# Patient Record
Sex: MALE | Race: WHITE | Employment: OTHER | URBAN - METROPOLITAN AREA
[De-identification: names, ages, dates, MRNs, and addresses within clinical notes are randomized per-mention and may not be internally consistent; named-entity substitution may affect disease eponyms.]

---

## 2017-01-02 ENCOUNTER — TELEPHONE (OUTPATIENT)
Dept: FAMILY MEDICINE | Facility: CLINIC | Age: 82
End: 2017-01-02

## 2017-01-02 ENCOUNTER — ANTICOAGULATION THERAPY VISIT (OUTPATIENT)
Dept: FAMILY MEDICINE | Facility: CLINIC | Age: 82
End: 2017-01-02
Payer: COMMERCIAL

## 2017-01-02 DIAGNOSIS — Z79.01 LONG-TERM (CURRENT) USE OF ANTICOAGULANTS: ICD-10-CM

## 2017-01-02 DIAGNOSIS — I48.91 ATRIAL FIBRILLATION (H): Primary | ICD-10-CM

## 2017-01-02 LAB — INR PPP: 3.5

## 2017-01-02 PROCEDURE — 99207 ZZC NO CHARGE NURSE ONLY: CPT | Performed by: FAMILY MEDICINE

## 2017-01-02 NOTE — TELEPHONE ENCOUNTER
ED / Discharge Outreach Protocol    Patient Contact    Attempt # 1    Was call answered?  No.  Left message on voicemail with information to speak with triage note.  Discharge note sounds like the patient went to a nursing home. Please confirm.      Follow-up Appointments      Follow Up and recommended labs and tests        Follow up with Nursing home physician.  No follow up labs or test are needed.  Will need to ensure he is having improvement in his symptoms of knee pain and ability to ambulate.  Will need to ensure follow up with Orthopedics.  Follow up with Ortho - Dr. Zuniga in 1-2 weeks.                Brandy Miller RN

## 2017-01-02 NOTE — PROGRESS NOTES
ANTICOAGULATION FOLLOW-UP CLINIC VISIT    Patient Name:  Earl Sanchez  Date:  1/2/2017  Contact Type:  Telephone/ [facility], [place], [phone]    SUBJECTIVE:     Patient Findings     Positives No Problem Findings    Comments Patient recently discharged from a nursing home to [facility].            OBJECTIVE    INR   Date Value Ref Range Status   01/02/2017 3.5  Final       ASSESSMENT / PLAN  INR assessment SUPRA    Recheck INR In: 1 WEEK    INR Location Homecare INR      Anticoagulation Summary as of 1/2/2017     INR goal 2.0-3.0   Selected INR 3.5! (1/2/2017)   Maintenance plan 4 mg (2 mg x 2) on Mon, Wed, Fri; 2 mg (2 mg x 1) all other days   Full instructions 1/4: 2 mg; Otherwise 4 mg on Mon, Wed, Fri; 2 mg all other days   Weekly total 20 mg   Plan last modified Brandy Miller RN (3/11/2016)   Next INR check 1/9/2017   Priority INR   Target end date Indefinite    Indications   Long-term (current) use of anticoagulants [Z79.01] [Z79.01]  Atrial fibrillation (H) [I48.91]         Anticoagulation Episode Summary     INR check location     Preferred lab     Send INR reminders to [facility]    Comments       Anticoagulation Care Providers     Provider Role Specialty Phone number    Alvarado Florian MD [facility] [phone]            See the Encounter Report to view Anticoagulation Flowsheet and Dosing Calendar (Go to Encounters tab in chart review, and find the Anticoagulation Therapy Visit)    Dosage adjustment made based on physician directed care plan.    Brandy Miller, RN

## 2017-01-02 NOTE — TELEPHONE ENCOUNTER
Patient returned call to clinic but hung up before call connected.     Tried to call patient back but got voice mail.     KENDRICK Delgado

## 2017-01-02 NOTE — TELEPHONE ENCOUNTER
Pt was discharged on 12/30/2016 after being treated for a tear of the meniscusof the L knee. Please call the pt. Thank you.  Naya Esposito,     .

## 2017-01-02 NOTE — TELEPHONE ENCOUNTER
Home care called asking for orders    opened to home care on 12/31/16    Needs INR today. Different nurse will see today and call with result.     Verbal ok on skilled nursing 1 X week for 1 week, 2 X week for 2 weeks and 1 X week for 2 weeks and 2 prn visits     Asked verbal order for INR draw for home care today    PT/OT and home health aid 2 X week for 2 weeks    Approval given to fax orders for yanelis Delgado RN

## 2017-01-03 NOTE — TELEPHONE ENCOUNTER
Patient has hospital f/u scheduled 1/5/17.   Minal Daley RN   [facility] - Triage

## 2017-01-05 ENCOUNTER — OFFICE VISIT (OUTPATIENT)
Dept: FAMILY MEDICINE | Facility: CLINIC | Age: 82
End: 2017-01-05
Payer: COMMERCIAL

## 2017-01-05 VITALS
TEMPERATURE: 97.8 F | HEART RATE: 80 BPM | DIASTOLIC BLOOD PRESSURE: 70 MMHG | WEIGHT: 155 LBS | BODY MASS INDEX: 23.49 KG/M2 | HEIGHT: 68 IN | SYSTOLIC BLOOD PRESSURE: 110 MMHG

## 2017-01-05 DIAGNOSIS — S83.209D CURRENT TEAR OF MENISCUS OF KNEE, UNSPECIFIED LATERALITY, UNSPECIFIED MENISCUS, UNSPECIFIED TEAR TYPE, SUBSEQUENT ENCOUNTER: ICD-10-CM

## 2017-01-05 DIAGNOSIS — R53.81 PHYSICAL DECONDITIONING: Primary | ICD-10-CM

## 2017-01-05 PROCEDURE — 99214 OFFICE O/P EST MOD 30 MIN: CPT | Performed by: FAMILY MEDICINE

## 2017-01-05 NOTE — PROGRESS NOTES
SUBJECTIVE:                                                    Earl Sanchez is a 89 year old male who presents to clinic today for the following health issues:          Hospital Follow-up Visit:    Hospital/Nursing Home/ Rehab Facility: [facility]  Date of Admission: 12/17/16  Date of Discharge: 12/29/16  Reason(s) for Admission: Torn meniscus            Problems taking medications regularly:  None       Medication changes since discharge: None       Problems adhering to non-medication therapy:  None    Summary of hospitalization:  [facility] discharge summary reviewed  Diagnostic Tests/Treatments reviewed.  Follow up needed: none  Other Healthcare Providers Involved in Patient s Care:         None  Update since discharge: improved.     Post Discharge Medication Reconciliation: discharge medications reconciled, continue medications without change.  Plan of care communicated with patient     Coding guidelines for this visit:  Type of Medical   Decision Making Face-to-Face Visit       within 7 Days of discharge Face-to-Face Visit        within 14 days of discharge   Moderate Complexity [id number]   High Complexity [id number]                Problem list and histories reviewed & adjusted, as indicated.  Additional history: as documented    Patient Active Problem List   Diagnosis     Hypertension goal BP (blood pressure) < 140/90     CAD (coronary artery disease)     Osteoarthritis     Gait apraxia     Atrial fibrillation (H)     BPH (benign prostatic hyperplasia)     Heart failure (H)     Hyperlipidemia with target LDL less than 100     CKD (chronic kidney disease) stage 3, GFR 30-59 ml/min     Microalbuminuria     Anemia     Hypothyroidism     Elevated alkaline phosphatase level     Advance care planning     Health Care Home     Chronic anticoagulation     Elevated fasting glucose     Toe inflammation     Elevated uric acid in blood     Gastritis     Abdominal aortic aneurysm (H)      Advanced directives, counseling/discussion     Long-term (current) use of anticoagulants [Z79.01]     Hereditary multiple exostosis     General weakness     Tear of meniscus of left knee     Past Surgical History   Procedure Laterality Date     Hernia repair, inguinal rt/lt       right     Ankle surgery       ORIF ankle fracture     Phacoemulsification clear cornea with toric intraocular lens implant  4/10/2014     Procedure: PHACOEMULSIFICATION CLEAR CORNEA WITH TORIC INTRAOCULAR LENS IMPLANT;  RIGHT PHACOEMULSIFICATION CLEAR CORNEA WITH TORIC INTRAOCULAR LENS IMPLANT ;  Surgeon: Carlo Tee MD;  Location: [facility]     Phacoemulsification clear cornea with toric intraocular lens implant  4/22/2014     Procedure: PHACOEMULSIFICATION CLEAR CORNEA WITH TORIC INTRAOCULAR LENS IMPLANT;  Surgeon: Carlo Tee MD;  Location: [facility]     Coronary artery bypass  2007     CAB X 5 CABG with LIMA to LAD and saphenous vein grafts to, OM2,SVG to diag 1, and sequential PDA     Heart cath left heart cath  12/10/07       Social History   Substance Use Topics     Smoking status: Never Smoker      Smokeless tobacco: Never Used     Alcohol Use: Yes      Comment: occ - one glass of wine last night     Family History   Problem Relation Age of Onset     Hypertension Mother      Hypertension Maternal Grandmother          Current Outpatient Prescriptions   Medication Sig Dispense Refill     Warfarin Therapy Reminder 1 each continuous prn       senna-docusate (SENOKOT-S;PERICOLACE) 8.6-50 MG per tablet Take 1 tablet by mouth 2 times daily       acetaminophen (TYLENOL) 325 MG tablet Take 650 mg by mouth 3 times daily       acetaminophen (TYLENOL) 500 MG tablet Take 1,000 mg by mouth nightly as needed for mild pain       furosemide (LASIX) 40 MG tablet TAKE 1 TABLET IN THE MORNING 180 tablet 1     levothyroxine (SYNTHROID, LEVOTHROID) 50 MCG tablet Take 1 tablet (50 mcg) by mouth daily 90 tablet 2     doxazosin (CARDURA) 4  MG tablet Take 1 tablet (4 mg) by mouth At Bedtime 90 tablet 1     lisinopril (PRINIVIL,ZESTRIL) 10 MG tablet Take 0.5 tablets (5 mg) by mouth daily 45 tablet 1     pravastatin (PRAVACHOL) 40 MG tablet Take 1 tablet (40 mg) by mouth daily 90 tablet 3     nitroglycerin (NITROSTAT) 0.4 MG SL tablet Place 1 tablet (0.4 mg) under the tongue every 5 minutes as needed for chest pain 25 tablet 0     aspirin (ASPIRIN LOW DOSE) 81 MG tablet Take 81 mg by mouth daily        No Known Allergies  Recent Labs   Lab Test 12/20/16  12/15/16   1330  05/11/16   1441  03/22/16   1406  06/11/15   1039  05/14/15   1059  04/16/15   1039  02/05/15   1344   04/08/14   1052  02/03/14   0917   03/12/13   1018   08/28/12   0911   A1C   --    --    --    --    --    --    --   5.9   --   6.2*   --    --   5.6   --    --    LDL   --    --    --   43   --    --   47   --    --    --   61   --    --    --   65   HDL   --    --    --   51   --    --   55   --    --    --   54   --    --    --   35*   TRIG   --    --    --   135   --    --   89   --    --    --   71   --    --    --   88   ALT   --    --    --    --   17   --    --    --    --    --   18   --    --    --   21   CR  1.23*  1.23   --    --   1.62*   --    --   1.60*   < >   --    --    < >  1.68*   < >  1.37*   GFRESTIMATED  51*  55*   --    --   40*   --    --   41*   < >   --    --    < >  39*   < >  49*   GFRESTBLACK  >60  67   --    --   49*   --    --   50*   < >   --    --    < >  47*   < >  60*   POTASSIUM  4.1  3.9   --    --   3.9   --    --   4.3   < >  4.1   --    < >  4.2   < >  4.2   TSH   --    --   1.85   --    --   2.79   --    --    < >  4.74   --    --   2.71   < >  9.78*    < > = values in this interval not displayed.      BP Readings from Last 3 Encounters:   01/05/17 110/70   12/28/16 140/76   12/18/16 117/66    Wt Readings from Last 3 Encounters:   01/05/17 155 lb (70.308 kg)   12/28/16 158 lb 6.4 oz (71.85 kg)   12/18/16 160 lb 11.2 oz (72.893 kg)           "          ROS:  Constitutional, HEENT, cardiovascular, pulmonary, gi and gu systems are negative, except as otherwise noted.    OBJECTIVE:                                                    /70 mmHg  Pulse 80  Temp(Src) 97.8  F (36.6  C) (Tympanic)  Ht 5' 8\" (1.727 m)  Wt 155 lb (70.308 kg)  BMI 23.57 kg/m2  Body mass index is 23.57 kg/(m^2).  GENERAL: healthy, alert and no distress  NECK: no adenopathy, no asymmetry, masses, or scars and thyroid normal to palpation  RESP: lungs clear to auscultation - no rales, rhonchi or wheezes  CV: regular rate and rhythm, normal S1 S2, no S3 or S4, no murmur, click or rub, no peripheral edema and peripheral pulses strong  ABDOMEN: soft, nontender, no hepatosplenomegaly, no masses and bowel sounds normal  MS: no gross musculoskeletal defects noted, no edema         ASSESSMENT/PLAN:                                                    Earl was seen today for recheck.    Diagnoses and all orders for this visit:    Physical deconditioning    Current tear of meniscus of knee, unspecified laterality, unspecified meniscus, unspecified tear type, subsequent encounter      Improving knee pain, finishing PT, I&O is stable,   Will keep him on current dose of meds, and keep monitoring     FUTURE APPOINTMENTS:       - Follow-up visit in 6 months     Alvarado Florian MD  [facility] [place]    "

## 2017-01-09 ENCOUNTER — ANTICOAGULATION THERAPY VISIT (OUTPATIENT)
Dept: NURSING | Facility: CLINIC | Age: 82
End: 2017-01-09

## 2017-01-09 DIAGNOSIS — I48.91 ATRIAL FIBRILLATION (H): Primary | ICD-10-CM

## 2017-01-09 DIAGNOSIS — Z79.01 LONG-TERM (CURRENT) USE OF ANTICOAGULANTS: ICD-10-CM

## 2017-01-09 LAB — INR PPP: 3.1

## 2017-01-09 NOTE — PROGRESS NOTES
ANTICOAGULATION FOLLOW-UP CLINIC VISIT    Patient Name:  Earl Sanchez  Date:  1/9/2017  Contact Type:  Telephone/ [facility], [place], [phone]    SUBJECTIVE:     Patient Findings     Positives No Problem Findings           OBJECTIVE    INR   Date Value Ref Range Status   01/09/2017 3.1  Final       ASSESSMENT / PLAN  INR assessment THER    Recheck INR In: 1 WEEK    INR Location [facility]R      Anticoagulation Summary as of 1/9/2017     INR goal 2.0-3.0   Selected INR 3.1! (1/9/2017)   Maintenance plan 4 mg (2 mg x 2) on Mon, Fri; 2 mg (2 mg x 1) all other days   Full instructions 4 mg on Mon, Fri; 2 mg all other days   Weekly total 18 mg   Plan last modified Brandy Miller RN (1/9/2017)   Next INR check 1/16/2017   Priority INR   Target end date Indefinite    Indications   Long-term (current) use of anticoagulants [Z79.01] [Z79.01]  Atrial fibrillation (H) [I48.91]         Anticoagulation Episode Summary     INR check location     Preferred lab     Send INR reminders to [facility]    Comments       Anticoagulation Care Providers     Provider Role Specialty Phone number    Alvarado Florian MD [facility] [phone]            See the Encounter Report to view Anticoagulation Flowsheet and Dosing Calendar (Go to Encounters tab in chart review, and find the Anticoagulation Therapy Visit)    Dosage adjustment made based on physician directed care plan.    Brandy Miller, KENDRICK

## 2017-01-16 ENCOUNTER — ANTICOAGULATION THERAPY VISIT (OUTPATIENT)
Dept: FAMILY MEDICINE | Facility: CLINIC | Age: 82
End: 2017-01-16
Payer: COMMERCIAL

## 2017-01-16 DIAGNOSIS — Z79.01 LONG-TERM (CURRENT) USE OF ANTICOAGULANTS: ICD-10-CM

## 2017-01-16 DIAGNOSIS — I48.91 ATRIAL FIBRILLATION (H): Primary | ICD-10-CM

## 2017-01-16 LAB — INR PPP: 2.5

## 2017-01-16 PROCEDURE — 99207 ZZC NO CHARGE NURSE ONLY: CPT | Performed by: FAMILY MEDICINE

## 2017-01-16 NOTE — PROGRESS NOTES
ANTICOAGULATION FOLLOW-UP CLINIC VISIT    Patient Name:  Earl Sanchez  Date:  1/16/2017  Contact Type:  Telephone/ YAMILETH Eng [phone]    SUBJECTIVE:     Patient Findings     Positives No Problem Findings           OBJECTIVE    INR   Date Value Ref Range Status   01/09/2017 3.1  Final       ASSESSMENT / PLAN  INR assessment THER    Recheck INR In: 2 WEEKS    INR Location Clinic      Anticoagulation Summary as of 1/16/2017     INR goal 2.0-3.0   Selected INR    Maintenance plan 4 mg (2 mg x 2) on Mon, Fri; 2 mg (2 mg x 1) all other days   Full instructions 4 mg on Mon, Fri; 2 mg all other days   Weekly total 18 mg   No change documented Ellen Negro RN   Plan last modified Brandy Miller RN (1/9/2017)   Next INR check 1/30/2017   Priority INR   Target end date Indefinite    Indications   Long-term (current) use of anticoagulants [Z79.01] [Z79.01]  Atrial fibrillation (H) [I48.91]         Anticoagulation Episode Summary     INR check location     Preferred lab     Send INR reminders to  [facility]    Comments       Anticoagulation Care Providers     Provider Role Specialty Phone number    Alvarado Florian MD [facility] [phone]            See the Encounter Report to view Anticoagulation Flowsheet and Dosing Calendar (Go to Encounters tab in chart review, and find the Anticoagulation Therapy Visit)    YAMILETH Eng  [phone] report INR of 2.5 today.    Advised to continue with 4 mg on Mon, Fri,  2 mg all other days.  Recheck in 2 weeks in clinic.  Patient will call clinic to schedule.    Dosage adjustment made based on physician directed care plan.    Ellen Negro, RN

## 2017-01-24 ENCOUNTER — OFFICE VISIT (OUTPATIENT)
Dept: FAMILY MEDICINE | Facility: CLINIC | Age: 82
End: 2017-01-24
Payer: COMMERCIAL

## 2017-01-24 VITALS
WEIGHT: 155 LBS | SYSTOLIC BLOOD PRESSURE: 104 MMHG | DIASTOLIC BLOOD PRESSURE: 60 MMHG | BODY MASS INDEX: 23.49 KG/M2 | HEART RATE: 74 BPM | HEIGHT: 68 IN | TEMPERATURE: 97.6 F

## 2017-01-24 DIAGNOSIS — E79.0 HIGH LEVEL OF URIC ACID IN BLOOD: Primary | ICD-10-CM

## 2017-01-24 DIAGNOSIS — M25.473 ANKLE SWELLING, UNSPECIFIED LATERALITY: ICD-10-CM

## 2017-01-24 DIAGNOSIS — E79.0 ELEVATED URIC ACID IN BLOOD: ICD-10-CM

## 2017-01-24 LAB — URATE SERPL-MCNC: 7.7 MG/DL (ref 3.5–7.2)

## 2017-01-24 PROCEDURE — 36415 COLL VENOUS BLD VENIPUNCTURE: CPT | Performed by: FAMILY MEDICINE

## 2017-01-24 PROCEDURE — 84550 ASSAY OF BLOOD/URIC ACID: CPT | Performed by: FAMILY MEDICINE

## 2017-01-24 PROCEDURE — 99213 OFFICE O/P EST LOW 20 MIN: CPT | Performed by: FAMILY MEDICINE

## 2017-01-24 NOTE — NURSING NOTE
"Chief Complaint   Patient presents with     Arthritis       Initial /60 mmHg  Pulse 74  Temp(Src) 97.6  F (36.4  C) (Tympanic)  Ht 5' 8\" (1.727 m)  Wt 155 lb (70.308 kg)  BMI 23.57 kg/m2 Estimated body mass index is 23.57 kg/(m^2) as calculated from the following:    Height as of this encounter: 5' 8\" (1.727 m).    Weight as of this encounter: 155 lb (70.308 kg).  BP completed using cuff size: armando Hooks CMA    "

## 2017-01-24 NOTE — PROGRESS NOTES
SUBJECTIVE:                                                    Earl Sanchez is a 89 year old male who presents to clinic today for the following health issues:      Concern - arthritis     Onset: has been different since last friday     Description:   Both feet were achy, Currently right ankle is still painful and swollen    Intensity: mild    Progression of Symptoms:  same    Accompanying Signs & Symptoms:  See above       Previous history of similar problem:       Precipitating factors:   Worsened by:     Alleviating factors:  Improved by:        Therapies Tried and outcome: tylenol          Problem list and histories reviewed & adjusted, as indicated.  Additional history: as documented    Patient Active Problem List   Diagnosis     Hypertension goal BP (blood pressure) < 140/90     CAD (coronary artery disease)     Osteoarthritis     Gait apraxia     Atrial fibrillation (H)     BPH (benign prostatic hyperplasia)     Heart failure (H)     Hyperlipidemia with target LDL less than 100     CKD (chronic kidney disease) stage 3, GFR 30-59 ml/min     Microalbuminuria     Anemia     Hypothyroidism     Elevated alkaline phosphatase level     Advance care planning     Health Care Home     Chronic anticoagulation     Elevated fasting glucose     Toe inflammation     Elevated uric acid in blood     Gastritis     Abdominal aortic aneurysm (H)     Advanced directives, counseling/discussion     Long-term (current) use of anticoagulants [Z79.01]     Hereditary multiple exostosis     General weakness     Tear of meniscus of left knee     Past Surgical History   Procedure Laterality Date     Hernia repair, inguinal rt/lt       right     Ankle surgery       ORIF ankle fracture     Phacoemulsification clear cornea with toric intraocular lens implant  4/10/2014     Procedure: PHACOEMULSIFICATION CLEAR CORNEA WITH TORIC INTRAOCULAR LENS IMPLANT;  RIGHT PHACOEMULSIFICATION CLEAR CORNEA WITH TORIC INTRAOCULAR LENS IMPLANT ;   Surgeon: Carlo Tee MD;  Location: [facility]     Phacoemulsification clear cornea with toric intraocular lens implant  4/22/2014     Procedure: PHACOEMULSIFICATION CLEAR CORNEA WITH TORIC INTRAOCULAR LENS IMPLANT;  Surgeon: Carlo Tee MD;  Location: [facility]     Coronary artery bypass  2007     CAB X 5 CABG with LIMA to LAD and saphenous vein grafts to, OM2,SVG to diag 1, and sequential PDA     Heart cath left heart cath  12/10/07       Social History   Substance Use Topics     Smoking status: Never Smoker      Smokeless tobacco: Never Used     Alcohol Use: Yes      Comment: occ - one glass of wine last night     Family History   Problem Relation Age of Onset     Hypertension Mother      Hypertension Maternal Grandmother          Current Outpatient Prescriptions   Medication Sig Dispense Refill     Warfarin Therapy Reminder 1 each continuous prn       acetaminophen (TYLENOL) 325 MG tablet Take 650 mg by mouth 3 times daily       acetaminophen (TYLENOL) 500 MG tablet Take 1,000 mg by mouth nightly as needed for mild pain       furosemide (LASIX) 40 MG tablet TAKE 1 TABLET IN THE MORNING 180 tablet 1     levothyroxine (SYNTHROID, LEVOTHROID) 50 MCG tablet Take 1 tablet (50 mcg) by mouth daily 90 tablet 2     doxazosin (CARDURA) 4 MG tablet Take 1 tablet (4 mg) by mouth At Bedtime 90 tablet 1     lisinopril (PRINIVIL,ZESTRIL) 10 MG tablet Take 0.5 tablets (5 mg) by mouth daily 45 tablet 1     pravastatin (PRAVACHOL) 40 MG tablet Take 1 tablet (40 mg) by mouth daily 90 tablet 3     nitroglycerin (NITROSTAT) 0.4 MG SL tablet Place 1 tablet (0.4 mg) under the tongue every 5 minutes as needed for chest pain 25 tablet 0     aspirin (ASPIRIN LOW DOSE) 81 MG tablet Take 81 mg by mouth daily        No Known Allergies  Recent Labs   Lab Test 12/20/16  12/15/16   1330  05/11/16   1441  03/22/16   1406  06/11/15   1039  05/14/15   1059  04/16/15   1039  02/05/15   1344   04/08/14   1052  02/03/14   0917    "03/12/13   1018   08/28/12   0911   A1C   --    --    --    --    --    --    --   5.9   --   6.2*   --    --   5.6   --    --    LDL   --    --    --   43   --    --   47   --    --    --   61   --    --    --   65   HDL   --    --    --   51   --    --   55   --    --    --   54   --    --    --   35*   TRIG   --    --    --   135   --    --   89   --    --    --   71   --    --    --   88   ALT   --    --    --    --   17   --    --    --    --    --   18   --    --    --   21   CR  1.23*  1.23   --    --   1.62*   --    --   1.60*   < >   --    --    < >  1.68*   < >  1.37*   GFRESTIMATED  51*  55*   --    --   40*   --    --   41*   < >   --    --    < >  39*   < >  49*   GFRESTBLACK  >60  67   --    --   49*   --    --   50*   < >   --    --    < >  47*   < >  60*   POTASSIUM  4.1  3.9   --    --   3.9   --    --   4.3   < >  4.1   --    < >  4.2   < >  4.2   TSH   --    --   1.85   --    --   2.79   --    --    < >  4.74   --    --   2.71   < >  9.78*    < > = values in this interval not displayed.      BP Readings from Last 3 Encounters:   01/24/17 104/60   01/05/17 110/70   12/28/16 140/76    Wt Readings from Last 3 Encounters:   01/24/17 155 lb (70.308 kg)   01/05/17 155 lb (70.308 kg)   12/28/16 158 lb 6.4 oz (71.85 kg)                    ROS:  Constitutional, HEENT, cardiovascular, pulmonary, gi and gu systems are negative, except as otherwise noted.    OBJECTIVE:                                                    /60 mmHg  Pulse 74  Temp(Src) 97.6  F (36.4  C) (Tympanic)  Ht 5' 8\" (1.727 m)  Wt 155 lb (70.308 kg)  BMI 23.57 kg/m2  Body mass index is 23.57 kg/(m^2).  GENERAL: healthy, alert and no distress  NECK: no adenopathy, no asymmetry, masses, or scars and thyroid normal to palpation  RESP: lungs clear to auscultation - no rales, rhonchi or wheezes  CV: regular rate and rhythm, normal S1 S2, no S3 or S4, no murmur, click or rub, no peripheral edema and peripheral pulses strong  ABDOMEN: " soft, nontender, no hepatosplenomegaly, no masses and bowel sounds normal  MS: no gross musculoskeletal defects noted, no edema         ASSESSMENT/PLAN:                                                    Earl was seen today for arthritis.    Diagnoses and all orders for this visit:    High level of uric acid in blood  -     Uric acid    Ankle swelling, unspecified laterality  -     Uric acid      Has sudden swelling on bilateral ankle with tenderness, has no erythema, limited ROM with pain and swelling  Has no h/o trauma,  Has been having high uric acid, has been taking lasix, will recheck lab and consider resume allopurinol         Alvarado Florian MD  [facility] [place]

## 2017-01-25 RX ORDER — ALLOPURINOL 100 MG/1
100 TABLET ORAL 2 TIMES DAILY
Qty: 90 TABLET | Refills: 1 | Status: SHIPPED | OUTPATIENT
Start: 2017-01-25 | End: 2017-11-29

## 2017-01-26 ENCOUNTER — CARE COORDINATION (OUTPATIENT)
Dept: CASE MANAGEMENT | Facility: CLINIC | Age: 82
End: 2017-01-26

## 2017-01-26 DIAGNOSIS — N18.30 CKD (CHRONIC KIDNEY DISEASE) STAGE 3, GFR 30-59 ML/MIN (H): ICD-10-CM

## 2017-01-26 DIAGNOSIS — E03.9 HYPOTHYROIDISM, UNSPECIFIED TYPE: Primary | ICD-10-CM

## 2017-01-26 DIAGNOSIS — E78.5 HYPERLIPIDEMIA WITH TARGET LDL LESS THAN 100: ICD-10-CM

## 2017-01-26 DIAGNOSIS — Z79.01 CHRONIC ANTICOAGULATION: Primary | ICD-10-CM

## 2017-01-26 DIAGNOSIS — I48.20 CHRONIC ATRIAL FIBRILLATION (H): ICD-10-CM

## 2017-01-26 DIAGNOSIS — N40.1 BENIGN NON-NODULAR PROSTATIC HYPERPLASIA WITH LOWER URINARY TRACT SYMPTOMS: ICD-10-CM

## 2017-01-26 DIAGNOSIS — R60.0 EDEMA, PERIPHERAL: ICD-10-CM

## 2017-01-26 DIAGNOSIS — N40.0 BENIGN NON-NODULAR PROSTATIC HYPERPLASIA WITHOUT LOWER URINARY TRACT SYMPTOMS: ICD-10-CM

## 2017-01-26 RX ORDER — PRAVASTATIN SODIUM 40 MG
40 TABLET ORAL DAILY
Qty: 90 TABLET | Refills: 0 | Status: SHIPPED | OUTPATIENT
Start: 2017-01-26 | End: 2017-08-28

## 2017-01-26 RX ORDER — WARFARIN SODIUM 2 MG/1
TABLET ORAL
Qty: 20 TABLET | Refills: 0 | Status: SHIPPED | OUTPATIENT
Start: 2017-01-26 | End: 2017-02-01

## 2017-01-26 RX ORDER — FUROSEMIDE 40 MG
TABLET ORAL
Qty: 180 TABLET | Refills: 1 | Status: SHIPPED | OUTPATIENT
Start: 2017-01-26 | End: 2017-06-16

## 2017-01-26 RX ORDER — LISINOPRIL 10 MG/1
5 TABLET ORAL DAILY
Qty: 45 TABLET | Refills: 1 | Status: SHIPPED | OUTPATIENT
Start: 2017-01-26 | End: 2017-08-28

## 2017-01-26 RX ORDER — LEVOTHYROXINE SODIUM 50 UG/1
50 TABLET ORAL DAILY
Qty: 90 TABLET | Refills: 1 | Status: SHIPPED | OUTPATIENT
Start: 2017-01-26 | End: 2017-08-28

## 2017-01-26 RX ORDER — DOXAZOSIN 4 MG/1
4 TABLET ORAL AT BEDTIME
Qty: 90 TABLET | Refills: 3 | Status: SHIPPED | OUTPATIENT
Start: 2017-01-26 | End: 2019-09-15

## 2017-01-26 NOTE — TELEPHONE ENCOUNTER
levothyroxine (SYNTHROID, LEVOTHROID) 50 MCG tablet     Last Written Prescription Date: 8/2/16  Last Quantity: 90, # refills: 2  Last Office Visit with [facility], [facility] or [facility] prescribing provider: 1/25/17        TSH   Date Value Ref Range Status   05/11/2016 1.85 0.40 - 4.00 mU/L Final       pravastatin (PRAVACHOL) 40 MG tablet     Last Written Prescription Date: 4/12/16  Last Fill Quantity: 90, # refills: 3  Last Office Visit with [facility], [facility] or [facility] prescribing provider: 1/25/17       CHOL      121   3/22/2016  HDL       51   3/22/2016  LDL       43   3/22/2016  TRIG      135   3/22/2016  CHOLHDLRATIO      2.2   4/16/2015      furosemide (LASIX) 40 MG tablet      Last Written Prescription Date: 8/23/16  Last Fill Quantity: 180, # refills: 1  Last Office Visit with [facility], [facility] or [facility] prescribing provider: 1/27/17        lisinopril (PRINIVIL,ZESTRIL) 10 MG tablet      Last Written Prescription Date: 7/26/16  Last Fill Quantity: 45, # refills: 1  Last Office Visit with [facility], [facility] or [facility] prescribing provider: 1/27/17       POTASSIUM   Date Value Ref Range Status   12/20/2016 4.1 3.5 - 5.2 mmol/L Final     CREATININE   Date Value Ref Range Status   12/20/2016 1.23* 0.73 - 1.18 mg/dL Final     BP Readings from Last 3 Encounters:   01/24/17 104/60   01/05/17 110/70   12/28/16 140/76          POTASSIUM   Date Value Ref Range Status   12/20/2016 4.1 3.5 - 5.2 mmol/L Final     CREATININE   Date Value Ref Range Status   12/20/2016 1.23* 0.73 - 1.18 mg/dL Final     BP Readings from Last 3 Encounters:   01/24/17 104/60   01/05/17 110/70   12/28/16 140/76     Warfarin    Last Written Prescription Date: ?  Last Fill Qty: ?, # refills: ?  Last Office Visit with [facility], [facility] or [facility] prescribing provider: 1/27/17       Date and Result of Last PT/INR:   INR      2.5   1/16/2017  INR      3.1   1/9/2017  INR      1.8   11/17/2016  INR      2.1   10/6/2016       .  doxazosin (CARDURA) 4 MG tablet      Last Written Prescription  Date: 7/26/16  Last Fill Quantity: 90, # refills: 1    Last Office Visit with [facility] or [facility] prescribing provider:  1/27/17   Future Office Visit:        BP Readings from Last 3 Encounters:   01/24/17 104/60   01/05/17 110/70   12/28/16 140/76       Yun OLIVER

## 2017-01-26 NOTE — TELEPHONE ENCOUNTER
Routing refill request to provider for review/approval because:  Labs out of range:  Cr      Amina SamuelsRN  [facility]  [phone]

## 2017-01-26 NOTE — PROGRESS NOTES
Clinic Care Coordination Contact  Care Team Conversations    Per [facility] and Epic patient is still receiving services from [facility].    Plan:  Secure e-mail sent to home care , Albertina Rodríguez RN, asking for progress update.  Will wait update from Albertina.  RN CC will check on home care status in 2-3 weeks.    TACO Mitchell, RN, PHN  [facility] Care Coordinator  [phone]  January 26, 2017

## 2017-01-30 NOTE — PROGRESS NOTES
Clinic Care Coordination Contact  Care Team Conversations    Update received from Albertina that patient was discharged from [facility] on 1/26/2017.    Plan: RN CC will follow up with patient this week.    TACO Mitchell, RN, PHN  [facility] Care Coordinator  [phone]  January 30, 2017

## 2017-02-01 ENCOUNTER — TELEPHONE (OUTPATIENT)
Dept: FAMILY MEDICINE | Facility: CLINIC | Age: 82
End: 2017-02-01

## 2017-02-01 DIAGNOSIS — I48.20 CHRONIC ATRIAL FIBRILLATION (H): ICD-10-CM

## 2017-02-01 DIAGNOSIS — Z79.01 CHRONIC ANTICOAGULATION: Primary | ICD-10-CM

## 2017-02-01 RX ORDER — WARFARIN SODIUM 2 MG/1
TABLET ORAL
Qty: 135 TABLET | Refills: 0 | Status: SHIPPED | OUTPATIENT
Start: 2017-02-01 | End: 2017-06-14

## 2017-02-01 NOTE — TELEPHONE ENCOUNTER
Patient almost out-would like this sent to [facility] (pended)      warfarin (COUMADIN) 2 MG tablet    Last Written Prescription Date: 1/26/17  Last Fill Qty: 20, # refills: 0  Last Office Visit with [facility], P or [facility] prescribing provider: 1/25/17       Date and Result of Last PT/INR:   INR      2.5   1/16/2017  INR      3.1   1/9/2017  INR      1.8   11/17/2016  INR      2.1   10/6/2016     Yun OLIVER

## 2017-02-02 ENCOUNTER — DOCUMENTATION ONLY (OUTPATIENT)
Dept: CARE COORDINATION | Facility: CLINIC | Age: 82
End: 2017-02-02

## 2017-02-02 NOTE — PROGRESS NOTES
OT Reassess/DC Note    Patient is home bound secondary to weakness, pain and need for assist to leave home.     SUBJECTIVE    Upcoming Appointments/Final Instructions and Education//  pt must sit on end of bed for doffing clothing for his shower, as well as sitting there for dressing, which is his already good habit.     OBJECTIVE Changes and progress since initial eval noted in following areas    BP    sitting  102/65   standing  119/76       HR        73  O2 Sats  96    Mobility Tool Score//  0    Functional mobility//  demos mod I with all transfers using equip appropriately. Uses grab bar for shower and placing mat down/up, using grab bar for toilet. Mod I for supine, sit/stand from bed.     Skin Integrity/Edema//  no issues    Continence//  wears incont products day/night for accidents    ADLs// Pt able to demo compliance with changing his routine to sit at end of bed for removing clothing for shower vs standing in closet and trying to get pants off when he is standing up/placing him at high risk for falls. Today he shows how he is now sitting, admits that his dtr discussed this with him after OT call last week and that she got him a new mat and he really likes it. Demos safety with setting mat on/off shower seat/holding onto grab bar. Pt demos safe with mod I for showering routine, dressing post shower and for completing toileting, grooming. No concerns noted on admit for toileting, grooming.     IADLs//  pt demos understanding of his meds/med set up and has supervision/cues to take meds, but has not needed cues. Pt has assist for all other IADLs.     DME//  walker,  handicap accessible bathroom    Cognition//  Pt A/O x4, consistently. Pt showed need for repetition with education for walker safety during ADLs/changing showering/dressing routine, but with repetition he shows learning and carryover as he is motivated to remain safe. Pt has been quite clear about meds/per OT and SN assessment/ and his safety  needs.     HHA Plan of Care//  discharged    Skilled services provided today//  OT completes reassessment of ADL/IADL safety and educates pt on shower routine modifications for increased falls prevention.     ASSESSMENT/SUMMARY/Justification for DC. OT had recommended pt obtain a new, small rubber bottom mat so it is safer for him to manage up/down and for him to sit for all U/LB dressing and OT had called pts dtr to educate her on recommendation to help reinforce changes, as well as getting him the mat/she has done all to support her father. He showed OT today how he has changed his routine. Pt has participated with 4/4 OT visits for POC to address goals below, he has met goals and is appropriate to discharge following todays visit.     FOR AGENCY ID, SUMMARY OF DISCIPLINES INVOLVED IN THIS EPISODE OF CARE  Pt has also participated with 1 PT visit and 7 SN visits with goals met for each.   PT goals met during initial visit/  1/ Pt ind with mobility in building with 4ww.  MET  2/ Pt ind with HEP.  MET  3/ Pt verbalizes ind with falls prevention.   MET  SN goals met/  1. Assess for pain, and Tylenol use.   MET  2. Assess for med compliance, is setting up medi boxes and able to teach back, using one boc for whole day.   MET    OT Initial Goals readdressed//  To be met 3 weeks/  1. Pt to demo I carryover of 3 walker/transfer safety strategies for falls risk reduction.  MET  2. Pt/cgs to be educated on ADL/IADL safety recommendations based upon ADL performance and cognitive screening/observation.  MET  3. Pt to be linked with necessary AE as/if needed for optimal safety with ADLs.   MET    FOR WINNIE ID, Episode Timeframe SOC Date   12/31/2016           DC Date   01/26/2017

## 2017-02-07 ENCOUNTER — ANTICOAGULATION THERAPY VISIT (OUTPATIENT)
Dept: NURSING | Facility: CLINIC | Age: 82
End: 2017-02-07
Payer: COMMERCIAL

## 2017-02-07 DIAGNOSIS — Z79.01 LONG-TERM (CURRENT) USE OF ANTICOAGULANTS: Primary | ICD-10-CM

## 2017-02-07 LAB — INR POINT OF CARE: 3 (ref 0.86–1.14)

## 2017-02-07 PROCEDURE — 36416 COLLJ CAPILLARY BLOOD SPEC: CPT

## 2017-02-07 PROCEDURE — 85610 PROTHROMBIN TIME: CPT | Mod: QW

## 2017-02-07 NOTE — MR AVS SNAPSHOT
Earl Tanner Laura   2/7/2017 3:00 PM   Anticoagulation Therapy Visit    Description:  89 year old male   Provider:  [facility]   Department:  Ec Nurse           INR as of 2/7/2017     Selected INR 3.0 (2/7/2017)      Anticoagulation Summary as of 2/7/2017     INR goal 2.0-3.0   Selected INR 3.0 (2/7/2017)   Full instructions 4 mg on Mon, Fri; 2 mg all other days   Next INR check 3/7/2017    Indications   Long-term (current) use of anticoagulants [Z79.01] [Z79.01]  Atrial fibrillation (H) [I48.91]         Your next Anticoagulation Clinic appointment(s)     Mar 07, 2017 11:15 AM   Anticoagulation Visit with  ANTICOAGULATION CLINIC   [facility]    [address] [place] [address]   [phone]              Contact Numbers     Clinic Number:         February 2017 Details    Sun Mon Tue Wed Thu Fri Sat        1               2               3               4                 5               6               7      2 mg   See details      8      2 mg         9      2 mg         10      4 mg         11      2 mg           12      2 mg         13      4 mg         14      2 mg         15      2 mg         16      2 mg         17      4 mg         18      2 mg           19      2 mg         20      4 mg         21      2 mg         22      2 mg         23      2 mg         24      4 mg         25      2 mg           26      2 mg         27      4 mg         28      2 mg              Date Details   02/07 This INR check               How to take your warfarin dose     To take:  2 mg Take 1 of the 2 mg tablets.    To take:  4 mg Take 2 of the 2 mg tablets.           March 2017 Details    Sun Mon Tue Wed Thu Fri Sat        1      2 mg         2      2 mg         3      4 mg         4      2 mg           5      2 mg         6      4 mg         7            8               9               10               11                 12                13               14               15               16               17               18                 19               20               21               22               23               24               25                 26               27               28               29               30               31                 Date Details   No additional details    Date of next INR:  3/7/2017         How to take your warfarin dose     To take:  2 mg Take 1 of the 2 mg tablets.    To take:  4 mg Take 2 of the 2 mg tablets.

## 2017-02-07 NOTE — PROGRESS NOTES
ANTICOAGULATION FOLLOW-UP CLINIC VISIT    Patient Name:  Earl Sanchez  Date:  2/7/2017  Contact Type:  Face to Face    SUBJECTIVE:     Patient Findings     Positives Medication Changes    Comments Started allopurinol 1/25/17           OBJECTIVE    INR PROTIME   Date Value Ref Range Status   02/07/2017 3.0* 0.86 - 1.14 Final       ASSESSMENT / PLAN  INR assessment THER    Recheck INR In: 4 WEEKS    INR Location Clinic      Anticoagulation Summary as of 2/7/2017     INR goal 2.0-3.0   Selected INR 3.0 (2/7/2017)   Maintenance plan 4 mg (2 mg x 2) on Mon, Fri; 2 mg (2 mg x 1) all other days   Full instructions 4 mg on Mon, Fri; 2 mg all other days   Weekly total 18 mg   No change documented Latha Lynn RN   Plan last modified Brandy Miller RN (1/9/2017)   Next INR check 3/7/2017   Priority INR   Target end date Indefinite    Indications   Long-term (current) use of anticoagulants [Z79.01] [Z79.01]  Atrial fibrillation (H) [I48.91]         Anticoagulation Episode Summary     INR check location     Preferred lab     Send INR reminders to [facility]    Comments       Anticoagulation Care Providers     Provider Role Specialty Phone number    Alvarado Florian MD [facility] [phone]            See the Encounter Report to view Anticoagulation Flowsheet and Dosing Calendar (Go to Encounters tab in chart review, and find the Anticoagulation Therapy Visit)    Dosage adjustment made based on physician directed care plan.    Latha Lynn, RN

## 2017-02-08 ENCOUNTER — CARE COORDINATION (OUTPATIENT)
Dept: CASE MANAGEMENT | Facility: CLINIC | Age: 82
End: 2017-02-08

## 2017-02-08 NOTE — LETTER
[facility] - CARE MANAGEMENT DEPT  [address] [facility] [address]  [place] [address]  Phone: [phone]    02/20/17    Earl Sanchez  [address] [place] [address] [address]  [facility] [facility] [place] [address]        Dear Pieter,  I am the Clinic Care Coordinator that works with your primary care provider's clinic. I wanted to thank you for spending the time to talk with me.  Below is a description of what Clinic Care Coordination is and how I can further assist you.     The Clinic Care Coordinator role is a Registered Nurse and/or  who understands the health care system. The goal of Clinic Care Coordination is to help you manage your health and improve access to the [facility] system in the most efficient manner.  The Registered Nurse can assist you in meeting your health care goals by providing education, coordinating services, and strengthening the communication among your providers. The  can assist you with financial, behavioral, psychosocial, and chemical dependency and counseling/psychiatric resources.    Please feel free to keep this letter and contact information to contact me at [phone] with any further questions or concerns that may arise. We at [facility] are focused on providing you with the highest-quality healthcare experience possible and that all starts with you.       Sincerely,     TACO Mitchell, RN, PHN  A Care Coordinator    Enclosed: I have enclosed a copy of a 24 Hour Access Plan. This has helpful phone numbers for you to call when needed. Please keep this in an easy to access place to use as needed.               Using Your Patient Care Plan: [facility]  When do I use my care plan?    Emergency room visits: The care plan gives the emergency room staff an overview of your health. And it gives instructions from your doctor about your care.    Hospital: If you bring your care plan, it will take less time to give your health  history when you are admitted.    Seeing a specialist:  Specialists can track changes to your medicines or enter a new diagnosis. You can have them fax the changes to your doctor to update your plan.    Regular or chronic care visits: Review your care plan for any errors before regular visits. Add information you feel would be helpful. (For example, all blood draws should be finger pokes if possible or note that your child cannot sit in a room for very long.)    Caregivers: Give the care plan to all caregivers. This might include your in-home health care team and family members.  How do I make changes to my care plan?  You may write on the care plan. Make changes by crossing out or adding information. Bring the revised care plan when you see your doctor, and share it with your Health Care Home team.  To send plan updates to your Health Care Home team, call, fax, mail or drop off the changes. We will update your care plan and send you a new copy. Please tell us if you need more than one copy. It s a good idea to keep a copy in your home, car, wheel-chair bag or wherever you might need one.

## 2017-02-08 NOTE — LETTER
Health Care Home - Access Care Plan    About Me  Patient Name:  Earl Sanchez    YOB: 1926  Age:                            89 year old   Maribel MRN:         [id number] Telephone Information:     Home Phone [phone]   Mobile none       Address:    [address] [place] [address] [address]  [place] [address] Email address:  [email or web address]      Emergency Contact(s)  Name Relationship Lgl Grd Work Phone Home Phone Mobile Phone   ALTHEA NEVAREZ  Daughter  none [phone] none             Health Maintenance: Routine Health maintenance Reviewed: Due/Overdue: Please discuss with Dr. Florain at your next office visit.    Health Maintenance Due   Topic Date Due     PNEUMOCOCCAL (1 of 2 - PCV13) 09/17/1991     HF ACTION PLAN Q3 YR (NO INBASKET MSG)  08/29/2015     A1C Q1 YR (NO INBASKET)  02/05/2016     ALT Q1 YR (NO INBASKET)  06/11/2016     MICROALBUMIN Q1 YEAR( NO INBASKET)  06/11/2016         My Access Plan  Medical Emergency 911   Questions or concerns during clinic hours Primary Clinic Line, I will call the clinic directly: [facility] [phone]   24 Hour Appointment Line [phone] or  [phone] [place] ([phone])  (toll free)   24 Hour Nurse Line [phone] (toll free)   Questions or concerns outside clinic hours 24 Hour Appointment Line, I will call the after-hours on-call line:   [facility] ([facility]) [phone] or [phone] ([phone]) (toll-free)   Preferred Urgent Care Preferred Urgent Care: [facility], [phone]   Preferred Hospital Preferred Hospital: [facility]  [phone]   Preferred Pharmacy [facility] [address] [facility], [place] - [address] [facility] [address]     Behavioral Health Crisis Line [facility], [phone] or 911     My Care Team Members  Patient Care Team       Relationship Specialty Notifications Start End    Alvarado Florian MD  PCP - General Family Practice  10/21/14     Phone: [phone] Fax: [phone]         [address] [address]    Abigail Trotter, RN Case Manager   6/27/16     Phone: [phone] Fax: [phone]            My Medical and Care Information  Problem List   Patient Active Problem List   Diagnosis     Hypertension goal BP (blood pressure) < 140/90     CAD (coronary artery disease)     Osteoarthritis     Gait apraxia     Atrial fibrillation (H)     BPH (benign prostatic hyperplasia)     Heart failure (H)     Hyperlipidemia with target LDL less than 100     CKD (chronic kidney disease) stage 3, GFR 30-59 ml/min     Microalbuminuria     Anemia     Hypothyroidism     Elevated alkaline phosphatase level     Advance care planning     Health Care Home     Chronic anticoagulation     Elevated fasting glucose     Toe inflammation     Elevated uric acid in blood     Gastritis     Abdominal aortic aneurysm (H)     Advanced directives, counseling/discussion     Long-term (current) use of anticoagulants [Z79.01]     Hereditary multiple exostosis     General weakness     Tear of meniscus of left knee      Current Medications and Allergies:  See printed Medication Report

## 2017-02-08 NOTE — PROGRESS NOTES
Clinic Care Coordination Contact  [facility]/Voicemail    Referral Source: PCP    Clinical Data: Patient was discharged from [facility] on 1/26/2017. D/C summary is in epic.    Outreach attempted x 1.  Left message on voicemail with call back information and requested return call.    Plan: Care Coordinator will try to reach patient again in 3-5 business days.    TACO Mitchell, RN, PHN  \[facility]\"" Care Coordinator  [phone]  February 8, 2017

## 2017-02-20 NOTE — PROGRESS NOTES
Clinic Care Coordination Contact  OUTREACH    Referral Information:  Referral Source: PCP  Reason for Contact: Home care discharge  Care Conference: No     Universal Utilization:   ED Visits in last year: 1  Hospital visits in last year: 1  Last PCP appointment: 01/24/17  Missed Appointments: 0  Concerns: None at this time  Multiple Providers or Specialists: Yes  Upcoming appointment: 03/07/17 (for INR recheck)    Clinical Concerns:  Current Medical Concerns: atrial fibrillation, long term anticoagulation, knee sprain    Current Behavioral Concerns: Denies concerns at this time for anxiety or depression    Education Provided to patient: Patient advised to contact RN CC if he has questions or needs further assistance   Clinical Pathway Name: None  Clinical Pathway: None    Medication Management:  Method of Taking:  Self set up in med box ( he sets up 4 weeks at at time)  Patient has understanding of regimen and is adherent:  Yes  Medications Reviewed: Yes  Side Effects Reported: None       Functional Status:  Mobility Status: Independent w/Device  Equipment Currently Used at Home: walker, rolling (4-wheeled walker), grab bars, medical alert necklace  Transportation: Children or bus is provided through [place] but only goes to specific places  He states he is independent with dressing, bathing and laundry.  He gets 3 meals provided through [place].  He said he also has cleaning provided once per week through [place] as well.  He lives in independent living.  Alert and oriented.  Denies any recent falls.           Psychosocial:  Current living arrangement:: I live alone, I live in assisted living  Financial/Insurance: [facility]  Children live close by and are supportive     Resources and Interventions:  Current Resources:     Assisted Living: [facility] in Independent Living        Advanced Care Plans/Directives on file: Yes  Referrals Placed: None at this time     Goals: None at this time          Barriers: Lives  alone, does not drive  Strengths: Lives at [place] in independent living, family is supportive    Patient/Caregiver understanding: Pieter states he is doing well since his discharge from home care.  He said it was nice to get his INR's checked at home and wish that could continue.  We discussed that he could have it continue if he is willing to privately pay for it.  He verbalized understanding.  CC also advised him that there are home lab services that can draw labs and sent result to PCP.  He said he does not have a problem getting a ride to the clinic every 6 weeks for his INR at this point.  He said the clinic is very close to his house.  He said that [place] does have a lot of activities.  He said he does not play bridge but goes to trivia and musical activities.      Frequency of Care Coordination: As needed         Plan:     1)  Pieter will follow up as scheduled with INR clinic on 3/7/2017.    2) RN CC mailed Access Care Plan to patient.  No ongoing care coordination needs identified at this time.  Patient has good support from his children.  I encouraged Pieter to call me if he has questions or needs further assistance.      Abigail Trotter, TACO, RN, PHN  A Care Coordinator  [phone]  February 20, 2017

## 2017-02-24 ENCOUNTER — TELEPHONE (OUTPATIENT)
Dept: FAMILY MEDICINE | Facility: CLINIC | Age: 82
End: 2017-02-24

## 2017-02-24 DIAGNOSIS — N39.0 URINARY TRACT INFECTION WITHOUT HEMATURIA, SITE UNSPECIFIED: Primary | ICD-10-CM

## 2017-02-24 RX ORDER — CEPHALEXIN 500 MG/1
500 CAPSULE ORAL 3 TIMES DAILY
Qty: 15 CAPSULE | Refills: 0 | Status: SHIPPED | OUTPATIENT
Start: 2017-02-24 | End: 2017-02-10

## 2017-02-24 NOTE — TELEPHONE ENCOUNTER
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

## 2017-02-24 NOTE — TELEPHONE ENCOUNTER
Patient notified of the prescription and if symptoms do not resolve to make a follow up appointment with Dr. Florian.  Patient agreeable.  Amina Samuels RN  [facility]  [phone]

## 2017-03-06 ENCOUNTER — DOCUMENTATION ONLY (OUTPATIENT)
Dept: OTHER | Facility: CLINIC | Age: 82
End: 2017-03-06

## 2017-03-06 DIAGNOSIS — Z71.89 ADVANCE CARE PLANNING: Chronic | ICD-10-CM

## 2017-03-07 ENCOUNTER — ANTICOAGULATION THERAPY VISIT (OUTPATIENT)
Dept: NURSING | Facility: CLINIC | Age: 82
End: 2017-03-07
Payer: COMMERCIAL

## 2017-03-07 DIAGNOSIS — Z79.01 LONG-TERM (CURRENT) USE OF ANTICOAGULANTS: ICD-10-CM

## 2017-03-07 DIAGNOSIS — I48.91 ATRIAL FIBRILLATION (H): ICD-10-CM

## 2017-03-07 LAB — INR POINT OF CARE: 3 (ref 0.86–1.14)

## 2017-03-07 PROCEDURE — 85610 PROTHROMBIN TIME: CPT | Mod: QW

## 2017-03-07 PROCEDURE — 36416 COLLJ CAPILLARY BLOOD SPEC: CPT

## 2017-03-07 NOTE — MR AVS SNAPSHOT
Earl Sanchez   3/7/2017 11:15 AM   Anticoagulation Therapy Visit    Description:  89 year old male   Provider:  [facility]   Department:  Ec Nurse           INR as of 3/7/2017     Today's INR 3.0      Anticoagulation Summary as of 3/7/2017     INR goal 2.0-3.0   Today's INR 3.0   Full instructions 4 mg on Mon, Fri; 2 mg all other days   Next INR check 4/4/2017    Indications   Long-term (current) use of anticoagulants [Z79.01] [Z79.01]  Atrial fibrillation (H) [I48.91]         Description     3.0 today.  Continue with 4 mg on Mon, Fri;  2 mg all other days.  Recheck in 4 weeks.         Your next Anticoagulation Clinic appointment(s)     Apr 04, 2017 11:15 AM CDT   Anticoagulation Visit with  ANTICOAGULATION CLINIC   [facility]    [address] [place] [id number] [phone]              Contact Numbers     Clinic Number:         March 2017 Details    Sun Mon Tue Wed Thu Fri Sat        1               2               3               4                 5               6               7      2 mg   See details      8      2 mg         9      2 mg         10      4 mg         11      2 mg           12      2 mg         13      4 mg         14      2 mg         15      2 mg         16      2 mg         17      4 mg         18      2 mg           19      2 mg         20      4 mg         21      2 mg         22      2 mg         23      2 mg         24      4 mg         25      2 mg           26      2 mg         27      4 mg         28      2 mg         29      2 mg         30      2 mg         31      4 mg           Date Details   03/07 This INR check               How to take your warfarin dose     To take:  2 mg Take 1 of the 2 mg tablets.    To take:  4 mg Take 2 of the 2 mg tablets.           April 2017 Details    Sun Mon Tue Wed Thu Fri Sat           1      2 mg           2      2 mg         3      4 mg          4            5               6               7               8                 9               10               11               12               13               14               15                 16               17               18               19               20               21               22                 23               24               25               26               27               28               29                 30                      Date Details   No additional details    Date of next INR:  4/4/2017         How to take your warfarin dose     To take:  2 mg Take 1 of the 2 mg tablets.    To take:  4 mg Take 2 of the 2 mg tablets.

## 2017-03-07 NOTE — PROGRESS NOTES
ANTICOAGULATION FOLLOW-UP CLINIC VISIT    Patient Name:  Earl Sanchez  Date:  3/7/2017  Contact Type:  Face to Face    SUBJECTIVE:     Patient Findings     Positives No Problem Findings           OBJECTIVE    INR Protime   Date Value Ref Range Status   03/07/2017 3.0 (A) 0.86 - 1.14 Final       ASSESSMENT / PLAN  INR assessment THER    Recheck INR In: 4 WEEKS    INR Location Clinic      Anticoagulation Summary as of 3/7/2017     INR goal 2.0-3.0   Today's INR 3.0   Maintenance plan 4 mg (2 mg x 2) on Mon, Fri; 2 mg (2 mg x 1) all other days   Full instructions 4 mg on Mon, Fri; 2 mg all other days   Weekly total 18 mg   No change documented Ellen Negro RN   Plan last modified Brandy Miller RN (1/9/2017)   Next INR check 4/4/2017   Priority INR   Target end date Indefinite    Indications   Long-term (current) use of anticoagulants [Z79.01] [Z79.01]  Atrial fibrillation (H) [I48.91]         Anticoagulation Episode Summary     INR check location     Preferred lab     Send INR reminders to  [facility]    Comments       Anticoagulation Care Providers     Provider Role Specialty Phone number    Alvarado Florian MD [facility] [phone]            See the Encounter Report to view Anticoagulation Flowsheet and Dosing Calendar (Go to Encounters tab in chart review, and find the Anticoagulation Therapy Visit)    Dosage adjustment made based on physician directed care plan.    3.0 today.  Continue with 4 mg on Mon, Fri;  2 mg all other days.  Recheck in 4 weeks.     Ellen Negro RN

## 2017-03-20 DIAGNOSIS — M1A.4790 OTHER SECONDARY CHRONIC GOUT OF FOOT WITHOUT TOPHUS, UNSPECIFIED LATERALITY: Primary | ICD-10-CM

## 2017-03-20 NOTE — TELEPHONE ENCOUNTER
On Allopurinol for gout, working pretty good but pain not completely gone    Wondering about increasing the dosage    1/25/17 rx written for #90 (45 day) rx not 90 day supply.    Had been taking twice daily (200 mg daily) and wants to know if he could take 2 pills twice daily (400 mg daily)    Started on his own taking the 2 tablets twice daily (400 mg daily) about 5-6 days ago and wants to know if this is ok with you that he increased the dosing    Will need new rx to reflect the change.    Increased dosing may also affect INR.    Triage:  OK to leave a detailed message on home christine Delgado RN

## 2017-03-21 RX ORDER — ALLOPURINOL 100 MG/1
200 TABLET ORAL 2 TIMES DAILY
Qty: 360 TABLET | Refills: 1 | Status: SHIPPED | OUTPATIENT
Start: 2017-03-21 | End: 2017-04-04

## 2017-03-21 NOTE — TELEPHONE ENCOUNTER
Spoke with patient, pharmacy pended and rx sent.   Minal Daley RN   [facility] - Triage

## 2017-04-04 ENCOUNTER — OFFICE VISIT (OUTPATIENT)
Dept: FAMILY MEDICINE | Facility: CLINIC | Age: 82
End: 2017-04-04
Payer: COMMERCIAL

## 2017-04-04 ENCOUNTER — ANTICOAGULATION THERAPY VISIT (OUTPATIENT)
Dept: NURSING | Facility: CLINIC | Age: 82
End: 2017-04-04
Payer: COMMERCIAL

## 2017-04-04 VITALS
DIASTOLIC BLOOD PRESSURE: 56 MMHG | WEIGHT: 157 LBS | SYSTOLIC BLOOD PRESSURE: 112 MMHG | BODY MASS INDEX: 23.79 KG/M2 | OXYGEN SATURATION: 97 % | HEIGHT: 68 IN | HEART RATE: 80 BPM | TEMPERATURE: 97.9 F

## 2017-04-04 DIAGNOSIS — M1A.4790 OTHER SECONDARY CHRONIC GOUT OF FOOT WITHOUT TOPHUS, UNSPECIFIED LATERALITY: ICD-10-CM

## 2017-04-04 DIAGNOSIS — Z79.01 LONG-TERM (CURRENT) USE OF ANTICOAGULANTS: ICD-10-CM

## 2017-04-04 DIAGNOSIS — I48.91 ATRIAL FIBRILLATION (H): ICD-10-CM

## 2017-04-04 LAB — INR POINT OF CARE: 2.1 (ref 0.86–1.14)

## 2017-04-04 PROCEDURE — 99213 OFFICE O/P EST LOW 20 MIN: CPT | Performed by: FAMILY MEDICINE

## 2017-04-04 PROCEDURE — 99207 ZZC NO CHARGE NURSE ONLY: CPT

## 2017-04-04 PROCEDURE — 36416 COLLJ CAPILLARY BLOOD SPEC: CPT

## 2017-04-04 PROCEDURE — 85610 PROTHROMBIN TIME: CPT | Mod: QW

## 2017-04-04 RX ORDER — ALLOPURINOL 100 MG/1
200 TABLET ORAL 3 TIMES DAILY PRN
Qty: 360 TABLET | Refills: 1
Start: 2017-04-04 | End: 2017-11-29

## 2017-04-04 NOTE — NURSING NOTE
"Chief Complaint   Patient presents with     Arthritis       Initial /56  Pulse 80  Temp 97.9  F (36.6  C) (Tympanic)  Ht 5' 8\" (1.727 m)  Wt 157 lb (71.2 kg)  SpO2 97%  BMI 23.87 kg/m2 Estimated body mass index is 23.87 kg/(m^2) as calculated from the following:    Height as of this encounter: 5' 8\" (1.727 m).    Weight as of this encounter: 157 lb (71.2 kg).  Medication Reconciliation: complete     Angela Hooks CMA      "

## 2017-04-04 NOTE — MR AVS SNAPSHOT
After Visit Summary   4/4/2017    Earl Sanchez    MRN: [id number]           Patient Information     Date Of Birth          9/17/1926        Visit Information        Provider Department      4/4/2017 10:40 AM Alvarado Florian MD 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)    [address] [place] [address]   [phone]              Who to contact     If you have questions or need follow up information about today's clinic visit or your schedule please contact [facility] directly at [phone].  Normal or non-critical lab and imaging results will be communicated to you by MyChart, letter or phone within 4 business days after the clinic has received the results. If you do not hear from us within 7 days, please contact the clinic through MyChart or phone. If you have a critical or abnormal lab result, we will notify you by phone as soon as possible.  Submit refill requests through [employer] or call your pharmacy and they will forward the refill request to us. Please allow 3 business days for your refill to be completed.          Additional Information About Your Visit        MyChart Information     [employer] gives you secure access to your electronic health record. If you see a primary care provider, you can also send messages to your care team and make appointments. If you have questions, please call your primary care clinic.  If you do not have a primary care provider, please call [phone] and they will assist you.        Care EveryWhere ID     This is your Care EveryWhere ID. This could be used by other organizations to access  "your [facility] medical records  [id number]        Your Vitals Were     Pulse Temperature Height Pulse Oximetry BMI (Body Mass Index)       80 97.9  F (36.6  C) (Tympanic) 5' 8\" (1.727 m) 97% 23.87 kg/m2        Blood Pressure from Last 3 Encounters:   04/04/17 112/56   01/24/17 104/60   01/05/17 110/70    Weight from Last 3 Encounters:   04/04/17 157 lb (71.2 kg)   01/24/17 155 lb (70.3 kg)   01/05/17 155 lb (70.3 kg)              Today, you had the following     No orders found for display         Today's Medication Changes          These changes are accurate as of: 4/4/17 11:02 AM.  If you have any questions, ask your nurse or doctor.               These medicines have changed or have updated prescriptions.        Dose/Directions    * allopurinol 100 MG tablet   Commonly known as:  ZYLOPRIM   This may have changed:  Another medication with the same name was changed. Make sure you understand how and when to take each.   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 Read the directions carefully, and ask your doctor or other care provider to review them with you.         Where to get your medicines      Some of these will need a paper prescription and others can be bought over the counter.  Ask your nurse if you have questions.     You don't need a prescription for these medications     allopurinol 100 MG tablet    "             Primary Care Provider Office Phone # Fax #    Alvarado Florian [phone]       [address] [address]        Thank you!     Thank you for choosing [facility]  for your care. Our goal is always to provide you with excellent care. Hearing back from our patients is one way we can continue to improve our services. Please take a few minutes to complete the written survey that you may receive in the mail after your visit with us. Thank you!             Your Updated Medication List - Protect others around you: Learn how to safely use, store and throw away your medicines at www.disposemymeds.org.          This list is accurate as of: 4/4/17 11:02 AM.  Always use your most recent med list.                   Brand Name Dispense Instructions for use    * acetaminophen 325 MG tablet    TYLENOL     Take 650 mg by mouth 3 times daily       * acetaminophen 500 MG tablet    TYLENOL     Take 1,000 mg by mouth nightly as needed for mild pain       * allopurinol 100 MG tablet    ZYLOPRIM    90 tablet    Take 1 tablet (100 mg) by mouth 2 times daily       * allopurinol 100 MG tablet    ZYLOPRIM    360 tablet    Take 2 tablets (200 mg) by mouth 3 times daily as needed       ASPIRIN LOW DOSE 81 MG tablet   Generic drug:  aspirin      Take 81 mg by mouth daily       cephALEXin 500 MG capsule    KEFLEX    15 capsule    Take 1 capsule (500 mg) by mouth 3 times daily       doxazosin 4 MG tablet    CARDURA    90 tablet    Take 1 tablet (4 mg) by mouth At Bedtime       furosemide 40 MG tablet    LASIX    180 tablet    TAKE 1 TABLET IN THE MORNING       levothyroxine 50 MCG tablet    SYNTHROID/LEVOTHROID    90 tablet    Take 1 tablet (50 mcg) by mouth daily       lisinopril 10 MG tablet    PRINIVIL/ZESTRIL    45 tablet    Take 0.5 tablets (5 mg) by mouth daily       nitroglycerin 0.4 MG sublingual tablet    NITROSTAT    25 tablet    Place 1 tablet (0.4  mg) under the tongue every 5 minutes as needed for chest pain       pravastatin 40 MG tablet    PRAVACHOL    90 tablet    Take 1 tablet (40 mg) by mouth daily       * Warfarin Therapy Reminder      1 each continuous prn       * warfarin 2 MG tablet    COUMADIN    135 tablet    Takes 4 mg on Mon, Fri;  2 mg all other days or as directed by [facility] nurses       * Notice:  This list has 6 medication(s) that are the same as other medications prescribed for you. Read the directions carefully, and ask your doctor or other care provider to review them with you.

## 2017-04-04 NOTE — PROGRESS NOTES
SUBJECTIVE:                                                    Earl Sanchez is a 89 year old male who presents to clinic today for the following health issues:      Concern - recheck gout     Onset: ongoing since last visit    Description:   Recheck gout is feeling better    Intensity: mild    Progression of Symptoms:  same    Accompanying Signs & Symptoms:         Previous history of similar problem:       Precipitating factors:   Worsened by:     Alleviating factors:  Improved by:        Therapies Tried and outcome: Allopurinol      Problem list and histories reviewed & adjusted, as indicated.  Additional history: as documented    Patient Active Problem List   Diagnosis     Hypertension goal BP (blood pressure) < 140/90     CAD (coronary artery disease)     Osteoarthritis     Gait apraxia     Atrial fibrillation (H)     BPH (benign prostatic hyperplasia)     Heart failure (H)     Hyperlipidemia with target LDL less than 100     CKD (chronic kidney disease) stage 3, GFR 30-59 ml/min     Microalbuminuria     Anemia     Hypothyroidism     Elevated alkaline phosphatase level     Advance care planning     Health Care Home     Chronic anticoagulation     Elevated fasting glucose     Toe inflammation     Elevated uric acid in blood     Gastritis     Abdominal aortic aneurysm (H)     Advanced directives, counseling/discussion     Long-term (current) use of anticoagulants [Z79.01]     Hereditary multiple exostosis     General weakness     Tear of meniscus of left knee     Past Surgical History:   Procedure Laterality Date     ANKLE SURGERY      ORIF ankle fracture     CORONARY ARTERY BYPASS  2007    CAB X 5 CABG with LIMA to LAD and saphenous vein grafts to, OM2,SVG to diag 1, and sequential PDA     HEART CATH LEFT HEART CATH  12/10/07     HERNIA REPAIR, INGUINAL RT/LT      right     PHACOEMULSIFICATION CLEAR CORNEA WITH TORIC INTRAOCULAR LENS IMPLANT  4/10/2014    Procedure: PHACOEMULSIFICATION CLEAR CORNEA  WITH TORIC INTRAOCULAR LENS IMPLANT;  RIGHT PHACOEMULSIFICATION CLEAR CORNEA WITH TORIC INTRAOCULAR LENS IMPLANT ;  Surgeon: Carol Tee MD;  Location: [facility]     PHACOEMULSIFICATION CLEAR CORNEA WITH TORIC INTRAOCULAR LENS IMPLANT  4/22/2014    Procedure: PHACOEMULSIFICATION CLEAR CORNEA WITH TORIC INTRAOCULAR LENS IMPLANT;  Surgeon: Carlo Tee MD;  Location: [facility]       Social History   Substance Use Topics     Smoking status: Never Smoker     Smokeless tobacco: Never Used     Alcohol use Yes      Comment: occ - one glass of wine last night     Family History   Problem Relation Age of Onset     Hypertension Mother      Hypertension Maternal Grandmother          Current Outpatient Prescriptions   Medication Sig Dispense Refill     allopurinol (ZYLOPRIM) 100 MG tablet Take 2 tablets (200 mg) by mouth 3 times daily as needed 360 tablet 1     cephALEXin (KEFLEX) 500 MG capsule Take 1 capsule (500 mg) by mouth 3 times daily 15 capsule 0     warfarin (COUMADIN) 2 MG tablet Takes 4 mg on Mon, Fri;  2 mg all other days or as directed by [facility] nurses 135 tablet 0     levothyroxine (SYNTHROID/LEVOTHROID) 50 MCG tablet Take 1 tablet (50 mcg) by mouth daily 90 tablet 1     pravastatin (PRAVACHOL) 40 MG tablet Take 1 tablet (40 mg) by mouth daily 90 tablet 0     furosemide (LASIX) 40 MG tablet TAKE 1 TABLET IN THE MORNING 180 tablet 1     lisinopril (PRINIVIL/ZESTRIL) 10 MG tablet Take 0.5 tablets (5 mg) by mouth daily 45 tablet 1     doxazosin (CARDURA) 4 MG tablet Take 1 tablet (4 mg) by mouth At Bedtime 90 tablet 3     allopurinol (ZYLOPRIM) 100 MG tablet Take 1 tablet (100 mg) by mouth 2 times daily 90 tablet 1     Warfarin Therapy Reminder 1 each continuous prn       acetaminophen (TYLENOL) 325 MG tablet Take 650 mg by mouth 3 times daily       acetaminophen (TYLENOL) 500 MG tablet Take 1,000 mg by mouth nightly as needed for mild pain       nitroglycerin (NITROSTAT) 0.4 MG SL tablet Place 1 tablet  (0.4 mg) under the tongue every 5 minutes as needed for chest pain 25 tablet 0     aspirin (ASPIRIN LOW DOSE) 81 MG tablet Take 81 mg by mouth daily        No Known Allergies  Recent Labs   Lab Test 12/20/16  12/15/16   1330  05/11/16   1441  03/22/16   1406  06/11/15   1039  05/14/15   1059  04/16/15   1039  02/05/15   1344   04/08/14   1052  02/03/14   0917   03/12/13   1018   08/28/12   0911   A1C   --    --    --    --    --    --    --   5.9   --   6.2*   --    --   5.6   --    --    LDL   --    --    --   43   --    --   47   --    --    --   61   --    --    --   65   HDL   --    --    --   51   --    --   55   --    --    --   54   --    --    --   35*   TRIG   --    --    --   135   --    --   89   --    --    --   71   --    --    --   88   ALT   --    --    --    --   17   --    --    --    --    --   18   --    --    --   21   CR  1.23*  1.23   --    --   1.62*   --    --   1.60*   < >   --    --    < >  1.68*   < >  1.37*   GFRESTIMATED  51*  55*   --    --   40*   --    --   41*   < >   --    --    < >  39*   < >  49*   GFRESTBLACK  >60  67   --    --   49*   --    --   50*   < >   --    --    < >  47*   < >  60*   POTASSIUM  4.1  3.9   --    --   3.9   --    --   4.3   < >  4.1   --    < >  4.2   < >  4.2   TSH   --    --   1.85   --    --   2.79   --    --    < >  4.74   --    --   2.71   < >  9.78*    < > = values in this interval not displayed.      BP Readings from Last 3 Encounters:   04/04/17 112/56   01/24/17 104/60   01/05/17 110/70    Wt Readings from Last 3 Encounters:   04/04/17 157 lb (71.2 kg)   01/24/17 155 lb (70.3 kg)   01/05/17 155 lb (70.3 kg)                    Reviewed and updated as needed this visit by clinical staff       Reviewed and updated as needed this visit by Provider         ROS:  Constitutional, HEENT, cardiovascular, pulmonary, gi and gu systems are negative, except as otherwise noted.    OBJECTIVE:                                                    /56  Pulse  "80  Temp 97.9  F (36.6  C) (Tympanic)  Ht 5' 8\" (1.727 m)  Wt 157 lb (71.2 kg)  SpO2 97%  BMI 23.87 kg/m2  Body mass index is 23.87 kg/(m^2).  GENERAL: healthy, alert and no distress  NECK: no adenopathy, no asymmetry, masses, or scars and thyroid normal to palpation  RESP: lungs clear to auscultation - no rales, rhonchi or wheezes  CV: regular rate and rhythm, normal S1 S2, no S3 or S4, no murmur, click or rub, no peripheral edema and peripheral pulses strong  ABDOMEN: soft, nontender, no hepatosplenomegaly, no masses and bowel sounds normal  MS: improving right MP podagra        ASSESSMENT/PLAN:                                                    Earl was seen today for arthritis.    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

## 2017-04-04 NOTE — MR AVS SNAPSHOT
Earl Cotterclaricemeri   4/4/2017 11:15 AM   Anticoagulation Therapy Visit    Description:  89 year old male   Provider:  [facility]   Department:  [facility]           INR as of 4/4/2017     Today's INR 2.1      Anticoagulation Summary as of 4/4/2017     INR goal 2.0-3.0   Today's INR 2.1   Full instructions 4 mg on Mon, Fri; 2 mg all other days   Next INR check 5/3/2017    Indications   Long-term (current) use of anticoagulants [Z79.01] [Z79.01]  Atrial fibrillation (H) [I48.91]         Description     2.1 today.  Continue with 4 mg on Mon, Fri;  2 mg all other days.  Recheck in 4 weeks.         Your next Anticoagulation Clinic appointment(s)     Apr 04, 2017 11:15 AM CDT   Anticoagulation Visit with [facility]   [facility] ([address] [address]   [phone]            May 03, 2017 10:30 AM CDT   Anticoagulation Visit with [facility]   [facility] ([address] [address]   [phone]              Contact Numbers     Clinic Number:         April 2017 Details    Sun Mon Tue Wed Thu Fri Sat           1                 2               3               4      2 mg   See details      5      2 mg         6      2 mg         7      4 mg         8      2 mg           9      2 mg         10      4 mg         11      2 mg         12      2 mg         13      2 mg         14      4 mg         15      2 mg           16      2 mg         17      4 mg         18      2 mg         19      2 mg         20      2 mg         21      4 mg         22      2 mg           23      2 mg         24      4 mg         25      2 mg         26      2 mg         27      2 mg         28      4 mg         29      2 mg           30      2 mg                Date Details   04/04 This INR check               How to take your warfarin  dose     To take:  2 mg Take 1 of the 2 mg tablets.    To take:  4 mg Take 2 of the 2 mg tablets.           May 2017 Details    Sun Mon Tue Wed Thu Fri Sat      1      4 mg         2      2 mg         3            4               5               6                 7               8               9               10               11               12               13                 14               15               16               17               18               19               20                 21               22               23               24               25               26               27                 28               29               30               31                   Date Details   No additional details    Date of next INR:  5/3/2017         How to take your warfarin dose     To take:  2 mg Take 1 of the 2 mg tablets.    To take:  4 mg Take 2 of the 2 mg tablets.

## 2017-04-04 NOTE — PROGRESS NOTES
ANTICOAGULATION FOLLOW-UP CLINIC VISIT    Patient Name:  Earl Sanchez  Date:  4/4/2017  Contact Type:  Face to Face    SUBJECTIVE:     Patient Findings     Positives No Problem Findings           OBJECTIVE    INR Protime   Date Value Ref Range Status   04/04/2017 2.1 (A) 0.86 - 1.14 Final       ASSESSMENT / PLAN  INR assessment THER    Recheck INR In: 4 WEEKS    INR Location Clinic      Anticoagulation Summary as of 4/4/2017     INR goal 2.0-3.0   Today's INR 2.1   Maintenance plan 4 mg (2 mg x 2) on Mon, Fri; 2 mg (2 mg x 1) all other days   Full instructions 4 mg on Mon, Fri; 2 mg all other days   Weekly total 18 mg   No change documented Ellen Negro RN   Plan last modified Brandy Miller RN (1/9/2017)   Next INR check 5/3/2017   Priority INR   Target end date Indefinite    Indications   Long-term (current) use of anticoagulants [Z79.01] [Z79.01]  Atrial fibrillation (H) [I48.91]         Anticoagulation Episode Summary     INR check location     Preferred lab     Send INR reminders to  [facility]    Comments       Anticoagulation Care Providers     Provider Role Specialty Phone number    Alvarado Florian MD [facility] [phone]            See the Encounter Report to view Anticoagulation Flowsheet and Dosing Calendar (Go to Encounters tab in chart review, and find the Anticoagulation Therapy Visit)    Dosage adjustment made based on physician directed care plan.    2.1 today.  Continue with 4 mg on Mon, Fri;  2 mg all other days.  Recheck in 4 weeks.     Ellen Negro RN

## 2017-05-03 ENCOUNTER — ANTICOAGULATION THERAPY VISIT (OUTPATIENT)
Dept: NURSING | Facility: CLINIC | Age: 82
End: 2017-05-03
Payer: COMMERCIAL

## 2017-05-03 DIAGNOSIS — I48.91 ATRIAL FIBRILLATION (H): ICD-10-CM

## 2017-05-03 DIAGNOSIS — Z79.01 LONG-TERM (CURRENT) USE OF ANTICOAGULANTS: ICD-10-CM

## 2017-05-03 LAB — INR POINT OF CARE: 1.7 (ref 0.86–1.14)

## 2017-05-03 PROCEDURE — 85610 PROTHROMBIN TIME: CPT | Mod: QW

## 2017-05-03 PROCEDURE — 36416 COLLJ CAPILLARY BLOOD SPEC: CPT

## 2017-05-03 NOTE — PROGRESS NOTES
ANTICOAGULATION FOLLOW-UP CLINIC VISIT    Patient Name:  Earl Sanchez  Date:  5/3/2017  Contact Type:  Face to Face    SUBJECTIVE:     Patient Findings     Positives Missed doses           OBJECTIVE    INR Protime   Date Value Ref Range Status   05/03/2017 1.7 (A) 0.86 - 1.14 Final       ASSESSMENT / PLAN  INR assessment SUB    Recheck INR In: 4 WEEKS    INR Location Clinic      Anticoagulation Summary as of 5/3/2017     INR goal 2.0-3.0   Today's INR 1.7!   Maintenance plan 4 mg (2 mg x 2) on Mon, Fri; 2 mg (2 mg x 1) all other days   Full instructions 4 mg on Mon, Fri; 2 mg all other days   Weekly total 18 mg   Plan last modified Brandy Miller RN (1/9/2017)   Next INR check 5/31/2017   Priority INR   Target end date Indefinite    Indications   Long-term (current) use of anticoagulants [Z79.01] [Z79.01]  Atrial fibrillation (H) [I48.91]         Anticoagulation Episode Summary     INR check location     Preferred lab     Send INR reminders to  [facility]    Comments       Anticoagulation Care Providers     Provider Role Specialty Phone number    Alvarado Florian MD [facility] [phone]            See the Encounter Report to view Anticoagulation Flowsheet and Dosing Calendar (Go to Encounters tab in chart review, and find the Anticoagulation Therapy Visit)    Dosage adjustment made based on physician directed care plan.    1.7 today with missed dose.  Continue with 4 mg on Mon, Fri;  2 mg all other days.  Recheck in 4 weeks as requested.     Ellen Negro RN

## 2017-05-30 ENCOUNTER — ANTICOAGULATION THERAPY VISIT (OUTPATIENT)
Dept: NURSING | Facility: CLINIC | Age: 82
End: 2017-05-30
Payer: COMMERCIAL

## 2017-05-30 DIAGNOSIS — Z79.01 LONG-TERM (CURRENT) USE OF ANTICOAGULANTS: ICD-10-CM

## 2017-05-30 LAB — INR POINT OF CARE: 1.3 (ref 0.86–1.14)

## 2017-05-30 PROCEDURE — 85610 PROTHROMBIN TIME: CPT | Mod: QW

## 2017-05-30 PROCEDURE — 36416 COLLJ CAPILLARY BLOOD SPEC: CPT

## 2017-05-30 NOTE — MR AVS SNAPSHOT
Earl Sanchez   5/30/2017 1:15 PM   Anticoagulation Therapy Visit    Description:  89 year old male   Provider:  [facility]   Department:  [facility]           INR as of 5/30/2017     Today's INR 1.3!      Anticoagulation Summary as of 5/30/2017     INR goal 2.0-3.0   Today's INR 1.3!   Full instructions 5/30: 4 mg; Otherwise 4 mg on Mon, Fri; 2 mg all other days   Next INR check 6/5/2017    Indications   Long-term (current) use of anticoagulants [Z79.01] [Z79.01]  Atrial fibrillation (H) [I48.91]         Your next Anticoagulation Clinic appointment(s)     Jun 05, 2017  9:30 AM CDT   Anticoagulation Visit with  ANTICOAGULATION CLINIC   [facility] ([address] [address]   [phone]              Contact Numbers     Clinic Number:         May 2017 Details    Sun Mon Tue Wed Thu Fri Sat      1               2               3               4               5               6                 7               8               9               10               11               12               13                 14               15               16               17               18               19               20                 21               22               23               24               25               26               27                 28               29               30      4 mg   See details      31      2 mg             Date Details   05/30 This INR check               How to take your warfarin dose     To take:  2 mg Take 1 of the 2 mg tablets.    To take:  4 mg Take 2 of the 2 mg tablets.           June 2017 Details    Sun Mon Tue Wed Thu Fri Sat         1      2 mg         2      4 mg         3      2 mg           4      2 mg         5            6               7               8               9               10                 11               12               13               14                15               16               17                 18               19               20               21               22               23               24                 25               26               27               28               29               30                 Date Details   No additional details    Date of next INR:  6/5/2017         How to take your warfarin dose     To take:  2 mg Take 1 of the 2 mg tablets.    To take:  4 mg Take 2 of the 2 mg tablets.

## 2017-05-30 NOTE — PROGRESS NOTES
ANTICOAGULATION FOLLOW-UP CLINIC VISIT    Patient Name:  Earl Sanchez  Date:  5/30/2017  Contact Type:  Face to Face    SUBJECTIVE:     Patient Findings     Positives No Problem Findings, may have missed a dose in last 7 days           OBJECTIVE    INR Protime   Date Value Ref Range Status   05/30/2017 1.3 (A) 0.86 - 1.14 Final       ASSESSMENT / PLAN  INR assessment SUB    Recheck INR In: 1 WEEK    INR Location Clinic      Anticoagulation Summary as of 5/30/2017     INR goal 2.0-3.0   Today's INR 1.3!   Maintenance plan 4 mg (2 mg x 2) on Mon, Fri; 2 mg (2 mg x 1) all other days   Full instructions 5/30: 4 mg; Otherwise 4 mg on Mon, Fri; 2 mg all other days   Weekly total 18 mg   Plan last modified Brandy Miller RN (1/9/2017)   Next INR check 6/5/2017   Priority INR   Target end date Indefinite    Indications   Long-term (current) use of anticoagulants [Z79.01] [Z79.01]  Atrial fibrillation (H) [I48.91]         Anticoagulation Episode Summary     INR check location     Preferred lab     Send INR reminders to [facility]    Comments TAKES WARFARIN IN THE MORNING        Anticoagulation Care Providers     Provider Role Specialty Phone number    Alvarado Florian MD [facility] [phone]            See the Encounter Report to view Anticoagulation Flowsheet and Dosing Calendar (Go to Encounters tab in chart review, and find the Anticoagulation Therapy Visit)    Take extra 2 mg 5/30, take 4 mg F, 2 mg all other days, recheck 1 week.      Dosage adjustment made based on physician directed care plan.    Latha Lynn RN

## 2017-06-05 ENCOUNTER — TELEPHONE (OUTPATIENT)
Dept: FAMILY MEDICINE | Facility: CLINIC | Age: 82
End: 2017-06-05

## 2017-06-05 ENCOUNTER — ANTICOAGULATION THERAPY VISIT (OUTPATIENT)
Dept: NURSING | Facility: CLINIC | Age: 82
End: 2017-06-05
Payer: COMMERCIAL

## 2017-06-05 DIAGNOSIS — I48.91 ATRIAL FIBRILLATION (H): ICD-10-CM

## 2017-06-05 DIAGNOSIS — I48.20 CHRONIC ATRIAL FIBRILLATION (H): Primary | ICD-10-CM

## 2017-06-05 DIAGNOSIS — Z79.01 LONG-TERM (CURRENT) USE OF ANTICOAGULANTS: ICD-10-CM

## 2017-06-05 LAB — INR POINT OF CARE: 2 (ref 0.86–1.14)

## 2017-06-05 PROCEDURE — 36416 COLLJ CAPILLARY BLOOD SPEC: CPT

## 2017-06-05 PROCEDURE — 85610 PROTHROMBIN TIME: CPT | Mod: QW

## 2017-06-05 NOTE — TELEPHONE ENCOUNTER
Patient is due for his annual anticoagulation referral.  Last office visit:  4/4/17  OBJECTIVE          INR Protime   Date Value Ref Range Status   06/05/2017 2.0 (A) 0.86 - 1.14 Final         ASSESSMENT / PLAN  INR assessment THER     Recheck INR In: 2 WEEKS     INR Location Clinic              Anticoagulation Summary as of 6/5/2017            INR goal 2.0-3.0   Today's INR 2.0   Maintenance plan 4 mg (2 mg x 2) on Mon, Wed, Fri; 2 mg (2 mg x 1) all other days   Full instructions 4 mg on Mon, Wed, Fri; 2 mg all other days   Weekly total 20 mg   Plan last modified Brandy Miller RN (6/5/2017)   Next INR check 6/19/2017   Priority INR   Target end date Indefinite         Order pended.  Brandy Miller RN

## 2017-06-05 NOTE — MR AVS SNAPSHOT
Earl Sanchez   6/5/2017 9:30 AM   Anticoagulation Therapy Visit    Description:  89 year old male   Provider[facility]  [facility]   Department:  [facility] Nurse           INR as of 6/5/2017     Today's INR       Anticoagulation Summary as of 6/5/2017     INR goal 2.0-3.0   Today's INR    Full instructions 4 mg on Mon, Wed, Fri; 2 mg all other days   Next INR check 6/19/2017    Indications   Long-term (current) use of anticoagulants [Z79.01] [Z79.01]  Atrial fibrillation (H) [I48.91]         Your next Anticoagulation Clinic appointment(s)     Jun 19, 2017 10:30 AM CDT   [facility]ticoagulation Visit with [facility]   [facility] [place]    [address] [place] [address]   [phone]              Contact Numbers     Clinic Number:         June 2017 Details    Sun Mon Tue Wed Thu Fri Sat         1               2               3                 4               5      4 mg   See details      6      2 mg         7      4 mg         8      2 mg         9      4 mg         10      2 mg           11      2 mg         12      4 mg         13      2 mg         14      4 mg         15      2 mg         16      4 mg         17      2 mg           18      2 mg         19            20               21               22               23               24                 25               26               27               28               29               30                 Date Details   06/05 This INR check       Date of next INR:  6/19/2017         How to take your warfarin dose     To take:  2 mg Take 1 of the 2 mg tablets.    To take:  4 mg Take 2 of the 2 mg tablets.

## 2017-06-05 NOTE — PROGRESS NOTES
ANTICOAGULATION FOLLOW-UP CLINIC VISIT    Patient Name:  Earl Sanchez  Date:  6/5/2017  Contact Type:  Face to Face    SUBJECTIVE:     Patient Findings     Positives No Problem Findings    Comments States that he eats quite a bit of greens at his senior center.           OBJECTIVE    INR Protime   Date Value Ref Range Status   06/05/2017 2.0 (A) 0.86 - 1.14 Final       ASSESSMENT / PLAN  INR assessment THER    Recheck INR In: 2 WEEKS    INR Location Clinic      Anticoagulation Summary as of 6/5/2017     INR goal 2.0-3.0   Today's INR 2.0   Maintenance plan 4 mg (2 mg x 2) on Mon, Wed, Fri; 2 mg (2 mg x 1) all other days   Full instructions 4 mg on Mon, Wed, Fri; 2 mg all other days   Weekly total 20 mg   Plan last modified Brandy Miller RN (6/5/2017)   Next INR check 6/19/2017   Priority INR   Target end date Indefinite    Indications   Long-term (current) use of anticoagulants [Z79.01] [Z79.01]  Atrial fibrillation (H) [I48.91]         Anticoagulation Episode Summary     INR check location     Preferred lab     Send INR reminders to [facility]    Comments TAKES WARFARIN IN THE MORNING        Anticoagulation Care Providers     Provider Role Specialty Phone number    Alvarado Florian MD [facility] [phone]            See the Encounter Report to view Anticoagulation Flowsheet and Dosing Calendar (Go to Encounters tab in chart review, and find the Anticoagulation Therapy Visit)    Dosage adjustment made based on physician directed care plan.    Brandy Miller, RN

## 2017-06-10 ENCOUNTER — APPOINTMENT (OUTPATIENT)
Dept: MRI IMAGING | Facility: CLINIC | Age: 82
End: 2017-06-10
Attending: EMERGENCY MEDICINE
Payer: COMMERCIAL

## 2017-06-10 ENCOUNTER — HOSPITAL ENCOUNTER (EMERGENCY)
Facility: CLINIC | Age: 82
Discharge: HOME | End: 2017-06-10
Attending: EMERGENCY MEDICINE | Admitting: EMERGENCY MEDICINE
Payer: COMMERCIAL

## 2017-06-10 VITALS
OXYGEN SATURATION: 97 % | TEMPERATURE: 98.3 F | SYSTOLIC BLOOD PRESSURE: 142 MMHG | DIASTOLIC BLOOD PRESSURE: 75 MMHG | HEIGHT: 68 IN

## 2017-06-10 DIAGNOSIS — E86.0 DEHYDRATION: ICD-10-CM

## 2017-06-10 DIAGNOSIS — R29.898 LEG WEAKNESS, BILATERAL: ICD-10-CM

## 2017-06-10 DIAGNOSIS — Z79.01 LONG TERM CURRENT USE OF ANTICOAGULANT THERAPY: ICD-10-CM

## 2017-06-10 LAB
ALBUMIN UR-MCNC: 30 MG/DL
ANION GAP SERPL CALCULATED.3IONS-SCNC: 7 MMOL/L (ref 3–14)
APPEARANCE UR: CLEAR
BASOPHILS # BLD AUTO: 0.1 10E9/L (ref 0–0.2)
BASOPHILS NFR BLD AUTO: 0.8 %
BILIRUB UR QL STRIP: NEGATIVE
BUN SERPL-MCNC: 27 MG/DL (ref 7–30)
CALCIUM SERPL-MCNC: 8.8 MG/DL (ref 8.5–10.1)
CHLORIDE SERPL-SCNC: 109 MMOL/L (ref 94–109)
CO2 SERPL-SCNC: 25 MMOL/L (ref 20–32)
COLOR UR AUTO: YELLOW
CREAT SERPL-MCNC: 1.11 MG/DL (ref 0.66–1.25)
DIFFERENTIAL METHOD BLD: ABNORMAL
EOSINOPHIL # BLD AUTO: 0.1 10E9/L (ref 0–0.7)
EOSINOPHIL NFR BLD AUTO: 2.3 %
ERYTHROCYTE [DISTWIDTH] IN BLOOD BY AUTOMATED COUNT: 15 % (ref 10–15)
GFR SERPL CREATININE-BSD FRML MDRD: 62 ML/MIN/1.7M2
GLUCOSE SERPL-MCNC: 102 MG/DL (ref 70–99)
GLUCOSE UR STRIP-MCNC: NEGATIVE MG/DL
HCT VFR BLD AUTO: 33.9 % (ref 40–53)
HGB BLD-MCNC: 11.1 G/DL (ref 13.3–17.7)
HGB UR QL STRIP: NEGATIVE
HYALINE CASTS #/AREA URNS LPF: 1 /LPF (ref 0–2)
IMM GRANULOCYTES # BLD: 0 10E9/L (ref 0–0.4)
IMM GRANULOCYTES NFR BLD: 0.2 %
INR PPP: 1.8 (ref 0.86–1.14)
KETONES UR STRIP-MCNC: NEGATIVE MG/DL
LEUKOCYTE ESTERASE UR QL STRIP: ABNORMAL
LYMPHOCYTES # BLD AUTO: 0.8 10E9/L (ref 0.8–5.3)
LYMPHOCYTES NFR BLD AUTO: 12.5 %
MCH RBC QN AUTO: 31.6 PG (ref 26.5–33)
MCHC RBC AUTO-ENTMCNC: 32.7 G/DL (ref 31.5–36.5)
MCV RBC AUTO: 97 FL (ref 78–100)
MONOCYTES # BLD AUTO: 0.5 10E9/L (ref 0–1.3)
MONOCYTES NFR BLD AUTO: 8.7 %
MUCOUS THREADS #/AREA URNS LPF: PRESENT /LPF
NEUTROPHILS # BLD AUTO: 4.6 10E9/L (ref 1.6–8.3)
NEUTROPHILS NFR BLD AUTO: 75.5 %
NITRATE UR QL: NEGATIVE
NRBC # BLD AUTO: 0 10*3/UL
NRBC BLD AUTO-RTO: 0 /100
PH UR STRIP: 6.5 PH (ref 5–7)
PLATELET # BLD AUTO: 184 10E9/L (ref 150–450)
POTASSIUM SERPL-SCNC: 4.2 MMOL/L (ref 3.4–5.3)
RBC # BLD AUTO: 3.51 10E12/L (ref 4.4–5.9)
RBC #/AREA URNS AUTO: 1 /HPF (ref 0–2)
SODIUM SERPL-SCNC: 141 MMOL/L (ref 133–144)
SP GR UR STRIP: 1.02 (ref 1–1.03)
SQUAMOUS #/AREA URNS AUTO: <1 /HPF (ref 0–1)
TROPONIN I SERPL-MCNC: 0.02 UG/L (ref 0–0.04)
URN SPEC COLLECT METH UR: ABNORMAL
UROBILINOGEN UR STRIP-MCNC: NORMAL MG/DL (ref 0–2)
WBC # BLD AUTO: 6.1 10E9/L (ref 4–11)
WBC #/AREA URNS AUTO: 3 /HPF (ref 0–2)

## 2017-06-10 PROCEDURE — 70553 MRI BRAIN STEM W/O & W/DYE: CPT

## 2017-06-10 PROCEDURE — 93005 ELECTROCARDIOGRAM TRACING: CPT

## 2017-06-10 PROCEDURE — 99285 EMERGENCY DEPT VISIT HI MDM: CPT | Mod: 25

## 2017-06-10 PROCEDURE — 84484 ASSAY OF TROPONIN QUANT: CPT | Performed by: EMERGENCY MEDICINE

## 2017-06-10 PROCEDURE — 81001 URINALYSIS AUTO W/SCOPE: CPT | Performed by: EMERGENCY MEDICINE

## 2017-06-10 PROCEDURE — 85025 COMPLETE CBC W/AUTO DIFF WBC: CPT | Performed by: EMERGENCY MEDICINE

## 2017-06-10 PROCEDURE — A9585 GADOBUTROL INJECTION: HCPCS | Performed by: EMERGENCY MEDICINE

## 2017-06-10 PROCEDURE — 25000128 H RX IP 250 OP 636: Performed by: EMERGENCY MEDICINE

## 2017-06-10 PROCEDURE — 85610 PROTHROMBIN TIME: CPT | Performed by: EMERGENCY MEDICINE

## 2017-06-10 PROCEDURE — 80048 BASIC METABOLIC PNL TOTAL CA: CPT | Performed by: EMERGENCY MEDICINE

## 2017-06-10 RX ORDER — GADOBUTROL 604.72 MG/ML
8 INJECTION INTRAVENOUS ONCE
Status: COMPLETED | OUTPATIENT
Start: 2017-06-10 | End: 2017-06-10

## 2017-06-10 RX ADMIN — GADOBUTROL 8 ML: 604.72 INJECTION INTRAVENOUS at 13:33

## 2017-06-10 ASSESSMENT — ENCOUNTER SYMPTOMS
APPETITE CHANGE: 0
WEAKNESS: 1
VOMITING: 0
DIARRHEA: 0
COUGH: 0

## 2017-06-10 NOTE — ED AVS SNAPSHOT
Emergency Department    [address] [facility] [address]    Phone:  [phone]    Fax:  [phone]                                       Earl Sanchez   MRN: [id number]    Department:   Emergency Department   Date of Visit:  6/10/2017           After Visit Summary Signature Page     I have received my discharge instructions, and my questions have been answered. I have discussed any challenges I see with this plan with the nurse or doctor.    ..........................................................................................................................................  Patient/Patient Representative Signature      ..........................................................................................................................................  Patient Representative Print Name and Relationship to Patient    ..................................................               ................................................  Date                                            Time    ..........................................................................................................................................  Reviewed by Signature/Title    ...................................................              ..............................................  Date                                                            Time

## 2017-06-10 NOTE — ED AVS SNAPSHOT
Emergency Department    [address] [facility] [address]    Phone:  [phone]    Fax:  [phone]                                       Earl Sanchez   MRN: [id number]    Department:   Emergency Department   Date of Visit:  6/10/2017           Patient Information     Date Of Birth          9/17/1926        Your diagnoses for this visit were:     Leg weakness, bilateral left greater than right    Dehydration     Long term current use of anticoagulant therapy        You were seen by Prudence Terrell MD.      Follow-up Information     Follow up with Alvarado Florian MD.    Specialty:  Family Practice    Contact information:    [facility]  [address] [facility] [address]  [phone]          Schedule an appointment as soon as possible for a visit with Spenser Diallo MD.    Specialty:  Neurology    Contact information:    [facility]  [address]  [facility] [address]  [phone]          Discharge Instructions       Increase your fluids to an extra 3-4 glasses of water or juice a day, use your walker.  Set up an appointment in the clinic with your doctor this week for a recheck of your leg weakness.        Dehydration (Adult)  Dehydration occurs when your body loses too much fluid. This may be the result of prolonged vomiting or diarrhea, excessive sweating, or a high fever. It may also happen if you don t drink enough fluid when you re sick or out in the heat. Misuse of diuretics (water pills) can also be a cause.  Symptoms include thirst and decreased urine output. You may also feel dizzy, weak, fatigued, or very drowsy. The diet described below is usually enough to treat dehydration. In some cases, you may need medicine.  Home care    Drink at least 12 8-ounce glasses of fluid every day to resolve the dehydration. Fluid may include water; orange juice; lemonade; apple, grape, or cranberry juice; clear fruit drinks; electrolyte  replacement and sports drinks; and teas and coffee without caffeine. If you have been diagnosed with a kidney disease, ask your doctor how much and what types of fluids you should drink to prevent dehydration. If you have kidney disease, fluid can build up in the body. This can be dangerous to your health.     If you have a fever, muscle aches, or a headache as a result of a cold or flu, you may take acetaminophen or ibuprofen, unless another medicine was prescribed. If you have chronic liver or kidney disease, or have ever had a stomach ulcer or gastrointestinal bleeding, talk with your health care provider before using these medicines. Don't take aspirin if you are younger than 18 and have a fever. Aspirin raises the chance for severe liver injury.  Follow-up care  Follow up with your health care provider, or as advised.  When to seek medical advice  Call your health care provider right away if any of these occur:    Continued vomiting    Frequent diarrhea (more than 5 times a day); blood (red or black color) or mucus in diarrhea    Blood in vomit or stool    Swollen abdomen or increasing abdominal pain    Weakness, dizziness, or fainting    Unusual drowsiness or confusion    Reduced urine output or extreme thirst    Fever of 100.4 F (34 C) or higher     0335-0166 [employer]. [address] [place] [address]. All rights reserved. This information is not intended as a substitute for professional medical care. Always follow your healthcare professional's instructions.          Future Appointments        Provider Department Dept Phone Center    6/19/2017 10:30 AM [facility] [phone]       24 Hour Appointment Hotline       To make an appointment at any [facility], call [phone] ([phone]). If you don't have a family doctor or clinic, we will help you find one. [facility] are conveniently located to serve the needs of you  and your family.             Review of your medicines      Our records show that you are taking the medicines listed below. If these are incorrect, please call your family doctor or clinic.        Dose / Directions Last dose taken    * acetaminophen 325 MG tablet   Commonly known as:  TYLENOL   Dose:  650 mg        Take 650 mg by mouth 3 times daily   Refills:  0        * acetaminophen 500 MG tablet   Commonly known as:  TYLENOL   Dose:  1000 mg        Take 1,000 mg by mouth nightly as needed for mild pain   Refills:  0        * allopurinol 100 MG tablet   Commonly known as:  ZYLOPRIM   Dose:  100 mg   Quantity:  90 tablet        Take 1 tablet (100 mg) by mouth 2 times daily   Refills:  1        * allopurinol 100 MG tablet   Commonly known as:  ZYLOPRIM   Dose:  200 mg   Quantity:  360 tablet        Take 2 tablets (200 mg) by mouth 3 times daily as needed   Refills:  1        ASPIRIN LOW DOSE 81 MG tablet   Dose:  81 mg   Generic drug:  aspirin        Take 81 mg by mouth daily   Refills:  0        doxazosin 4 MG tablet   Commonly known as:  CARDURA   Dose:  4 mg   Quantity:  90 tablet        Take 1 tablet (4 mg) by mouth At Bedtime   Refills:  3        furosemide 40 MG tablet   Commonly known as:  LASIX   Quantity:  180 tablet        TAKE 1 TABLET IN THE MORNING   Refills:  1        levothyroxine 50 MCG tablet   Commonly known as:  SYNTHROID/LEVOTHROID   Dose:  50 mcg   Quantity:  90 tablet        Take 1 tablet (50 mcg) by mouth daily   Refills:  1        lisinopril 10 MG tablet   Commonly known as:  PRINIVIL/ZESTRIL   Dose:  5 mg   Quantity:  45 tablet        Take 0.5 tablets (5 mg) by mouth daily   Refills:  1        nitroglycerin 0.4 MG sublingual tablet   Commonly known as:  NITROSTAT   Dose:  0.4 mg   Quantity:  25 tablet        Place 1 tablet (0.4 mg) under the tongue every 5 minutes as needed for chest pain   Refills:  0        pravastatin 40 MG tablet   Commonly known as:  PRAVACHOL   Dose:  40 mg    Quantity:  90 tablet        Take 1 tablet (40 mg) by mouth daily   Refills:  0        * Warfarin Therapy Reminder   Dose:  1 each        1 each continuous prn   Refills:  0        * warfarin 2 MG tablet   Commonly known as:  COUMADIN   Quantity:  135 tablet        Takes 4 mg on Mon, Fri;  2 mg all other days or as directed by [facility] nurses   Refills:  0        * Notice:  This list has 6 medication(s) that are the same as other medications prescribed for you. Read the directions carefully, and ask your doctor or other care provider to review them with you.            Procedures and tests performed during your visit     Basic metabolic panel    CBC with platelets differential    EKG 12-lead, tracing only    INR    MR Brain w/o & w Contrast    Troponin I    UA reflex to Microscopic      Orders Needing Specimen Collection     None      Pending Results     Date and Time Order Name Status Description    6/10/2017 1114 EKG 12-lead, tracing only Preliminary     6/10/2017 1114 MR Brain w/o & w Contrast Preliminary             Pending Culture Results     No orders found from 6/8/2017 to 6/11/2017.            Pending Results Instructions     If you had any lab results that were not finalized at the time of your Discharge, you can call the ED Lab Result RN at [phone]. You will be contacted by this team for any positive Lab results or changes in treatment. The nurses are available 7 days a week from 10A to 6:30P.  You can leave a message 24 hours per day and they will return your call.        Test Results From Your Hospital Stay        6/10/2017 12:14 PM      Component Results     Component Value Ref Range & Units Status    WBC 6.1 4.0 - 11.0 10e9/L Final    RBC Count 3.51 (L) 4.4 - 5.9 10e12/L Final    Hemoglobin 11.1 (L) 13.3 - 17.7 g/dL Final    Hematocrit 33.9 (L) 40.0 - 53.0 % Final    MCV 97 78 - 100 fl Final    MCH 31.6 26.5 - 33.0 pg Final    MCHC 32.7 31.5 - 36.5 g/dL Final    RDW 15.0 10.0 - 15.0 % Final     Platelet Count 184 150 - 450 10e9/L Final    Diff Method Automated Method  Final    % Neutrophils 75.5 % Final    % Lymphocytes 12.5 % Final    % Monocytes 8.7 % Final    % Eosinophils 2.3 % Final    % Basophils 0.8 % Final    % Immature Granulocytes 0.2 % Final    Nucleated RBCs 0 0 /100 Final    Absolute Neutrophil 4.6 1.6 - 8.3 10e9/L Final    Absolute Lymphocytes 0.8 0.8 - 5.3 10e9/L Final    Absolute Monocytes 0.5 0.0 - 1.3 10e9/L Final    Absolute Eosinophils 0.1 0.0 - 0.7 10e9/L Final    Absolute Basophils 0.1 0.0 - 0.2 10e9/L Final    Abs Immature Granulocytes 0.0 0 - 0.4 10e9/L Final    Absolute Nucleated RBC 0.0  Final         6/10/2017 12:30 PM      Component Results     Component Value Ref Range & Units Status    Sodium 141 133 - 144 mmol/L Final    Potassium 4.2 3.4 - 5.3 mmol/L Final    Chloride 109 94 - 109 mmol/L Final    Carbon Dioxide 25 20 - 32 mmol/L Final    Anion Gap 7 3 - 14 mmol/L Final    Glucose 102 (H) 70 - 99 mg/dL Final    Urea Nitrogen 27 7 - 30 mg/dL Final    Creatinine 1.11 0.66 - 1.25 mg/dL Final    GFR Estimate 62 >60 mL/min/1.7m2 Final    Non  GFR Calc    GFR Estimate If Black 75 >60 mL/min/1.7m2 Final    African American GFR Calc    Calcium 8.8 8.5 - 10.1 mg/dL Final         6/10/2017 12:24 PM      Component Results     Component Value Ref Range & Units Status    INR 1.80 (H) 0.86 - 1.14 Final         6/10/2017 12:31 PM      Component Results     Component Value Ref Range & Units Status    Troponin I ES 0.017 0.000 - 0.045 ug/L Final    The 99th percentile for upper reference range is 0.045 ug/L.  Troponin values   in   the range of 0.045 - 0.120 ug/L may be associated with risks of adverse   clinical events.           6/10/2017  2:12 PM      Narrative     MRI BRAIN WITHOUT AND WITH CONTRAST  6/10/2017 1:46 PM    HISTORY: Left leg weakness for 2 days.    TECHNIQUE:  Multiplanar, multisequence MRI of the brain without and  with 7 mL Gadavist.    COMPARISON:  10/21/2014.    FINDINGS:  There is generalized atrophy of the brain.  White matter  changes are present in the cerebral hemispheres that are consistent  with small vessel ischemic disease in this age patient. Flattening of  the sulci over the convexities suggests oral pressure hydrocephalus,  but this is unchanged. There is no evidence of hemorrhage, mass, acute  infarct, or anomaly.  There are no gadolinium enhancing lesions.    There are bilateral intraocular lens implants. The arteries at the  base of the brain and the dural venous sinuses appear patent.         Impression     IMPRESSION:  1. No acute abnormality.  2. Brain atrophy and white matter changes consistent with sequelae of  small vessel ischemic disease.  3. Flattening of the sulci over the convexities is again suspicious  for normal pressure hydrocephalus but unchanged.         6/10/2017 11:43 AM      Component Results     Component Value Ref Range & Units Status    Color Urine Yellow  Final    Appearance Urine Clear  Final    Glucose Urine Negative NEG mg/dL Final    Bilirubin Urine Negative NEG Final    Ketones Urine Negative NEG mg/dL Final    Specific Gravity Urine 1.018 1.003 - 1.035 Final    Blood Urine Negative NEG Final    pH Urine 6.5 5.0 - 7.0 pH Final    Protein Albumin Urine 30 (A) NEG mg/dL Final    Urobilinogen mg/dL Normal 0.0 - 2.0 mg/dL Final    Nitrite Urine Negative NEG Final    Leukocyte Esterase Urine Trace (A) NEG Final    Source Midstream Urine  Final    RBC Urine 1 0 - 2 /HPF Final    WBC Urine 3 (H) 0 - 2 /HPF Final    Squamous Epithelial /HPF Urine <1 0 - 1 /HPF Final    Mucous Urine Present (A) NEG /LPF Final    Hyaline Casts 1 0 - 2 /LPF Final                Clinical Quality Measure: Blood Pressure Screening     Your blood pressure was checked while you were in the emergency department today. The last reading we obtained was  BP: 142/75 . Please read the guidelines below about what these numbers mean and what you should do  about them.  If your systolic blood pressure (the top number) is less than 120 and your diastolic blood pressure (the bottom number) is less than 80, then your blood pressure is normal. There is nothing more that you need to do about it.  If your systolic blood pressure (the top number) is 120-139 or your diastolic blood pressure (the bottom number) is 80-89, your blood pressure may be higher than it should be. You should have your blood pressure rechecked within a year by a primary care provider.  If your systolic blood pressure (the top number) is 140 or greater or your diastolic blood pressure (the bottom number) is 90 or greater, you may have high blood pressure. High blood pressure is treatable, but if left untreated over time it can put you at risk for heart attack, stroke, or kidney failure. You should have your blood pressure rechecked by a primary care provider within the next 4 weeks.  If your provider in the emergency department today gave you specific instructions to follow-up with your doctor or provider even sooner than that, you should follow that instruction and not wait for up to 4 weeks for your follow-up visit.        Thank you for choosing [facility]       Thank you for choosing [facility] for your care. Our goal is always to provide you with excellent care. Hearing back from our patients is one way we can continue to improve our services. Please take a few minutes to complete the written survey that you may receive in the mail after you visit with us. Thank you!        [employer] Information     [facility] gives you secure access to your electronic health record. If you see a primary care provider, you can also send messages to your care team and make appointments. If you have questions, please call your primary care clinic.  If you do not have a primary care provider, please call [phone] and they will assist you.        Care EveryWhere ID     This is your Care EveryWhere ID. This could be used by  other organizations to access your [facility] medical records  [id number]        After Visit Summary       This is your record. Keep this with you and show to your community pharmacist(s) and doctor(s) at your next visit.

## 2017-06-10 NOTE — ED NOTES
Bed: ED02  Expected date:   Expected time:   Means of arrival:   Comments:  439  89 M diff ambulating  1055

## 2017-06-10 NOTE — ED NOTES
Pt road tested with walker.  Gait was even and shuffling, pt stated that the weakness that brought him to the ED was gone.

## 2017-06-10 NOTE — ED PROVIDER NOTES
History     Chief Complaint:  Generalized Weakness    HPI   Earl Sanchez is a 89 year old male who presents to the ED via EMS for evaluation of weakness. The patient reports that he has had increased leg weakness yesterday and today. Normally, he uses a walker to get around, but has not been able to ambulate per baseline recently. The patient is concerned about sustaining a fall secondary to his instability. He denies any upper body weakness, coughing, diarrhea, vomiting, or change in appetite. He denies any history of stroke.     Allergies:  NKDA     Medications:    Zyloprim  Coumadin  Levothyroxine  Pravastatin  Lasix  Lisinopril  Cardura  Tylenol  Nitroglycerin   Aspirin      Past Medical History:    Abdominal aortic aneurysm   Acute MI - 1981, 2007  Atrial fibrillation  Benign prostatic hyperplasia  Coronary artery disease   Chronic kidney disease   Duodenal ulcer with hemorrhage   Gait apraxia   Gastritis   Gastro-oesophageal reflux disease   Heart failure  Hyperlipidemia   Hypertension   Osteoarthritis   Renal disease   Thyroid disease      Past Surgical History:    ORIF ankle fracture   Coronary artery bypass - 2007  Left heart cath - 12/10/2007  Right inguinal hernia repair  Phacoemulsification clear cornea with toric intraocular lens implant - 4/10/2014  Phacoemulsification clear cornea with toric intraocular lens implant - 4/22/2014    Family History:    Mother - hypertension     Social History:  The patient was brought to the ED by EMS.  Smoking Status: Never smoker  Smokeless Tobacco: Never used   Alcohol Use: Occasional. One glass of wine last night.   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     Review of Systems   Constitutional: Negative for appetite change.   Respiratory: Negative for cough.    Gastrointestinal: Negative for diarrhea and vomiting.   Neurological: Positive for weakness (legs).   All other systems reviewed and are  "negative.    Physical Exam   First Vitals: /75  Temp 98.3  F (36.8  C) (Oral)  Ht 1.727 m (5' 8\")  SpO2 97%       Physical Exam  Nursing note and vitals reviewed.  Constitutional:  Appears well-developed and well-nourished, comfortable.   HENT:   Head:   No evidence of facial or scalp trauma.  Nose:    Nose normal.   Mouth/Throat:  Mucosa is moist.  Eyes:    Conjunctivae are normal.      Pupils are equal, round, and reactive to light.      Right eye exhibits no discharge. Left eye exhibits no discharge.      No scleral icterus.   Cardiovascular:  Normal rate, irregular rhythm.      Normal heart sounds and intact distal pulses.       No murmur heard.  Pulmonary/Chest:  Effort normal and breath sounds normal. No respiratory distress.     No wheezes. No rales. No chest wall tenderness. No stridor.   Abdominal:   Soft. No distension and no mass. No tenderness.      No rebound and no guarding. No flank pain.  Musculoskeletal:  Normal range of motion.      No edema and no tenderness.                                       Neck supple, no midline cervical tenderness.   Neurological:   GCS 15. NIH stroke scale score 0. O X 3.  No sensory deficit. Normal strength    and sensation. Normal coordination. Normal finger to nose. Normal    heel-knee-shin. No hand drift. No leg drift. He does have some mild left leg    weakness. He has mild weakness of the plantarflexors of the ankles. Left leg    heel-knee-shin was wobbly. No facial droop. No slurred speech. Mental status    and memory normal. Comprehension and expression of speech is normal.   Skin:    Skin is warm and dry. No rash noted. No diaphoresis.      No erythema. No pallor.   Psychiatric:   Behavior is normal. Judgment and thought content normal.     Emergency Department Course   ECG done at 1126. ECG read at 1130:  Rate 68 bpm. MN interval *. QRS duration 100. QT/QTc 430/457. P-R-T axes * -60 62.  Atrial fibrillation with a competing junctional pacemaker. Left " anterior fascicular block. Cannot rule out anterior infarct, age undetermined. Abnormal ECG. No significant change compared to EKG dated 12/15/2016.     Imaging:  Radiographic findings were communicated with the patient who voiced understanding of the findings.  MR Brain w/o & w Contrast:  1. No acute abnormality.  2. Brain atrophy and white matter changes consistent with sequelae of small vessel ischemic disease.  3. Flattening of the sulci over the convexities is again suspicious for normal pressure hydrocephalus but unchanged.  Per radiology.     Laboratory:  CBC: HGB 11.1 low, o/w WNL (WBC 6.1, )   BMP: Glucose 102 high, o/w WNL (Creatinine 1.11)   INR: 1.80 high   Troponin I: 0.017  UA: Leukocyte esterase urine trace, protein albumin urine 30, WBC/HPF 3 high, mucous urine present, o/w negative    Interventions:  (1333) Gadavist 8 mL IV    Emergency Department Course:  Nursing notes and vitals reviewed.  I performed an exam of the patient as documented above.     EKG obtained in the ED, see results above.   The patient had several labs conducted while in the ED, findings are noted above.   The patient was sent for imaging while here in the emergency department, findings above.    Findings and plan explained to the Patient. Patient discharged home with instructions regarding supportive care, medications, and reasons to return. The importance of close follow-up was reviewed.     Impression & Plan      Medical Decision Making:  Earl Sanchez is a 89 year old male who presents for evaluation of leg weakness. His left leg was definitely weaker than the right. He could lift it up off the bed, but his heel-knee-shin was unsteady, and when he plantar flexed his foot he had weakness compared to the right. He has noticed this for some time. He looked dry when he came in; his mouth was dry. He drank fluids while he was here. His labs came back with urine that was clear, normal troponin, INR was  subtherapeutic at 1.80, and BMP was normal. His CBC is also normal, other than slightly low hemoglobin at 11.1. After some oral fluids and brain MRI which was unremarkable, he got up and ambulated. He used the walker and did quite well. He got around without a problem. He believes that he is not drinking enough fluid. I think with his chronic leg weakness, it just makes that worse. He agrees to drink 3-4 more glasses of water a day. His daughter is here and agrees with this. He has a walker at home. I am comfortable sending him home, but because he feels his leg weakness is worsening, he should follow up in the clinic. He has not had a recent MRI of his lumbar spine, which may be helpful if he continues to get progressively worse weakening in his legs.     Diagnosis:    ICD-10-CM    1. Leg weakness, bilateral M62.81     left greater than right   2. Dehydration E86.0    3. Long term current use of anticoagulant therapy Z79.01        Disposition:  Discharged to home. Increase your fluids to an extra 3-4 glasses of water or juice a day, use your walker.  Set up an appointment in the clinic with your doctor this week for a recheck of your leg weakness.    I, Sherwin Bridges, am serving as a scribe at 10:59 AM on 6/10/2017 to document services personally performed by Prudence Terrell MD based on my observations and the provider's statements to me.    EMERGENCY DEPARTMENT       Prudence Terrell MD  06/11/17 0059

## 2017-06-10 NOTE — DISCHARGE INSTRUCTIONS
Increase your fluids to an extra 3-4 glasses of water or juice a day, use your walker.  Set up an appointment in the clinic with your doctor this week for a recheck of your leg weakness.        Dehydration (Adult)  Dehydration occurs when your body loses too much fluid. This may be the result of prolonged vomiting or diarrhea, excessive sweating, or a high fever. It may also happen if you don t drink enough fluid when you re sick or out in the heat. Misuse of diuretics (water pills) can also be a cause.  Symptoms include thirst and decreased urine output. You may also feel dizzy, weak, fatigued, or very drowsy. The diet described below is usually enough to treat dehydration. In some cases, you may need medicine.  Home care    Drink at least 12 8-ounce glasses of fluid every day to resolve the dehydration. Fluid may include water; orange juice; lemonade; apple, grape, or cranberry juice; clear fruit drinks; electrolyte replacement and sports drinks; and teas and coffee without caffeine. If you have been diagnosed with a kidney disease, ask your doctor how much and what types of fluids you should drink to prevent dehydration. If you have kidney disease, fluid can build up in the body. This can be dangerous to your health.     If you have a fever, muscle aches, or a headache as a result of a cold or flu, you may take acetaminophen or ibuprofen, unless another medicine was prescribed. If you have chronic liver or kidney disease, or have ever had a stomach ulcer or gastrointestinal bleeding, talk with your health care provider before using these medicines. Don't take aspirin if you are younger than 18 and have a fever. Aspirin raises the chance for severe liver injury.  Follow-up care  Follow up with your health care provider, or as advised.  When to seek medical advice  Call your health care provider right away if any of these occur:    Continued vomiting    Frequent diarrhea (more than 5 times a day); blood (red or  black color) or mucus in diarrhea    Blood in vomit or stool    Swollen abdomen or increasing abdominal pain    Weakness, dizziness, or fainting    Unusual drowsiness or confusion    Reduced urine output or extreme thirst    Fever of 100.4 F (34 C) or higher     5407-1657 [employer]. [address] [place] [address]. All rights reserved. This information is not intended as a substitute for professional medical care. Always follow your healthcare professional's instructions.

## 2017-06-11 LAB — INTERPRETATION ECG - MUSE: NORMAL

## 2017-06-13 ENCOUNTER — TELEPHONE (OUTPATIENT)
Dept: FAMILY MEDICINE | Facility: CLINIC | Age: 82
End: 2017-06-13

## 2017-06-13 NOTE — TELEPHONE ENCOUNTER
OPT[employer] PAF Project printed and given to PCP for completion at patient's appointment  TC to fax to [phone] to process once it has been signed  Yun OLIVER

## 2017-06-14 ENCOUNTER — OFFICE VISIT (OUTPATIENT)
Dept: FAMILY MEDICINE | Facility: CLINIC | Age: 82
End: 2017-06-14
Payer: COMMERCIAL

## 2017-06-14 VITALS — DIASTOLIC BLOOD PRESSURE: 86 MMHG | OXYGEN SATURATION: 98 % | HEART RATE: 78 BPM | SYSTOLIC BLOOD PRESSURE: 136 MMHG

## 2017-06-14 DIAGNOSIS — I25.10 CORONARY ARTERY DISEASE INVOLVING NATIVE CORONARY ARTERY OF NATIVE HEART WITHOUT ANGINA PECTORIS: ICD-10-CM

## 2017-06-14 DIAGNOSIS — I10 HYPERTENSION GOAL BP (BLOOD PRESSURE) < 140/90: ICD-10-CM

## 2017-06-14 DIAGNOSIS — Z79.01 CHRONIC ANTICOAGULATION: ICD-10-CM

## 2017-06-14 DIAGNOSIS — I48.20 CHRONIC ATRIAL FIBRILLATION (H): ICD-10-CM

## 2017-06-14 DIAGNOSIS — R53.81 PHYSICAL DECONDITIONING: ICD-10-CM

## 2017-06-14 DIAGNOSIS — E78.5 HYPERLIPIDEMIA WITH TARGET LDL LESS THAN 100: ICD-10-CM

## 2017-06-14 DIAGNOSIS — R63.0 POOR APPETITE: Primary | ICD-10-CM

## 2017-06-14 PROCEDURE — 99214 OFFICE O/P EST MOD 30 MIN: CPT | Performed by: FAMILY MEDICINE

## 2017-06-14 RX ORDER — WARFARIN SODIUM 2 MG/1
TABLET ORAL
Qty: 135 TABLET | Refills: 0 | Status: SHIPPED | OUTPATIENT
Start: 2017-06-14 | End: 2017-07-14

## 2017-06-14 RX ORDER — MIRTAZAPINE 30 MG/1
30 TABLET, FILM COATED ORAL AT BEDTIME
Qty: 90 TABLET | Refills: 1 | Status: SHIPPED | OUTPATIENT
Start: 2017-06-14 | End: 2017-11-29

## 2017-06-14 NOTE — MR AVS SNAPSHOT
After Visit Summary   6/14/2017    Earl Sanchez    MRN: [id number]           Patient Information     Date Of Birth          9/17/1926        Visit Information        Provider Department      6/14/2017 10:20 AM Alvarado Florian MD [facility]        Today's Diagnoses     Poor appetite    -  1    Chronic anticoagulation        Chronic atrial fibrillation (H)        Physical deconditioning        Coronary artery disease involving native coronary artery of native heart without angina pectoris        Hyperlipidemia with target LDL less than 100        Hypertension goal BP (blood pressure) < 140/90           Follow-ups after your visit        Your next 10 appointments already scheduled     Jun 19, 2017 10:30 AM CDT   Anticoagulation Visit with [facility]   [facility]    [address] [place] [id number] [phone]              Who to contact     If you have questions or need follow up information about today's clinic visit or your schedule please contact [facility] directly at [phone].  Normal or non-critical lab and imaging results will be communicated to you by Kiddyhart, letter or phone within 4 business days after the clinic has received the results. If you do not hear from us within 7 days, please contact the clinic through Kiddyhart or phone. If you have a critical or abnormal lab result, we will notify you by phone as soon as possible.  Submit refill requests through BlueYield or call your pharmacy and they will forward the refill request to us. Please allow 3 business days for your refill to be completed.          Additional Information About Your Visit        MyChart Information     [employer] gives you secure access to your electronic health record. If you see a primary care provider, you can also send messages to your care team and make appointments. If you have  questions, please call your primary care clinic.  If you do not have a primary care provider, please call [phone] and they will assist you.        Care EveryWhere ID     This is your Care EveryWhere ID. This could be used by other organizations to access your [facility] medical records  [id number]        Your Vitals Were     Pulse Pulse Oximetry                78 98%           Blood Pressure from Last 3 Encounters:   06/14/17 136/86   06/10/17 142/75   04/04/17 112/56    Weight from Last 3 Encounters:   04/04/17 157 lb (71.2 kg)   01/24/17 155 lb (70.3 kg)   01/05/17 155 lb (70.3 kg)              Today, you had the following     No orders found for display         Today's Medication Changes          These changes are accurate as of: 6/14/17 11:13 AM.  If you have any questions, ask your nurse or doctor.               Start taking these medicines.        Dose/Directions    mirtazapine 30 MG tablet   Commonly known as:  REMERON   Used for:  Poor appetite   Started by:  Alvarado Florian MD        Dose:  30 mg   Take 1 tablet (30 mg) by mouth At Bedtime   Quantity:  90 tablet   Refills:  1            Where to get your medicines      These medications were sent to [facility], [address] [address]     Phone:  [phone]     mirtazapine 30 MG tablet    warfarin 2 MG tablet                Primary Care Provider Office Phone # Fax #    Alvarado Florian [phone]       [address] [address]        Thank you!     Thank you for choosing [facility]  for your care. Our goal is always to provide you with excellent care. Hearing back from our patients is one way we can continue to improve our services. Please take a few minutes to complete the written survey that you may receive in the mail after your visit with us. Thank you!             Your Updated  Medication List - Protect others around you: Learn how to safely use, store and throw away your medicines at www.disposemymeds.org.          This list is accurate as of: 6/14/17 11:13 AM.  Always use your most recent med list.                   Brand Name Dispense Instructions for use    * acetaminophen 325 MG tablet    TYLENOL     Take 650 mg by mouth 3 times daily       * acetaminophen 500 MG tablet    TYLENOL     Take 1,000 mg by mouth nightly as needed for mild pain       * allopurinol 100 MG tablet    ZYLOPRIM    90 tablet    Take 1 tablet (100 mg) by mouth 2 times daily       * allopurinol 100 MG tablet    ZYLOPRIM    360 tablet    Take 2 tablets (200 mg) by mouth 3 times daily as needed       ASPIRIN LOW DOSE 81 MG tablet   Generic drug:  aspirin      Take 81 mg by mouth daily       doxazosin 4 MG tablet    CARDURA    90 tablet    Take 1 tablet (4 mg) by mouth At Bedtime       furosemide 40 MG tablet    LASIX    180 tablet    TAKE 1 TABLET IN THE MORNING       levothyroxine 50 MCG tablet    SYNTHROID/LEVOTHROID    90 tablet    Take 1 tablet (50 mcg) by mouth daily       lisinopril 10 MG tablet    PRINIVIL/ZESTRIL    45 tablet    Take 0.5 tablets (5 mg) by mouth daily       mirtazapine 30 MG tablet    REMERON    90 tablet    Take 1 tablet (30 mg) by mouth At Bedtime       nitroglycerin 0.4 MG sublingual tablet    NITROSTAT    25 tablet    Place 1 tablet (0.4 mg) under the tongue every 5 minutes as needed for chest pain       pravastatin 40 MG tablet    PRAVACHOL    90 tablet    Take 1 tablet (40 mg) by mouth daily       * Warfarin Therapy Reminder      1 each continuous prn       * warfarin 2 MG tablet    COUMADIN    135 tablet    Takes 4 mg on Mon, Fri;  2 mg all other days or as directed by [facility] nurses       * Notice:  This list has 6 medication(s) that are the same as other medications prescribed for you. Read the directions carefully, and ask your doctor or other care provider to review them with you.

## 2017-06-14 NOTE — PROGRESS NOTES
SUBJECTIVE:                                                    Earl Sanchez is a 89 year old male who presents to clinic today for the following health issues:      ED/UC Followup:    Facility:  [facility]  Date of visit: 6/10/17  Reason for visit: Weakness dehydrated  Current Status: very weak still     Problem list and histories reviewed & adjusted, as indicated.  Additional history: as documented    Patient Active Problem List   Diagnosis     Hypertension goal BP (blood pressure) < 140/90     CAD (coronary artery disease)     Osteoarthritis     Gait apraxia     Atrial fibrillation (H)     BPH (benign prostatic hyperplasia)     Heart failure (H)     Hyperlipidemia with target LDL less than 100     CKD (chronic kidney disease) stage 3, GFR 30-59 ml/min     Microalbuminuria     Anemia     Hypothyroidism     Elevated alkaline phosphatase level     Advance care planning     Health Care Home     Chronic anticoagulation     Elevated fasting glucose     Toe inflammation     Elevated uric acid in blood     Gastritis     Abdominal aortic aneurysm (H)     Advanced directives, counseling/discussion     Long-term (current) use of anticoagulants [Z79.01]     Hereditary multiple exostosis     General weakness     Tear of meniscus of left knee     Past Surgical History:   Procedure Laterality Date     ANKLE SURGERY      ORIF ankle fracture     CORONARY ARTERY BYPASS  2007    CAB X 5 CABG with LIMA to LAD and saphenous vein grafts to, OM2,SVG to diag 1, and sequential PDA     HEART CATH LEFT HEART CATH  12/10/07     HERNIA REPAIR, INGUINAL RT/LT      right     PHACOEMULSIFICATION CLEAR CORNEA WITH TORIC INTRAOCULAR LENS IMPLANT  4/10/2014    Procedure: PHACOEMULSIFICATION CLEAR CORNEA WITH TORIC INTRAOCULAR LENS IMPLANT;  RIGHT PHACOEMULSIFICATION CLEAR CORNEA WITH TORIC INTRAOCULAR LENS IMPLANT ;  Surgeon: Carlo Tee MD;  Location: [facility]     PHACOEMULSIFICATION CLEAR CORNEA WITH TORIC INTRAOCULAR LENS IMPLANT   4/22/2014    Procedure: PHACOEMULSIFICATION CLEAR CORNEA WITH TORIC INTRAOCULAR LENS IMPLANT;  Surgeon: Carlo Tee MD;  Location: [facility]       Social History   Substance Use Topics     Smoking status: Never Smoker     Smokeless tobacco: Never Used     Alcohol use Yes      Comment: occ - one glass of wine last night     Family History   Problem Relation Age of Onset     Hypertension Mother      Hypertension Maternal Grandmother          Current Outpatient Prescriptions   Medication Sig Dispense Refill     mirtazapine (REMERON) 30 MG tablet Take 1 tablet (30 mg) by mouth At Bedtime 90 tablet 1     warfarin (COUMADIN) 2 MG tablet Takes 4 mg on Mon, Fri;  2 mg all other days or as directed by [facility] nurses 135 tablet 0     allopurinol (ZYLOPRIM) 100 MG tablet Take 2 tablets (200 mg) by mouth 3 times daily as needed 360 tablet 1     levothyroxine (SYNTHROID/LEVOTHROID) 50 MCG tablet Take 1 tablet (50 mcg) by mouth daily 90 tablet 1     pravastatin (PRAVACHOL) 40 MG tablet Take 1 tablet (40 mg) by mouth daily 90 tablet 0     furosemide (LASIX) 40 MG tablet TAKE 1 TABLET IN THE MORNING 180 tablet 1     lisinopril (PRINIVIL/ZESTRIL) 10 MG tablet Take 0.5 tablets (5 mg) by mouth daily 45 tablet 1     doxazosin (CARDURA) 4 MG tablet Take 1 tablet (4 mg) by mouth At Bedtime 90 tablet 3     allopurinol (ZYLOPRIM) 100 MG tablet Take 1 tablet (100 mg) by mouth 2 times daily 90 tablet 1     Warfarin Therapy Reminder 1 each continuous prn       acetaminophen (TYLENOL) 325 MG tablet Take 650 mg by mouth 3 times daily       acetaminophen (TYLENOL) 500 MG tablet Take 1,000 mg by mouth nightly as needed for mild pain       nitroglycerin (NITROSTAT) 0.4 MG SL tablet Place 1 tablet (0.4 mg) under the tongue every 5 minutes as needed for chest pain 25 tablet 0     aspirin (ASPIRIN LOW DOSE) 81 MG tablet Take 81 mg by mouth daily        [DISCONTINUED] warfarin (COUMADIN) 2 MG tablet Takes 4 mg on Mon, Fri;  2 mg all other days or  as directed by [facility] nurses 135 tablet 0     No Known Allergies  Recent Labs   Lab Test  06/10/17   1201 12/20/16 05/11/16   1441  03/22/16   1406  06/11/15   1039  05/14/15   1059  04/16/15   1039  02/05/15   1344   04/08/14   1052  02/03/14   0917   03/12/13   1018   08/28/12   0911   A1C   --    --    --    --    --    --    --    --   5.9   --   6.2*   --    --   5.6   --    --    LDL   --    --    --    --   43   --    --   47   --    --    --   61   --    --    --   65   HDL   --    --    --    --   51   --    --   55   --    --    --   54   --    --    --   35*   TRIG   --    --    --    --   135   --    --   89   --    --    --   71   --    --    --   88   ALT   --    --    --    --    --   17   --    --    --    --    --   18   --    --    --   21   CR  1.11  1.23*   < >   --    --   1.62*   --    --   1.60*   < >   --    --    < >  1.68*   < >  1.37*   GFRESTIMATED  62  51*   < >   --    --   40*   --    --   41*   < >   --    --    < >  39*   < >  49*   GFRESTBLACK  75  >60   < >   --    --   49*   --    --   50*   < >   --    --    < >  47*   < >  60*   POTASSIUM  4.2  4.1   < >   --    --   3.9   --    --   4.3   < >  4.1   --    < >  4.2   < >  4.2   TSH   --    --    --   1.85   --    --   2.79   --    --    < >  4.74   --    --   2.71   < >  9.78*    < > = values in this interval not displayed.      BP Readings from Last 3 Encounters:   06/14/17 136/86   06/10/17 142/75   04/04/17 112/56    Wt Readings from Last 3 Encounters:   04/04/17 157 lb (71.2 kg)   01/24/17 155 lb (70.3 kg)   01/05/17 155 lb (70.3 kg)                    Reviewed and updated as needed this visit by clinical staff       Reviewed and updated as needed this visit by Provider         ROS:  Constitutional, HEENT, cardiovascular, pulmonary, gi and gu systems are negative, except as otherwise noted.    OBJECTIVE:                                                    /86 (BP Location: Right arm, Patient Position: Chair, Cuff Size:  Adult Regular)  Pulse 78  SpO2 98%  There is no height or weight on file to calculate BMI.  GENERAL: healthy, alert and no distress  NECK: no adenopathy, no asymmetry, masses, or scars and thyroid normal to palpation  RESP: lungs clear to auscultation - no rales, rhonchi or wheezes  CV: regular rate and rhythm, normal S1 S2, no S3 or S4, no murmur, click or rub, no peripheral edema and peripheral pulses strong  ABDOMEN: soft, nontender, no hepatosplenomegaly, no masses and bowel sounds normal  MS: no gross musculoskeletal defects noted, no edema         ASSESSMENT/PLAN:                                                    ASSESSMENT / PLAN:  (R63.0) Poor appetite  (primary encounter diagnosis)  Comment: water and food intake has been decreased, feeling very weak, was seen at ER, and found having dehydration  Plan: mirtazapine (REMERON) 30 MG tablet        encouraged him to try Remeron, and will also keep counting liquid intake(at least three 17 Oz water daily ), with adding ensure     (Z79.01) Chronic anticoagulation  Comment: stable with current treatment, has no sign of acute active bleeding, no blood in stool nor urine   Plan: warfarin (COUMADIN) 2 MG tablet            (I48.2) Chronic atrial fibrillation (H)  Comment: mentioned above   Plan: warfarin (COUMADIN) 2 MG tablet            (R53.81) Physical deconditioning  Comment: mentioned above above, need to increase the intake  Plan: mentioned above     (I25.10) Coronary artery disease involving native coronary artery of native heart without angina pectoris  Comment: stable with worsening clinical finding, has stable a fib status, will keep watching sx   Plan: keep monitoring     (E78.5) Hyperlipidemia with target LDL less than 100  Comment: mentioned above,   Plan: monitoring     (I10) Hypertension goal BP (blood pressure) < 140/90  Comment: stable  Plan: monitoring       FUTURE APPOINTMENTS:       - Follow-up visit in 1 month     Alvarado Florian MD  [place]  [facility]

## 2017-06-14 NOTE — NURSING NOTE
"Chief Complaint   Patient presents with     ER F/U       Initial /86 (BP Location: Right arm, Patient Position: Chair, Cuff Size: Adult Regular)  Pulse 78  SpO2 98% Estimated body mass index is 23.87 kg/(m^2) as calculated from the following:    Height as of 4/4/17: 5' 8\" (1.727 m).    Weight as of 4/4/17: 157 lb (71.2 kg).  Medication Reconciliation: complete   Angela Hooks CMA      "

## 2017-06-15 ENCOUNTER — TELEPHONE (OUTPATIENT)
Dept: FAMILY MEDICINE | Facility: CLINIC | Age: 82
End: 2017-06-15

## 2017-06-15 NOTE — TELEPHONE ENCOUNTER
Calling to report a change in his condition -  Patient woke up this AM with mild swollen ankles  patient is taking in 30-40 oz a day-  patient have a very low salt diet    Advised to put feet up- patient will do and continue to monitor swelling- if worsening he will call back- just want Dr. Florian to know this is a new symptom      Amina Samuels,RN  [facility]  [phone]

## 2017-06-15 NOTE — TELEPHONE ENCOUNTER
If gets worse with SOB/PND, CHF study needed. Wt/BP/BNP/BMP should be rechecked   And, he has been off of lasix for a while, will consider resume it as well in the case in near future

## 2017-06-16 ENCOUNTER — TELEPHONE (OUTPATIENT)
Dept: FAMILY MEDICINE | Facility: CLINIC | Age: 82
End: 2017-06-16

## 2017-06-16 ENCOUNTER — APPOINTMENT (OUTPATIENT)
Dept: CT IMAGING | Facility: CLINIC | Age: 82
End: 2017-06-16
Attending: EMERGENCY MEDICINE
Payer: COMMERCIAL

## 2017-06-16 ENCOUNTER — HOSPITAL ENCOUNTER (OUTPATIENT)
Facility: CLINIC | Age: 82
Setting detail: OBSERVATION
Discharge: NURSING, REHAB OR LONG-TERM CARE | End: 2017-06-16
Attending: EMERGENCY MEDICINE | Admitting: INTERNAL MEDICINE
Payer: COMMERCIAL

## 2017-06-16 VITALS
BODY MASS INDEX: 24.25 KG/M2 | TEMPERATURE: 95.7 F | RESPIRATION RATE: 16 BRPM | WEIGHT: 160 LBS | HEIGHT: 68 IN | DIASTOLIC BLOOD PRESSURE: 77 MMHG | SYSTOLIC BLOOD PRESSURE: 136 MMHG | OXYGEN SATURATION: 99 %

## 2017-06-16 DIAGNOSIS — R29.6 FALLS FREQUENTLY: ICD-10-CM

## 2017-06-16 DIAGNOSIS — D64.9 ANEMIA, UNSPECIFIED TYPE: ICD-10-CM

## 2017-06-16 DIAGNOSIS — R62.7 ADULT FAILURE TO THRIVE: ICD-10-CM

## 2017-06-16 DIAGNOSIS — Z79.01 CHRONIC ANTICOAGULATION: Primary | ICD-10-CM

## 2017-06-16 PROBLEM — R53.1 WEAKNESS GENERALIZED: Status: ACTIVE | Noted: 2017-06-16

## 2017-06-16 LAB
ALBUMIN UR-MCNC: NEGATIVE MG/DL
ANION GAP SERPL CALCULATED.3IONS-SCNC: 8 MMOL/L (ref 3–14)
APPEARANCE UR: CLEAR
BASOPHILS # BLD AUTO: 0.1 10E9/L (ref 0–0.2)
BASOPHILS NFR BLD AUTO: 1.1 %
BILIRUB UR QL STRIP: NEGATIVE
BUN SERPL-MCNC: 18 MG/DL (ref 7–30)
CALCIUM SERPL-MCNC: 8.8 MG/DL (ref 8.5–10.1)
CHLORIDE SERPL-SCNC: 108 MMOL/L (ref 94–109)
CO2 SERPL-SCNC: 26 MMOL/L (ref 20–32)
COLOR UR AUTO: ABNORMAL
CREAT SERPL-MCNC: 1.15 MG/DL (ref 0.66–1.25)
DIFFERENTIAL METHOD BLD: ABNORMAL
EOSINOPHIL # BLD AUTO: 0.1 10E9/L (ref 0–0.7)
EOSINOPHIL NFR BLD AUTO: 2.2 %
ERYTHROCYTE [DISTWIDTH] IN BLOOD BY AUTOMATED COUNT: 14.7 % (ref 10–15)
GFR SERPL CREATININE-BSD FRML MDRD: 60 ML/MIN/1.7M2
GLUCOSE SERPL-MCNC: 92 MG/DL (ref 70–99)
GLUCOSE UR STRIP-MCNC: NEGATIVE MG/DL
HCT VFR BLD AUTO: 35.1 % (ref 40–53)
HGB BLD-MCNC: 11.5 G/DL (ref 13.3–17.7)
HGB UR QL STRIP: NEGATIVE
IMM GRANULOCYTES # BLD: 0 10E9/L (ref 0–0.4)
IMM GRANULOCYTES NFR BLD: 0.2 %
INR PPP: 2.91 (ref 0.86–1.14)
INTERPRETATION ECG - MUSE: NORMAL
KETONES UR STRIP-MCNC: NEGATIVE MG/DL
LEUKOCYTE ESTERASE UR QL STRIP: NEGATIVE
LYMPHOCYTES # BLD AUTO: 1.1 10E9/L (ref 0.8–5.3)
LYMPHOCYTES NFR BLD AUTO: 16.7 %
MCH RBC QN AUTO: 31.5 PG (ref 26.5–33)
MCHC RBC AUTO-ENTMCNC: 32.8 G/DL (ref 31.5–36.5)
MCV RBC AUTO: 96 FL (ref 78–100)
MONOCYTES # BLD AUTO: 0.6 10E9/L (ref 0–1.3)
MONOCYTES NFR BLD AUTO: 10 %
MUCOUS THREADS #/AREA URNS LPF: PRESENT /LPF
NEUTROPHILS # BLD AUTO: 4.4 10E9/L (ref 1.6–8.3)
NEUTROPHILS NFR BLD AUTO: 69.8 %
NITRATE UR QL: NEGATIVE
NRBC # BLD AUTO: 0 10*3/UL
NRBC BLD AUTO-RTO: 0 /100
PH UR STRIP: 6.5 PH (ref 5–7)
PLATELET # BLD AUTO: 186 10E9/L (ref 150–450)
POTASSIUM SERPL-SCNC: 3.7 MMOL/L (ref 3.4–5.3)
RBC # BLD AUTO: 3.65 10E12/L (ref 4.4–5.9)
RBC #/AREA URNS AUTO: 1 /HPF (ref 0–2)
SODIUM SERPL-SCNC: 142 MMOL/L (ref 133–144)
SP GR UR STRIP: 1 (ref 1–1.03)
TSH SERPL DL<=0.005 MIU/L-ACNC: 2.79 MU/L (ref 0.4–4)
URN SPEC COLLECT METH UR: ABNORMAL
UROBILINOGEN UR STRIP-MCNC: NORMAL MG/DL (ref 0–2)
WBC # BLD AUTO: 6.3 10E9/L (ref 4–11)
WBC #/AREA URNS AUTO: 1 /HPF (ref 0–2)

## 2017-06-16 PROCEDURE — 81001 URINALYSIS AUTO W/SCOPE: CPT | Performed by: INTERNAL MEDICINE

## 2017-06-16 PROCEDURE — 80048 BASIC METABOLIC PNL TOTAL CA: CPT | Performed by: EMERGENCY MEDICINE

## 2017-06-16 PROCEDURE — 25000128 H RX IP 250 OP 636: Performed by: INTERNAL MEDICINE

## 2017-06-16 PROCEDURE — 85025 COMPLETE CBC W/AUTO DIFF WBC: CPT | Performed by: EMERGENCY MEDICINE

## 2017-06-16 PROCEDURE — 93005 ELECTROCARDIOGRAM TRACING: CPT

## 2017-06-16 PROCEDURE — 96361 HYDRATE IV INFUSION ADD-ON: CPT

## 2017-06-16 PROCEDURE — 99220 ZZC INITIAL OBSERVATION CARE,LEVL III: CPT | Performed by: INTERNAL MEDICINE

## 2017-06-16 PROCEDURE — 96360 HYDRATION IV INFUSION INIT: CPT

## 2017-06-16 PROCEDURE — 25000128 H RX IP 250 OP 636: Performed by: EMERGENCY MEDICINE

## 2017-06-16 PROCEDURE — 84443 ASSAY THYROID STIM HORMONE: CPT | Performed by: EMERGENCY MEDICINE

## 2017-06-16 PROCEDURE — 40000893 ZZH STATISTIC PT IP EVAL DEFER

## 2017-06-16 PROCEDURE — 85610 PROTHROMBIN TIME: CPT | Performed by: EMERGENCY MEDICINE

## 2017-06-16 PROCEDURE — G0378 HOSPITAL OBSERVATION PER HR: HCPCS

## 2017-06-16 PROCEDURE — 99285 EMERGENCY DEPT VISIT HI MDM: CPT | Mod: 25

## 2017-06-16 PROCEDURE — 70450 CT HEAD/BRAIN W/O DYE: CPT

## 2017-06-16 RX ORDER — ACETAMINOPHEN 325 MG/1
650 TABLET ORAL EVERY 4 HOURS PRN
Status: DISCONTINUED | OUTPATIENT
Start: 2017-06-16 | End: 2017-06-16 | Stop reason: HOSPADM

## 2017-06-16 RX ORDER — ONDANSETRON 2 MG/ML
4 INJECTION INTRAMUSCULAR; INTRAVENOUS EVERY 6 HOURS PRN
Status: DISCONTINUED | OUTPATIENT
Start: 2017-06-16 | End: 2017-06-16 | Stop reason: HOSPADM

## 2017-06-16 RX ORDER — NALOXONE HYDROCHLORIDE 0.4 MG/ML
.1-.4 INJECTION, SOLUTION INTRAMUSCULAR; INTRAVENOUS; SUBCUTANEOUS
Status: DISCONTINUED | OUTPATIENT
Start: 2017-06-16 | End: 2017-06-16 | Stop reason: HOSPADM

## 2017-06-16 RX ORDER — WARFARIN SODIUM 2 MG/1
2 TABLET ORAL ONCE
Qty: 30 TABLET | DISCHARGE
Start: 2017-06-16 | End: 2017-06-16

## 2017-06-16 RX ORDER — WARFARIN SODIUM 2 MG/1
2 TABLET ORAL
Status: DISCONTINUED | OUTPATIENT
Start: 2017-06-16 | End: 2017-06-16 | Stop reason: HOSPADM

## 2017-06-16 RX ORDER — POLYETHYLENE GLYCOL 3350 17 G/17G
17 POWDER, FOR SOLUTION ORAL DAILY PRN
Status: DISCONTINUED | OUTPATIENT
Start: 2017-06-16 | End: 2017-06-16 | Stop reason: HOSPADM

## 2017-06-16 RX ORDER — AMOXICILLIN 250 MG
1-2 CAPSULE ORAL 2 TIMES DAILY PRN
Status: DISCONTINUED | OUTPATIENT
Start: 2017-06-16 | End: 2017-06-16 | Stop reason: HOSPADM

## 2017-06-16 RX ORDER — SODIUM CHLORIDE 9 MG/ML
1000 INJECTION, SOLUTION INTRAVENOUS CONTINUOUS
Status: DISCONTINUED | OUTPATIENT
Start: 2017-06-16 | End: 2017-06-16 | Stop reason: HOSPADM

## 2017-06-16 RX ORDER — ONDANSETRON 4 MG/1
4 TABLET, ORALLY DISINTEGRATING ORAL EVERY 6 HOURS PRN
Status: DISCONTINUED | OUTPATIENT
Start: 2017-06-16 | End: 2017-06-16 | Stop reason: HOSPADM

## 2017-06-16 RX ORDER — SODIUM CHLORIDE 9 MG/ML
INJECTION, SOLUTION INTRAVENOUS CONTINUOUS
Status: DISCONTINUED | OUTPATIENT
Start: 2017-06-16 | End: 2017-06-16 | Stop reason: HOSPADM

## 2017-06-16 RX ADMIN — SODIUM CHLORIDE, PRESERVATIVE FREE: 5 INJECTION INTRAVENOUS at 10:36

## 2017-06-16 RX ADMIN — SODIUM CHLORIDE 500 ML: 9 INJECTION, SOLUTION INTRAVENOUS at 04:52

## 2017-06-16 ASSESSMENT — ENCOUNTER SYMPTOMS
BACK PAIN: 0
DIZZINESS: 0
WOUND: 0
SHORTNESS OF BREATH: 0
HEADACHES: 0
NAUSEA: 0
NUMBNESS: 0
NECK PAIN: 0
CHILLS: 0
VOMITING: 0
WEAKNESS: 1
FEVER: 0

## 2017-06-16 ASSESSMENT — ACTIVITIES OF DAILY LIVING (ADL)
NUMBER_OF_TIMES_PATIENT_HAS_FALLEN_WITHIN_LAST_SIX_MONTHS: 4
SWALLOWING: 0-->SWALLOWS FOODS/LIQUIDS WITHOUT DIFFICULTY
DRESS: 0-->INDEPENDENT
RETIRED_EATING: 0-->INDEPENDENT
FALL_HISTORY_WITHIN_LAST_SIX_MONTHS: YES
TOILETING: 3-->ASSISTIVE EQUIPMENT AND PERSON
TRANSFERRING: 3-->ASSISTIVE EQUIPMENT AND PERSON
RETIRED_COMMUNICATION: 0-->UNDERSTANDS/COMMUNICATES WITHOUT DIFFICULTY
COGNITION: 0 - NO COGNITION ISSUES REPORTED
BATHING: 3-->ASSISTIVE EQUIPMENT AND PERSON
AMBULATION: 3-->ASSISTIVE EQUIPMENT AND PERSON
WHICH_OF_THE_ABOVE_FUNCTIONAL_RISKS_HAD_A_RECENT_ONSET_OR_CHANGE?: AMBULATION;TRANSFERRING;TOILETING

## 2017-06-16 NOTE — IP AVS SNAPSHOT
"[facility] OBSERVATION UNIT: [phone]                                              INTERAGENCY TRANSFER FORM - PHYSICIAN ORDERS   2017                    Hospital Admission Date: 2017  KATHRYN GARNER   : 1926  Sex: Male        Attending Provider: Mal Weiss MD     Allergies:  No Known Allergies    Infection:  None   Service:  HOSPITALIST    Ht:  1.727 m (5' 8\")   Wt:  72.6 kg (160 lb)   Admission Wt:  72.6 kg (160 lb)    BMI:  24.33 kg/m 2   BSA:  1.87 m 2            Patient PCP Information     Provider PCP Type    Alvarado Florian MD General      ED Clinical Impression     Diagnosis Description Comment Added By Time Added    Falls frequently [R29.6] Falls frequently [R29.6]  TriggerErnesto MD 2017  6:47 AM    Adult failure to thrive [R62.7] Adult failure to thrive [R62.7]  TriggerErnesto MD 2017  6:48 AM    Anemia, unspecified type [D64.9] Anemia, unspecified type [D64.9]  TriggerErnesto MD 2017  6:48 AM      Hospital Problems as of 2017              Priority Class Noted POA    Weakness generalized Medium  2017 Yes      Non-Hospital Problems as of 2017              Priority Class Noted    Hypertension goal BP (blood pressure) < 140/90 High  Unknown    CAD (coronary artery disease) High  Unknown    Atrial fibrillation (H) High  Unknown    Heart failure (H) High  Unknown    Hyperlipidemia with target LDL less than 100 Medium  Unknown    Osteoarthritis   Unknown    Gait apraxia   Unknown    BPH (benign prostatic hyperplasia)   Unknown    CKD (chronic kidney disease) stage 3, GFR 30-59 ml/min High  2012    Anemia High  2012    Microalbuminuria Medium  2012    Hypothyroidism Medium  2012    Elevated alkaline phosphatase level Medium  2012    Advance care planning Low  2012    Health Care Home Low  2012    Chronic anticoagulation High  2012    Elevated fasting glucose High  " 3/15/2013    Toe inflammation   1/30/2014    Elevated uric acid in blood   1/30/2014    Gastritis   Unknown    Abdominal aortic aneurysm (H)   Unknown    Advanced directives, counseling/discussion   12/10/2015    Long-term (current) use of anticoagulants [Z79.01] Medium  3/11/2016    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    General weakness Medium  12/15/2016    Tear of meniscus of left knee Medium  12/19/2016      Code Status History     Date Active Date Inactive Code Status Order ID Comments User Context    12/17/2016  9:20 AM 6/16/2017  8:06 AM DNR/DNI [id number]  Omari Sims MD Outpatient    12/16/2016  2:41 AM 12/17/2016  9:20 AM DNR/DNI [id number]  Vy Walsh RN Inpatient    12/15/2016  5:21 PM 12/16/2016  2:41 AM Full Code [id number]  Mal Weiss MD Inpatient         Medication Review      CONTINUE these medications which may have CHANGED, or have new prescriptions. If we are uncertain of the size of tablets/capsules you have at home, strength may be listed as something that might have changed.        Dose / Directions Comments    pravastatin 40 MG tablet   Commonly known as:  PRAVACHOL   This may have changed:  when to take this   Used for:  Hyperlipidemia with target LDL less than 100        Dose:  40 mg   Take 1 tablet (40 mg) by mouth daily   Quantity:  90 tablet   Refills:  0        * warfarin 2 MG tablet   Commonly known as:  COUMADIN   This may have changed:  Another medication with the same name was added. Make sure you understand how and when to take each.   Used for:  Chronic anticoagulation, Chronic atrial fibrillation (H)        Takes 4 mg on Mon, Fri;  2 mg all other days or as directed by [facility] nurses   Quantity:  135 tablet   Refills:  0        * warfarin 2 MG tablet   Commonly known as:  COUMADIN   This may have changed:  You were already taking a medication with the same name, and this prescription was added. Make sure you understand how and when to  take each.   Used for:  Chronic anticoagulation        Dose:  2 mg   Take 1 tablet (2 mg) by mouth once for 1 dose   Quantity:  30 tablet   Refills:  0        * Notice:  This list has 2 medication(s) that are the same as other medications prescribed for you. Read the directions carefully, and ask your doctor or other care provider to review them with you.      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, unspecified laterality        Dose:  200 mg   Take 2 tablets (200 mg) by mouth 3 times daily as needed   Quantity:  360 tablet   Refills:  1        ASPIRIN LOW DOSE 81 MG tablet   Generic drug:  aspirin        Dose:  81 mg   Take 81 mg by mouth daily   Refills:  0        doxazosin 4 MG tablet   Commonly known as:  CARDURA   Used for:  Benign non-nodular prostatic hyperplasia without lower urinary tract symptoms        Dose:  4 mg   Take 1 tablet (4 mg) by mouth At Bedtime   Quantity:  90 tablet   Refills:  3        levothyroxine 50 MCG tablet   Commonly known as:  SYNTHROID/LEVOTHROID   Used for:  Hypothyroidism, unspecified type        Dose:  50 mcg   Take 1 tablet (50 mcg) by mouth daily   Quantity:  90 tablet   Refills:  1        lisinopril 10 MG tablet   Commonly known as:  PRINIVIL/ZESTRIL   Used for:  CKD (chronic kidney disease) stage 3, GFR 30-59 ml/min        Dose:  5 mg   Take 0.5 tablets (5 mg) by mouth daily   Quantity:  45 tablet   Refills:  1        mirtazapine 30 MG  tablet   Commonly known as:  REMERON   Used for:  Poor appetite        Dose:  30 mg   Take 1 tablet (30 mg) by mouth At Bedtime   Quantity:  90 tablet   Refills:  1        nitroglycerin 0.4 MG sublingual tablet   Commonly known as:  NITROSTAT   Used for:  CAD (coronary artery disease)        Dose:  0.4 mg   Place 1 tablet (0.4 mg) under the tongue every 5 minutes as needed for chest pain   Quantity:  25 tablet   Refills:  0        * Notice:  This list has 2 medication(s) that are the same as other medications prescribed for you. Read the directions carefully, and ask your doctor or other care provider to review them with you.            Summary of Visit     Reason for your hospital stay       Gen weakness             After Care     Activity - Up with nursing assistance           Advance Diet as Tolerated       Follow this diet upon discharge: Orders Placed This Encounter      Regular Diet Adult         Fall precautions           General info for SNF       Length of Stay Estimate: Short Term Care: Estimated # of Days <30  Condition at Discharge: Improving  Level of care:skilled   Rehabilitation Potential: Good  Admission H&P remains valid and up-to-date: Yes  Recent Chemotherapy: N/A  Use Nursing Home Standing Orders: N/A       Mantoux instructions       Give two-step Mantoux (PPD) Per Facility Policy Yes             Referrals     Occupational Therapy Adult Consult       Evaluate and treat as clinically indicated.    Reason:       Physical Therapy Adult Consult       Evaluate and treat as clinically indicated.    Reason:             Your next 10 appointments already scheduled     Jun 19, 2017 10:30 AM CDT   Anticoagulation Visit with  ANTICOAGULATION CLINIC   [facility])    [address] [place] [id number] [phone]              Follow-Up Appointment Instructions     Future Labs/Procedures    Follow Up and recommended labs and tests      Comments:    Follow up with skilled nursing physician.  The following labs/tests are recommended:      Follow-Up Appointment Instructions     Follow Up and recommended labs and tests       Follow up with skilled nursing physician.  The following labs/tests are recommended:             Statement of Approval     Ordered          06/16/17 1258  I have reviewed and agree with all the recommendations and orders detailed in this document.  EFFECTIVE NOW     Approved and electronically signed by:  Mal Weiss MD

## 2017-06-16 NOTE — PLAN OF CARE
"Problem: Goal Outcome Summary  Goal: Goal Outcome Summary  PT: Orders received and chart reviewed. Patient currently admitted under observation status. Patient discussed in interdisciplinary rounds and PT screen completed. Per NA, patient needed a \"strong\" assist x 2 to walk between the cart and the bed this morning to prevent a fall. Patient will need TCU on discharge to address strength concerns to maximize independence with functional mobility. Will complete order at this time.       "

## 2017-06-16 NOTE — H&P
PRIMARY CARE PROVIDER:  Alvarado Florian MD      DATE OF ADMISSION:  06/16/2017      CHIEF COMPLAINT:  Generalized weakness.      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  The patient has been progressively getting weaker lately and has been slower getting around.  He does live at an assisted living and has been independent until now.  Lately he tends to be a little unstable with his gait and has had minor falls a few times without any serious injuries.  This morning he woke up to go to the bathroom around 3:00 a.m.  He was so weak that he could barely stand up and gently slid to the bathroom floor.  He clearly mentioned that he did not fall.  There was no injury, and he did not hit his head.  He was eventually brought to the emergency room.  The patient was just seen in the emergency room 6 days ago for a similar presentation.  At that time he had a fairly extensive workup including MRI of the brain with and without contrast which did not show any acute problems.  At that time it was also felt that he was not taking enough fluids and it was felt that he was dehydrated.  He received IV fluid and discharged back to the assisted living.  The patient also had a similar presentation in 12/2016, and there was no clearcut cause identified for his generalized weakness.  It does appear like this has been an ongoing and progressive problem.  As far as pertinent review of system is concerned, he denies any back pain.  No fever or chills.  No cough.  No dyspnea.  No diarrhea or vomiting.  No dysuria, urinary urgency or frequency.  No fever or chills.  He denies any lightheaded or dizzy spells.  On questioning more about his weakness, he does mention that it is a  generalized weakness and involves pretty much all 4 extremities.  The daughter also mentioned that he has been weak in his upper extremities and has had difficulty getting up in the bed because of upper extremity weakness too.      In the emergency room, the patient was evaluated by Dr. Robin.  Basic lab work was nonrevealing in terms of cause for his weakness.  CT head was negative.  INR was 2.91.  The patient is being admitted for IV hydration and transitional care placement.      PAST MEDICAL HISTORY:   1.  Atrial fibrillation, on chronic Coumadin.   2.  Hypertension.   3.  Chronic kidney disease, stage 3.   4.  BPH.   5.  Hyperlipidemia.   6.  Osteoarthritis.   7.  Coronary artery disease.   8.  Hypothyroidism.   9.  Abdominal aortic aneurysm.      ALLERGIES:  None.      SOCIAL AND PERSONAL HISTORY:  He is a retired physician.  He lives at [facility] in [place].  No tobacco, alcohol or illicit drug use.      FAMILY HISTORY:  Reviewed and is noncontributory.      HOME MEDICATIONS:     Prior to Admission Medications   Prescriptions Last Dose Informant Patient Reported? Taking?   acetaminophen (TYLENOL) 500 MG tablet Past Month at prn Self Yes Yes   Sig: Take 1,000 mg by mouth every 6 hours as needed for mild pain    allopurinol (ZYLOPRIM) 100 MG tablet 6/15/2017 at pm Self No Yes   Sig: Take 1 tablet (100 mg) by mouth 2 times daily   allopurinol (ZYLOPRIM) 100 MG tablet Has never used this Rx at - Self No No   Sig: Take 2 tablets (200 mg) by mouth 3 times daily as needed   aspirin (ASPIRIN LOW DOSE) 81 MG tablet 6/15/2017 at am Self Yes Yes   Sig: Take 81 mg by mouth daily    doxazosin (CARDURA) 4 MG tablet 6/15/2017 at hs Self No Yes   Sig: Take 1 tablet (4 mg) by mouth At Bedtime   levothyroxine (SYNTHROID/LEVOTHROID) 50 MCG tablet 6/15/2017 at am Self No Yes   Sig: Take 1 tablet (50 mcg) by mouth daily   lisinopril (PRINIVIL/ZESTRIL) 10 MG tablet 6/15/2017 at am Self No Yes   Sig: Take 0.5 tablets (5 mg)  by mouth daily   mirtazapine (REMERON) 30 MG tablet 6/15/2017 at hs Self No Yes   Sig: Take 1 tablet (30 mg) by mouth At Bedtime   nitroglycerin (NITROSTAT) 0.4 MG SL tablet prn at prn Self No No   Sig: Place 1 tablet (0.4 mg) under the tongue every 5 minutes as needed for chest pain   pravastatin (PRAVACHOL) 40 MG tablet 6/15/2017 at hs Self No Yes   Sig: Take 1 tablet (40 mg) by mouth daily   Patient taking differently: Take 40 mg by mouth At Bedtime    warfarin (COUMADIN) 2 MG tablet 6/15/2017 at pm Self No Yes   Sig: Takes 4 mg on Mon, Fri;  2 mg all other days or as directed by [facility] nurses      Facility-Administered Medications: None        REVIEW OF SYSTEMS:  A complete review of system was done and was negative for anything else other than that mentioned in the HPI.      PHYSICAL EXAMINATION:   GENERAL:  Mr. Sanchez is a 89-year-old male who is not in any obvious distress.   VITAL SIGNS:  Temperature is 97.7, blood pressure is 154/93, heart rate is 70, respiration rate is 14, O2 sat 97% on room air.   HEENT:  Atraumatic, normocephalic, no pallor or icterus.   NECK:  Supple, with good range of motion.   RESPIRATORY:  Good air entry bilaterally.  Normal effort of breathing.   CARDIOVASCULAR:  Regular, rate controlled.   ABDOMEN:  Soft, nontender.   EXTREMITIES:  No edema, cyanosis or clubbing.   SKIN:  Warm and dry.   NEUROLOGIC:  Awake, alert, oriented x3, answering all of my questions appropriately, moving all 4 extremities.      LABORATORY AND DIAGNOSTIC DATA:  Creatinine is 1.15 with a GFR of 60, normal electrolytes, hemoglobin is 11.5.  INR is 2.91.  A 12-lead EKG with controlled atrial fibrillation.      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   1.  Generalized weakness.  The etiology  of this is unclear.  He has had similar presentation for this now 3 times within the last 6 months or so and a cause has not been identified.  Six days ago when he presented to the emergency room, it was thought to be due to dehydration.  Apparently he is doing better with fluid intake but still has not gotten better.  He does have a history of hypothyroidism.  I am going to recheck his TSH and T4 today.  I doubt we will find out a clearcut cause at this time.  The daughter does mention that more than likely he is going to need TCU this time.  Will consult  and care coordinators for placement.  Will also recheck his urine just to make sure that a UTI is not contributing to his symptoms.   2.  Atrial fibrillation, on chronic Coumadin.  His INR is therapeutic.  He has had minor falls but no recent injury to the head and neck area.  At this time will continue his Coumadin and ask pharmacy to dose it for us.   3.  Hypertension.  Continue prior to admission lisinopril 5 mg daily and Cardura 4 mg daily.   4.  Hypothyroidism:  Continue prior to admission levothyroxine 50 mcg daily.  Recheck TSH and T4 as mentioned above.   5.  History of coronary artery disease.  He is on aspirin 81 mg daily and pravastatin.  Denies any chest pain or any anginal equivalent symptoms.  No further workup necessary at this time.      Anticipated length of stay less than 2 midnights.      CODE STATUS:  DNR/DNI.         JOCELYN HOWE MD             D: 2017 08:00   T: 2017 08:32   MT: felisa      Name:     KATHRYN GARNER   MRN:      [id number]        Account:      [id number]   :      1926           Admitted:     [id number]      Document: [id number]       cc: Alvarado Florian MD

## 2017-06-16 NOTE — IP AVS SNAPSHOT
"` `     [facility]: [phone]                                              INTERAGENCY TRANSFER FORM - NURSING   2017                    Hospital Admission Date: 2017  KATHRYN GARNER   : 1926  Sex: Male        Attending Provider: Mal Weiss MD     Allergies:  No Known Allergies    Infection:  None   Service:  HOSPITALIST    Ht:  1.727 m (5' 8\")   Wt:  72.6 kg (160 lb)   Admission Wt:  72.6 kg (160 lb)    BMI:  24.33 kg/m 2   BSA:  1.87 m 2            Patient PCP Information     Provider PCP Type    Alvarado Florian MD General      Current Code Status     Date Active Code Status Order ID Comments User Context       2017  8:06 AM DNR/DNI [id number]  Mal Weiss MD Inpatient       Code Status History     Date Active Date Inactive Code Status Order ID Comments User Context    2016  9:20 AM 2017  8:06 AM DNR/DNI [id number]  Omari Sims MD Outpatient    2016  2:41 AM 2016  9:20 AM DNR/DNI [id number]  Vy Walsh RN Inpatient    12/15/2016  5:21 PM 2016  2:41 AM Full Code [id number]  aMl Weiss MD Inpatient      Advance Directives        Does patient have a scanned Advance Directive/ACP document in EPIC?           No        Hospital Problems as of 2017              Priority Class Noted POA    Weakness generalized Medium  2017 Yes      Non-Hospital Problems as of 2017              Priority Class Noted    Hypertension goal BP (blood pressure) < 140/90 High  Unknown    CAD (coronary artery disease) High  Unknown    Atrial fibrillation (H) High  Unknown    Heart failure (H) High  Unknown    Hyperlipidemia with target LDL less than 100 Medium  Unknown    Osteoarthritis   Unknown    Gait apraxia   Unknown    BPH (benign prostatic hyperplasia)   Unknown    CKD (chronic kidney disease) stage 3, GFR 30-59 ml/min High  2012    Anemia High  2012    Microalbuminuria Medium  2012    " Hypothyroidism Medium  8/29/2012    Elevated alkaline phosphatase level Medium  8/29/2012    Advance care planning Low  9/6/2012    Health Care Home Low  9/12/2012    Chronic anticoagulation High  11/29/2012    Elevated fasting glucose High  3/15/2013    Toe inflammation   1/30/2014    Elevated uric acid in blood   1/30/2014    Gastritis   Unknown    Abdominal aortic aneurysm (H)   Unknown    Advanced directives, counseling/discussion   12/10/2015    Long-term (current) use of anticoagulants [Z79.01] Medium  3/11/2016    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    General weakness Medium  12/15/2016    Tear of meniscus of left knee Medium  12/19/2016      Immunizations     Name Date      Influenza (High Dose) 3 valent vaccine 10/06/16     Influenza (High Dose) 3 valent vaccine 10/08/15     Influenza (High Dose) 3 valent vaccine 10/27/14     Influenza (High Dose) 3 valent vaccine 11/07/13     Influenza (High Dose) 3 valent vaccine 10/02/12     TDAP Vaccine (Adacel) 08/21/12          END      ASSESSMENT     Discharge Profile Flowsheet     EXPECTED DISCHARGE     Last Bowel Movement  12/16/16 12/16/16 1106    Expected Discharge Date  06/16/17 06/16/17 1034   COMMUNICATION ASSESSMENT      DISCHARGE NEEDS ASSESSMENT     Patient's communication style  spoken language (English or Bilingual) 06/16/17 0821    Concerns To Be Addressed  discharge planning concerns;home safety concerns 06/16/17 1034   FINAL RESOURCES      Concerns Comments  -- 02/20/17 1302   Resources List  Transitional Care 06/16/17 1034    Patient/family verbalizes understanding of discharge plan recommendations?  Yes 06/16/17 1034   Other Resources  Other (see comment) 02/20/17 1302    Medical Team notified of plan?  yes 06/16/17 1034   Transitional Care  -- ([facility]) 06/16/17 1503    Anticipated Changes Related to Illness  none 06/16/17 0821   PAS Number  [id number] 06/16/17 1503    Equipment Currently Used at Home  grab bar;walker, rolling;other  "(see comments) 06/16/17 0821   Referrals Placed  Other *** (None at this time) 02/20/17 1302    Transportation Available  agency transportation;van, wheelchair accessible 06/16/17 1034   SAFETY      # of Referrals Placed by CTS  Post Acute Facilities 06/16/17 1034   Safety WDL  WDL 06/16/17 1158    GASTROINTESTINAL (ADULT,PEDIATRIC,OB)                        Assessment WDL (Within Defined Limits) Definitions           Safety WDL     Effective: 09/28/15    Row Information: <b>WDL Definition:</b> Bed in low position, wheels locked; call light in reach; upper side rails up x 2; ID band on<br> <font color=\"gray\"><i>Item=AS safety wdl>>List=AS safety wdl>>Version=F14</i></font>      Vitals     Vital Signs Flowsheet     VITAL SIGNS     BMI (Calculated)  24.38 06/16/17 0409    Temp  95.7  F (35.4  C) 06/16/17 0804   LYING ORTHOSTATIC BP      Temp src  Oral 06/16/17 0409   Lying Orthostatic BP  135/83 06/16/17 0431    Resp  16 06/16/17 0804   Lying Orthostatic Pulse  62 bpm 06/16/17 0431    Heart Rate  69 06/16/17 0804   SITTING ORTHOSTATIC BP      Pulse/Heart Rate Source  Monitor 06/16/17 0409   Sitting Orthostatic BP  135/86 06/16/17 0431    BP  136/77 06/16/17 0804   Sitting Orthostatic Pulse  86 bpm 06/16/17 0431    OXYGEN THERAPY     STANDING ORTHOSTATIC BP      SpO2  99 % 06/16/17 0804   Standing Orthostatic BP  154/93 06/16/17 0431    O2 Device  None (Room air) 06/16/17 1158   Standing Orthostatic Pulse  79 bpm 06/16/17 0431    PAIN/COMFORT     CYNTHIA COMA SCALE      Patient's Stated Pain Goal  No pain 06/16/17 0409   Best Eye Response  4-->(E4) spontaneous 06/16/17 1158    0-10 Pain Scale  0 06/16/17 0757   Best Motor Response  6-->(M6) obeys commands 06/16/17 1158    HEIGHT AND WEIGHT     Best Verbal Response  5-->(V5) oriented 06/16/17 1158    Height  1.727 m (5' 8\") 06/16/17 0409   Chapin Coma Scale Score  15 06/16/17 1158    Height Method  Stated 06/16/17 0409   DAILY CARE      Weight  72.6 kg (160 lb) " (Patient states) 06/16/17 0409   Activity Type  sitting, edge of bed 06/16/17 1345    BSA (Calculated - sq m)  1.87 06/16/17 0409   Activity Level of Assistance  assistance, 2 people 06/16/17 1345            Patient Lines/Drains/Airways Status    Active LINES/DRAINS/AIRWAYS     Name: Placement date: Placement time: Site: Days: Last dressing change:    Peripheral IV 12/15/16 Right 12/15/16   1331      183     Wound 10/21/14 Nose Abrasion(s) 10/21/14   2123   Nose   968     Wound 10/21/14 Left Knee Abrasion(s) 10/21/14   2124   Knee   968     Wound 10/21/14 Right;Upper Lip Laceration 10/21/14   2124   Lip   968     Wound 12/15/16 Coccyx Other (comment) Redness 12/15/16   2259   Coccyx   182     Incision/Surgical Site 04/22/14 Left Eye 04/22/14   1345    1151             Patient Lines/Drains/Airways Status    Active PICC/CVC     None            Intake/Output Detail Report     Date Intake     Output Net    Shift P.O. I.V. IV Piggyback Total Urine Total       Day 06/15/17 0700 - 06/15/17 1459 -- -- -- -- -- -- 0    Sandra 06/15/17 1500 - 06/15/17 2259 -- -- -- -- -- -- 0    Noc 06/15/17 2300 - 06/16/17 0659 -- 10 -- 10 -- -- 10    Day 06/16/17 0700 - 06/16/17 1459 360 -- -- 360 100 100 260    Sandra 06/16/17 1500 - 06/16/17 2259 -- -- -- -- -- -- 0      Last Void/BM       Most Recent Value    Urine Occurrence 1 at 06/16/2017 1300    Stool Occurrence       Case Management/Discharge Planning     Case Management/Discharge Planning Flowsheet     REFERRAL INFORMATION     EXPECTED DISCHARGE      Did the Initial Social Work Assessment result in a Social Work Case?  Yes 06/16/17 1034   Expected Discharge Date  06/16/17 06/16/17 1034    Admission Type  observation 06/16/17 1034   ASSESSMENT/CONCERNS TO BE ADDRESSED      Arrived From  home or self-care 06/16/17 1034   Concerns To Be Addressed  discharge planning concerns;home safety concerns 06/16/17 1034    Referral Source  physician 06/16/17 1034   Concerns Comments  -- 02/20/17 [id number]     # of Referrals Placed by CTS  Post Acute Facilities 06/16/17 1034   DISCHARGE PLANNING      Reason For Consult  discharge planning 06/16/17 1034   Patient/family verbalizes understanding of discharge plan recommendations?  Yes 06/16/17 1034    Record Reviewed  medical record 06/16/17 1034   Medical Team notified of plan?  yes 06/16/17 1034     Assigned to Case  Jami Elias 06/16/17 1034   Anticipated Changes Related to Illness  none 06/16/17 0821    LIVING ENVIRONMENT     Transportation Available  agency transportation;van, wheelchair accessible 06/16/17 1034    Lives With  alone;facility resident 06/16/17 1034   FINAL RESOURCES      Living Arrangements  assisted living 06/16/17 1034   Equipment Currently Used at Home  grab bar;walker, rolling;other (see comments) 06/16/17 0821    Quality Of Family Relationships  supportive;helpful;involved 06/16/17 1034   Resources List  Transitional Care 06/16/17 1034    Able to Return to Prior Living Arrangements  no 06/16/17 1034   Other Resources  Other (see comment) 02/20/17 1302    HOME SAFETY     Transitional Care  -- ([facility]) 06/16/17 1503    Patient Feels Safe Living in Home?  yes 06/16/17 1034   PAS Number  [id number] 06/16/17 1503    ASSESSMENT OF FAMILY/SOCIAL SUPPORT     Referrals Placed  Other *** (None at this time) 02/20/17 1302    Marital Status   06/16/17 1034   ABUSE RISK SCREEN      Who is your support system?  Children 06/16/17 1034   QUESTION TO PATIENT:  Has a member of your family or a partner(now or in the past) intimidated, hurt, manipulated, or controlled you in any way?  no 06/16/17 0824    Description of Support System  Supportive;Involved 06/16/17 1034   QUESTION TO PATIENT: Do you feel safe going back to the place where you are living?  yes 06/16/17 0824    Support Assessment  Adequate family and caregiver support 06/16/17 1034   OBSERVATION: Is there reason to believe there has been maltreatment of a vulnerable adult (ie.  Physical/Sexual/Emotional abuse, self neglect, lack of adequate food, shelter, medical care, or financial exploitation)?  no 06/16/17 0824    Quality of Family Relationships  supportive;involved 06/16/17 1034   (R) MENTAL HEALTH SUICIDE RISK      COPING/STRESS     Are you depressed or being treated for depression?  No 06/16/17 0824    Major Change/Loss/Stressor  none 06/16/17 0824

## 2017-06-16 NOTE — TELEPHONE ENCOUNTER
Dr. Joanna Zapien, [facility], the house doctor at [facility] will follow the patient this weekend.    Please call Gricelda on Monday 6/19 if Dr. Florian wishes to follow by phone and fax going forward. Call Gricelda directly at [phone].    Gricelda apologized for the short notice. [facility] also had short notice.  Brandy Miller RN

## 2017-06-16 NOTE — PROGRESS NOTES
"Care Transition Initial Assessment - YAYO  Reason For Consult: discharge planning  Met with: Patient and Family    Active Problems:    Weakness generalized         DATA  Lives With: alone, facility resident  Living Arrangements: assisted living  Description of Support System: Supportive, Involved  Who is your support system?: Children  Support Assessment: Adequate family and caregiver support.   Identified issues/concerns regarding health management:    SW received request for consult to assist with discharge planning. Patient admitted Observation Status on 6/16/17 for Generalized Weakness. Tentative discharge date 6/16/17. Patient currently resides at [facility]. Patient is mostly independent at baseline, however due to weakness is requiring and assist of 2 currently. SW met with patient to discuss discharge planning and recommendation of TCU at discharge. Patient stating he is in agreement with TCU. Discussed that as long as [facility] provides authorization, patient would have coverage. Informed patient that UCARE typically covers days 1-20, and days  there is a $140 co-pay. Patient in agreement with this. Patient requesting to stay in the [facility]. Discussed [facility]. Patient stating he is in agreement with a referral being sent to [facility] for TCU. SW sent referral per protocol.     Addendum:   Patient accepted at [facility] TCU for today. Per [facility] admissions staff, they will work on receiving UCARE authorization and stated they \"feel patient will most definitely meet qualifications for coverage\". Per [facility] they do not accept TCU admissions over the weekend, so patient would need to discharge today. YAYO paged MD to verify if patient will be ready for discharge today.     Resources List: Transitional Care     Quality Of Family Relationships: supportive, helpful, involved  Transportation Available: agency transportation, van, wheelchair " accessible      ASSESSMENT  Cognitive Status:  awake, alert and oriented  Concerns to be addressed: discharge planning.       PLAN  Financial costs for the patient includes: NA-pending authorization from [facility].  Patient given options and choices for discharge: discussed TCU, patient in agreement.  Patient/family is agreeable to the plan?  Yes  Patient Goals and Preferences: TCU.  [facility]  Discharge Planner   Discharge Plans in progress: Referral sent to [facility].   Barriers to discharge plan: [facility] authorization.  Follow up plan: Patient to contact daughter and MD josefina paged to discuss discharge plan.        Entered by: Jami Elias 06/16/2017 10:34 AM       DILEEP Xie, NICHOLESW

## 2017-06-16 NOTE — ED PROVIDER NOTES
History     Chief Complaint:  Fall    HPI   Earl Sanchez is a 89 year old male who presents via EMS for a fall. The patient has a history of a recent fall due to global weakness in his legs and was evaluated recently without any significant findings for this including an MRI on 6/10. He was given IV fluids at that time and felt improved. Earlier yesterday, he notes that around dinner time he had crumpled up getting out of his chair at dinner and needed assistance getting to his room. This morning, he states that he was getting off the toilet at his nursing care facility when he was unable to get up, sliding off the edge of the seat onto the floor. He denies hitting his head or losing consciousness. EMS was subsequently called and on arrival found the patient to have an unsteady gait, requiring assistance, normal blood sugar, and normal oxygen saturations. The patient notes that the symptoms tonight were similar to the previous ones he had when he seen here 6 days ago. However, he notes that when he was talking with EMS he occasionally had garbled speech with his questions at times, but no confusion. He denies any focal weakness or numbness. He denies any hip pain, back pain, neck pain, headache, fevers, chills, shortness of breath, chest pain.     MRI BRAIN WITHOUT AND WITH CONTRAST 6/10/2017 1:46 PM  1. No acute abnormality.  2. Brain atrophy and white matter changes consistent with sequelae of  small vessel ischemic disease.  3. Flattening of the sulci over the convexities is again suspicious  for normal pressure hydrocephalus but unchanged.  BRAEDEN MORALES MD    Allergies:  No known drug allergies     Medications:    Remeron  Coumadin  Zyloprim  Levothyroxine  Lasix  Cardura  Zyloprim  Nitroglycerin  Aspirin     Past Medical History:    Atrial fibrillation  Heart failure   CKD stage III  Hypertension  Anemia  Heart failure  Abdominal aortic aneurysm  Gastritis  Osteoarthritis  BPH  Gait  "apraxia  Hyperlipidemia  CAD  Duodenal ulcer    Past Surgical History:    ORIF Ankle  CABG x5  Hernia repair  Left heart cath  Cataract surgery x2    Family History:    Hypertension     Social History:  Smoking status: Never smoker  Alcohol use: Yes, occasionally    Marital Status:       Review of Systems   Constitutional: Negative for chills and fever.   Respiratory: Negative for shortness of breath.    Cardiovascular: Negative for chest pain.   Gastrointestinal: Negative for nausea and vomiting.   Musculoskeletal: Negative for back pain and neck pain.   Skin: Negative for wound.   Neurological: Positive for weakness. Negative for dizziness, syncope, numbness and headaches.   All other systems reviewed and are negative.      Physical Exam   Patient Vitals for the past 24 hrs:   BP Temp Temp src Heart Rate Resp SpO2 Height Weight   06/16/17 0530 - - - 70 14 97 % - -   06/16/17 0419 (!) 154/93 - - 85 14 - - -   06/16/17 0414 135/86 - - 68 9 97 % - -   06/16/17 0407 138/90 97.7  F (36.5  C) Oral 71 14 98 % 1.727 m (5' 8\") 72.6 kg (160 lb)        Physical Exam  General: Alert, interactive in mild distress  Head:  Scalp is atraumatic  Eyes:  The pupils are equal, round, and reactive to light    EOM's intact    No scleral icterus  ENT:      Nose:  The external nose is normal  Ears:  External ears are normal  Mouth/Throat: The oropharynx is normal    Mucus membranes are dry      Neck:  Normal range of motion.      There is no rigidity.    Trachea is in the midline         CV:  Irregularly irregular. 2/6 systolic murmur.    No murmur   Resp:  Breath sounds are clear bilaterally    Non-labored, no retractions or accessory muscle use      GI:  Abdomen is soft, no distension, no tenderness.       MS:  Normal strength in all 4 extremities, No midline cervical, thoracic, or lumbar tenderness  Skin:  Warm and dry, No rash or lesions noted.  Neuro: Global malaise but no focal neurologic deficits.    Strength 5/5 x4.  " Sensation intact  In all 4 extremities.      Cranial nerves 2-12 grossly intact.  Psych:  Awake. Alert.  Normal affect.      Appropriate interactions.    Emergency Department Course     ECG (4:10:32):  Rate 69 bpm. NC interval &. QRS duration 100. QT/QTc 426/456. P-R-T axes * -58 75. Atrial fibrillation. Left anterior fascicular block. Minimal voltage criteria for LVH, may be normal variant. Possible anterior infarct, age undetermined. Abnormal ECG. Interpreted at 0428 by Trigger, Ernesto Benavides MD.     Imaging:  Radiographic findings were communicated with the patient who voiced understanding of the findings.    CT-scan Head w/o contrast:  No acute abnormality.  Result per radiology.      Laboratory:  CBC: HGB 11.5 (L), o/w WNL (WBC 6.3, )    BMP: GFR 60 (L) o/w WNL (Creatinine 1.15)   INR: 2.91 (H)    Interventions:  NS 1L IV Bolus     Emergency Department Course:  The patient arrived in the emergency department via EMS.   Past medical records, nursing notes, and vitals reviewed.  0426: I performed an exam of the patient and obtained history, as documented above.   IV inserted and blood drawn. The patient was placed on continuous cardiac monitoring and pulse oximetry.   The patient was sent for a CT-scan while in the emergency department, findings above.      0637: Findings and plan explained to the Patient and family who consents to admission.     0713: Discussed the patient with Dr. Weiss, who will admit the patient to a medical bed for further monitoring, evaluation, and treatment.      Impression & Plan      Medical Decision Making:  Mr. Sanchez was seen and evaluated. This was his second visit in approximately one weeks time with frequent falls. He slid off of his toilet today and denies striking his head or losing consciousness however he was unable to get up. The above work up was undertaken here returning largely unremarkable. His INR is therapeutic and CT scan demonstrates no signs of any  acute intracranial hemorrhage. He's got no deformity of the extremities, no spinal discomfort. He was unable to provide a urine sample here, however did have a urinalysis at his last visit that was unremarkable. He is demonstrating no signs of acute infection. Given his failure to thrive and weakness as well as frequent falls, I believe he would benefit from hospitalization. I spoke with Dr. Weiss from the hospitalist service who was in agreement. I believe the patient would benefit from physical therapy evaluation as well as  consultation to discuss if his current living accommodations are appropriate. The patient's daughters were in agreement with this plan.     Diagnosis:    ICD-10-CM    1. Falls frequently R29.6    2. Adult failure to thrive R62.7    3. Anemia, unspecified type D64.9      Jose Turner  6/16/2017    EMERGENCY DEPARTMENT  Jose ANDUJAR, am serving as a scribe at 4:26 AM on 6/16/2017 to document services personally performed by Ernesto Robin MD based on my observations and the provider's statements to me.       Ernesto Robin MD  06/16/17 0903

## 2017-06-16 NOTE — IP AVS SNAPSHOT
MRN:[id number]                      After Visit Summary   6/16/2017    Earl Sanchez    MRN: [id number]           Thank you!     Thank you for choosing [facility] for your care. Our goal is always to provide you with excellent care. Hearing back from our patients is one way we can continue to improve our services. Please take a few minutes to complete the written survey that you may receive in the mail after you visit with us. Thank you!        Patient Information     Date Of Birth          9/17/1926        Designated Caregiver       Most Recent Value    Caregiver    Will someone help with your care after discharge? yes    Name of designated caregiver Darcy Avalos    Phone number of caregiver see demographics    Caregiver address see demographics      About your hospital stay     You were admitted on:  June 16, 2017 [facility]    You were discharged on:  June 16, 2017        Reason for your hospital stay       Gen weakness                  Who to Call     For medical emergencies, please call 911.  For non-urgent questions about your medical care, please call your primary care provider or clinic, [phone]          Attending Provider     Provider Specialty    Trigger, Ernesto Benavides MD Emergency Medicine    IselaMal martinez MD Internal Medicine       Primary Care Provider Office Phone # Fax #    Alvarado Florian [phone]      After Care Instructions     Activity - Up with nursing assistance           Advance Diet as Tolerated       Follow this diet upon discharge: Orders Placed This Encounter      Regular Diet Adult              Fall precautions           General info for SNF       Length of Stay Estimate: Short Term Care: Estimated # of Days <30  Condition at Discharge: Improving  Level of care:skilled   Rehabilitation Potential: Good  Admission H&P remains valid and up-to-date: Yes  Recent Chemotherapy: N/A  Use Nursing  Home Standing Orders: N/A            Mantoux instructions       Give two-step Mantoux (PPD) Per Facility Policy Yes                  Follow-up Appointments     Follow Up and recommended labs and tests       Follow up with long-term physician.  The following labs/tests are recommended:                  Your next 10 appointments already scheduled     Jun 19, 2017 10:30 AM CDT   Anticoagulation Visit with  ANTICOAGULATION CLINIC   [facility] [place] [place]    [address] [id number] [phone]              Additional Services     Occupational Therapy Adult Consult       Evaluate and treat as clinically indicated.    Reason:            Physical Therapy Adult Consult       Evaluate and treat as clinically indicated.    Reason:                  Warfarin Instruction     You have started taking a medicine called warfarin. This is a blood-thinning medicine (anticoagulant). It helps prevent and treat blood clots.      Before leaving the hospital, make sure you know how much to take and how long to take it.      You will need regular blood tests to make sure your blood is clotting safely. It is very important to see your doctor for regular blood tests.    Talk to your doctor before taking any new medicine (this includes over-the-counter drugs and herbal products). Many medicines can interact with warfarin. This may cause more bleeding or too much clotting.     Eating a lot of vitamin K--found in green, leafy vegetables--can change the way warfarin works in your body. Do NOT avoid these foods. Instead, try to eat the same amount each day.     Bleeding is the most common side-effect of warfarin. You may notice bleeding gums, a bloody nose, bruises and bleeding longer when you cut yourself. See a doctor at once if:   o You cough up blood  o You find blood in your stool (poop)  o You have a deep cut, or a cut that bleeds longer than 10 minutes   o You have a  "bad cut, hard fall, accident or hit your head (go to urgent care or the emergency room).    For women who can get pregnant: This medicine can harm an unborn baby. Be very careful not to get pregnant while taking this medicine. If you think you might be pregnant, call your doctor right away.    For more information, read \"Guide to Warfarin Therapy,  the booklet you received in the hospital.        Pending Results     No orders found from 6/14/2017 to 6/17/2017.            Statement of Approval     Ordered          06/16/17 1258  I have reviewed and agree with all the recommendations and orders detailed in this document.  EFFECTIVE NOW     Approved and electronically signed by:  Mal Weiss MD             Admission Information     Date & Time Provider Department Dept. Phone    6/16/2017 Mal Weiss MD [facility] [phone]      Your Vitals Were     Blood Pressure Temperature Respirations Height Weight Pulse Oximetry    136/77 95.7  F (35.4  C) 16 1.727 m (5' 8\") 72.6 kg (160 lb) 99%    BMI (Body Mass Index)                   24.33 kg/m2           [facility] Information     [employer] gives you secure access to your electronic health record. If you see a primary care provider, you can also send messages to your care team and make appointments. If you have questions, please call your primary care clinic.  If you do not have a primary care provider, please call [phone] and they will assist you.        Care EveryWhere ID     This is your Care EveryWhere ID. This could be used by other organizations to access your [facility] medical records  [id number]           Review of your medicines      CONTINUE these medicines which may have CHANGED, or have new prescriptions. If we are uncertain of the size of tablets/capsules you have at home, strength may be listed as something that might have changed.        Dose / Directions    pravastatin 40 MG tablet   Commonly known as:  PRAVACHOL   This may " have changed:  when to take this   Used for:  Hyperlipidemia with target LDL less than 100        Dose:  40 mg   Take 1 tablet (40 mg) by mouth daily   Quantity:  90 tablet   Refills:  0       * warfarin 2 MG tablet   Commonly known as:  COUMADIN   This may have changed:  Another medication with the same name was added. Make sure you understand how and when to take each.   Used for:  Chronic anticoagulation, Chronic atrial fibrillation (H)        Takes 4 mg on Mon, Fri;  2 mg all other days or as directed by [facility] nurses   Quantity:  135 tablet   Refills:  0       * warfarin 2 MG tablet   Commonly known as:  COUMADIN   This may have changed:  You were already taking a medication with the same name, and this prescription was added. Make sure you understand how and when to take each.   Used for:  Chronic anticoagulation        Dose:  2 mg   Take 1 tablet (2 mg) by mouth once for 1 dose   Quantity:  30 tablet   Refills:  0       * Notice:  This list has 2 medication(s) that are the same as other medications prescribed for you. Read the directions carefully, and ask your doctor or other care provider to review them with you.      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, unspecified laterality        Dose:  200 mg   Take 2 tablets (200 mg) by mouth 3 times daily as needed   Quantity:  360 tablet   Refills:  1       ASPIRIN LOW DOSE 81 MG tablet   Generic drug:  aspirin        Dose:  81 mg   Take 81  mg by mouth daily   Refills:  0       doxazosin 4 MG tablet   Commonly known as:  CARDURA   Used for:  Benign non-nodular prostatic hyperplasia without lower urinary tract symptoms        Dose:  4 mg   Take 1 tablet (4 mg) by mouth At Bedtime   Quantity:  90 tablet   Refills:  3       levothyroxine 50 MCG tablet   Commonly known as:  SYNTHROID/LEVOTHROID   Used for:  Hypothyroidism, unspecified type        Dose:  50 mcg   Take 1 tablet (50 mcg) by mouth daily   Quantity:  90 tablet   Refills:  1       lisinopril 10 MG tablet   Commonly known as:  PRINIVIL/ZESTRIL   Used for:  CKD (chronic kidney disease) stage 3, GFR 30-59 ml/min        Dose:  5 mg   Take 0.5 tablets (5 mg) by mouth daily   Quantity:  45 tablet   Refills:  1       mirtazapine 30 MG tablet   Commonly known as:  REMERON   Used for:  Poor appetite        Dose:  30 mg   Take 1 tablet (30 mg) by mouth At Bedtime   Quantity:  90 tablet   Refills:  1       nitroglycerin 0.4 MG sublingual tablet   Commonly known as:  NITROSTAT   Used for:  CAD (coronary artery disease)        Dose:  0.4 mg   Place 1 tablet (0.4 mg) under the tongue every 5 minutes as needed for chest pain   Quantity:  25 tablet   Refills:  0       * Notice:  This list has 2 medication(s) that are the same as other medications prescribed for you. Read the directions carefully, and ask your doctor or other care provider to review them with you.         Where to get your medicines      Some of these will need a paper prescription and others can be bought over the counter. Ask your nurse if you have questions.     You don't need a prescription for these medications     warfarin 2 MG tablet                Protect others around you: Learn how to safely use, store and throw away your medicines at www.disposemymeds.org.             Medication List: This is a list of all your medications and when to take them. Check marks below indicate your daily home schedule. Keep this list as a reference.       Medications           Morning Afternoon Evening Bedtime As Needed    acetaminophen 500 MG tablet   Commonly known as:  TYLENOL   Take 1,000 mg by mouth every 6 hours as needed for mild pain                                * allopurinol 100 MG tablet   Commonly known as:  ZYLOPRIM   Take 1 tablet (100 mg) by mouth 2 times daily                                * allopurinol 100 MG tablet   Commonly known as:  ZYLOPRIM   Take 2 tablets (200 mg) by mouth 3 times daily as needed                                ASPIRIN LOW DOSE 81 MG tablet   Take 81 mg by mouth daily   Generic drug:  aspirin                                doxazosin 4 MG tablet   Commonly known as:  CARDURA   Take 1 tablet (4 mg) by mouth At Bedtime                                levothyroxine 50 MCG tablet   Commonly known as:  SYNTHROID/LEVOTHROID   Take 1 tablet (50 mcg) by mouth daily                                lisinopril 10 MG tablet   Commonly known as:  PRINIVIL/ZESTRIL   Take 0.5 tablets (5 mg) by mouth daily                                mirtazapine 30 MG tablet   Commonly known as:  REMERON   Take 1 tablet (30 mg) by mouth At Bedtime                                nitroglycerin 0.4 MG sublingual tablet   Commonly known as:  NITROSTAT   Place 1 tablet (0.4 mg) under the tongue every 5 minutes as needed for chest pain                                pravastatin 40 MG tablet   Commonly known as:  PRAVACHOL   Take 1 tablet (40 mg) by mouth daily                                * warfarin 2 MG tablet   Commonly known as:  COUMADIN   Takes 4 mg on Mon, Fri;  2 mg all other days or as directed by [facility] nurses                                * warfarin 2 MG tablet   Commonly known as:  COUMADIN   Take 1 tablet (2 mg) by mouth once for 1 dose                                * Notice:  This list has 4 medication(s) that are the same as other medications prescribed for you. Read the directions carefully, and ask your doctor or other care provider to review  them with you.

## 2017-06-16 NOTE — IP AVS SNAPSHOT
[facility]    [address] [place] [id number]    Phone:  [phone]                                       After Visit Summary   6/16/2017    Earl Sanchez    MRN: [id number]           After Visit Summary Signature Page     I have received my discharge instructions, and my questions have been answered. I have discussed any challenges I see with this plan with the nurse or doctor.    ..........................................................................................................................................  Patient/Patient Representative Signature      ..........................................................................................................................................  Patient Representative Print Name and Relationship to Patient    ..................................................               ................................................  Date                                            Time    ..........................................................................................................................................  Reviewed by Signature/Title    ...................................................              ..............................................  Date                                                            Time

## 2017-06-16 NOTE — PLAN OF CARE
Problem: Goal Outcome Summary  Goal: Goal Outcome Summary  Outcome: Adequate for Discharge Date Met:  06/16/17  Care Plan Summary Note: Pt adequate for discharge.  D/C teaching done,  Questioned answered, verbalized understanding. Patient belonging given to patient. D/C paperwork given to daughter. Transported to TCU by two daughters. Continue to monitor.

## 2017-06-16 NOTE — IP AVS SNAPSHOT
` `           [facility] OBSERVATION UNIT: [phone]                 INTERAGENCY TRANSFER FORM - NOTES (H&P, Discharge Summary, Consults, Procedures, Therapies)   2017                    Hospital Admission Date: 2017  KATHRYN GARNER   : 1926  Sex: Male        Patient PCP Information     Provider PCP Type    Alvarado Florian MD General      History & Physicals     No notes of this type exist for this encounter.      Discharge Summaries     No notes of this type exist for this encounter.      Consult Notes     No notes of this type exist for this encounter.         Progress Notes - Physician (Notes from 17 through 17)      Progress Notes by Jami Elias LSW at 2017 10:34 AM     Author:  Jami Elias LSW Service:  (none) Author Type:      Filed:  2017 12:04 PM Date of Service:  2017 10:34 AM Creation Time:  2017 10:34 AM    Status:  Signed :  Jami Elias LSW ()         Care Transition Initial Assessment - SW  Reason For Consult: discharge planning  Met with: Patient and Family    Active Problems:    Weakness generalized         DATA  Lives With: alone, facility resident  Living Arrangements: assisted living  Description of Support System: Supportive, Involved  Who is your support system?: Children  Support Assessment: Adequate family and caregiver support.   Identified issues/concerns regarding health management:    SW received request for consult to assist with discharge planning. Patient admitted Observation Status on[LS1.1] 17 for Generalized Weakness. Tentative discharge date 17. [facility] resides [facility]. Patient is mostly independent at baseline, however due to weakness is requiring and assist of 2 currently[LS1.2]. SW met with patient to discuss discharge planning and recommendation of TCU at discharge. Patient stating he is in agreement with TCU. Discussed that as long  "as [facility] provides authorization, patient would have coverage. Informed patient that [facility] typically covers days 1-20, and days  there is a $140 co-pay. Patient in agreement with this. Patient requesting to stay in the [facility]. Discussed [facility]. Patient stating he is in agreement with a referral being sent to [facility] for TCU. SW sent referral per protocol.     Addendum:   Patient accepted at [facility] TCU for today. Per [facility] admissions staff, they will work on receiving [facility] authorization and stated they \"feel patient will most definitely meet qualifications for coverage\". Per [facility] they do not accept TCU admissions over the weekend, so patient would need to discharge today. YAYO paged MD to verify if patient will be ready for discharge today.     Res[LS1.3]o[LS1.2]urces List: Transitional Care     Quality Of Family Relationships: supportive, helpful, involved  Transportation Available: agency transportation, van, wheelchair accessible      ASSESSMENT  Cognitive Status:[LS1.1]  awake, alert and oriented[LS1.3]  Concerns to be addressed:[LS1.1] discharge planning[LS1.3].       PLAN  Financial costs for the patient includes[LS1.1]: NA-pending authorization from [facility][LS1.3].  Patient given options and choices for discharge[LS1.1]: discussed TCU, patient in agreement[LS1.3].  Patient/family is agreeable to the plan?[LS1.1]  Yes[LS1.3]  Patient Goals and Preferences:[LS1.1] TCU[LS1.3].  Patient anticipates discharging to:[LS1.1] [facility] TCU[LS1.3].  Discharge Planner   Discharge Plans in progress:[LS1.1] Referral sent to [facility].[LS1.3]   Barriers to discharge plan:[LS1.1] [employer] authorization.[LS1.3]  Follow up plan:[LS1.1] Patient to contact daughter and update, MD paged to discuss discharge plan.[LS1.3]        Entered by: Jami Elias 06/16/2017 10:34 AM[LS1.1]       Jami Elias, MSW, LGSW[LS1.3]       Revision History        User Key Date/Time User Provider Type " Action    > LS1.3 6/16/2017 12:04 PM Jami Elias LSW  Sign     LS1.2 6/16/2017 11:48 AM Jami Elias LSW       LS1.1 6/16/2017 10:34 AM Jami Elias LSW              ED Provider Notes by Ernesto Robin MD at 6/16/2017  4:03 AM     Author:  Ernesto Robin MD Service:  Emergency Medicine Author Type:  Physician    Filed:  6/16/2017  9:48 AM Date of Service:  6/16/2017  4:03 AM Creation Time:  6/16/2017  4:26 AM    Status:  Signed :  Ernesto Robin MD (Physician)           History     Chief Complaint:  Fall    HPI   Earl Sanchez is a 89 year old male who presents via EMS for[ZJ1.1] a[BT1.1] fall. The patient has a history of a recent fall due to global weakness in his legs and was evaluated recently without any significant findings for this including an MRI on 6/10. He was given IV fluids at that time and felt improved. Earlier yesterday, he notes that around dinner time he had crumpled up getting out of his chair at dinner and needed assistance getting to his room. This morning, he states that he was getting off the toilet at his nursing care facility when he was unable to get up, sliding off the edge of the seat onto the floor. He denies hitting his head or losing consciousness. EMS was subsequently called and on arrival found the patient to have an unsteady gait, requiring assistance, normal blood sugar, and normal oxygen saturations. The patient notes that the symptoms tonight were similar to the previous ones he had when he seen here 6 days ago. However, he notes that when he was talking with EMS he occasionally had garbled speech with his questions at times, but no confusion. He denies any focal weakness or numbness. He denies any hip pain, back pain, neck pain, headache, fevers, chills, shortness of breath, chest pain.     MRI BRAIN WITHOUT AND WITH CONTRAST 6/10/2017 1:46 PM  1. No acute abnormality.  2. Brain atrophy  "and white matter changes consistent with sequelae of  small vessel ischemic disease.  3. Flattening of the sulci over the convexities is again suspicious  for normal pressure hydrocephalus but unchanged.  BRAEDEN MORALES MD    Allergies:  No known drug allergies     Medications:    Remeron  Coumadin  Zyloprim  Levothyroxine  Lasix  Cardura  Zyloprim  Nitroglycerin  Aspirin     Past Medical History:    Atrial fibrillation  Heart failure   CKD stage III  Hypertension  Anemia  Heart failure  Abdominal aortic aneurysm  Gastritis  Osteoarthritis  BPH  Gait apraxia  Hyperlipidemia  CAD  Duodenal ulcer    Past Surgical History:    ORIF Ankle  CABG x5  Hernia repair  Left heart cath  Cataract surgery x2    Family History:    Hypertension     Social History:  Smoking status: Never smoker  Alcohol use: Yes, occasionally    Marital Status:       Review of Systems   Constitutional: Negative for chills and fever.   Respiratory: Negative for shortness of breath.    Cardiovascular: Negative for chest pain.   Gastrointestinal: Negative for nausea and vomiting.   Musculoskeletal: Negative for back pain and neck pain.   Skin: Negative for wound.   Neurological: Positive for weakness. Negative for dizziness, syncope, numbness and headaches.   All other systems reviewed and are negative.      Physical Exam[ZJ1.1]   Patient Vitals for the past 24 hrs:   BP Temp Temp src Heart Rate Resp SpO2 Height Weight   06/16/17 0530 - - - 70 14 97 % - -   06/16/17 0419 (!) 154/93 - - 85 14 - - -   06/16/17 0414 135/86 - - 68 9 97 % - -   06/16/17 0407 138/90 97.7  F (36.5  C) Oral 71 14 98 % 1.727 m (5' 8\") 72.6 kg (160 lb)[ZJ1.2]        Physical Exam[ZJ1.1]  General: Alert, interactive in[ZJ1.3] mild[BT1.1] distress  Head:  Scalp is atraumatic  Eyes:  The pupils are equal, round, and reactive to light    EOM's intact    No scleral icterus  ENT:      Nose:  The external nose is normal  Ears:  External ears are normal  Mouth/Throat: The " oropharynx is normal    Mucus membranes are[ZJ1.3] dry[BT1.1]      Neck:  Normal range of motion.      There is no rigidity.    Trachea is in the midline         CV:  Irregularly irregular. 2/6 systolic murmur.    No murmur   Resp:  Breath sounds are clear bilaterally    Non-labored, no retractions or accessory muscle use      GI:  Abdomen is soft, no distension, no tenderness.       MS:  Normal strength in all 4 extremities, No midline cervical, thoracic, or lumbar tenderness  Skin:  Warm and dry, No rash or lesions noted.  Neuro: Global malaise but no focal neurologic deficits.    Strength 5/5 x4.  Sensation intact  In all 4 extremities.      Cranial nerves 2-12[ZJ1.3] grossly[BT1.1] intact.  Psych:  Awake. Alert.  Normal affect.      Appropriate interactions.[ZJ1.3]    Emergency Department Course     ECG (4:10:32):  Rate 69 bpm. ID interval &. QRS duration 100. QT/QTc 426/456. P-R-T axes * -58 75. Atrial fibrillation. Left anterior fascicular block. Minimal voltage criteria for LVH, may be normal variant. Possible anterior infarct, age undetermined. Abnormal ECG. Interpreted at 0428 by TriggerErnesto MD.     Imaging:[ZJ1.1]  Radiographic findings were communicated with the patient who voiced understanding of the findings.    CT-scan Head w/o contrast:  No acute abnormality.  Result per radiology.[ZJ1.3]      Laboratory:[ZJ1.1]  CBC: HGB 11.5 (L), o/w WNL (WBC 6.3, )    BMP: GFR 60 (L) o/w WNL (Creatinine 1.15)   INR: 2.91 (H)[ZJ1.3]    Interventions:[ZJ1.1]  NS 1L IV Bolus[ZJ1.3]     Emergency Department Course:  The patient arrived in the emergency department via EMS.   Past medical records, nursing notes, and vitals reviewed.[ZJ1.1]  0426[ZJ1.3]: I performed an exam of the patient and obtained history, as documented above.[ZJ1.1]   IV inserted and blood drawn. The patient was placed on continuous cardiac monitoring and pulse oximetry.   The patient was sent for a CT-scan while in the emergency  department, findings above.[ZJ1.3]      0637:[ZJ1.4] Findings and plan explained to the Patient and family who consents to admission.     0713: Discussed the patient with [ZJ1.3] Aretha[SM1.1]lori[SS1.1]michelle[SM1.1], who will admit the patient to a medical bed for further monitoring, evaluation, and treatment.[ZJ1.3]      Impression & Plan      Medical Decision Making:[ZJ1.1]  Mr. Sanchez was seen and evaluated. This was his second visit in approximately one weeks time with frequent falls. He slid off of his toilet today and denies striking his head or losing consciousness however he was unable to get up. The above work up was undertaken here returning largely unremarkable. His INR is therapeutic and CT scan demonstrates no signs of any acute intracranial hemorrhage. He's got no deformity of the extremities, no spinal discomfort. He was unable to provide a urine sample here, however did have a urinalysis at his last visit that was unremarkable. He is demonstrating no signs of acute infection. Given his failure to thrive and weakness as well as frequent falls, I believe he would benefit from hospitalization. I spoke with [SM1.1] Isela[SS1.1] from the hospitalist service who was in agreement. I believe the patient would benefit from physical therapy evaluation as well as  consultation to discuss if his current living accommodations are appropriate. The patient's daughters were in agreement with this plan.[SM1.1]     Diagnosis:[ZJ1.1]    ICD-10-CM    1. Falls frequently R29.6    2. Adult failure to thrive R62.7    3. Anemia, unspecified type D64.9[ZJ1.2]      Jose Turner  6/16/2017    EMERGENCY DEPARTMENT  IJose, am serving as a scribe at 4:26 AM on 6/16/2017 to document services personally performed by Ernesto Robin[ZJ1.1] MD[ZJ1.3] based on my observations and the provider's statements to me.[ZJ1.1]       Ernesto Robin MD  06/16/17 0948  [BT1.2]      "Revision History        User Key Date/Time User Provider Type Action    > BT1.2 6/16/2017  9:48 AM Trigger, Ernesto Benavides MD Physician Sign     BT1.1 6/16/2017  9:46 AM Trigger, Ernesto Benavides MD Physician      SS1.1 6/16/2017  7:38 AM Marilynn Toribio Scribe Share     SM1.1 6/16/2017  7:26 AM RashardOdin Scribe Share     ZJ1.4 6/16/2017  7:16 AM Jose Turner Scribe Share     ZJ1.2 6/16/2017  7:14 AM JohnnyMarielosry Scribe Share     ZJ1.3 6/16/2017  7:11 AM Marielos Turnerry Scribe      ZJ1.1 6/16/2017  4:40 AM Jhonny Jose Cadeibe Share            ED Notes by Kathrin Mcfarland RN at 6/16/2017  7:00 AM     Author:  Kathrin Mcfarland RN Service:  (none) Author Type:  (none)    Filed:  6/16/2017  7:03 AM Date of Service:  6/16/2017  7:00 AM Creation Time:  6/16/2017  7:03 AM    Status:  Signed :  Kathrin Mcfarland RN         [facility]  ED Nurse Handoff Report    ED Chief complaint: Fall (Slid off toilet when trying to get off.  Feeling weak & having frequent falls lately.  Evaluated recently for weakness & fall & no reason found.  Reports here on Saturday for same, had labs, MRI.  Was dehydrated & received IV fluids. )      ED Diagnosis:   Final diagnoses:   Falls frequently   Adult failure to thrive   Anemia, unspecified type       Code Status: DNR, hospitalist to address.  Refer to Code documentation    Allergies: No Known Allergies    Activity level - Baseline/Home:  Stand with Assist    Activity Level - Current:   Stand with Assist of 2     Needed?: No    Isolation: No  Infection: Not Applicable    Bariatric?: No    Vital Signs:   Vitals:    06/16/17 0407 06/16/17 0414 06/16/17 0419 06/16/17 0530   BP: 138/90 135/86 (!) 154/93    Resp: 14 9 14 14   Temp: 97.7  F (36.5  C)      TempSrc: Oral      SpO2: 98% 97%  97%   Weight: 72.6 kg (160 lb)      Height: 1.727 m (5' 8\")          Cardiac Rhythm: ,  Atrial fibrillation, rate 78      Pain level: 0-10 Pain Scale: " 0    Is this patient confused?: No    Patient Report: Initial Complaint: Initial Complaint: Patient complaints of feeling weak, fell in dining room earlier today.  Tonight, slid off of toilet.  Unsure why he is so weak, seeking answers.  Denies injury.  Seem for similar complaint of Saturday in ED.  Evaluated & discharged; reports had MRI, labs, IV fluids.  Discharged back to assisted living, was not admitted.  Focused Assessment:  Patient complaints of generalized weakness.  Reports frequent falls over last week.  Weakness seems to be worsening.  Denies pain & has no other complaints.  Patient became incontinent of urine when assisted to standing for orthostatic vital signs.  2nd episode of urine incontinence later, unable to obtain UA & pang catheter insertion not indicated at this time per MD.    Tests Performed:  Labs, CT scan  Abnormal Results: Refer to results.   Treatments provided: IV fluid bolus, 500 ml  Tests Performed: Labs, CT scan, unable to collect UA.  Pang catheter insertion not indicated at this time per MD & patient.   Abnormal Results: Refer to results.   Treatments provided:  IV fluids, 500 ml.     Family Comments: Son & daughter with patient.     OBS brochure/video discussed/provided to patient: N/A    ED Medications:   Medications   sodium chloride (PF) 0.9% PF flush 3 mL (not administered)   sodium chloride (PF) 0.9% PF flush 3 mL (3 mLs Intracatheter Given 6/16/17 0453)   0.9% sodium chloride BOLUS (500 mLs Intravenous New Bag 6/16/17 0452)     Followed by   0.9% sodium chloride infusion (not administered)       Drips infusing?:  No      ED NURSE PHONE NUMBER: [phone][JL1.1]            Revision History        User Key Date/Time User Provider Type Action    > JL1.1 6/16/2017  7:03 AM Kathrin Mcfarland RN (none) Sign            ED Notes by Kathrin Mcfarland RN at 6/16/2017  6:27 AM     Author:  Kathrin Mcfarland RN Service:  (none) Author Type:  (none)    Filed:  6/16/2017  6:29 AM Date  of Service:  6/16/2017  6:27 AM Creation Time:  6/16/2017  6:29 AM    Status:  Signed :  Kathrin Mcfarland RN         Patient unable to provide urine sample.  Has been incontinent of urine twice during ED stay & seems to be unable to control his urine.  Assist x2 to standing to attempt to use urinal.  Incontinence brief moderately saturated with urine.  Patient changed to clean brief.[JL1.1]       Revision History        User Key Date/Time User Provider Type Action    > JL1.1 6/16/2017  6:29 AM Kathrin Mcfarland RN (none) Sign            ED Notes by Ashleigh Larkin RN at 6/16/2017  4:05 AM     Author:  Ashleigh Larkin RN Service:  (none) Author Type:  Registered Nurse    Filed:  6/16/2017  4:05 AM Date of Service:  6/16/2017  4:05 AM Creation Time:  6/16/2017  4:05 AM    Status:  Signed :  Ashleigh Larkin RN (Registered Nurse)         Bed: ED19  Expected date:   Expected time:   Means of arrival:   Comments:  Marivel-89M Fall     Revision History        User Key Date/Time User Provider Type Action    > AH1.1 6/16/2017  4:05 AM Ashleigh Larkin RN Registered Nurse Sign                  Procedure Notes     No notes of this type exist for this encounter.      Progress Notes - Therapies (Notes from 06/13/17 through 06/16/17)     No notes of this type exist for this encounter.

## 2017-06-16 NOTE — TELEPHONE ENCOUNTER
Left message on answering machine for patient to call back.  Amina SamuelsRN  [facility]  [phone]

## 2017-06-16 NOTE — DISCHARGE SUMMARY
DATE OF ADMISSION: 06/16/2017.      DATE OF DISCHARGE:  06/16/2017.      DISCHARGE DIAGNOSES:   1.  Generalized weakness.   2.  Atrial fibrillation, on chronic Coumadin.   3.  Hypertension.   4.  Hypothyroidism.   5.  History of coronary artery disease.      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  For details of presentation and initial plan, please refer to the H&P dictated by the same provider.  The patient was essentially admitted for a placement.  He is now going to be discharging to a TCU.  As a part of his generalized weakness, I did a TSH, which was normal.  He already had a pretty good workup about a week ago with an MRI of the brain.  He did not have any focal symptoms, and we decided not to pursue any further workup at this time.  Moreover, he was going to be seeing a neurologist as an outpatient.  I would defer the workup there.  At this time, he will be discharged to a TCU.      PHYSICAL EXAMINATION ON THE DAY OF DISCHARGE:  Please refer to my H&P.      DISCHARGE MEDICATIONS:   1.  Pravastatin 40 mg daily.   2.  Warfarin 4 mg on Monday and 2 mg on all other days per previous schedule.  However, given the slightly high INR, we have recommended 2 mg of Coumadin tonight and then go back to the previous schedule.   3.  Tylenol 1000 mg every 6 hours as needed.   4.  Allopurinol 100 mg 2 times daily.   5.  Aspirin 81 mg daily.   6.  Cardura 4 mg at bedtime.   7.  Levothyroxine 50 mcg daily.   8.  Lisinopril 5 mg daily.   9.  Remeron 30 mg at bedtime.   10.  Nitroglycerin 0.4 mg sublingual as needed.      FOLLOWUP INSTRUCTIONS AND PLAN:     1.  He will follow up with provider at the transitional care facility.   2.  Outpatient Urology as previously scheduled.         JOCELYN HOWE MD             D: 06/16/2017 13:25   T: 06/16/2017 13:41   MT: [id number]      Name:     EARL SANCHEZ   MRN:      [id number]         Account:        [id number]   :      1926           Admit Date:     [id number]                                  Discharge Date:       Document: [id number]

## 2017-06-16 NOTE — PROGRESS NOTES
YAYO  I: Patient's daughter requested SW also contact [facility] as that is closer for family, and patient in agreement. Per [facility] admissions, they do not have a bed available at this time. YAYO updated patient and patient's family who are in agreement with patient discharging to [facility]. Patient's daughter wishes to transport. Discussed with patient's daughter that patient is currently an assist of 2 people. Patient's daughter stating she was able to assist the patient in and out of the car the other day and feels she can do it again. Patient's daughter stating they have a transport chair she can use. Patient is also in agreement with this and does not want a wheelchair ride arranged. Patient's daughter will transport at 16:00 today. YAYO updated [facility]. YAYO faxed discharge orders, PAS. RN notified.   P: No further needs at this time.     DILEEP Xie, LGSW    PAS-RR    D: Per DHS regulation, YAYO completed and submitted PAS-RR to [place] [employer] on [employer]ging Direct Connect via the Senior LinkAge Line.  PAS-RR confirmation # is : [id number]    I: SW spoke with patient and they are aware a PAS-RR has been submitted.  YAYO reviewed with patient that they may be contacted for a follow up appointment within 10 days of hospital discharge if their SNF stay is < 30 days.  Contact information for [facility] LinkAge Line was also provided.    A: Patient verbalized understanding.    P: Further questions may be directed to [facility] Line at #[phone], option #4 for PAS-RR staff.

## 2017-06-16 NOTE — PHARMACY-ADMISSION MEDICATION HISTORY
Admission medication history interview status for the 6/16/2017  admission is complete. See EPIC admission navigator for prior to admission medications     Medication history source reliability:Good    Actions taken by pharmacist (provider contacted, etc):None     Additional medication history information not noted on PTA med list :    Patient is very familiar with his home meds (including the doses).     I can see a prescription for PRN allopurinol on top of the scheduled allopurinol, but he states that he was unaware of the PRN prescription and has not used it. He is only using the scheduled allopurinol.     He states that he is no longer taking Lasix 40 mg qAM.    The Remeron is newly prescribed.     He is currently taking warfarin 4 mg on Mon/Fri, and 2 mg ROW. His last dose was 6/15 PM.    Medication reconciliation/reorder completed by provider prior to medication history? No    Time spent in this activity: 15 mins    Prior to Admission medications    Medication Sig Last Dose Taking? Auth Provider   mirtazapine (REMERON) 30 MG tablet Take 1 tablet (30 mg) by mouth At Bedtime 6/15/2017 at hs Yes Alvarado Florian MD   warfarin (COUMADIN) 2 MG tablet Takes 4 mg on Mon, Fri;  2 mg all other days or as directed by [facility] nurses 6/15/2017 at pm Yes Alvarado Florian MD   levothyroxine (SYNTHROID/LEVOTHROID) 50 MCG tablet Take 1 tablet (50 mcg) by mouth daily 6/15/2017 at am Yes Alvarado Florian MD   pravastatin (PRAVACHOL) 40 MG tablet Take 1 tablet (40 mg) by mouth daily  Patient taking differently: Take 40 mg by mouth At Bedtime  6/15/2017 at hs Yes Alvarado Florian MD   lisinopril (PRINIVIL/ZESTRIL) 10 MG tablet Take 0.5 tablets (5 mg) by mouth daily 6/15/2017 at am Yes Alvarado Florian MD   doxazosin (CARDURA) 4 MG tablet Take 1 tablet (4 mg) by mouth At Bedtime 6/15/2017 at hs Yes Alvarado Florian MD   allopurinol (ZYLOPRIM) 100 MG tablet Take 1 tablet (100 mg) by mouth 2 times daily 6/15/2017 at pm Yes Alvarado Florian MD   acetaminophen  (TYLENOL) 500 MG tablet Take 1,000 mg by mouth every 6 hours as needed for mild pain  Past Month at prn Yes Reported, Patient   aspirin (ASPIRIN LOW DOSE) 81 MG tablet Take 81 mg by mouth daily  6/15/2017 at am Yes Reported, Patient   allopurinol (ZYLOPRIM) 100 MG tablet Take 2 tablets (200 mg) by mouth 3 times daily as needed Has never used this Rx at -  Alvarado Florian MD   nitroglycerin (NITROSTAT) 0.4 MG SL tablet Place 1 tablet (0.4 mg) under the tongue every 5 minutes as needed for chest pain prn at prn  Alvarado Florian MD [facility]

## 2017-06-16 NOTE — ED NOTES
Patient unable to provide urine sample.  Has been incontinent of urine twice during ED stay & seems to be unable to control his urine.  Assist x2 to standing to attempt to use urinal.  Incontinence brief moderately saturated with urine.  Patient changed to clean brief.

## 2017-06-16 NOTE — TELEPHONE ENCOUNTER
LOV 6/14/17    Gricelda from [facility] calling to ask if Dr. Florian is willing to follow the patient by phone and fax. Patient will be at [facility] following a fall.    Left voice mail for Dr. Florian to ask if he is willing to follow.    Patient will be arriving at [facility] this afternoon. If unable to reach Dr. Florian they will assign another physician.  Brandy Miller RN

## 2017-06-16 NOTE — IP AVS SNAPSHOT
` `     [facility] OBSERVATION UNIT: [phone]            Medication Administration Report for Earl Sanchez as of 06/16/17 1527   Legend:    Given Hold Not Given Due Canceled Entry Other Actions    Time Time (Time) Time  Time-Action       Inactive    Active    Linked        Medications 06/10/17 06/11/17 06/12/17 06/13/17 06/14/17 06/15/17 06/16/17    0.9% sodium chloride infusion  Rate: 75 mL/hr Freq: CONTINUOUS Route: IV  Start: 06/16/17 0807          1036 ( )-New Bag           0.9% sodium chloride infusion  Rate: 125 mL/hr Freq: CONTINUOUS Route: IV  Start: 06/16/17 0442   Admin Instructions: Administer after the bolus.                      acetaminophen (TYLENOL) tablet 650 mg  Dose: 650 mg Freq: EVERY 4 HOURS PRN Route: PO  PRN Reason: mild pain  Start: 06/16/17 0806   Admin Instructions: Alternate ibuprofen (if ordered) with acetaminophen.  Maximum acetaminophen dose from all sources = 75 mg/kg/day not to exceed 4 grams/day.               naloxone (NARCAN) injection 0.1-0.4 mg  Dose: 0.1-0.4 mg Freq: EVERY 2 MIN PRN Route: IV  PRN Reason: opioid reversal  Start: 06/16/17 0806   Admin Instructions: For respiratory rate LESS than or EQUAL to 8.  Partial reversal dose:  0.1 mg titrated q 2 minutes for Analgesia Side Effects Monitoring Sedation Level of 3 (frequently drowsy, arousable, drifts to sleep during conversation).Full reversal dose:  0.4 mg bolus for Analgesia Side Effects Monitoring Sedation Level of 4 (somnolent, minimal or no response to stimulation).               ondansetron (ZOFRAN-ODT) ODT tab 4 mg  Dose: 4 mg Freq: EVERY 6 HOURS PRN Route: PO  PRN Reason: nausea  Start: 06/16/17 0806   Admin Instructions: This is Step 1 of nausea and vomiting management.  If nausea not resolved in 15 minutes, go to Step 2 prochlorperazine (COMPAZINE). Do not push through foil backing. Peel back foil and gently remove. Place on tongue immediately. Administration with liquid unnecessary               Or  ondansetron (ZOFRAN) injection 4 mg  Dose: 4 mg Freq: EVERY 6 HOURS PRN Route: IV  PRN Reasons: nausea,vomiting  Start: 06/16/17 0806   Admin Instructions: This is Step 1 of nausea and vomiting management.  If nausea not resolved in 15 minutes, go to Step 2 prochlorperazine (COMPAZINE).  Irritant.               polyethylene glycol (MIRALAX/GLYCOLAX) Packet 17 g  Dose: 17 g Freq: DAILY PRN Route: PO  PRN Reason: constipation  Start: 06/16/17 0806   Admin Instructions: Give in 8oz of  water, juice, or soda. Hold for loose stools.  This is the second step of a three step constipation treatment protocol.  1 Packet = 17 grams. Mixed prescribed dose in 8 ounces of water. Follow with 8 oz. of water.               senna-docusate (SENOKOT-S;PERICOLACE) 8.6-50 MG per tablet 1-2 tablet  Dose: 1-2 tablet Freq: 2 TIMES DAILY PRN Route: PO  PRN Comment: constipation   Start: 06/16/17 0806   Admin Instructions: If no bowel movement in 24 hours, increase to 2 tablets PO BID.  Hold for loose stools.   This is the first step of a three step constipation treatment protocol.               sodium chloride (PF) 0.9% PF flush 3 mL  Dose: 3 mL Freq: EVERY 8 HOURS Route: IK  Start: 06/16/17 0442   Admin Instructions: And Q1H PRN, to lock peripheral IV dormant line.           0453 (3 mL)-Given       (1508)-Not Given       [ ] 2042           sodium chloride (PF) 0.9% PF flush 3 mL  Dose: 3 mL Freq: EVERY 1 HOUR PRN Route: IK  PRN Reason: line flush  PRN Comment: for peripheral IV flush post IV meds  Start: 06/16/17 0440              Warfarin Therapy Reminder (Check START DATE - warfarin may be starting in the FUTURE)  Freq: CONTINUOUS PRN Route: XX  Start: 06/16/17 0806   Admin Instructions: *Note to reorder warfarin daily*  Pharmacy Warfarin Dosing Service  Patient is on Warfarin Therapy - check for daily order              Future Medications  Medications 06/10/17 06/11/17 06/12/17 06/13/17 06/14/17 06/15/17 06/16/17       warfarin  (COUMADIN) tablet 2 mg  Dose: 2 mg Freq: ONCE AT 6PM Route: PO  Start: 06/16/17 1800          [ ] 1800          Completed Medications  Medications 06/10/17 06/11/17 06/12/17 06/13/17 06/14/17 06/15/17 06/16/17         Dose: 500 mL Freq: ONCE Route: IV  Last Dose: Stopped (06/16/17 0552)  Start: 06/16/17 0442   End: 06/16/17 0552          0452 (500 mL)-New Bag       0552-Stopped

## 2017-06-16 NOTE — PHARMACY-ANTICOAGULATION SERVICE
Clinical Pharmacy - Warfarin Dosing Consult     Pharmacy has been consulted to manage this patient s warfarin therapy.  Indication: Atrial Fibrillation  Therapy Goal: INR 2-3  Provider/Team: Mal Weiss MD  Warfarin Prior to Admission: Yes  Warfarin PTA Regimen: 4mg Mon, 2mg rest of week  Significant drug interactions: allopurinol, aspirin, levothyroxine  Recent documented change in oral intake/nutrition: No    INR   Date Value Ref Range Status   06/16/2017 2.91 (H) 0.86 - 1.14 Final   06/10/2017 1.80 (H) 0.86 - 1.14 Final       Recommend warfarin 2 mg today.  Pharmacy will monitor Earl Sanchez daily and order warfarin doses to achieve specified goal.      Please contact pharmacy as soon as possible if the warfarin needs to be held for a procedure or if the warfarin goals change.

## 2017-06-16 NOTE — IP AVS SNAPSHOT
` ` Patient Information     Patient Name Sex     Earl Sanchez ([id number]) Male 1926       Room Bed    OU02 OU02-01      Patient Demographics     Address Phone E-mail Address    [address] [place] [address] [address]  [place] [address] [phone] (Home)  none (Work)  none (Mobile) [email or web address]      Patient Ethnicity & Race     Ethnic Group Patient Race    American White      Emergency Contact(s)     Name Relation Home Work Mobile    Shasta Avalos  Daughter [phone] none [phone]    Pako Sanchez Son [phone]      Reynaldo Sanchez Son   [phone]      Documents on File        Status Date Received Description       Documents for the Patient    RW Privacy Notice  () 06     Insurance Card  () 06     Consent Form  06     Waiver - Payment  06     Privacy Notice - [place] Received 07     Consent Form  07     Insurance Card Received () 08     Consent Form  08     Waiver - Payment  08     Consent for Services - CIM Received () 11     Privacy Notice - CIM Received 11     Patient ID Received () 09/10/12     Consent for Services - Hospital/Clinic Received () 12     External Medication Information Consent Accepted () 12     HIM CASSIE Authorization - File Only   12 CASSIE Magana    Privacy Notice - [place] Received 12     Consent to Communicate Received () 12 Email Consent 12    Insurance Card Received () 09/10/12 [facility]    Consent for EHR Access  13 Copied from existing Consent for services - C/HOD collected on 2012    [facility] Specified Other       Consent for Services - Hospital/Clinic Received () 13     External Medication Information Consent Patient Refused 13     Consent to Communicate Received 13 PHI    Patient ID Received () 13     Insurance Card Received ()  13     HIM CASSIE Authorization - File Only   Vaishnavi Auth Release 14    Consent for Services - Hospital/Clinic Received () 14     Insurance Card Received () 02/12/15 [employer]    Consent for Services - Hospital/Clinic Received () 09/03/15     Business/Insurance/Care Coordination/Health Form - Patient  09/15/15 APPLICATION FOR DISABILITY PARKING CERTIFICATE FORM, 8/24/15    Consent for Services/Privacy Notice - Hospital/Clinic Received () 16     Insurance Card Received () 16 [employer]are    [facility] CASSIE Authorization  16     Advance Directives and Living Will Received 16 Resuscitation Guidelines 2012    Advance Directives and Living Will Not Received  Validation of AD 10/17/2012    Advance Directives and Living Will Received 16 Health Care Directive 10/17/2012    Insurance Card Received 17 [employer]are    Consent for Services/Privacy Notice - Hospital/Clinic Received 17     Patient ID Received () 17     Business/Insurance/Care Coordination/Health Form - Patient  17 [employer], DOSING CONSIDERATION, 17    Advance Directives and Living Will Received (Deleted) 06     External Medication Information Consent  (Deleted)      Consent for Services/Privacy Notice - Hospital/Clinic  (Deleted)         Documents for the Encounter    CMS IM for Patient Signature         Admission Information     Attending Provider Admitting Provider Admission Type Admission Date/Time    Mal Weiss MD Bhattarai, Nimesh, MD Emergency 17  0405    Discharge Date Hospital Service Auth/Cert Status Service Area     Hospitalist [facility]    Unit Room/Bed Admission Status        OBSERVATION OU02/OU02-01 Admission (Confirmed)       Admission     Complaint    Weakness generalized      Hospital Account     Name Acct ID Class Status Primary Coverage    Earl Sanchez Tanner [id number] Observation Open  DAVID - [employer]            Guarantor Account (for Hospital Account #[id number])     Name Relation to Pt Service Area Active? Acct Type    Earl Sanchez  FCS Yes Personal/Family    Address Phone          [address]  [place] [address] [phone](H)  none(O)              Coverage Information (for Hospital Account #[id number])     F/O Payor/Plan Precert #    [employer]     Subscriber Subscriber #    Earl Sanchez [id number]    Address Phone    [address]  [place] [address] [phone]

## 2017-06-16 NOTE — PLAN OF CARE
Problem: Discharge Planning  Goal: Discharge Planning (Adult, OB, Behavioral, Peds)  Outcome: Improving  - SW consult- met  -vital signs normal or at patient baseline- met    Nurse to notify provider when observation goals have been met and patient is ready for discharge.     A&Ox3. Able to stand and take a few steps with assist x1. Incontinent. IV infusing. Tolerating regular diet. Denies pain. [facility] Daughter will transport pt at 1600.

## 2017-06-16 NOTE — ED NOTES
"[facility]  ED Nurse Handoff Report    ED Chief complaint: Fall (Slid off toilet when trying to get off.  Feeling weak & having frequent falls lately.  Evaluated recently for weakness & fall & no reason found.  Reports here on Saturday for same, had labs, MRI.  Was dehydrated & received IV fluids. )      ED Diagnosis:   Final diagnoses:   Falls frequently   Adult failure to thrive   Anemia, unspecified type       Code Status: DNR, hospitalist to address.  Refer to Code documentation    Allergies: No Known Allergies    Activity level - Baseline/Home:  Stand with Assist    Activity Level - Current:   Stand with Assist of 2     Needed?: No    Isolation: No  Infection: Not Applicable    Bariatric?: No    Vital Signs:   Vitals:    06/16/17 0407 06/16/17 0414 06/16/17 0419 06/16/17 0530   BP: 138/90 135/86 (!) 154/93    Resp: 14 9 14 14   Temp: 97.7  F (36.5  C)      TempSrc: Oral      SpO2: 98% 97%  97%   Weight: 72.6 kg (160 lb)      Height: 1.727 m (5' 8\")          Cardiac Rhythm: ,  Atrial fibrillation, rate 78      Pain level: 0-10 Pain Scale: 0    Is this patient confused?: No    Patient Report: Initial Complaint: Initial Complaint: Patient complaints of feeling weak, fell in dining room earlier today.  Tonight, slid off of toilet.  Unsure why he is so weak, seeking answers.  Denies injury.  Seem for similar complaint of Saturday in ED.  Evaluated & discharged; reports had MRI, labs, IV fluids.  Discharged back to assisted living, was not admitted.  Focused Assessment:  Patient complaints of generalized weakness.  Reports frequent falls over last week.  Weakness seems to be worsening.  Denies pain & has no other complaints.  Patient became incontinent of urine when assisted to standing for orthostatic vital signs.  2nd episode of urine incontinence later, unable to obtain UA & pang catheter insertion not indicated at this time per MD.    Tests Performed:  Labs, CT scan  Abnormal Results: " Refer to results.   Treatments provided: IV fluid bolus, 500 ml  Tests Performed: Labs, CT scan, unable to collect UA.  Denson catheter insertion not indicated at this time per MD & patient.   Abnormal Results: Refer to results.   Treatments provided:  IV fluids, 500 ml.     Family Comments: Son & daughter with patient.     OBS brochure/video discussed/provided to patient: N/A    ED Medications:   Medications   sodium chloride (PF) 0.9% PF flush 3 mL (not administered)   sodium chloride (PF) 0.9% PF flush 3 mL (3 mLs Intracatheter Given 6/16/17 0453)   0.9% sodium chloride BOLUS (500 mLs Intravenous New Bag 6/16/17 0452)     Followed by   0.9% sodium chloride infusion (not administered)       Drips infusing?:  No      ED NURSE PHONE NUMBER: [phone]

## 2017-06-16 NOTE — IP AVS SNAPSHOT
"[facility] OBSERVATION UNIT: [phone]                                              INTERAGENCY TRANSFER FORM - LAB / IMAGING / EKG / EMG RESULTS   2017                    Hospital Admission Date: 2017  KATHRYN GARNER   : 1926  Sex: Male        Attending Provider: Mal Weiss MD     Allergies:  No Known Allergies    Infection:  None   Service:  HOSPITALIST    Ht:  1.727 m (5' 8\")   Wt:  72.6 kg (160 lb)   Admission Wt:  72.6 kg (160 lb)    BMI:  24.33 kg/m 2   BSA:  1.87 m 2            Patient PCP Information     Provider PCP Type    Alvarado Florian MD General         Lab Results - 3 Days      UA with Microscopic reflex to Culture [[id number]] (Abnormal)  Resulted: 17 1054, Result status: Final result    Ordering provider: Mal Weiss MD  17 0806 Resulting lab: [facility]    Specimen Information    Type Source Collected On   Urine Urine clean catch 17 1038          Components       Value Reference Range Flag Lab   Color Urine Light Yellow   FrStHsLb   Appearance Urine Clear   FrStHsLb   Glucose Urine Negative NEG mg/dL  FrStHsLb   Bilirubin Urine Negative NEG  FrStHsLb   Ketones Urine Negative NEG mg/dL  FrStHsLb   Specific Lindsay Urine 1.005 1.003 - 1.035  FrStHsLb   Blood Urine Negative NEG  FrStHsLb   pH Urine 6.5 5.0 - 7.0 pH  FrStHsLb   Protein Albumin Urine Negative NEG mg/dL  FrStHsLb   Urobilinogen mg/dL Normal 0.0 - 2.0 mg/dL  FrStHsLb   Nitrite Urine Negative NEG  FrStHsLb   Leukocyte Esterase Urine Negative NEG  FrStHsLb   Source Midstream Urine   FrStHsLb   WBC Urine 1 0 - 2 /HPF  FrStHsLb   RBC Urine 1 0 - 2 /HPF  FrStHsLb   Mucous Urine Present NEG /LPF A FrStHsLb            TSH with free T4 reflex [[id number]]  Resulted: 17 0844, Result status: Final result    Ordering provider: Ernesto Robin MD  17 0981 Resulting lab: [facility]    Specimen Information    Type Source Collected On     " 06/16/17 0450          Components       Value Reference Range Flag Lab   TSH 2.79 0.40 - 4.00 mU/L  FrStHsLb            Basic metabolic panel [[id number]] (Abnormal)  Resulted: 06/16/17 0516, Result status: Final result    Ordering provider: Ernesto Robin MD  06/16/17 0441 Resulting lab: [facility]    Specimen Information    Type Source Collected On   Blood  06/16/17 0450          Components       Value Reference Range Flag Lab   Sodium 142 133 - 144 mmol/L  FrStHsLb   Potassium 3.7 3.4 - 5.3 mmol/L  FrStHsLb   Chloride 108 94 - 109 mmol/L  FrStHsLb   Carbon Dioxide 26 20 - 32 mmol/L  FrStHsLb   Anion Gap 8 3 - 14 mmol/L  FrStHsLb   Glucose 92 70 - 99 mg/dL  FrStHsLb   Urea Nitrogen 18 7 - 30 mg/dL  FrStHsLb   Creatinine 1.15 0.66 - 1.25 mg/dL  FrStHsLb   GFR Estimate 60 >60 mL/min/1.7m2 L FrStHsLb   Comment:  Non  GFR Calc   GFR Estimate If Black 72 >60 mL/min/1.7m2  FrStHsLb   Comment:  African American GFR Calc   Calcium 8.8 8.5 - 10.1 mg/dL  FrStHsLb            INR [[id number]] (Abnormal)  Resulted: 06/16/17 0514, Result status: Final result    Ordering provider: Ernesto Robin MD  06/16/17 0441 Resulting lab: [facility]    Specimen Information    Type Source Collected On   Blood  06/16/17 0450          Components       Value Reference Range Flag Lab   INR 2.91 0.86 - 1.14 H FrStHsLb            CBC with platelets differential [[id number]] (Abnormal)  Resulted: 06/16/17 0503, Result status: Final result    Ordering provider: Ernesto Robin MD  06/16/17 0441 Resulting lab: [facility]    Specimen Information    Type Source Collected On   Blood  06/16/17 0450          Components       Value Reference Range Flag Lab   WBC 6.3 4.0 - 11.0 10e9/L  FrStHsLb   RBC Count 3.65 4.4 - 5.9 10e12/L L FrStHsLb   Hemoglobin 11.5 13.3 - 17.7 g/dL L FrStHsLb   Hematocrit 35.1 40.0 - 53.0 % L FrStHsLb   MCV 96 78 - 100 fl  FrStHsLb   MCH  31.5 26.5 - 33.0 pg  FrStHsLb   MCHC 32.8 31.5 - 36.5 g/dL  FrStHsLb   RDW 14.7 10.0 - 15.0 %  FrStHsLb   Platelet Count 186 150 - 450 10e9/L  FrStHsLb   Diff Method Automated Method   FrStHsLb   % Neutrophils 69.8 %  FrStHsLb   % Lymphocytes 16.7 %  FrStHsLb   % Monocytes 10.0 %  FrStHsLb   % Eosinophils 2.2 %  FrStHsLb   % Basophils 1.1 %  FrStHsLb   % Immature Granulocytes 0.2 %  FrStHsLb   Nucleated RBCs 0 0 /100  FrStHsLb   Absolute Neutrophil 4.4 1.6 - 8.3 10e9/L  FrStHsLb   Absolute Lymphocytes 1.1 0.8 - 5.3 10e9/L  FrStHsLb   Absolute Monocytes 0.6 0.0 - 1.3 10e9/L  FrStHsLb   Absolute Eosinophils 0.1 0.0 - 0.7 10e9/L  FrStHsLb   Absolute Basophils 0.1 0.0 - 0.2 10e9/L  FrStHsLb   Abs Immature Granulocytes 0.0 0 - 0.4 10e9/L  FrStHsLb   Absolute Nucleated RBC 0.0   FrStHsLb            Testing Performed By     Lab - Abbreviation Name Director Address Valid Date Range    14 - FrStHsLb [facility] Unknown [address] [place] [address] 05/08/15 1057 - Present            Unresulted Labs (24h ago through future)    Start       Ordered    06/17/17 0600  INR  (warfarin (COUMADIN) Pharmacy Consult-INITIAL ORDER)  DAILY,   Routine      06/16/17 0806    06/16/17 0442  UA with Microscopic  STAT,   STAT      06/16/17 0441         Imaging Results - 3 Days      CT Head w/o Contrast [[id number]]  Resulted: 06/16/17 0617, Result status: Final result    Ordering provider: Ernesto Robin MD  06/16/17 0441 Resulted by: Farhad Dumont MD    Performed: 06/16/17 0518 - 06/16/17 0525 Resulting lab: RADIOLOGY RESULTS    Narrative:       CT HEAD W/O CONTRAST  6/16/2017 5:25 AM      HISTORY: Frequent falls.    TECHNIQUE: Routine noncontrast head CT. Radiation dose for this scan  was reduced using automated exposure control, adjustment of the mA  and/or kV according to patient size, or iterative reconstruction  technique.    COMPARISON: MRI 6/10/2017.    FINDINGS: There is generalized brain atrophy.  No mass, mass effect or  intracranial hemorrhage. No evidence of acute infarct. Periventricular  low attenuation is consistent with chronic small vessel ischemic  disease. The visualized paranasal sinuses are clear.      Impression:       IMPRESSION: No acute abnormality.    OSMAN ARECHIGA MD      Testing Performed By     Lab - Abbreviation Name Director Address Valid Date Range    104 - Rad Rslts RADIOLOGY RESULTS Unknown Unknown 02/16/05 1553 - Present            Encounter-Level Documents:     There are no encounter-level documents.      Order-Level Documents:     There are no order-level documents.

## 2017-06-16 NOTE — PLAN OF CARE
Problem: Discharge Planning  Goal: Discharge Planning (Adult, OB, Behavioral, Peds)  CM... Anticipate goal for discharge to [facility] on 6/16/17. LS

## 2017-06-19 ENCOUNTER — TRANSFERRED RECORDS (OUTPATIENT)
Dept: HEALTH INFORMATION MANAGEMENT | Facility: CLINIC | Age: 82
End: 2017-06-19

## 2017-06-19 LAB — TSH SERPL-ACNC: 3.23 UIU/ML (ref 0.3–4.5)

## 2017-06-19 NOTE — TELEPHONE ENCOUNTER
Left message on answering machine for Gricelda- with the below information.  Amina Samuels RN  [facility]  [phone]

## 2017-06-19 NOTE — TELEPHONE ENCOUNTER
Left message on answering machine for patient to call back.  Amina SamuelsRN  [facility]  [phone]

## 2017-06-21 ENCOUNTER — TELEPHONE (OUTPATIENT)
Dept: FAMILY MEDICINE | Facility: CLINIC | Age: 82
End: 2017-06-21

## 2017-06-21 ENCOUNTER — ANTICOAGULATION THERAPY VISIT (OUTPATIENT)
Dept: FAMILY MEDICINE | Facility: CLINIC | Age: 82
End: 2017-06-21
Payer: COMMERCIAL

## 2017-06-21 DIAGNOSIS — I48.91 ATRIAL FIBRILLATION (H): ICD-10-CM

## 2017-06-21 DIAGNOSIS — Z79.01 LONG-TERM (CURRENT) USE OF ANTICOAGULANTS: ICD-10-CM

## 2017-06-21 LAB — INR PPP: 1.9

## 2017-06-21 PROCEDURE — 99207 ZZC NO CHARGE NURSE ONLY: CPT | Performed by: FAMILY MEDICINE

## 2017-06-21 NOTE — PROGRESS NOTES
ANTICOAGULATION FOLLOW-UP CLINIC VISIT    Patient Name:  Earl Sanchez  Date:  6/21/2017  Contact Type:  Eileen Hutson Fax [phone]    SUBJECTIVE:     Patient Findings     Positives No Problem Findings           OBJECTIVE    INR   Date Value Ref Range Status   06/21/2017 1.9  Final       ASSESSMENT / PLAN  INR assessment THER    Recheck INR In: 1 WEEK    INR Location Outside lab      Anticoagulation Summary as of 6/21/2017     INR goal 2.0-3.0   Today's INR 1.9!   Maintenance plan 4 mg (2 mg x 2) on Mon, Fri; 2 mg (2 mg x 1) all other days   Full instructions 6/21: 2 mg; Otherwise 4 mg on Mon, Fri; 2 mg all other days   Weekly total 18 mg   Plan last modified Brandy Miller RN (6/21/2017)   Next INR check 6/28/2017   Priority INR   Target end date Indefinite    Indications   Long-term (current) use of anticoagulants [Z79.01] [Z79.01]  Atrial fibrillation (H) [I48.91]         Anticoagulation Episode Summary     INR check location     Preferred lab     Send INR reminders to [facility]    Comments TAKES WARFARIN IN THE MORNING        Anticoagulation Care Providers     Provider Role Specialty Phone number    Alvarado Florian MD [facility] [phone]            See the Encounter Report to view Anticoagulation Flowsheet and Dosing Calendar (Go to Encounters tab in chart review, and find the Anticoagulation Therapy Visit)    Dosage adjustment made based on physician directed care plan.    Brandy Miller, KENDRICK

## 2017-06-21 NOTE — MR AVS SNAPSHOT
Earl Sanchez   6/21/2017   Anticoagulation Therapy Visit    Description:  89 year old male   Provider:  Alvarado Florian MD   Department:  Ec Fp/Im/Peds           INR as of 6/21/2017     Today's INR 1.9!      Anticoagulation Summary as of 6/21/2017     INR goal 2.0-3.0   Today's INR 1.9!   Full instructions 6/21: 2 mg; Otherwise 4 mg on Mon, Fri; 2 mg all other days   Next INR check 6/28/2017    Indications   Long-term (current) use of anticoagulants [Z79.01] [Z79.01]  Atrial fibrillation (H) [I48.91]         June 2017 Details    Sun Mon Tue Wed Thu Fri Sat         1               2               3                 4               5               6               7               8               9               10                 11               12               13               14               15               16               17                 18               19               20               21      2 mg   See details      22      2 mg         23      4 mg         24      2 mg           25      2 mg         26      4 mg         27      2 mg         28            29               30                 Date Details   06/21 This INR check       Date of next INR:  6/28/2017         How to take your warfarin dose     To take:  2 mg Take 1 of the 2 mg tablets.    To take:  4 mg Take 2 of the 2 mg tablets.

## 2017-06-21 NOTE — TELEPHONE ENCOUNTER
Swapna RN calling from [facility]  States she was speaking to nurse re: INR order;  but got disconnected  Please call Swapna at [phone]  Yun Colin TC

## 2017-06-28 ENCOUNTER — TELEPHONE (OUTPATIENT)
Dept: FAMILY MEDICINE | Facility: CLINIC | Age: 82
End: 2017-06-28

## 2017-06-28 ENCOUNTER — ANTICOAGULATION THERAPY VISIT (OUTPATIENT)
Dept: FAMILY MEDICINE | Facility: CLINIC | Age: 82
End: 2017-06-28
Payer: COMMERCIAL

## 2017-06-28 DIAGNOSIS — I48.91 ATRIAL FIBRILLATION (H): ICD-10-CM

## 2017-06-28 DIAGNOSIS — Z79.01 LONG-TERM (CURRENT) USE OF ANTICOAGULANTS: ICD-10-CM

## 2017-06-28 LAB — INR PPP: 2

## 2017-06-28 PROCEDURE — 99207 ZZC NO CHARGE NURSE ONLY: CPT | Performed by: FAMILY MEDICINE

## 2017-06-28 NOTE — TELEPHONE ENCOUNTER
KENDRICK Moore from [facility] calling to report INR. Transferred to INR nurse.   Minal Daley RN   [facility] - Triage

## 2017-06-28 NOTE — MR AVS SNAPSHOT
Earl Sanchez   6/28/2017   Anticoagulation Therapy Visit    Description:  89 year old male   Provider:  Alvarado Florian MD   Department:  Ec [facility]/Peds           INR as of 6/28/2017     Today's INR 2.0      Anticoagulation Summary as of 6/28/2017     INR goal 2.0-3.0   Today's INR 2.0   Full instructions 4 mg on Mon, Fri; 2 mg all other days   Next INR check 7/5/2017    Indications   Long-term (current) use of anticoagulants [Z79.01] [Z79.01]  Atrial fibrillation (H) [I48.91]         June 2017 Details    Sun Mon Tue Wed Thu Fri Sat         1               2               3                 4               5               6               7               8               9               10                 11               12               13               14               15               16               17                 18               19               20               21               22               23               24                 25               26               27               28      2 mg   See details      29      2 mg         30      4 mg           Date Details   06/28 This INR check               How to take your warfarin dose     To take:  2 mg Take 1 of the 2 mg tablets.    To take:  4 mg Take 2 of the 2 mg tablets.           July 2017 Details    Sun Mon Tue Wed Thu Fri Sat           1      2 mg           2      2 mg         3      4 mg         4      2 mg         5            6               7               8                 9               10               11               12               13               14               15                 16               17               18               19               20               21               22                 23               24               25               26               27               28               29                 30               31                     Date Details   No additional details    Date of next INR:   7/5/2017         How to take your warfarin dose     To take:  2 mg Take 1 of the 2 mg tablets.    To take:  4 mg Take 2 of the 2 mg tablets.

## 2017-06-28 NOTE — PROGRESS NOTES
ANTICOAGULATION FOLLOW-UP CLINIC VISIT    Patient Name:  Earl Sanchez  Date:  6/28/2017  Contact Type:  Telephone/ Teresa Foss reporting INR of 2.0 today, [phone]    SUBJECTIVE:     Patient Findings     Positives No Problem Findings           OBJECTIVE    INR   Date Value Ref Range Status   06/28/2017 2.0  Final       ASSESSMENT / PLAN  INR assessment THER    Recheck INR In: 1 WEEK    INR Location Outside lab      Anticoagulation Summary as of 6/28/2017     INR goal 2.0-3.0   Today's INR 2.0   Maintenance plan 4 mg (2 mg x 2) on Mon, Fri; 2 mg (2 mg x 1) all other days   Full instructions 4 mg on Mon, Fri; 2 mg all other days   Weekly total 18 mg   No change documented Brandy Miller RN   Plan last modified Brandy Miller RN (6/21/2017)   Next INR check 7/5/2017   Priority INR   Target end date Indefinite    Indications   Long-term (current) use of anticoagulants [Z79.01] [Z79.01]  Atrial fibrillation (H) [I48.91]         Anticoagulation Episode Summary     INR check location     Preferred lab     Send INR reminders to [facility]    Comments TAKES WARFARIN IN THE MORNING        Anticoagulation Care Providers     Provider Role Specialty Phone number    Alvarado Florian MD [facility] [phone]            See the Encounter Report to view Anticoagulation Flowsheet and Dosing Calendar (Go to Encounters tab in chart review, and find the Anticoagulation Therapy Visit)    Dosage adjustment made based on physician directed care plan.    Brandy Miller RN

## 2017-06-30 ENCOUNTER — TELEPHONE (OUTPATIENT)
Dept: FAMILY MEDICINE | Facility: CLINIC | Age: 82
End: 2017-06-30

## 2017-06-30 DIAGNOSIS — R60.9 FLUID RETENTION: Primary | ICD-10-CM

## 2017-06-30 RX ORDER — FUROSEMIDE 20 MG
20 TABLET ORAL DAILY
Qty: 30 TABLET | Refills: 1 | Status: SHIPPED | OUTPATIENT
Start: 2017-06-30 | End: 2017-11-29

## 2017-06-30 NOTE — TELEPHONE ENCOUNTER
Call transferred from  did to come through, returned call and left message to call back    KENDRICK Delgado

## 2017-06-30 NOTE — TELEPHONE ENCOUNTER
Informed [place] of Lasix 20 mg daily prescription and follow up message    KENDRICK Delgado

## 2017-06-30 NOTE — TELEPHONE ENCOUNTER
Nurse from [facility] calling about patient     Has had weight gain of almost 10 pounds since 6/16     2 + pitting edema LE's bilaterally, no shortness of breath noted, clear lung sounds    States that he used to take Lasix 40 mg once daily and stopped on his own and wants to restart     Has been off Lasix for a few weeks per patient, not on med list     Lasix 40 mg pended if needed, uses [facility], need verbal order also     Call [facility] at [phone] with response to nurses station for verbal order.    KENDRICK Delgado

## 2017-07-05 ENCOUNTER — ANTICOAGULATION THERAPY VISIT (OUTPATIENT)
Dept: NURSING | Facility: CLINIC | Age: 82
End: 2017-07-05
Payer: COMMERCIAL

## 2017-07-05 DIAGNOSIS — I48.91 ATRIAL FIBRILLATION (H): ICD-10-CM

## 2017-07-05 DIAGNOSIS — Z79.01 LONG-TERM (CURRENT) USE OF ANTICOAGULANTS: ICD-10-CM

## 2017-07-05 LAB — INR PPP: 1.9

## 2017-07-05 PROCEDURE — 99207 ZZC NO CHARGE NURSE ONLY: CPT | Performed by: FAMILY MEDICINE

## 2017-07-05 NOTE — PROGRESS NOTES
ANTICOAGULATION FOLLOW-UP CLINIC VISIT    Patient Name:  Earl Sanchez  Date:  7/5/2017  Contact Type:  Telephone/ [facility] [phone]    SUBJECTIVE:     Patient Findings     Positives No Problem Findings           OBJECTIVE    INR   Date Value Ref Range Status   07/05/2017 1.9  Final       ASSESSMENT / PLAN  INR assessment THER    Recheck INR In: 1 WEEK    INR Location Outside lab      Anticoagulation Summary as of 7/5/2017     INR goal 2.0-3.0   Today's INR 1.9!   Maintenance plan 4 mg (2 mg x 2) on Mon, Fri; 2 mg (2 mg x 1) all other days   Full instructions 4 mg on Mon, Fri; 2 mg all other days   Weekly total 18 mg   Plan last modified Brandy Miller RN (6/21/2017)   Next INR check 7/12/2017   Priority INR   Target end date Indefinite    Indications   Long-term (current) use of anticoagulants [Z79.01] [Z79.01]  Atrial fibrillation (H) [I48.91]         Anticoagulation Episode Summary     INR check location     Preferred lab     Send INR reminders to [facility]    Comments TAKES WARFARIN IN THE MORNING        Anticoagulation Care Providers     Provider Role Specialty Phone number    Alvarado Florian MD [facility] [phone]            See the Encounter Report to view Anticoagulation Flowsheet and Dosing Calendar (Go to Encounters tab in chart review, and find the Anticoagulation Therapy Visit)    Dosage adjustment made based on physician directed care plan.    [facility] [phone] report INR of 1.9 today via fingerstick.  Advise to continue with 4 mg on Mon, Fri; 2 mg all other days and recheck in 1 week.      Ellen Negro RN

## 2017-07-05 NOTE — MR AVS SNAPSHOT
Earl Sanchez   7/5/2017   Anticoagulation Therapy Visit    Description:  89 year old male   Provider:  Alvarado Florian MD   Department:  Ec Nurse           INR as of 7/5/2017     Today's INR 1.9!      Anticoagulation Summary as of 7/5/2017     INR goal 2.0-3.0   Today's INR 1.9!   Full instructions 4 mg on Mon, Fri; 2 mg all other days   Next INR check 7/12/2017    Indications   Long-term (current) use of anticoagulants [Z79.01] [Z79.01]  Atrial fibrillation (H) [I48.91]         Description     [facility] [phone] report INR of 1.9 today via fingerstick.  Advise to continue with 4 mg on Mon, Fri; 2 mg all other days and recheck in 1 week.        Contact Numbers     Clinic Number:         July 2017 Details    Sun Mon Tue Wed Thu Fri Sat           1                 2               3               4               5      2 mg   See details      6      2 mg         7      4 mg         8      2 mg           9      2 mg         10      4 mg         11      2 mg         12            13               14               15                 16               17               18               19               20               21               22                 23               24               25               26               27               28               29                 30               31                     Date Details   07/05 This INR check       Date of next INR:  7/12/2017         How to take your warfarin dose     To take:  2 mg Take 1 of the 2 mg tablets.    To take:  4 mg Take 2 of the 2 mg tablets.

## 2017-07-07 ENCOUNTER — TELEPHONE (OUTPATIENT)
Dept: FAMILY MEDICINE | Facility: CLINIC | Age: 82
End: 2017-07-07

## 2017-07-07 NOTE — TELEPHONE ENCOUNTER
Pieter's caregiver Magdi is calling regarding med orders:  Rx'd furosemide (LASIX) 20 MG tablet  for edema in legs.  The edema is gone but Pieter is thinking he should continue a little while longer.    They do have a refill also.    Nursing home needs order to continue or d/c the Lasix.       Please call Magdi  [phone] with Dr Florian's advice  Polina Fernandez RN- Triage [employer]

## 2017-07-09 NOTE — TELEPHONE ENCOUNTER
Keep him on lasix and update me in 1 week with vital sign and wt change for us to consider 'off of lasix'   thx

## 2017-07-10 NOTE — TELEPHONE ENCOUNTER
Patient notified with information noted below from provider and agrees with plan.  Jinny Ellsworth RN  Triage [employer]

## 2017-07-12 ENCOUNTER — ANTICOAGULATION THERAPY VISIT (OUTPATIENT)
Dept: NURSING | Facility: CLINIC | Age: 82
End: 2017-07-12
Payer: COMMERCIAL

## 2017-07-12 ENCOUNTER — TELEPHONE (OUTPATIENT)
Dept: FAMILY MEDICINE | Facility: CLINIC | Age: 82
End: 2017-07-12

## 2017-07-12 DIAGNOSIS — Z79.01 LONG-TERM (CURRENT) USE OF ANTICOAGULANTS: ICD-10-CM

## 2017-07-12 DIAGNOSIS — I48.91 ATRIAL FIBRILLATION (H): ICD-10-CM

## 2017-07-12 LAB — INR PPP: 2

## 2017-07-12 PROCEDURE — 99207 ZZC NO CHARGE NURSE ONLY: CPT | Performed by: FAMILY MEDICINE

## 2017-07-12 NOTE — MR AVS SNAPSHOT
Earl Sanchez   7/12/2017   Anticoagulation Therapy Visit    Description:  89 year old male   Provider:  Alvarado Florian MD   Department:  Ec Nurse           INR as of 7/12/2017     Today's INR 2.0      Anticoagulation Summary as of 7/12/2017     INR goal 2.0-3.0   Today's INR 2.0   Full instructions 4 mg on Mon, Wed, Fri; 2 mg all other days   Next INR check 7/19/2017    Indications   Long-term (current) use of anticoagulants [Z79.01] [Z79.01]  Atrial fibrillation (H) [I48.91]         Description     [facility] [phone] report INR of 2.0 via finger stick with 18 mg in the past week.  Per chart review, patient has been on low end. Will increase to 20 mg weekly with instruction to take 4 mg on Mon, Wed, Fri; 2 mg all other days.  Recheck in 1 week.        Contact Numbers     Clinic Number:         July 2017 Details    Sun Mon Tue Wed Thu Fri Sat           1                 2               3               4               5               6               7               8                 9               10               11               12      4 mg   See details      13      2 mg         14      4 mg         15      2 mg           16      2 mg         17      4 mg         18      2 mg         19            20               21               22                 23               24               25               26               27               28               29                 30               31                     Date Details   07/12 This INR check       Date of next INR:  7/19/2017         How to take your warfarin dose     To take:  2 mg Take 1 of the 2 mg tablets.    To take:  4 mg Take 2 of the 2 mg tablets.

## 2017-07-12 NOTE — TELEPHONE ENCOUNTER
Gilberto from [facility] calling for discharge papers.  States faxed discharge papers yesterday and today.  States patient to be discharged home tomorrow.      Ph: [phone]  Fax orders to: [phone]    Please advise as appropriate,  Jinny Ellsworth RN  Triage [employer]

## 2017-07-12 NOTE — PROGRESS NOTES
ANTICOAGULATION FOLLOW-UP CLINIC VISIT    Patient Name:  Earl Sanchez  Date:  7/12/2017  Contact Type:  Telephone/ Gilberto, [facility] [phone]    SUBJECTIVE:     Patient Findings     Positives No Problem Findings           OBJECTIVE    INR   Date Value Ref Range Status   07/12/2017 2.0  Final       ASSESSMENT / PLAN  INR assessment THER    Recheck INR In: 1 WEEK    INR Location Outside lab      Anticoagulation Summary as of 7/12/2017     INR goal 2.0-3.0   Today's INR 2.0   Maintenance plan 4 mg (2 mg x 2) on Mon, Wed, Fri; 2 mg (2 mg x 1) all other days   Full instructions 4 mg on Mon, Wed, Fri; 2 mg all other days   Weekly total 20 mg   Plan last modified Ellen Negro RN (7/12/2017)   Next INR check 7/19/2017   Priority INR   Target end date Indefinite    Indications   Long-term (current) use of anticoagulants [Z79.01] [Z79.01]  Atrial fibrillation (H) [I48.91]         Anticoagulation Episode Summary     INR check location     Preferred lab     Send INR reminders to [facility]    Comments TAKES WARFARIN IN THE MORNING        Anticoagulation Care Providers     Provider Role Specialty Phone number    Alvarado Florian MD [facility] [phone]            See the Encounter Report to view Anticoagulation Flowsheet and Dosing Calendar (Go to Encounters tab in chart review, and find the Anticoagulation Therapy Visit)    Dosage adjustment made based on physician directed care plan.    [facility] [phone] report INR of 2.0 via finger stick with 18 mg in the past week.  Per chart review, patient has been on low end. Will increase to 20 mg weekly with instruction to take 4 mg on Mon, Wed, Fri; 2 mg all other days.  Recheck in 1 week.      Ellen Negro, KENDRICK

## 2017-07-14 ENCOUNTER — CARE COORDINATION (OUTPATIENT)
Dept: CARE COORDINATION | Facility: CLINIC | Age: 82
End: 2017-07-14

## 2017-07-14 ENCOUNTER — TELEPHONE (OUTPATIENT)
Dept: FAMILY MEDICINE | Facility: CLINIC | Age: 82
End: 2017-07-14
Payer: COMMERCIAL

## 2017-07-14 DIAGNOSIS — Z79.01 CHRONIC ANTICOAGULATION: ICD-10-CM

## 2017-07-14 DIAGNOSIS — Z53.9 ERRONEOUS ENCOUNTER--DISREGARD: Primary | ICD-10-CM

## 2017-07-14 DIAGNOSIS — I48.20 CHRONIC ATRIAL FIBRILLATION (H): ICD-10-CM

## 2017-07-14 NOTE — TELEPHONE ENCOUNTER
Jantoven  Last Written Prescription Date: No History of being prescribed  Last Fill Qty: , # refills:   Last Office Visit with G, P or [facility] prescribing provider: 6/14/17  Next 5 appointments (look out 90 days)     Jul 17, 2017 11:20 AM CDT   Office Visit with Alvarado Florian MD   [facility]    [address] [place] [id number] [phone]                   Date and Result of Last PT/INR:   Lab Results   Component Value Date    INR 2.0 07/12/2017    INR 1.9 07/05/2017

## 2017-07-14 NOTE — TELEPHONE ENCOUNTER
[employer] PAF Project printed and given to PCP or MA for completion at patient's appointment  Routing to Provider to sign order  TC to fax to [phone] to process once it has been signed  Yun OLIVER

## 2017-07-17 ENCOUNTER — TELEPHONE (OUTPATIENT)
Dept: FAMILY MEDICINE | Facility: CLINIC | Age: 82
End: 2017-07-17

## 2017-07-17 ENCOUNTER — ANTICOAGULATION THERAPY VISIT (OUTPATIENT)
Dept: NURSING | Facility: CLINIC | Age: 82
End: 2017-07-17
Payer: COMMERCIAL

## 2017-07-17 ENCOUNTER — OFFICE VISIT (OUTPATIENT)
Dept: FAMILY MEDICINE | Facility: CLINIC | Age: 82
End: 2017-07-17
Payer: COMMERCIAL

## 2017-07-17 VITALS
BODY MASS INDEX: 23.95 KG/M2 | HEIGHT: 68 IN | HEART RATE: 86 BPM | TEMPERATURE: 98.1 F | RESPIRATION RATE: 18 BRPM | DIASTOLIC BLOOD PRESSURE: 68 MMHG | OXYGEN SATURATION: 96 % | SYSTOLIC BLOOD PRESSURE: 111 MMHG | WEIGHT: 158 LBS

## 2017-07-17 DIAGNOSIS — Z23 NEED FOR PROPHYLACTIC VACCINATION AGAINST STREPTOCOCCUS PNEUMONIAE (PNEUMOCOCCUS): ICD-10-CM

## 2017-07-17 DIAGNOSIS — I48.91 ATRIAL FIBRILLATION (H): ICD-10-CM

## 2017-07-17 DIAGNOSIS — Z79.01 LONG-TERM (CURRENT) USE OF ANTICOAGULANTS: ICD-10-CM

## 2017-07-17 DIAGNOSIS — R53.81 PHYSICAL DECONDITIONING: Primary | ICD-10-CM

## 2017-07-17 LAB
ERYTHROCYTE [DISTWIDTH] IN BLOOD BY AUTOMATED COUNT: 14.6 % (ref 10–15)
HCT VFR BLD AUTO: 36.9 % (ref 40–53)
HGB BLD-MCNC: 11.7 G/DL (ref 13.3–17.7)
INR POINT OF CARE: 1.5 (ref 0.86–1.14)
MCH RBC QN AUTO: 31.1 PG (ref 26.5–33)
MCHC RBC AUTO-ENTMCNC: 31.7 G/DL (ref 31.5–36.5)
MCV RBC AUTO: 98 FL (ref 78–100)
PLATELET # BLD AUTO: 197 10E9/L (ref 150–450)
RBC # BLD AUTO: 3.76 10E12/L (ref 4.4–5.9)
WBC # BLD AUTO: 6.6 10E9/L (ref 4–11)

## 2017-07-17 PROCEDURE — 99207 ZZC NO CHARGE NURSE ONLY: CPT

## 2017-07-17 PROCEDURE — 80053 COMPREHEN METABOLIC PANEL: CPT | Performed by: FAMILY MEDICINE

## 2017-07-17 PROCEDURE — 85027 COMPLETE CBC AUTOMATED: CPT | Performed by: FAMILY MEDICINE

## 2017-07-17 PROCEDURE — 85610 PROTHROMBIN TIME: CPT | Mod: QW

## 2017-07-17 PROCEDURE — 36416 COLLJ CAPILLARY BLOOD SPEC: CPT

## 2017-07-17 PROCEDURE — 99213 OFFICE O/P EST LOW 20 MIN: CPT | Performed by: FAMILY MEDICINE

## 2017-07-17 RX ORDER — WARFARIN SODIUM 2 MG/1
TABLET ORAL
Qty: 135 TABLET | Refills: 0 | Status: SHIPPED | OUTPATIENT
Start: 2017-07-17 | End: 2017-12-18

## 2017-07-17 NOTE — TELEPHONE ENCOUNTER
[facility] calling for verbal orders  Ok to leave on secure  [phone]    RN eval and treat - 1 visit to  Assess Medication compliance  OT eval and treat for  Bathing assessment/Cognitive  PT Gait training exercises 1 wk 1 and 2 wks 2    Yun OLIVER

## 2017-07-17 NOTE — PROGRESS NOTES
SUBJECTIVE:                                                    Earl Sanchez is a 89 year old male who presents to clinic today for the following health issues:          Hospital Follow-up Visit:    Hospital/Nursing Home/ Rehab Facility: [facility]  Date of Admission: 6-  Date of Discharge: 6-  Reason(s) for Admission: physical deconditioning             Problems taking medications regularly:  None       Medication changes since discharge: Stopped Remeron       Problems adhering to non-medication therapy:  None    Summary of hospitalization:  [facility] discharge summary reviewed  Diagnostic Tests/Treatments reviewed.  Follow up needed: none  Other Healthcare Providers Involved in Patient s Care:         Homecare  Update since discharge: improved.     Post Discharge Medication Reconciliation: discharge medications reconciled, continue medications without change.  Plan of care communicated with patient     Coding guidelines for this visit:  Type of Medical   Decision Making Face-to-Face Visit       within 7 Days of discharge Face-to-Face Visit        within 14 days of discharge   Moderate Complexity [id number]   High Complexity [id number]          Problem list and histories reviewed & adjusted, as indicated.  Additional history: as documented    Patient Active Problem List   Diagnosis     Hypertension goal BP (blood pressure) < 140/90     CAD (coronary artery disease)     Osteoarthritis     Gait apraxia     Atrial fibrillation (H)     BPH (benign prostatic hyperplasia)     Heart failure (H)     Hyperlipidemia with target LDL less than 100     CKD (chronic kidney disease) stage 3, GFR 30-59 ml/min     Microalbuminuria     Anemia     Hypothyroidism     Elevated alkaline phosphatase level     Advance care planning     Health Care Home     Chronic anticoagulation     Elevated fasting glucose     Toe inflammation     Elevated uric acid in blood     Gastritis     Abdominal aortic  aneurysm (H)     Advanced directives, counseling/discussion     Long-term (current) use of anticoagulants [Z79.01]     Hereditary multiple exostosis     General weakness     Tear of meniscus of left knee     Weakness generalized     Past Surgical History:   Procedure Laterality Date     ANKLE SURGERY      ORIF ankle fracture     CORONARY ARTERY BYPASS  2007    CAB X 5 CABG with LIMA to LAD and saphenous vein grafts to, OM2,SVG to diag 1, and sequential PDA     HEART CATH LEFT HEART CATH  12/10/07     HERNIA REPAIR, INGUINAL RT/LT      right     PHACOEMULSIFICATION CLEAR CORNEA WITH TORIC INTRAOCULAR LENS IMPLANT  4/10/2014    Procedure: PHACOEMULSIFICATION CLEAR CORNEA WITH TORIC INTRAOCULAR LENS IMPLANT;  RIGHT PHACOEMULSIFICATION CLEAR CORNEA WITH TORIC INTRAOCULAR LENS IMPLANT ;  Surgeon: Carlo Tee MD;  Location: [facility]     PHACOEMULSIFICATION CLEAR CORNEA WITH TORIC INTRAOCULAR LENS IMPLANT  4/22/2014    Procedure: PHACOEMULSIFICATION CLEAR CORNEA WITH TORIC INTRAOCULAR LENS IMPLANT;  Surgeon: Carlo Tee MD;  Location: [facility]       Social History   Substance Use Topics     Smoking status: Never Smoker     Smokeless tobacco: Never Used     Alcohol use Yes      Comment: occ - one glass of wine last night     Family History   Problem Relation Age of Onset     Hypertension Mother      Hypertension Maternal Grandmother          Current Outpatient Prescriptions   Medication Sig Dispense Refill     furosemide (LASIX) 20 MG tablet Take 1 tablet (20 mg) by mouth daily 30 tablet 1     warfarin (COUMADIN) 2 MG tablet Takes 4 mg on Mon, Fri;  2 mg all other days or as directed by [facility] nurses 135 tablet 0     levothyroxine (SYNTHROID/LEVOTHROID) 50 MCG tablet Take 1 tablet (50 mcg) by mouth daily 90 tablet 1     pravastatin (PRAVACHOL) 40 MG tablet Take 1 tablet (40 mg) by mouth daily (Patient taking differently: Take 40 mg by mouth At Bedtime ) 90 tablet 0     lisinopril (PRINIVIL/ZESTRIL) 10 MG  tablet Take 0.5 tablets (5 mg) by mouth daily 45 tablet 1     doxazosin (CARDURA) 4 MG tablet Take 1 tablet (4 mg) by mouth At Bedtime 90 tablet 3     acetaminophen (TYLENOL) 500 MG tablet Take 1,000 mg by mouth every 6 hours as needed for mild pain        nitroglycerin (NITROSTAT) 0.4 MG SL tablet Place 1 tablet (0.4 mg) under the tongue every 5 minutes as needed for chest pain 25 tablet 0     aspirin (ASPIRIN LOW DOSE) 81 MG tablet Take 81 mg by mouth daily        mirtazapine (REMERON) 30 MG tablet Take 1 tablet (30 mg) by mouth At Bedtime (Patient not taking: Reported on 7/17/2017) 90 tablet 1     allopurinol (ZYLOPRIM) 100 MG tablet Take 2 tablets (200 mg) by mouth 3 times daily as needed (Patient not taking: Reported on 7/17/2017) 360 tablet 1     allopurinol (ZYLOPRIM) 100 MG tablet Take 1 tablet (100 mg) by mouth 2 times daily (Patient not taking: Reported on 7/17/2017) 90 tablet 1     No Known Allergies  Recent Labs   Lab Test 06/19/17 06/16/17   0450  06/10/17   1201   03/22/16   1406  06/11/15   1039   04/16/15   1039  02/05/15   1344   04/08/14   1052  02/03/14   0917   03/12/13   1018   08/28/12   0911   A1C   --    --    --    --    --    --    --    --   5.9   --   6.2*   --    --   5.6   --    --    LDL   --    --    --    --   43   --    --   47   --    --    --   61   --    --    --   65   HDL   --    --    --    --   51   --    --   55   --    --    --   54   --    --    --   35*   TRIG   --    --    --    --   135   --    --   89   --    --    --   71   --    --    --   88   ALT   --    --    --    --    --   17   --    --    --    --    --   18   --    --    --   21   CR   --   1.15  1.11   < >   --   1.62*   --    --   1.60*   < >   --    --    < >  1.68*   < >  1.37*   GFRESTIMATED   --   60*  62   < >   --   40*   --    --   41*   < >   --    --    < >  39*   < >  49*   GFRESTBLACK   --   72  75   < >   --   49*   --    --   50*   < >   --    --    < >  47*   < >  60*   POTASSIUM   --   3.7   "4.2   < >   --   3.9   --    --   4.3   < >  4.1   --    < >  4.2   < >  4.2   TSH  3.23  2.79   --    < >   --    --    < >   --    --    < >  4.74   --    --   2.71   < >  9.78*    < > = values in this interval not displayed.      BP Readings from Last 3 Encounters:   07/17/17 111/68   06/16/17 136/77   06/14/17 136/86    Wt Readings from Last 3 Encounters:   07/17/17 158 lb (71.7 kg)   06/16/17 160 lb (72.6 kg)   04/04/17 157 lb (71.2 kg)                    Reviewed and updated as needed this visit by clinical staff       Reviewed and updated as needed this visit by Provider         ROS:  Constitutional, HEENT, cardiovascular, pulmonary, gi and gu systems are negative, except as otherwise noted.    OBJECTIVE:     /68 (Cuff Size: Adult Regular)  Pulse 86  Temp 98.1  F (36.7  C) (Tympanic)  Resp 18  Ht 5' 8\" (1.727 m)  Wt 158 lb (71.7 kg)  SpO2 96%  BMI 24.02 kg/m2  Body mass index is 24.02 kg/(m^2).  GENERAL: healthy, alert and no distress  NECK: no adenopathy, no asymmetry, masses, or scars and thyroid normal to palpation  RESP: lungs clear to auscultation - no rales, rhonchi or wheezes  CV: regular rate and rhythm, normal S1 S2, no S3 or S4, no murmur, click or rub, no peripheral edema and peripheral pulses strong  ABDOMEN: soft, nontender, no hepatosplenomegaly, no masses and bowel sounds normal  MS: no gross musculoskeletal defects noted, no edema        ASSESSMENT/PLAN:   Earl was seen today for hospital f/u.    Diagnoses and all orders for this visit:    Physical deconditioning  -     CBC with platelets  -     Comprehensive metabolic panel (BMP + Alb, Alk Phos, ALT, AST, Total. Bili, TP)    Need for prophylactic vaccination against Streptococcus pneumoniae (pneumococcus)    Other orders  -     Cancel: ALT  -     Cancel: HEMOGLOBIN A1C  -     Cancel: Lipid panel reflex to direct LDL  -     Cancel: Albumin Random Urine Quantitative      Has been improving, intake and output stable  Encouraged " him to keep maintaining fluid intake   Encouraged him to keep doing home care with RN/PT visit      RTC in 2 months for routine health check     Alvarado Florian MD  [facility]

## 2017-07-17 NOTE — TELEPHONE ENCOUNTER
rx for jantoven was sent to  pharmacy.  Patient states that he lives at [facility] in  and is not able to drive to  the rx.   Left message for the nurses at [facility] to call clinic to see if we can send the Rx for jantoven to the pharmacy that will deliver to facility  Awaiting call back from [facility]    Ellen Negro RN

## 2017-07-17 NOTE — MR AVS SNAPSHOT
"              After Visit Summary   7/17/2017    Earl Sanchez    MRN: [id number]           Patient Information     Date Of Birth          9/17/1926        Visit Information        Provider Department      7/17/2017 11:20 AM Alvarado Florian MD [facility] [place]        Today's Diagnoses     Physical deconditioning    -  1    Need for prophylactic vaccination against Streptococcus pneumoniae (pneumococcus)           Follow-ups after your visit        Who to contact     If you have questions or need follow up information about today's clinic visit or your schedule please contact [facility] [place] directly at [phone].  Normal or non-critical lab and imaging results will be communicated to you by Bolocohart, letter or phone within 4 business days after the clinic has received the results. If you do not hear from us within 7 days, please contact the clinic through Bolocohart or phone. If you have a critical or abnormal lab result, we will notify you by phone as soon as possible.  Submit refill requests through Delectable or call your pharmacy and they will forward the refill request to us. Please allow 3 business days for your refill to be completed.          Additional Information About Your Visit        MyChart Information     Delectable gives you secure access to your electronic health record. If you see a primary care provider, you can also send messages to your care team and make appointments. If you have questions, please call your primary care clinic.  If you do not have a primary care provider, please call [phone] and they will assist you.        Care EveryWhere ID     This is your Care EveryWhere ID. This could be used by other organizations to access your [place] medical records  [id number]        Your Vitals Were     Pulse Temperature Respirations Height Pulse Oximetry BMI (Body Mass Index)    86 98.1  F (36.7  C) (Tympanic) 18 5' 8\" (1.727 m) 96% 24.02 kg/m2       Blood Pressure " from Last 3 Encounters:   07/17/17 111/68   06/16/17 136/77   06/14/17 136/86    Weight from Last 3 Encounters:   07/17/17 158 lb (71.7 kg)   06/16/17 160 lb (72.6 kg)   04/04/17 157 lb (71.2 kg)              We Performed the Following     CBC with platelets     Comprehensive metabolic panel (BMP + Alb, Alk Phos, ALT, AST, Total. Bili, TP)          Today's Medication Changes          These changes are accurate as of: 7/17/17 11:43 AM.  If you have any questions, ask your nurse or doctor.               These medicines have changed or have updated prescriptions.        Dose/Directions    pravastatin 40 MG tablet   Commonly known as:  PRAVACHOL   This may have changed:  when to take this   Used for:  Hyperlipidemia with target LDL less than 100        Dose:  40 mg   Take 1 tablet (40 mg) by mouth daily   Quantity:  90 tablet   Refills:  0                Primary Care Provider Office Phone # Fax #    Alvarado NAHUM Florian [phone]       [place] [address]        Equal Access to Services     MARIANNE SINGH AH: Hadii ana m Rees, wapreetida jaye, qaybta kajes gibson, araceli littlejohn. So [facility] [phone].    ATENCIÓN: Si habla español, tiene a stephen disposición servicios gratuitos de asistencia lingüística. [facility] [phone].    We comply with applicable federal civil rights laws and [place] laws. We do not discriminate on the basis of race, color, national origin, age, disability sex, sexual orientation or gender identity.            Thank you!     Thank you for choosing [facility]  [place]. Our goal is always to provide you with excellent care. Hearing back from our patients is one way we can continue to improve our services. Please take a few minutes to complete the written survey that you may receive in the mail after your visit with us. Thank you!             Your Updated Medication List -  Protect others around you: Learn how to safely use, store and throw away your medicines at www.disposemymeds.org.          This list is accurate as of: 7/17/17 11:43 AM.  Always use your most recent med list.                   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, unspecified laterality       ASPIRIN LOW DOSE 81 MG tablet   Generic drug:  aspirin      Take 81 mg by mouth daily        doxazosin 4 MG tablet    CARDURA    90 tablet    Take 1 tablet (4 mg) by mouth At Bedtime    Benign non-nodular prostatic hyperplasia without lower urinary tract symptoms       furosemide 20 MG tablet    LASIX    30 tablet    Take 1 tablet (20 mg) by mouth daily    Fluid retention       levothyroxine 50 MCG tablet    SYNTHROID/LEVOTHROID    90 tablet    Take 1 tablet (50 mcg) by mouth daily    Hypothyroidism, unspecified type       lisinopril 10 MG tablet    PRINIVIL/ZESTRIL    45 tablet    Take 0.5 tablets (5 mg) by mouth daily    CKD (chronic kidney disease) stage 3, GFR 30-59 ml/min       mirtazapine 30 MG tablet    REMERON    90 tablet    Take 1 tablet (30 mg) by mouth At Bedtime    Poor appetite       nitroGLYcerin 0.4 MG sublingual tablet    NITROSTAT    25 tablet    Place 1 tablet (0.4 mg) under the tongue every 5 minutes as needed for chest pain    CAD (coronary artery disease)       pravastatin 40 MG tablet    PRAVACHOL    90 tablet    Take 1 tablet (40 mg) by mouth daily    Hyperlipidemia with target LDL less than 100       warfarin 2 MG tablet    COUMADIN    135 tablet    Takes 4 mg on Mon, Fri;  2  mg all other days or as directed by [facility] nurses    Chronic anticoagulation, Chronic atrial fibrillation (H)       * Notice:  This list has 2 medication(s) that are the same as other medications prescribed for you. Read the directions carefully, and ask your doctor or other care provider to review them with you.

## 2017-07-17 NOTE — PROGRESS NOTES
ANTICOAGULATION FOLLOW-UP CLINIC VISIT    Patient Name:  Earl Sanchez  Date:  7/17/2017  Contact Type:  Face to Face    SUBJECTIVE:     Patient Findings     Positives Missed doses    Comments Patient was in rehab and states did not get doses right away due to orders not being in the system.           OBJECTIVE    INR Protime   Date Value Ref Range Status   07/17/2017 1.5 (A) 0.86 - 1.14 Final       ASSESSMENT / PLAN  INR assessment SUB    Recheck INR In: 1 WEEK    INR Location Clinic      Anticoagulation Summary as of 7/17/2017     INR goal 2.0-3.0   Today's INR 1.5!   Maintenance plan 4 mg (2 mg x 2) on Mon, Wed, Fri; 2 mg (2 mg x 1) all other days   Full instructions 7/17: 6 mg; Otherwise 4 mg on Mon, Wed, Fri; 2 mg all other days   Weekly total 20 mg   Plan last modified Ellen Negro RN (7/12/2017)   Next INR check 7/24/2017   Priority INR   Target end date Indefinite    Indications   Long-term (current) use of anticoagulants [Z79.01] [Z79.01]  Atrial fibrillation (H) [I48.91]         Anticoagulation Episode Summary     INR check location     Preferred lab     Send INR reminders to [facility]    Comments TAKES WARFARIN IN THE MORNING        Anticoagulation Care Providers     Provider Role Specialty Phone number    Alvarado [facility] [phone]            See the Encounter Report to view Anticoagulation Flowsheet and Dosing Calendar (Go to Encounters tab in chart review, and find the Anticoagulation Therapy Visit)    Patient states that he was recently discharged from rehab and states he feels there was a lag in getting his coumadin and feels possibly missed a dose or 2.  Will add 2 mg today and follow up in 1 week.    Jinny Gama RN

## 2017-07-17 NOTE — TELEPHONE ENCOUNTER
Spoke with BRITTANY Shelton     RN visit - one time for eval and treat, assess medication compliance  OT visit - one time for eval and treat - bathing assessment/cognitive.    PT visit - 1 time per week for one week.   2 times per week for 2 weeks.     Verbal order given to fax for signature.       Ellen Negro RN

## 2017-07-17 NOTE — NURSING NOTE
"Chief Complaint   Patient presents with     Hospital F/U       Initial /68 (Cuff Size: Adult Regular)  Pulse 86  Temp 98.1  F (36.7  C) (Tympanic)  Resp 18  Ht 5' 8\" (1.727 m)  Wt 158 lb (71.7 kg)  SpO2 96%  BMI 24.02 kg/m2 Estimated body mass index is 24.02 kg/(m^2) as calculated from the following:    Height as of this encounter: 5' 8\" (1.727 m).    Weight as of this encounter: 158 lb (71.7 kg).  Medication Reconciliation: complete   Melanie Delcid, CMA    "

## 2017-07-17 NOTE — MR AVS SNAPSHOT
Earl Sanchez   7/17/2017 11:45 AM   Anticoagulation Therapy Visit    Description:  89 year old male   Provider:  [facility]   Department:  [facility] Nurse           INR as of 7/17/2017     Today's INR 1.5!      Anticoagulation Summary as of 7/17/2017     INR goal 2.0-3.0   Today's INR 1.5!   Full instructions 7/17: 6 mg; Otherwise 4 mg on Mon, Wed, Fri; 2 mg all other days   Next INR check 7/24/2017    Indications   Long-term (current) use of anticoagulants [Z79.01] [Z79.01]  Atrial fibrillation (H) [I48.91]         Contact Numbers     Clinic Number:         July 2017 Details    Sun Mon Tue Wed Thu Fri Sat           1                 2               3               4               5               6               7               8                 9               10               11               12               13               14               15                 16               17      6 mg   See details      18      2 mg         19      4 mg         20      2 mg         21      4 mg         22      2 mg           23      2 mg         24            25               26               27               28               29                 30               31                     Date Details   07/17 This INR check       Date of next INR:  7/24/2017         How to take your warfarin dose     To take:  2 mg Take 1 of the 2 mg tablets.    To take:  4 mg Take 2 of the 2 mg tablets.    To take:  6 mg Take 3 of the 2 mg tablets.

## 2017-07-18 ENCOUNTER — CARE COORDINATION (OUTPATIENT)
Dept: CARE COORDINATION | Facility: CLINIC | Age: 82
End: 2017-07-18

## 2017-07-18 LAB
ALBUMIN SERPL-MCNC: 3.7 G/DL (ref 3.4–5)
ALP SERPL-CCNC: 109 U/L (ref 40–150)
ALT SERPL W P-5'-P-CCNC: 13 U/L (ref 0–70)
ANION GAP SERPL CALCULATED.3IONS-SCNC: 5 MMOL/L (ref 3–14)
AST SERPL W P-5'-P-CCNC: 16 U/L (ref 0–45)
BILIRUB SERPL-MCNC: 0.7 MG/DL (ref 0.2–1.3)
BUN SERPL-MCNC: 24 MG/DL (ref 7–30)
CALCIUM SERPL-MCNC: 8.7 MG/DL (ref 8.5–10.1)
CHLORIDE SERPL-SCNC: 109 MMOL/L (ref 94–109)
CO2 SERPL-SCNC: 28 MMOL/L (ref 20–32)
CREAT SERPL-MCNC: 1.29 MG/DL (ref 0.66–1.25)
GFR SERPL CREATININE-BSD FRML MDRD: 52 ML/MIN/1.7M2
GLUCOSE SERPL-MCNC: 94 MG/DL (ref 70–99)
POTASSIUM SERPL-SCNC: 4.3 MMOL/L (ref 3.4–5.3)
PROT SERPL-MCNC: 7.8 G/DL (ref 6.8–8.8)
SODIUM SERPL-SCNC: 142 MMOL/L (ref 133–144)

## 2017-07-18 NOTE — PROGRESS NOTES
Clinic Care Coordination Contact  OUTREACH    Referral Information:  Referral Source: IP/TCU Report  Reason for Contact: post discharge        Universal Utilization:   ED Visits in last year: 3  Hospital visits in last year: 1  Last PCP appointment: 07/17/17  Missed Appointments:  (n/a)  Concerns: None at this time  Multiple Providers or Specialists: yes    Clinical Concerns:  RN CC outreach to patient follow discharge for status update.   Patient states that he did attend visit yesturday with PCP  '  He states that PT came to house today and he anticipates that they will be coming out 1-2 times per week    Current Medical Concerns:   Patient Active Problem List   Diagnosis     Hypertension goal BP (blood pressure) < 140/90     CAD (coronary artery disease)     Osteoarthritis     Gait apraxia     Atrial fibrillation (H)     BPH (benign prostatic hyperplasia)     Heart failure (H)     Hyperlipidemia with target LDL less than 100     CKD (chronic kidney disease) stage 3, GFR 30-59 ml/min     Microalbuminuria     Anemia     Hypothyroidism     Elevated alkaline phosphatase level     Advance care planning     Health Care Home     Chronic anticoagulation     Elevated fasting glucose     Toe inflammation     Elevated uric acid in blood     Gastritis     Abdominal aortic aneurysm (H)     Advanced directives, counseling/discussion     Long-term (current) use of anticoagulants [Z79.01]     Hereditary multiple exostosis     General weakness     Tear of meniscus of left knee     Weakness generalized      Medication Management:  Patient has understanding of regimen and is adherent:  Yes      Functional Status:  Mobility Status: Independent w/Device  Equipment Currently Used at Home: grab bar, walker, rolling, other (see comments)  Transportation: Patient has family member who are able to bring to appointment           Psychosocial:  Current living arrangement:: I live alone, I live in assisted living  Financial/Insurance:  are       Resources and Interventions:  Current Resources: Transitional Care;Patient enrolled in home PT   Advanced Care Plans/Directives on file:: Yes     Patient/Caregiver understanding: Care coordination information given. Patient verbalized understanding  Frequency of Care Coordination: As needed  Upcoming appointment: 08/18/17     Plan:   Patient will continue with home care PT  Patient will attend upcoming visit with [facility] on 8-15  RN CC will outreach in 2-3 weeks, sooner if needed.   Contact information given    Amina Page RN  Clinic Care Coordinator  Mobile: [phone]  [email or web address]

## 2017-07-19 NOTE — TELEPHONE ENCOUNTER
Spoke with patient who states that he got his Jantoven and has been taking it.      Ellen Negro RN

## 2017-07-21 ENCOUNTER — TELEPHONE (OUTPATIENT)
Dept: FAMILY MEDICINE | Facility: CLINIC | Age: 82
End: 2017-07-21

## 2017-07-21 NOTE — TELEPHONE ENCOUNTER
Patient was seen by RN today for evaluation.   Verbal order given for RN visits once a week for 3 weeks with 3 PRN visits for medication management.  Brandy Miller RN

## 2017-07-24 ENCOUNTER — ANTICOAGULATION THERAPY VISIT (OUTPATIENT)
Dept: FAMILY MEDICINE | Facility: CLINIC | Age: 82
End: 2017-07-24
Payer: COMMERCIAL

## 2017-07-24 ENCOUNTER — TELEPHONE (OUTPATIENT)
Dept: FAMILY MEDICINE | Facility: CLINIC | Age: 82
End: 2017-07-24

## 2017-07-24 DIAGNOSIS — I48.91 ATRIAL FIBRILLATION (H): ICD-10-CM

## 2017-07-24 DIAGNOSIS — Z79.01 LONG-TERM (CURRENT) USE OF ANTICOAGULANTS: ICD-10-CM

## 2017-07-24 LAB — INR PPP: 1.8

## 2017-07-24 PROCEDURE — 99207 ZZC NO CHARGE NURSE ONLY: CPT | Performed by: FAMILY MEDICINE

## 2017-07-24 NOTE — MR AVS SNAPSHOT
Earl Sanchez   7/24/2017   Anticoagulation Therapy Visit    Description:  89 year old male   Provider:  Alvarado Florian MD   Department:  [facility] Fp/Im/Peds           INR as of 7/24/2017     Today's INR 1.8!      Anticoagulation Summary as of 7/24/2017     INR goal 2.0-3.0   Today's INR 1.8!   Full instructions 2 mg on Tue, Thu, Sat; 4 mg all other days   Next INR check 7/31/2017    Indications   Long-term (current) use of anticoagulants [Z79.01] [Z79.01]  Atrial fibrillation (H) [I48.91]         Description     Zaira [id number] saw patient today and INR was also taken.  Report INR of 1.8 today.  Denies changes/problem.  Advised to take 2 mg on Tue, Thu, Sat,  4mg all other days = 22 mg weekly.  Recheck in 1 week during home care visit.        July 2017 Details    Sun Mon Tue Wed Thu Fri Sat           1                 2               3               4               5               6               7               8                 9               10               11               12               13               14               15                 16               17               18               19               20               21               22                 23               24      4 mg   See details      25      2 mg         26      4 mg         27      2 mg         28      4 mg         29      2 mg           30      4 mg         31                  Date Details   07/24 This INR check       Date of next INR:  7/31/2017         How to take your warfarin dose     To take:  2 mg Take 1 of the 2 mg tablets.    To take:  4 mg Take 2 of the 2 mg tablets.

## 2017-07-24 NOTE — TELEPHONE ENCOUNTER
Verbal order given for home OT twice a week for 2 weeks for walker safety and ADL's.    Will fax for signature.  Brandy Miller RN

## 2017-07-24 NOTE — PROGRESS NOTES
ANTICOAGULATION FOLLOW-UP CLINIC VISIT    Patient Name:  Earl Sanchez  Date:  7/24/2017  Contact Type:  Telephone/ BRITTANY Meneses [phone]    SUBJECTIVE:     Patient Findings     Positives No Problem Findings           OBJECTIVE    INR   Date Value Ref Range Status   07/24/2017 1.8  Final       ASSESSMENT / PLAN  INR assessment SUB    Recheck INR In: 1 WEEK    INR Location Homecare INR      Anticoagulation Summary as of 7/24/2017     INR goal 2.0-3.0   Today's INR 1.8!   Maintenance plan 2 mg (2 mg x 1) on Tue, Thu, Sat; 4 mg (2 mg x 2) all other days   Full instructions 2 mg on Tue, Thu, Sat; 4 mg all other days   Weekly total 22 mg   Plan last modified Ellen Negro RN (7/24/2017)   Next INR check 7/31/2017   Priority INR   Target end date Indefinite    Indications   Long-term (current) use of anticoagulants [Z79.01] [Z79.01]  Atrial fibrillation (H) [I48.91]         Anticoagulation Episode Summary     INR check location     Preferred lab     Send INR reminders to [facility]    Comments TAKES WARFARIN IN THE MORNING        Anticoagulation Care Providers     Provider Role Specialty Phone number    Alvarado Florian MD Responsible Family Practice [phone]            See the Encounter Report to view Anticoagulation Flowsheet and Dosing Calendar (Go to Encounters tab in chart review, and find the Anticoagulation Therapy Visit)    Dosage adjustment made based on physician directed care plan.    Ziara [id number] saw patient today and INR was also taken.  Report INR of 1.8 today.  Denies changes/problem.  Advised to take 2 mg on Tue, Thu, Sat,  4mg all other days = 22 mg weekly.  Recheck in 1 week during home care visit.      Ellen Negro, RN

## 2017-07-25 DIAGNOSIS — Z53.9 DIAGNOSIS NOT YET DEFINED: Primary | ICD-10-CM

## 2017-07-25 PROCEDURE — G0180 MD CERTIFICATION HHA PATIENT: HCPCS | Performed by: FAMILY MEDICINE

## 2017-07-27 ENCOUNTER — TELEPHONE (OUTPATIENT)
Dept: FAMILY MEDICINE | Facility: CLINIC | Age: 82
End: 2017-07-27

## 2017-07-27 NOTE — TELEPHONE ENCOUNTER
[facility]  HC PT calling for orders. PT 2x/ week x 2 weeks. Verbal given per [facility] protocol.   Minal Daley RN   [facility] - Triage

## 2017-07-31 ENCOUNTER — ANTICOAGULATION THERAPY VISIT (OUTPATIENT)
Dept: FAMILY MEDICINE | Facility: CLINIC | Age: 82
End: 2017-07-31
Payer: COMMERCIAL

## 2017-07-31 DIAGNOSIS — Z79.01 LONG-TERM (CURRENT) USE OF ANTICOAGULANTS: ICD-10-CM

## 2017-07-31 DIAGNOSIS — I48.91 ATRIAL FIBRILLATION (H): ICD-10-CM

## 2017-07-31 LAB — INR PPP: 1.9

## 2017-07-31 PROCEDURE — 99207 ZZC NO CHARGE NURSE ONLY: CPT | Performed by: FAMILY MEDICINE

## 2017-07-31 NOTE — MR AVS SNAPSHOT
Earl Sanchez   7/31/2017   Anticoagulation Therapy Visit    Description:  89 year old male   Provider:  Alvarado Florian MD   Department:  Ec Fp/Im/Peds           INR as of 7/31/2017     Today's INR 1.9!      Anticoagulation Summary as of 7/31/2017     INR goal 2.0-3.0   Today's INR 1.9!   Full instructions 2 mg on Tue, Sat; 4 mg all other days   Next INR check 8/7/2017    Indications   Long-term (current) use of anticoagulants [Z79.01] [Z79.01]  Atrial fibrillation (H) [I48.91]         July 2017 Details    Sun Mon Tue Wed Thu Fri Sat           1                 2               3               4               5               6               7               8                 9               10               11               12               13               14               15                 16               17               18               19               20               21               22                 23               24               25               26               27               28               29                 30               31      4 mg   See details            Date Details   07/31 This INR check               How to take your warfarin dose     To take:  4 mg Take 2 of the 2 mg tablets.           August 2017 Details    Sun Mon Tue Wed Thu Fri Sat       1      2 mg         2      4 mg         3      4 mg         4      4 mg         5      2 mg           6      4 mg         7            8               9               10               11               12                 13               14               15               16               17               18               19                 20               21               22               23               24               25               26                 27               28               29               30               31                  Date Details   No additional details    Date of next INR:  8/7/2017         How to take your  warfarin dose     To take:  2 mg Take 1 of the 2 mg tablets.    To take:  4 mg Take 2 of the 2 mg tablets.

## 2017-07-31 NOTE — PROGRESS NOTES
ANTICOAGULATION FOLLOW-UP CLINIC VISIT    Patient Name:  Earl Sanchez  Date:  7/31/2017  Contact Type:  Telephone/ Saira MARKS, [facility] [phone]    SUBJECTIVE:     Patient Findings     Positives No Problem Findings ( RN with [facility] reports that patient is eating well. No medication changes.)    Comments Saira           OBJECTIVE    INR   Date Value Ref Range Status   07/31/2017 1.9  Final       ASSESSMENT / PLAN  INR assessment THER    Recheck INR In: 1 WEEK    INR Location [facility] INR      Anticoagulation Summary as of 7/31/2017     INR goal 2.0-3.0   Today's INR 1.9!   Maintenance plan 2 mg (2 mg x 1) on Tue, Sat; 4 mg (2 mg x 2) all other days   Full instructions 2 mg on Tue, Sat; 4 mg all other days   Weekly total 24 mg   Plan last modified Brandy Miller, RN (7/31/2017)   Next INR check 8/7/2017   Priority INR   Target end date Indefinite    Indications   Long-term (current) use of anticoagulants [Z79.01] [Z79.01]  Atrial fibrillation (H) [I48.91]         Anticoagulation Episode Summary     INR check location     Preferred lab     Send INR reminders to  [facility]    Comments TAKES WARFARIN IN THE MORNING        Anticoagulation Care Providers     Provider Role Specialty Phone number    Alvarado Florian MD [facility] [phone]            See the Encounter Report to view Anticoagulation Flowsheet and Dosing Calendar (Go to Encounters tab in chart review, and find the Anticoagulation Therapy Visit)    Dosage adjustment made based on physician directed care plan. 2 mg TSa, 4 ROW will = 24 for 7 day total.    Brandy Miller, RN

## 2017-08-07 ENCOUNTER — ANTICOAGULATION THERAPY VISIT (OUTPATIENT)
Dept: FAMILY MEDICINE | Facility: CLINIC | Age: 82
End: 2017-08-07
Payer: COMMERCIAL

## 2017-08-07 ENCOUNTER — TELEPHONE (OUTPATIENT)
Dept: FAMILY MEDICINE | Facility: CLINIC | Age: 82
End: 2017-08-07

## 2017-08-07 DIAGNOSIS — I48.91 ATRIAL FIBRILLATION (H): ICD-10-CM

## 2017-08-07 DIAGNOSIS — Z79.01 LONG-TERM (CURRENT) USE OF ANTICOAGULANTS: ICD-10-CM

## 2017-08-07 LAB — INR PPP: 2.1

## 2017-08-07 PROCEDURE — 99207 ZZC NO CHARGE NURSE ONLY: CPT | Performed by: FAMILY MEDICINE

## 2017-08-07 NOTE — MR AVS SNAPSHOT
Earl Sanchez   8/7/2017   Anticoagulation Therapy Visit    Description:  89 year old male   Provider:  Alvarado Florian MD   Department:  Ec Fp/Im/Peds           INR as of 8/7/2017     Today's INR 2.1      Anticoagulation Summary as of 8/7/2017     INR goal 2.0-3.0   Today's INR 2.1   Full instructions 2 mg on Tue, Sat; 4 mg all other days   Next INR check 8/14/2017    Indications   Long-term (current) use of anticoagulants [Z79.01] [Z79.01]  Atrial fibrillation (H) [I48.91]         August 2017 Details    Sun Mon Tue Wed Thu Fri Sat       1               2               3               4               5                 6               7      4 mg   See details      8      2 mg         9      4 mg         10      4 mg         11      4 mg         12      2 mg           13      4 mg         14            15               16               17               18               19                 20               21               22               23               24               25               26                 27               28               29               30               31                  Date Details   08/07 This INR check       Date of next INR:  8/14/2017         How to take your warfarin dose     To take:  2 mg Take 1 of the 2 mg tablets.    To take:  4 mg Take 2 of the 2 mg tablets.

## 2017-08-07 NOTE — TELEPHONE ENCOUNTER
Home care OT is calling to get 2 extra visits.  She has seen him 4 times.   She would like to do cognitive testing as ge has been forgetful and showing other symptoms.  Verbal ok for 2 visits for testing  Polina Fernandez RN- Triage [employer]

## 2017-08-07 NOTE — PROGRESS NOTES
ANTICOAGULATION FOLLOW-UP CLINIC VISIT    Patient Name:  Earl Sanchez  Date:  8/7/2017  Contact Type:  Telephone/ [facility]    SUBJECTIVE:     Patient Findings     Positives No Problem Findings           OBJECTIVE    INR   Date Value Ref Range Status   08/07/2017 2.1  Final       ASSESSMENT / PLAN  INR assessment THER    Recheck INR In: 1 WEEK    INR Location [facility] INR      Anticoagulation Summary as of 8/7/2017     INR goal 2.0-3.0   Today's INR 2.1   Maintenance plan 2 mg (2 mg x 1) on Tue, Sat; 4 mg (2 mg x 2) all other days   Full instructions 2 mg on Tue, Sat; 4 mg all other days   Weekly total 24 mg   No change documented Jinny Holguin RN   Plan last modified Brandy Miller RN (7/31/2017)   Next INR check 8/14/2017   Priority INR   Target end date Indefinite    Indications   Long-term (current) use of anticoagulants [Z79.01] [Z79.01]  Atrial fibrillation (H) [I48.91]         Anticoagulation Episode Summary     INR check location     Preferred lab     Send INR reminders to [facility]    Comments TAKES WARFARIN IN THE MORNING        Anticoagulation Care Providers     Provider Role Specialty Phone number    Alvarado Florian MD [facility] [phone]            See the Encounter Report to view Anticoagulation Flowsheet and Dosing Calendar (Go to Encounters tab in chart review, and find the Anticoagulation Therapy Visit)    Patient in range today.  Will continue 24 mg weekly dosing and Home care will re-check in 1 week.    Jinny Gama RN

## 2017-08-14 ENCOUNTER — ANTICOAGULATION THERAPY VISIT (OUTPATIENT)
Dept: FAMILY MEDICINE | Facility: CLINIC | Age: 82
End: 2017-08-14
Payer: COMMERCIAL

## 2017-08-14 DIAGNOSIS — I48.91 ATRIAL FIBRILLATION (H): ICD-10-CM

## 2017-08-14 DIAGNOSIS — Z79.01 LONG-TERM (CURRENT) USE OF ANTICOAGULANTS: ICD-10-CM

## 2017-08-14 LAB — INR PPP: 2.6

## 2017-08-14 PROCEDURE — 99207 ZZC NO CHARGE NURSE ONLY: CPT | Performed by: FAMILY MEDICINE

## 2017-08-14 NOTE — MR AVS SNAPSHOT
Earl Sanchez   8/14/2017   Anticoagulation Therapy Visit    Description:  89 year old male   Provider:  Alvarado Florian MD   Department:  [facility]           INR as of 8/14/2017     Today's INR 2.6      Anticoagulation Summary as of 8/14/2017     INR goal 2.0-3.0   Today's INR 2.6   Full instructions 2 mg on Tue, Sat; 4 mg all other days   Next INR check 8/28/2017    Indications   Long-term (current) use of anticoagulants [Z79.01] [Z79.01]  Atrial fibrillation (H) [I48.91]         August 2017 Details    Sun Mon Tue Wed Thu Fri Sat       1               2               3               4               5                 6               7               8               9               10               11               12                 13               14      4 mg   See details      15      2 mg         16      4 mg         17      4 mg         18      4 mg         19      2 mg           20      4 mg         21      4 mg         22      2 mg         23      4 mg         24      4 mg         25      4 mg         26      2 mg           27      4 mg         28            29               30               31                  Date Details   08/14 This INR check       Date of next INR:  8/28/2017         How to take your warfarin dose     To take:  2 mg Take 1 of the 2 mg tablets.    To take:  4 mg Take 2 of the 2 mg tablets.

## 2017-08-15 ENCOUNTER — TRANSFERRED RECORDS (OUTPATIENT)
Dept: HEALTH INFORMATION MANAGEMENT | Facility: CLINIC | Age: 82
End: 2017-08-15

## 2017-08-15 DIAGNOSIS — Z79.01 LONG TERM (CURRENT) USE OF ANTICOAGULANTS: ICD-10-CM

## 2017-08-15 DIAGNOSIS — R29.6 FREQUENT FALLS: ICD-10-CM

## 2017-08-15 DIAGNOSIS — R20.0 NUMBNESS: ICD-10-CM

## 2017-08-15 DIAGNOSIS — R25.8 BRADYKINESIA: ICD-10-CM

## 2017-08-15 DIAGNOSIS — I48.91 ATRIAL FIBRILLATION (H): ICD-10-CM

## 2017-08-15 DIAGNOSIS — R53.1 WEAKNESS GENERALIZED: Primary | ICD-10-CM

## 2017-08-15 LAB
CK SERPL-CCNC: 69 U/L (ref 30–300)
ERYTHROCYTE [SEDIMENTATION RATE] IN BLOOD BY WESTERGREN METHOD: 18 MM/H (ref 0–20)
VIT B12 SERPL-MCNC: 453 PG/ML (ref 193–986)

## 2017-08-15 PROCEDURE — 86334 IMMUNOFIX E-PHORESIS SERUM: CPT

## 2017-08-15 PROCEDURE — 82085 ASSAY OF ALDOLASE: CPT | Mod: 90

## 2017-08-15 PROCEDURE — 85652 RBC SED RATE AUTOMATED: CPT

## 2017-08-15 PROCEDURE — 99000 SPECIMEN HANDLING OFFICE-LAB: CPT

## 2017-08-15 PROCEDURE — 82550 ASSAY OF CK (CPK): CPT

## 2017-08-15 PROCEDURE — 82784 ASSAY IGA/IGD/IGG/IGM EACH: CPT | Mod: 59

## 2017-08-15 PROCEDURE — 82784 ASSAY IGA/IGD/IGG/IGM EACH: CPT

## 2017-08-15 PROCEDURE — 36415 COLL VENOUS BLD VENIPUNCTURE: CPT

## 2017-08-15 PROCEDURE — 82607 VITAMIN B-12: CPT

## 2017-08-15 PROCEDURE — 82306 VITAMIN D 25 HYDROXY: CPT

## 2017-08-16 LAB
ALDOLASE SERPL-CCNC: 2.9 U/L (ref 1.5–8.1)
IGA SERPL-MCNC: 161 MG/DL (ref 70–380)
IGG SERPL-MCNC: 1630 MG/DL (ref 695–1620)
IGM SERPL-MCNC: 118 MG/DL (ref 60–265)
PROT PATTERN SERPL IFE-IMP: ABNORMAL

## 2017-08-18 LAB
DEPRECATED CALCIDIOL+CALCIFEROL SERPL-MC: <25 UG/L (ref 20–75)
VITAMIN D2 SERPL-MCNC: <5 UG/L
VITAMIN D3 SERPL-MCNC: 20 UG/L

## 2017-08-28 ENCOUNTER — ANTICOAGULATION THERAPY VISIT (OUTPATIENT)
Dept: FAMILY MEDICINE | Facility: CLINIC | Age: 82
End: 2017-08-28
Payer: COMMERCIAL

## 2017-08-28 DIAGNOSIS — E78.5 HYPERLIPIDEMIA WITH TARGET LDL LESS THAN 100: ICD-10-CM

## 2017-08-28 DIAGNOSIS — N18.30 CKD (CHRONIC KIDNEY DISEASE) STAGE 3, GFR 30-59 ML/MIN (H): ICD-10-CM

## 2017-08-28 DIAGNOSIS — Z79.01 LONG-TERM (CURRENT) USE OF ANTICOAGULANTS: ICD-10-CM

## 2017-08-28 DIAGNOSIS — I48.91 ATRIAL FIBRILLATION (H): ICD-10-CM

## 2017-08-28 DIAGNOSIS — E03.9 HYPOTHYROIDISM, UNSPECIFIED TYPE: ICD-10-CM

## 2017-08-28 LAB — INR PPP: 3.5

## 2017-08-28 PROCEDURE — 99207 ZZC NO CHARGE NURSE ONLY: CPT | Performed by: FAMILY MEDICINE

## 2017-08-28 NOTE — TELEPHONE ENCOUNTER
Lisinopril      Last Written Prescription Date: 1/26/17  Last Fill Quantity: 45, # refills: 1  Last Office Visit with [facility], [facility] or  Health prescribing provider: 7/17/17  Next 5 appointments (look out 90 days)     Aug 29, 2017  3:00 PM CDT   Return Visit with Richard Pastrana MD   [facility] ([facility] [place])    [address] [place] [address] , [address]  [place] [address]   [phone]                   Potassium   Date Value Ref Range Status   07/17/2017 4.3 3.4 - 5.3 mmol/L Final     Creatinine   Date Value Ref Range Status   07/17/2017 1.29 (H) 0.66 - 1.25 mg/dL Final     BP Readings from Last 3 Encounters:   07/17/17 111/68   06/16/17 136/77   06/14/17 136/86     Synthroid 50mcg     Last Written Prescription Date: 1/26/17  Last Quantity: 90, # refills: 1  Last Office Visit with [facility], P or  Health prescribing provider: 7/17/17   Next 5 appointments (look out 90 days)     Aug 29, 2017  3:00 PM CDT   Return Visit with Richard Pastrana MD   [facility] [place] [facility] ([facility] [place])    [address] [place] [address] [address]  [place] [address]   [phone]                   TSH   Date Value Ref Range Status   06/19/2017 3.23 0.30 - 4.50 uIU/mL Final     Pravachol     Last Written Prescription Date: 1/26/17  Last Fill Quantity: 90, # refills: 0  Last Office Visit with [facility], P or  Health prescribing provider: 7/17/17  Next 5 appointments (look out 90 days)     Aug 29, 2017  3:00 PM CDT   Return Visit with Richard Pastrana MD   [facility] ([facility] [place])    [address] [place] [address] [address]  [place] [address]   [phone]                   Lab Results   Component Value Date    CHOL 121 03/22/2016     Lab Results   Component Value Date    HDL 51 03/22/2016     Lab Results   Component Value Date    LDL 43  03/22/2016     Lab Results   Component Value Date    TRIG 135 03/22/2016     Lab Results   Component Value Date    CHOLHDLRATIO 2.2 04/16/2015     RIKCY Jones LPN

## 2017-08-28 NOTE — PROGRESS NOTES
ANTICOAGULATION FOLLOW-UP CLINIC VISIT    Patient Name:  Earl Sanchez  Date:  8/28/2017  Contact Type:  Telephone/ [facility], [place], [phone]    SUBJECTIVE:     Patient Findings     Positives No Problem Findings           OBJECTIVE    INR   Date Value Ref Range Status   08/28/2017 3.5  Final       ASSESSMENT / PLAN  INR assessment SUPRA    Recheck INR In: 1 WEEK    INR Location Homecare INR      Anticoagulation Summary as of 8/28/2017     INR goal 2.0-3.0   Today's INR 3.5!   Maintenance plan 2 mg (2 mg x 1) on Tue, Thu, Sat; 4 mg (2 mg x 2) all other days   Full instructions 2 mg on Tue, Thu, Sat; 4 mg all other days   Weekly total 22 mg   Plan last modified Brandy Miller RN (8/28/2017)   Next INR check 9/5/2017   Priority INR   Target end date Indefinite    Indications   Long-term (current) use of anticoagulants [Z79.01] [Z79.01]  Atrial fibrillation (H) [I48.91]         Anticoagulation Episode Summary     INR check location     Preferred lab     Send INR reminders to [facility]    Comments TAKES WARFARIN IN THE MORNING        Anticoagulation Care Providers     Provider Role Specialty Phone number    Alvarado Florian MD Responsible Family Practice [phone]            See the Encounter Report to view Anticoagulation Flowsheet and Dosing Calendar (Go to Encounters tab in chart review, and find the Anticoagulation Therapy Visit)    Decrease. Take 2 mg TuThSa 4 mg the rest of the days. Recheck in 1 week.  Today was last home care visit. Scheduled follow up appointment for INR recheck on 9/5/17.    Brandy Miller RN

## 2017-08-28 NOTE — TELEPHONE ENCOUNTER
Routing refill request to provider for review/approval because:  Labs out of range:  Creatinine: lisinopril  Labs not current:  Pravastatin  Break in Medication: Pravastatin  Jinny Ellsworth RN - Triage  [facility]

## 2017-08-29 ENCOUNTER — OFFICE VISIT (OUTPATIENT)
Dept: ORTHOPEDICS | Facility: CLINIC | Age: 82
End: 2017-08-29
Payer: COMMERCIAL

## 2017-08-29 VITALS
HEIGHT: 68 IN | WEIGHT: 158 LBS | BODY MASS INDEX: 23.95 KG/M2 | DIASTOLIC BLOOD PRESSURE: 68 MMHG | SYSTOLIC BLOOD PRESSURE: 111 MMHG

## 2017-08-29 DIAGNOSIS — M17.12 PRIMARY LOCALIZED OSTEOARTHROSIS, LOWER LEG, LEFT: Primary | ICD-10-CM

## 2017-08-29 DIAGNOSIS — Q78.6 HEREDITARY MULTIPLE EXOSTOSIS: ICD-10-CM

## 2017-08-29 PROCEDURE — 20610 DRAIN/INJ JOINT/BURSA W/O US: CPT | Mod: LT | Performed by: FAMILY MEDICINE

## 2017-08-29 RX ORDER — LISINOPRIL 10 MG/1
TABLET ORAL
Qty: 45 TABLET | Refills: 1 | Status: SHIPPED | OUTPATIENT
Start: 2017-08-29 | End: 2017-11-29

## 2017-08-29 RX ORDER — LEVOTHYROXINE SODIUM 50 UG/1
TABLET ORAL
Qty: 90 TABLET | Refills: 1 | Status: SHIPPED | OUTPATIENT
Start: 2017-08-29 | End: 2018-02-20 | Stop reason: DRUGHIGH

## 2017-08-29 RX ORDER — PRAVASTATIN SODIUM 40 MG
TABLET ORAL
Qty: 90 TABLET | Refills: 1 | Status: SHIPPED | OUTPATIENT
Start: 2017-08-29 | End: 2022-01-01

## 2017-08-29 NOTE — MR AVS SNAPSHOT
After Visit Summary   8/29/2017    Earl Sanchez    MRN: [id number]           Patient Information     Date Of Birth          9/17/1926        Visit Information        Provider Department      8/29/2017 3:00 PM Richard Pastrana MD [facility]        Today's Diagnoses     Primary localized osteoarthrosis, lower leg, left    -  1    Hereditary multiple exostosis           Follow-ups after your visit        Your next 10 appointments already scheduled     Sep 05, 2017  1:45 PM CDT   Anticoagulation Visit with [facility]IC   [facility] ([facility])    [address] [place] [id number] [phone]              Who to contact     If you have questions or need follow up information about today's clinic visit or your schedule please contact [facility] directly at [phone].  Normal or non-critical lab and imaging results will be communicated to you by Dimdimhart, letter or phone within 4 business days after the clinic has received the results. If you do not hear from us within 7 days, please contact the clinic through Viv[employer]t or phone. If you have a critical or abnormal lab result, we will notify you by phone as soon as possible.  Submit refill requests through [employer] or call your pharmacy and they will forward the refill request to us. Please allow 3 business days for your refill to be completed.          Additional Information About Your Visit        MyChart Information     [employer] gives you secure access to your electronic health record. If you see a primary care provider, you can also send messages to your care team and make appointments. If you have questions, please call your primary care clinic.  If you do not have a primary care provider, please call [phone] and they will assist you.        Care EveryWhere ID     This  "is your Care EveryWhere ID. This could be used by other organizations to access your [facility] medical records  [id number]        Your Vitals Were     Height BMI (Body Mass Index)                5' 8\" (1.727 m) 24.02 kg/m2           Blood Pressure from Last 3 Encounters:   08/29/17 111/68   07/17/17 111/68   06/16/17 136/77    Weight from Last 3 Encounters:   08/29/17 158 lb (71.7 kg)   07/17/17 158 lb (71.7 kg)   06/16/17 160 lb (72.6 kg)              We Performed the Following     DRAIN/INJECT LARGE JOINT/BURSA     METHYLPREDNISOLONE 40 MG INJ          Today's Medication Changes          These changes are accurate as of: 8/29/17 11:59 PM.  If you have any questions, ask your nurse or doctor.               Start taking these medicines.        Dose/Directions    lidocaine 1 % injection   Used for:  Primary localized osteoarthrosis, lower leg, left   Started by:  Richard Pastrana MD        Dose:  4 mL   4 mLs by INTRA-ARTICULAR route once for 1 dose   Quantity:  4 mL   Refills:  0       methylPREDNISolone acetate 40 MG/ML injection   Commonly known as:  DEPO-MEDROL   Used for:  Primary localized osteoarthrosis, lower leg, left   Started by:  Richard Pastrana MD        Dose:  40 mg   1 mL (40 mg) by INTRA-ARTICULAR route once for 1 dose   Quantity:  1 mL   Refills:  0            Where to get your medicines      Some of these will need a paper prescription and others can be bought over the counter.  Ask your nurse if you have questions.     You don't need a prescription for these medications     lidocaine 1 % injection    methylPREDNISolone acetate 40 MG/ML injection                Primary Care Provider Office Phone # Fax #    Alvarado Florian [phone]       [address] [place] [address]        Equal Access to Services     MARIANNE SINGH AH: Rachel Rees, anat cee, la gibson, araceli littlejohn. So wa " [phone].    ATENCIÓN: Si francois jon, tiene a stephen disposición servicios gratuitos de asistencia lingüística. Sukhdev weir [phone].    We comply with applicable federal civil rights laws and [place] laws. We do not discriminate on the basis of race, color, national origin, age, disability sex, sexual orientation or gender identity.            Thank you!     Thank you for choosing [facility]  for your care. Our goal is always to provide you with excellent care. Hearing back from our patients is one way we can continue to improve our services. Please take a few minutes to complete the written survey that you may receive in the mail after your visit with us. Thank you!             Your Updated Medication List - Protect others around you: Learn how to safely use, store and throw away your medicines at www.disposemymeds.org.          This list is accurate as of: 8/29/17 11:59 PM.  Always use your most recent med list.                   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, unspecified laterality       ASPIRIN LOW DOSE 81 MG tablet   Generic drug:  aspirin      Take 81 mg by mouth daily        doxazosin 4 MG tablet    CARDURA    90 tablet    Take 1 tablet (4 mg) by mouth At Bedtime    Benign non-nodular prostatic hyperplasia without lower urinary tract symptoms       furosemide 20 MG tablet    LASIX    30 tablet    Take 1 tablet (20 mg) by mouth daily    Fluid retention       JANTOVEN 2 MG tablet   Generic drug:  warfarin     135 tablet     TAKE 2 TABLETS (4 MG) BY MOUTH ON MONDAY AND FRIDAY, AND 1 TABLET (2 MG) ALL OTHER DAYS OR AS DIRECTED BY ACC NURSES    Chronic anticoagulation, Chronic atrial fibrillation (H)       levothyroxine 50 MCG tablet    SYNTHROID/LEVOTHROID    90 tablet    TAKE 1 TABLET DAILY    Hypothyroidism, unspecified type       lidocaine 1 % injection     4 mL    4 mLs by INTRA-ARTICULAR route once for 1 dose    Primary localized osteoarthrosis, lower leg, left       lisinopril 10 MG tablet    PRINIVIL/ZESTRIL    45 tablet    TAKE ONE-HALF (1/2) TABLET DAILY    CKD (chronic kidney disease) stage 3, GFR 30-59 ml/min       methylPREDNISolone acetate 40 MG/ML injection    DEPO-MEDROL    1 mL    1 mL (40 mg) by INTRA-ARTICULAR route once for 1 dose    Primary localized osteoarthrosis, lower leg, left       mirtazapine 30 MG tablet    REMERON    90 tablet    Take 1 tablet (30 mg) by mouth At Bedtime    Poor appetite       nitroGLYcerin 0.4 MG sublingual tablet    NITROSTAT    25 tablet    Place 1 tablet (0.4 mg) under the tongue every 5 minutes as needed for chest pain    CAD (coronary artery disease)       pravastatin 40 MG tablet    PRAVACHOL    90 tablet    TAKE 1 TABLET DAILY    Hyperlipidemia with target LDL less than 100       * Notice:  This list has 2 medication(s) that are the same as other medications prescribed for you. Read the directions carefully, and ask your doctor or other care provider to review them with you.

## 2017-08-29 NOTE — PROGRESS NOTES
[facility]   [facility]   Follow-up Visit s Aug 29, 2017    PCP: Alvarado Florian      Subjective:  Earl is a 89 year old male who is seen in follow up for evaluation of   Chief Complaint   Patient presents with     Knee Pain     His last visit was on Visit date not found.  Since that time, symptoms have returned. Patient states the last injection gave him good relief and would like to have another injection today. Patient uses a walker to help him ambulate. Patient states he has a myopathy in his left leg as a new problem.     Patient's past medical, surgical, social and family histories are reviewed today.    Earl Sanchez is alone today.      Previous Injections: 8/29/2017 (today), 10/6/2016, 8/22/2016, 7/1/2016, 4/7/2016    History from previous visit on 10/6/2016    His last visit was on 8/22/2016 and had a right knee cortisone injection at that time, he reports that the right knee has been improved since having that injection. He is following up today for the left knee. He last had a cortisone injection in the left knee on 7/1/16 and got good relief but feels like it has since worn off. He is having trouble with the knee when he is exercising or getting up out of a low chair.      Patient's past medical, surgical, social and family histories are reviewed today.    Social History: retired physician    Past Medical History:   Diagnosis Date     Abdominal aortic aneurysm (H)     per 2/12/15 abdominal US, mild aneurysm measuring 48y35gd     Acute MI (H) 1981, 2007     Atrial fibrillation (H)      BPH (benign prostatic hyperplasia)      CAD (coronary artery disease)     CABG 2007, f/b cardio, Dr. 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     Gastritis     treated with zantac     Gastro-oesophageal reflux disease      Heart failure (H)      Hyperlipidaemia LDL goal <100      Hypertension goal BP (blood pressure) < 140/90       Osteoarthritis     low back, hips, knees     Renal disease     renal insuff     Thyroid disease        Patient Active Problem List    Diagnosis Date Noted     Elevated fasting glucose 03/15/2013     Priority: High     Chronic anticoagulation 11/29/2012     Priority: High     CKD (chronic kidney disease) stage 3, GFR 30-59 ml/min 08/29/2012     Priority: High     Anemia 08/29/2012     Priority: High     Hypertension goal BP (blood pressure) < 140/90      Priority: High     CAD (coronary artery disease)      Priority: High     CABD 2007, f/b cardio, Dr. Rodriguez       Atrial fibrillation (H)      Priority: High     Heart failure (H)      Priority: High     Weakness generalized 06/16/2017     Priority: Medium     Tear of meniscus of left knee 12/19/2016     Priority: Medium     General weakness 12/15/2016     Priority: Medium     Hereditary multiple exostosis 04/10/2016     Priority: Medium     Long-term (current) use of anticoagulants [Z79.01] 03/11/2016     Priority: Medium     Advanced directives, counseling/discussion 12/10/2015     Priority: Medium     Advance Care Planning 12/10/2015: ACP Review of Chart / Resources Provided:  Reviewed chart for advance care plan.  Earl Sanchez has no plan or code status on file. Discussed available resources and provided with information. . Added by Tammy Sims             Abdominal aortic aneurysm (H)      Priority: Medium     per 2/12/15 abdominal US, mild aneurysm measuring [id number]       Toe inflammation 01/30/2014     Priority: Medium     Elevated uric acid in blood 01/30/2014     Priority: Medium     Gastritis      Priority: Medium     treated with zantac       Microalbuminuria 08/29/2012     Priority: Medium     Hypothyroidism 08/29/2012     Priority: Medium     Elevated alkaline phosphatase level 08/29/2012     Priority: Medium     Osteoarthritis      Priority: Medium     low back, hips, knees       Gait apraxia      Priority: Medium     eval by neurology        BPH (benign prostatic hyperplasia)      Priority: Medium     Hyperlipidemia with target LDL less than 100      Priority: Medium     Diagnosis updated by automated process. Provider to review and confirm.       Health Care Home 09/12/2012     Priority: Low     DX V65.8 REPLACED WITH 58206 HEALTH CARE HOME (04/08/2013)         Advance care planning 09/06/2012     Priority: Low     Advance Care Planning 3/6/2017: Receipt of ACP document:  Received: Health Care Directive which was witnessed or notarized on 10-17-12.  Document previously scanned on 12-20-16.  Validation form completed and sent to be scanned.  Code Status needs to be updated to reflect choices in most recent ACP document. Orders are DNR only.  Confirmed/documented designated decision maker(s).  Added by Lacey Turner RN Advance Care Planning Liaison with Dav Anderson  Advance Care Planning 3/6/2017: Receipt of ACP document:  Received: Resuscitation Guidelines order which was signed and dated by provider on 5-1-12.  Document previously scanned on 12-20-16.  Order reviewed and found to be valid.  Code Status needs to be updated to reflect choices in most recent ACP document. Orders are DNR only.  Confirmed/documented designated decision maker(s).  Added by Lacey Turner RN Advance Care Planning Liaison with Dav Anderson  Advance Care Planning 9/6/2012: Patient states has Advance Directive and will bring in a copy to clinic. 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     last worked at [employer] in 2005     Social History Main Topics     Smoking status: Never Smoker      Smokeless tobacco: Never Used     Alcohol Use: Yes      Comment: occ - one glass of wine last night      Past Surgical History:   Procedure Laterality Date     ANKLE SURGERY      ORIF ankle fracture     CORONARY ARTERY BYPASS  2007    CAB X 5 CABG with LIMA to LAD and saphenous vein grafts to, OM2,SVG to diag 1, and sequential PDA     HEART CATH LEFT HEART CATH  12/10/07     HERNIA REPAIR, INGUINAL RT/LT      right     PHACOEMULSIFICATION CLEAR CORNEA WITH TORIC INTRAOCULAR LENS IMPLANT  4/10/2014    Procedure: PHACOEMULSIFICATION CLEAR CORNEA WITH TORIC INTRAOCULAR LENS IMPLANT;  RIGHT PHACOEMULSIFICATION CLEAR CORNEA WITH TORIC INTRAOCULAR LENS IMPLANT ;  Surgeon: Carlo Tee MD;  Location: [facility]     PHACOEMULSIFICATION CLEAR CORNEA WITH TORIC INTRAOCULAR LENS IMPLANT  4/22/2014    Procedure: PHACOEMULSIFICATION CLEAR CORNEA WITH TORIC INTRAOCULAR LENS IMPLANT;  Surgeon: Carlo Tee MD;  Location: [facility]       Review of Systems   Musculoskeletal: Positive for joint pain.   All other systems reviewed and are negative.        Physical Exam   Musculoskeletal:        Left knee: Medial joint line and lateral joint line tenderness noted.     There were no vitals taken for this visit.  Constitutional:well-developed, well-nourished, and in no distress.   Cardiovascular: Intact distal pulses.    Neurological: alert. Gait : requires walker to ambulate  Skin: Skin is warm and dry.   Psychiatric: Mood and affect normal.   Respiratory: unlabored, speaks in full sentences  Lymph: no LAD, no lymphangitis      Left Knee Exam   Swelling: Moderate  Effusion: No    Tenderness   The patient is experiencing tenderness in the medial joint line, lateral joint line.    Range of Motion   Extension: 5  Flexion:     100    Tests   Varus:  Negative  Valgus: Negative  Patellar Apprehension: No    Comments:          Xr independently reviewed by me:  4/7/16 images: XR KNEES    Multiple areas of exostosis as previously noted on RIGHT knee images in 2014, essentially unchanged. Surgical clips in medial proximal LEFT calf  soft tissues. Calcified vessels bilaterally. Mild to moderate degenerative changes bilaterally with mild joint space narrowing and trace osteophytes bilaterally.       ICD-10-CM    1. Primary localized osteoarthrosis, lower leg, left M17.12 DRAIN/INJECT LARGE JOINT/BURSA     METHYLPREDNISOLONE 40 MG INJ     methylPREDNISolone acetate (DEPO-MEDROL) 40 MG/ML injection     lidocaine 1 % injection   2. Hereditary multiple exostosis Q78.6       Ok for repeat cortisone steroid injection LEFT knee    The benefits, risks, indications for and alternatives to the procedure were discussed with the patient, including bleeding, infection, hyperglycemia, localized lipodystrophy and hypopigmentation. He elected to proceed, and informed consent was signed.    PROCEDURE:  JOINT INJECTION.         After a discussion of risks, benefits and side effects of procedure, informed patient consent was obtained, and form signed by patient and physician.       The LEFT   knee was prepped with Chloroprep.    INJECTION:  Using 4 cc of 1% lidocaine mixed                           with 40 mg of DepoMedrol, the RIGHT  knee joint was successfully injected                           without complication using a 22G needle with the patient lying supine and the injection administered using the superolateral or lateral patellar approach.  He tolerated the procedure well with minimal discomfort. Bandaid applied. Instructed to monitor for increased warmth, redness, pain or swelling which might indicate an infection.

## 2017-08-29 NOTE — NURSING NOTE
"Chief Complaint   Patient presents with     Knee Pain       Initial /68  Ht 5' 8\" (1.727 m)  Wt 158 lb (71.7 kg)  BMI 24.02 kg/m2 Estimated body mass index is 24.02 kg/(m^2) as calculated from the following:    Height as of this encounter: 5' 8\" (1.727 m).    Weight as of this encounter: 158 lb (71.7 kg).  Medication Reconciliation: complete    "

## 2017-08-29 NOTE — Clinical Note
Here's an update on your patient, Earl Sanchez. Thank you for allowing me at [facility] [place] to be involved in the care of your patient. Please feel free to reach out to me on [employer], Epic Staff Message, or by phone at [phone].   Richard Pastrana MD [facility] [address] , [address] [place] [address] Phone: [phone] Fax: [phone]

## 2017-08-30 RX ORDER — LIDOCAINE HYDROCHLORIDE 10 MG/ML
4 INJECTION, SOLUTION INFILTRATION; PERINEURAL ONCE
Qty: 4 ML | Refills: 0 | OUTPATIENT
Start: 2017-08-30 | End: 2017-08-30

## 2017-08-30 RX ORDER — METHYLPREDNISOLONE ACETATE 40 MG/ML
40 INJECTION, SUSPENSION INTRA-ARTICULAR; INTRALESIONAL; INTRAMUSCULAR; SOFT TISSUE ONCE
Qty: 1 ML | Refills: 0 | OUTPATIENT
Start: 2017-08-30 | End: 2017-08-30

## 2017-09-05 ENCOUNTER — ANTICOAGULATION THERAPY VISIT (OUTPATIENT)
Dept: NURSING | Facility: CLINIC | Age: 82
End: 2017-09-05
Payer: COMMERCIAL

## 2017-09-05 DIAGNOSIS — Z79.01 LONG-TERM (CURRENT) USE OF ANTICOAGULANTS: ICD-10-CM

## 2017-09-05 DIAGNOSIS — I48.91 ATRIAL FIBRILLATION (H): ICD-10-CM

## 2017-09-05 LAB — INR POINT OF CARE: 2.8 (ref 0.86–1.14)

## 2017-09-05 PROCEDURE — 99207 ZZC NO CHARGE NURSE ONLY: CPT

## 2017-09-05 PROCEDURE — 85610 PROTHROMBIN TIME: CPT | Mod: QW

## 2017-09-05 PROCEDURE — 36416 COLLJ CAPILLARY BLOOD SPEC: CPT

## 2017-09-05 NOTE — PROGRESS NOTES
ANTICOAGULATION FOLLOW-UP CLINIC VISIT    Patient Name:  Earl Sanchez  Date:  9/5/2017  Contact Type:  Face to Face    SUBJECTIVE:     Patient Findings     Positives No Problem Findings           OBJECTIVE    INR   Date Value Ref Range Status   08/28/2017 3.5  Final       ASSESSMENT / PLAN  INR assessment THER    Recheck INR In: 3 WEEKS    INR Location Clinic      Anticoagulation Summary as of 9/5/2017     INR goal 2.0-3.0   Today's INR    Maintenance plan 2 mg (2 mg x 1) on Tue, Thu, Sat; 4 mg (2 mg x 2) all other days   Full instructions 2 mg on Tue, Thu, Sat; 4 mg all other days   Weekly total 22 mg   No change documented Jinny Holguin RN   Plan last modified Brandy iMller RN (8/28/2017)   Next INR check 9/26/2017   Priority INR   Target end date Indefinite    Indications   Long-term (current) use of anticoagulants [Z79.01] [Z79.01]  Atrial fibrillation (H) [I48.91]         Anticoagulation Episode Summary     INR check location     Preferred lab     Send INR reminders to [facility]    Comments TAKES WARFARIN IN THE MORNING        Anticoagulation Care Providers     Provider Role Specialty Phone number    Alvarado Florian MD Responsible Family Practice [phone]            See the Encounter Report to view Anticoagulation Flowsheet and Dosing Calendar (Go to Encounters tab in chart review, and find the Anticoagulation Therapy Visit)    Patient at goal.  Will continue weekly dosing of 22 mg and follow up in clinic in 3 weeks    Jinny Gama, RN

## 2017-09-05 NOTE — MR AVS SNAPSHOT
Earl Sanchez   9/5/2017 1:45 PM   Anticoagulation Therapy Visit    Description:  89 year old male   Provider:  [facility]   Department:  [facility] Nurse           INR as of 9/5/2017     Today's INR 2.8      Anticoagulation Summary as of 9/5/2017     INR goal 2.0-3.0   Today's INR 2.8   Full instructions 2 mg on Tue, Thu, Sat; 4 mg all other days   Next INR check 9/26/2017    Indications   Long-term (current) use of anticoagulants [Z79.01] [Z79.01]  Atrial fibrillation (H) [I48.91]         Your next Anticoagulation Clinic appointment(s)     Sep 26, 2017  1:30 PM CDT   Anticoagulation Visit with  ANTICOAGULATION CLINIC   [facility] [place]    [address] [place] [id number]   [phone]              Contact Numbers     Clinic Number:         September 2017 Details    Sun Mon Tue Wed Thu Fri Sat          1               2                 3               4               5      2 mg   See details      6      4 mg         7      2 mg         8      4 mg         9      2 mg           10      4 mg         11      4 mg         12      2 mg         13      4 mg         14      2 mg         15      4 mg         16      2 mg           17      4 mg         18      4 mg         19      2 mg         20      4 mg         21      2 mg         22      4 mg         23      2 mg           24      4 mg         25      4 mg         26            27               28               29               30                Date Details   09/05 This INR check       Date of next INR:  9/26/2017         How to take your warfarin dose     To take:  2 mg Take 1 of the 2 mg tablets.    To take:  4 mg Take 2 of the 2 mg tablets.

## 2017-09-07 ENCOUNTER — TELEPHONE (OUTPATIENT)
Dept: FAMILY MEDICINE | Facility: CLINIC | Age: 82
End: 2017-09-07

## 2017-09-07 NOTE — TELEPHONE ENCOUNTER
Patient states that his gout symptoms have not been troubling for quite some time and states has increased the med's on his own because of the pain in hands and feet.  Reports taking 2 tablets twice daily.      Please advise if increased dose is appropriate,  Jinny Ellsworth RN - Triage  [facility]

## 2017-09-07 NOTE — TELEPHONE ENCOUNTER
Patient returned call  States to let it ring long enough so he can get to it  [phone]  Yun OLIVER

## 2017-09-07 NOTE — TELEPHONE ENCOUNTER
Non-detailed message left to return our call.  Jinny Ellsworth RN - Triage  [facility]

## 2017-09-07 NOTE — TELEPHONE ENCOUNTER
Reason for Call:  Other Medication    Detailed comments: Earl would like to request his medication allopurinol (ZYLOPRIM) 100 MG tablet to be increased to 2 x 2 a day.      Phone Number Patient can be reached at: Home number on file [phone] (home)    Best Time: anytime    Can we leave a detailed message on this number? YES    Call taken on 9/7/2017 at 2:32 PM by Ladonna Foy

## 2017-09-08 NOTE — TELEPHONE ENCOUNTER
Patient advised and agrees with plan.  Will only do it for short term to aid with the pain.    Will RTC to check BMP in 2 weeks.      Ellen Negro RN

## 2017-09-08 NOTE — TELEPHONE ENCOUNTER
Since he had low kidney function, we do not recommend him to try higher dose of allopurinol. However, if he could try only short term less than 2 weeks, if may be okay. If he agrees with the plan, please have him to try it and RTC for f/u BMP to make sure his kidney function.    Thank

## 2017-09-26 ENCOUNTER — ANTICOAGULATION THERAPY VISIT (OUTPATIENT)
Dept: NURSING | Facility: CLINIC | Age: 82
End: 2017-09-26
Payer: COMMERCIAL

## 2017-09-26 DIAGNOSIS — I48.91 ATRIAL FIBRILLATION (H): ICD-10-CM

## 2017-09-26 DIAGNOSIS — Z79.01 LONG-TERM (CURRENT) USE OF ANTICOAGULANTS: ICD-10-CM

## 2017-09-26 LAB — INR POINT OF CARE: 2.6 (ref 0.86–1.14)

## 2017-09-26 PROCEDURE — 36416 COLLJ CAPILLARY BLOOD SPEC: CPT

## 2017-09-26 PROCEDURE — 99207 ZZC NO CHARGE NURSE ONLY: CPT

## 2017-09-26 PROCEDURE — 85610 PROTHROMBIN TIME: CPT | Mod: QW

## 2017-09-26 NOTE — MR AVS SNAPSHOT
Earl Sanchez   9/26/2017 1:30 PM   Anticoagulation Therapy Visit    Description:  89 year old male   Provider:  [facility]   Department:  [facility] Nurse           INR as of 9/26/2017     Today's INR 2.6      Anticoagulation Summary as of 9/26/2017     INR goal 2.0-3.0   Today's INR 2.6   Full instructions 2 mg on Tue, Thu, Sat; 4 mg all other days   Next INR check 10/24/2017    Indications   Long-term (current) use of anticoagulants [Z79.01] [Z79.01]  Atrial fibrillation (H) [I48.91]         Your next Anticoagulation Clinic appointment(s)     Oct 24, 2017  1:30 PM CDT   Anticoagulation Visit with  ANTICOAGULATION CLINIC   [facility] ([facility]    [address] [place] [id number]   [phone]              Contact Numbers     Clinic Number:         September 2017 Details    Sun Mon Tue Wed Thu Fri Sat          1               2                 3               4               5               6               7               8               9                 10               11               12               13               14               15               16                 17               18               19               20               21               22               23                 24               25               26      2 mg   See details      27      4 mg         28      2 mg         29      4 mg         30      2 mg          Date Details   09/26 This INR check               How to take your warfarin dose     To take:  2 mg Take 1 of the 2 mg tablets.    To take:  4 mg Take 2 of the 2 mg tablets.           October 2017 Details    Sun Mon Tue Wed Thu Fri Sat     1      4 mg         2      4 mg         3      2 mg         4      4 mg         5      2 mg         6      4 mg         7      2 mg           8      4 mg         9      4 mg         10      2 mg         11      4 mg         12      2 mg         13      4 mg          14      2 mg           15      4 mg         16      4 mg         17      2 mg         18      4 mg         19      2 mg         20      4 mg         21      2 mg           22      4 mg         23      4 mg         24            25               26               27               28                 29               30               31                    Date Details   No additional details    Date of next INR:  10/24/2017         How to take your warfarin dose     To take:  2 mg Take 1 of the 2 mg tablets.    To take:  4 mg Take 2 of the 2 mg tablets.

## 2017-10-10 ENCOUNTER — OFFICE VISIT (OUTPATIENT)
Dept: FAMILY MEDICINE | Facility: CLINIC | Age: 82
End: 2017-10-10
Payer: COMMERCIAL

## 2017-10-10 VITALS
OXYGEN SATURATION: 98 % | HEART RATE: 70 BPM | BODY MASS INDEX: 24.1 KG/M2 | HEIGHT: 68 IN | SYSTOLIC BLOOD PRESSURE: 106 MMHG | DIASTOLIC BLOOD PRESSURE: 67 MMHG | RESPIRATION RATE: 14 BRPM | TEMPERATURE: 97 F | WEIGHT: 159 LBS

## 2017-10-10 DIAGNOSIS — Z23 NEED FOR PROPHYLACTIC VACCINATION AND INOCULATION AGAINST INFLUENZA: ICD-10-CM

## 2017-10-10 DIAGNOSIS — H61.20 WAX IN EAR: Primary | ICD-10-CM

## 2017-10-10 PROCEDURE — 69210 REMOVE IMPACTED EAR WAX UNI: CPT | Performed by: FAMILY MEDICINE

## 2017-10-10 PROCEDURE — 90471 IMMUNIZATION ADMIN: CPT | Performed by: FAMILY MEDICINE

## 2017-10-10 PROCEDURE — 90662 IIV NO PRSV INCREASED AG IM: CPT | Performed by: FAMILY MEDICINE

## 2017-10-10 NOTE — PROGRESS NOTES
Injectable Influenza Immunization Documentation    1.  Is the person to be vaccinated sick today?   No    2. Does the person to be vaccinated have an allergy to a component   of the vaccine?   No    3. Has the person to be vaccinated ever had a serious reaction   to influenza vaccine in the past?   No    4. Has the person to be vaccinated ever had Guillain-Barré syndrome?   No    Form completed by Melanie Delcid, [facility]            yes

## 2017-10-10 NOTE — MR AVS SNAPSHOT
After Visit Summary   10/10/2017    Earl aSnchez    MRN: [id number]           Patient Information     Date Of Birth          9/17/1926        Visit Information        Provider Department      10/10/2017 2:20 PM Alvarado Florian MD [facility]        Today's Diagnoses     Wax in ear    -  1    Need for prophylactic vaccination and inoculation against influenza           Follow-ups after your visit        Your next 10 appointments already scheduled     Oct 24, 2017  1:30 PM CDT   Anticoagulation Visit with [facility]   [facility] ([facility])    [address] [place] [id number]   [phone]              Who to contact     If you have questions or need follow up information about today's clinic visit or your schedule please contact [facility] directly at [phone].  Normal or non-critical lab and imaging results will be communicated to you by TestSouphart, letter or phone within 4 business days after the clinic has received the results. If you do not hear from us within 7 days, please contact the clinic through TestSouphart or phone. If you have a critical or abnormal lab result, we will notify you by phone as soon as possible.  Submit refill requests through Crovat or call your pharmacy and they will forward the refill request to us. Please allow 3 business days for your refill to be completed.          Additional Information About Your Visit        MyChart Information     C[employer]at gives you secure access to your electronic health record. If you see a primary care provider, you can also send messages to your care team and make appointments. If you have questions, please call your primary care clinic.  If you do not have a primary care provider, please call [phone] and they will assist you.        Care EveryWhere ID     This is your Care EveryWhere ID. This could be used by other  "organizations to access your [place] medical records  [id number]        Your Vitals Were     Pulse Temperature Respirations Height Pulse Oximetry BMI (Body Mass Index)    70 97  F (36.1  C) (Tympanic) 14 5' 8\" (1.727 m) 98% 24.18 kg/m2       Blood Pressure from Last 3 Encounters:   10/10/17 106/67   08/29/17 111/68   07/17/17 111/68    Weight from Last 3 Encounters:   10/10/17 159 lb (72.1 kg)   08/29/17 158 lb (71.7 kg)   07/17/17 158 lb (71.7 kg)              We Performed the Following     REMOVE IMPACTED [facility]        Primary Care Provider Office Phone # Fax #    Alvarado Florian [phone]       [address] [place] [phone]        Equal Access to Services     [facility]: Hadii aad ku hadasho [place], waaxda luqadaha, qaybta kaalmada adeegyada, araceli em . So [facility] [phone].    ATENCIÓN: Si habla español, tiene a stephen disposición servicios gratuitos de asistencia lingüística. Llame al [phone].    We comply with applicable federal civil rights laws and [place] laws. We do not discriminate on the basis of race, color, national origin, age, disability, sex, sexual orientation, or gender identity.            Thank you!     Thank you for choosing [facility]  for your care. Our goal is always to provide you with excellent care. Hearing back from our patients is one way we can continue to improve our services. Please take a few minutes to complete the written survey that you may receive in the mail after your visit with us. Thank you!             Your Updated Medication List - Protect others around you: Learn how to safely use, store and throw away your medicines at www.disposemymeds.org.          This list is accurate as of: 10/10/17  3:04 PM.  Always use your most recent med list.                   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, unspecified laterality       ASPIRIN LOW DOSE 81 MG tablet   Generic drug:  aspirin      Take 81 mg by mouth daily        doxazosin 4 MG tablet    CARDURA    90 tablet    Take 1 tablet (4 mg) by mouth At Bedtime    Benign non-nodular prostatic hyperplasia without lower urinary tract symptoms       furosemide 20 MG tablet    LASIX    30 tablet    Take 1 tablet (20 mg) by mouth daily    Fluid retention       JANTOVEN 2 MG tablet   Generic drug:  warfarin     135 tablet    TAKE 2 TABLETS (4 MG) BY MOUTH ON MONDAY AND FRIDAY, AND 1 TABLET (2 MG) ALL OTHER DAYS OR AS DIRECTED BY [facility] NURSES    Chronic anticoagulation, Chronic atrial fibrillation (H)       levothyroxine 50 MCG tablet    SYNTHROID/LEVOTHROID    90 tablet    TAKE 1 TABLET DAILY    Hypothyroidism, unspecified type       lisinopril 10 MG tablet    PRINIVIL/ZESTRIL    45 tablet    TAKE ONE-HALF (1/2) TABLET DAILY    CKD (chronic kidney disease) stage 3, GFR 30-59 ml/min       mirtazapine 30 MG tablet    REMERON    90 tablet    Take 1 tablet (30 mg) by mouth At Bedtime    Poor appetite       nitroGLYcerin 0.4 MG sublingual tablet    NITROSTAT    25 tablet    Place 1 tablet (0.4 mg) under the tongue every 5 minutes as needed for chest pain    CAD (coronary artery disease)       pravastatin 40 MG tablet    PRAVACHOL    90 tablet    TAKE 1 TABLET DAILY    Hyperlipidemia with target LDL less than 100       * Notice:  This list has 2 medication(s) that are the same as other medications prescribed for you. Read the directions carefully, and ask your doctor or other care provider to review them with you.

## 2017-10-10 NOTE — PROGRESS NOTES
SUBJECTIVE:   Earl Sanchez is a 89 year old male who presents to clinic today for the following health issues:      Ear Problem       Duration: 3 days     Description (location/character/radiation): right ear feels plugged     Intensity:  moderate    Accompanying signs and symptoms: none     History (similar episodes/previous evaluation): None    Precipitating or alleviating factors: None    Therapies tried and outcome: None       Problem list and histories reviewed & adjusted, as indicated.  Additional history: as documented    Patient Active Problem List   Diagnosis     Hypertension goal BP (blood pressure) < 140/90     CAD (coronary artery disease)     Osteoarthritis     Gait apraxia     Atrial fibrillation (H)     BPH (benign prostatic hyperplasia)     Heart failure (H)     Hyperlipidemia with target LDL less than 100     CKD (chronic kidney disease) stage 3, GFR 30-59 ml/min     Microalbuminuria     Anemia     Hypothyroidism     Elevated alkaline phosphatase level     Advance care planning     Health Care Home     Chronic anticoagulation     Elevated fasting glucose     Toe inflammation     Elevated uric acid in blood     Gastritis     Abdominal aortic aneurysm (H)     Advanced directives, counseling/discussion     Long-term (current) use of anticoagulants [Z79.01]     Hereditary multiple exostosis     General weakness     Tear of meniscus of left knee     Weakness generalized     Past Surgical History:   Procedure Laterality Date     ANKLE SURGERY      ORIF ankle fracture     CORONARY ARTERY BYPASS  2007    CAB X 5 CABG with LIMA to LAD and saphenous vein grafts to, OM2,SVG to diag 1, and sequential PDA     HEART CATH LEFT HEART CATH  12/10/07     HERNIA REPAIR, INGUINAL RT/LT      right     PHACOEMULSIFICATION CLEAR CORNEA WITH TORIC INTRAOCULAR LENS IMPLANT  4/10/2014    Procedure: PHACOEMULSIFICATION CLEAR CORNEA WITH TORIC INTRAOCULAR LENS IMPLANT;  RIGHT PHACOEMULSIFICATION CLEAR CORNEA WITH  TORIC INTRAOCULAR LENS IMPLANT ;  Surgeon: Carlo Tee MD;  Location: [facility]     PHACOEMULSIFICATION CLEAR CORNEA WITH TORIC INTRAOCULAR LENS IMPLANT  4/22/2014    Procedure: PHACOEMULSIFICATION CLEAR CORNEA WITH TORIC INTRAOCULAR LENS IMPLANT;  Surgeon: Carlo Tee MD;  Location: [facility]       Social History   Substance Use Topics     Smoking status: Never Smoker     Smokeless tobacco: Never Used     Alcohol use Yes      Comment: occ - one glass of wine last night     Family History   Problem Relation Age of Onset     Hypertension Mother      Hypertension Maternal Grandmother          Current Outpatient Prescriptions   Medication Sig Dispense Refill     lisinopril (PRINIVIL/ZESTRIL) 10 MG tablet TAKE ONE-HALF (1/2) TABLET DAILY 45 tablet 1     levothyroxine (SYNTHROID/LEVOTHROID) 50 MCG tablet TAKE 1 TABLET DAILY 90 tablet 1     pravastatin (PRAVACHOL) 40 MG tablet TAKE 1 TABLET DAILY 90 tablet 1     JANTOVEN 2 MG tablet TAKE 2 TABLETS (4 MG) BY MOUTH ON MONDAY AND FRIDAY, AND 1 TABLET (2 MG) ALL OTHER DAYS OR AS DIRECTED BY [facility] [place] NURSES 135 tablet 0     furosemide (LASIX) 20 MG tablet Take 1 tablet (20 mg) by mouth daily 30 tablet 1     allopurinol (ZYLOPRIM) 100 MG tablet Take 2 tablets (200 mg) by mouth 3 times daily as needed 360 tablet 1     doxazosin (CARDURA) 4 MG tablet Take 1 tablet (4 mg) by mouth At Bedtime 90 tablet 3     acetaminophen (TYLENOL) 500 MG tablet Take 1,000 mg by mouth every 6 hours as needed for mild pain        aspirin (ASPIRIN LOW DOSE) 81 MG tablet Take 81 mg by mouth daily        mirtazapine (REMERON) 30 MG tablet Take 1 tablet (30 mg) by mouth At Bedtime (Patient not taking: Reported on 10/10/2017) 90 tablet 1     allopurinol (ZYLOPRIM) 100 MG tablet Take 1 tablet (100 mg) by mouth 2 times daily (Patient not taking: Reported on 10/10/2017) 90 tablet 1     nitroglycerin (NITROSTAT) 0.4 MG SL tablet Place 1 tablet (0.4 mg) under the tongue every 5 minutes as needed  for chest pain (Patient not taking: Reported on 10/10/2017) 25 tablet 0     No Known Allergies  Recent Labs   Lab Test  07/17/17   1157 06/19/17 06/16/17   0450   03/22/16   1406  06/11/15   1039   04/16/15   1039  02/05/15   1344   04/08/14   1052  02/03/14   0917   03/12/13   1018   A1C   --    --    --    --    --    --    --    --   5.9   --   6.2*   --    --   5.6   LDL   --    --    --    --   43   --    --   47   --    --    --   61   --    --    HDL   --    --    --    --   51   --    --   55   --    --    --   54   --    --    TRIG   --    --    --    --   135   --    --   89   --    --    --   71   --    --    ALT  13   --    --    --    --   17   --    --    --    --    --   18   --    --    CR  1.29*   --   1.15   < >   --   1.62*   --    --   1.60*   < >   --    --    < >  1.68*   GFRESTIMATED  52*   --   60*   < >   --   40*   --    --   41*   < >   --    --    < >  39*   GFRESTBLACK  63   --   72   < >   --   49*   --    --   50*   < >   --    --    < >  47*   POTASSIUM  4.3   --   3.7   < >   --   3.9   --    --   4.3   < >  4.1   --    < >  4.2   TSH   --   3.23  2.79   < >   --    --    < >   --    --    < >  4.74   --    --   2.71    < > = values in this interval not displayed.      BP Readings from Last 3 Encounters:   10/10/17 106/67   08/29/17 111/68   07/17/17 111/68    Wt Readings from Last 3 Encounters:   10/10/17 159 lb (72.1 kg)   08/29/17 158 lb (71.7 kg)   07/17/17 158 lb (71.7 kg)           Reviewed and updated as needed this visit by clinical staff     Reviewed and updated as needed this visit by Provider           Earl Sanchez is a 89 year old male who presents with right ear fullness for 2 week(s).   Severity: moderate   Timing:gradual onset  Additional symptoms include none.      History of recurrent otitis: no    Past Medical History:   Diagnosis Date     Abdominal aortic aneurysm (H)     per 2/12/15 abdominal US, mild aneurysm measuring [id number]     Acute MI 1981, 2007      Atrial fibrillation (H)      BPH (benign prostatic hyperplasia)      CAD (coronary artery disease)     CABG 2007, f/b cardio, Dr. 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     Gastritis     treated with zantac     Gastro-oesophageal reflux disease      Heart failure (H)      Hyperlipidaemia LDL goal <100      Hypertension goal BP (blood pressure) < 140/90      Osteoarthritis     low back, hips, knees     Renal disease     renal insuff     Thyroid disease      Current Outpatient Prescriptions   Medication Sig Dispense Refill     lisinopril (PRINIVIL/ZESTRIL) 10 MG tablet TAKE ONE-HALF (1/2) TABLET DAILY 45 tablet 1     levothyroxine (SYNTHROID/LEVOTHROID) 50 MCG tablet TAKE 1 TABLET DAILY 90 tablet 1     pravastatin (PRAVACHOL) 40 MG tablet TAKE 1 TABLET DAILY 90 tablet 1     JANTOVEN 2 MG tablet TAKE 2 TABLETS (4 MG) BY MOUTH ON MONDAY AND FRIDAY, AND 1 TABLET (2 MG) ALL OTHER DAYS OR AS DIRECTED BY [facility] NURSES 135 tablet 0     furosemide (LASIX) 20 MG tablet Take 1 tablet (20 mg) by mouth daily 30 tablet 1     allopurinol (ZYLOPRIM) 100 MG tablet Take 2 tablets (200 mg) by mouth 3 times daily as needed 360 tablet 1     doxazosin (CARDURA) 4 MG tablet Take 1 tablet (4 mg) by mouth At Bedtime 90 tablet 3     acetaminophen (TYLENOL) 500 MG tablet Take 1,000 mg by mouth every 6 hours as needed for mild pain        aspirin (ASPIRIN LOW DOSE) 81 MG tablet Take 81 mg by mouth daily        mirtazapine (REMERON) 30 MG tablet Take 1 tablet (30 mg) by mouth At Bedtime (Patient not taking: Reported on 10/10/2017) 90 tablet 1     allopurinol (ZYLOPRIM) 100 MG tablet Take 1 tablet (100 mg) by mouth 2 times daily (Patient not taking: Reported on 10/10/2017) 90 tablet 1     nitroglycerin (NITROSTAT) 0.4 MG SL tablet Place 1 tablet (0.4 mg) under the tongue every 5 minutes as needed for chest pain (Patient not taking: Reported on 10/10/2017) 25 tablet 0     Social  "History   Substance Use Topics     Smoking status: Never Smoker     Smokeless tobacco: Never Used     Alcohol use Yes      Comment: occ - one glass of wine last night       ROS:   Review of systems negative except as stated above.    OBJECTIVE:  /67 (Cuff Size: Adult Regular)  Pulse 70  Temp 97  F (36.1  C) (Tympanic)  Resp 14  Ht 5' 8\" (1.727 m)  Wt 159 lb (72.1 kg)  SpO2 98%  BMI 24.18 kg/m2   EXAM:  The right TM is not visualized secondary to cerumen     The right auditory canal is normal and without drainage, edema or erythema  The left TM is normal: no effusions, no erythema, and normal landmarks  The left auditory canal is normal and without drainage, edema or erythema  Oropharynx exam is normal: no lesions, erythema, adenopathy or exudate.  GENERAL: no acute distress  EYES: EOMI,  PERRL, conjunctiva clear  NECK: supple, non-tender to palpation, no adenopathy noted  RESP: lungs clear to auscultation - no rales, rhonchi or wheezes  CV: regular rates and rhythm, normal S1 S2, no murmur noted  SKIN: no suspicious lesions or rashes     ASSESSMENT:  Earl was seen today for ear problem.    Diagnoses and all orders for this visit:    Wax in ear  -     REMOVE IMPACTED CERUMEN    Need for prophylactic vaccination and inoculation against influenza    Other orders  -     Cancel: HEMOGLOBIN A1C  -     Cancel: Lipid panel reflex to direct LDL  -     Cancel: Albumin Random Urine Quantitative with Creat Ratio      Wax on right ear removed with ENT alligator forceps and flushed with water without complication     "

## 2017-10-10 NOTE — NURSING NOTE
"Chief Complaint   Patient presents with     Ear Problem       Initial /67 (Cuff Size: Adult Regular)  Pulse 70  Temp 97  F (36.1  C) (Tympanic)  Resp 14  Ht 5' 8\" (1.727 m)  Wt 159 lb (72.1 kg)  SpO2 98%  BMI 24.18 kg/m2 Estimated body mass index is 24.18 kg/(m^2) as calculated from the following:    Height as of this encounter: 5' 8\" (1.727 m).    Weight as of this encounter: 159 lb (72.1 kg).  Medication Reconciliation: complete   Melanie Delcid, CMA    "

## 2017-10-17 NOTE — PROGRESS NOTES
ANTICOAGULATION FOLLOW-UP CLINIC VISIT    Patient Name:  Earl Sanchez  Date:  8/14/2017  Contact Type:  Telephone    SUBJECTIVE:     Patient Findings     Positives No Problem Findings           OBJECTIVE    INR   Date Value Ref Range Status   08/14/2017 2.6  Final       ASSESSMENT / PLAN  INR assessment THER    Recheck INR In: 2 WEEKS    INR Location [facility]      Anticoagulation Summary as of 8/14/2017     INR goal 2.0-3.0   Today's INR 2.6   Maintenance plan 2 mg (2 mg x 1) on Tue, Sat; 4 mg (2 mg x 2) all other days   Full instructions 2 mg on Tue, Sat; 4 mg all other days   Weekly total 24 mg   No change documented Jinny Holguin RN   Plan last modified Brandy Miller RN (7/31/2017)   Next INR check 8/28/2017   Priority INR   Target end date Indefinite    Indications   Long-term (current) use of anticoagulants [Z79.01] [Z79.01]  Atrial fibrillation (H) [I48.91]         Anticoagulation Episode Summary     INR check location     Preferred lab     Send INR reminders to [facility]    Comments TAKES WARFARIN IN THE MORNING        Anticoagulation Care Providers     Provider Role Specialty Phone number    Alvarado Florian MD Responsible Family Practice [phone]            See the Encounter Report to view Anticoagulation Flowsheet and Dosing Calendar (Go to Encounters tab in chart review, and find the Anticoagulation Therapy Visit)    Patient INR at goal will continue same maintenance dosing and follow up in 2 weeks.    Jinny Gama RN               
Breath sounds clear and equal bilaterally.

## 2017-10-26 ENCOUNTER — ANTICOAGULATION THERAPY VISIT (OUTPATIENT)
Dept: NURSING | Facility: CLINIC | Age: 82
End: 2017-10-26
Payer: COMMERCIAL

## 2017-10-26 DIAGNOSIS — I48.91 ATRIAL FIBRILLATION (H): ICD-10-CM

## 2017-10-26 DIAGNOSIS — Z79.01 LONG-TERM (CURRENT) USE OF ANTICOAGULANTS: ICD-10-CM

## 2017-10-26 LAB — INR POINT OF CARE: 2.8 (ref 0.86–1.14)

## 2017-10-26 PROCEDURE — 99207 ZZC NO CHARGE NURSE ONLY: CPT

## 2017-10-26 PROCEDURE — 85610 PROTHROMBIN TIME: CPT | Mod: QW

## 2017-10-26 PROCEDURE — 36416 COLLJ CAPILLARY BLOOD SPEC: CPT

## 2017-10-26 NOTE — MR AVS SNAPSHOT
Earl Sanchez   10/26/2017 10:30 AM   Anticoagulation Therapy Visit    Description:  89 year old male   Provider:  [facility]   Department:  [facility]           INR as of 10/26/2017     Today's INR 2.8      Anticoagulation Summary as of 10/26/2017     INR goal 2.0-3.0   Today's INR 2.8   Full instructions 2 mg on Tue, Thu, Sat; 4 mg all other days   Next INR check 11/30/2017    Indications   Long-term (current) use of anticoagulants [Z79.01] [Z79.01]  Atrial fibrillation (H) [I48.91]         Description     INR 2.8 today.  Continue with 2 mg Tues, Thu, Sat;  4 mg all other days = 22 mg weekly.  Recheck in 5 weeks.         Your next Anticoagulation Clinic appointment(s)     Nov 30, 2017 10:30 AM CST   Anticoagulation Visit with  ANTICOAGULATION CLINIC   [facility] ([facility])    [address] [place] [id number] [phone]              Contact Numbers     Clinic Number:         October 2017 Details    Sun Mon Tue Wed Thu Fri Sat     1               2               3               4               5               6               7                 8               9               10               11               12               13               14                 15               16               17               18               19               20               21                 22               23               24               25               26      2 mg   See details      27      4 mg         28      2 mg           29      4 mg         30      4 mg         31      2 mg              Date Details   10/26 This INR check               How to take your warfarin dose     To take:  2 mg Take 1 of the 2 mg tablets.    To take:  4 mg Take 2 of the 2 mg tablets.           November 2017 Details    Sun Mon Tue Wed Thu Fri Sat        1      4 mg         2      2 mg         3      4 mg         4      2 mg           5      4 mg          6      4 mg         7      2 mg         8      4 mg         9      2 mg         10      4 mg         11      2 mg           12      4 mg         13      4 mg         14      2 mg         15      4 mg         16      2 mg         17      4 mg         18      2 mg           19      4 mg         20      4 mg         21      2 mg         22      4 mg         23      2 mg         24      4 mg         25      2 mg           26      4 mg         27      4 mg         28      2 mg         29      4 mg         30               Date Details   No additional details    Date of next INR:  11/30/2017         How to take your warfarin dose     To take:  2 mg Take 1 of the 2 mg tablets.    To take:  4 mg Take 2 of the 2 mg tablets.

## 2017-10-26 NOTE — PROGRESS NOTES
ANTICOAGULATION FOLLOW-UP CLINIC VISIT    Patient Name:  Earl Sanchez  Date:  10/26/2017  Contact Type:  Face to Face    SUBJECTIVE:     Patient Findings     Positives No Problem Findings           OBJECTIVE    INR Protime   Date Value Ref Range Status   10/26/2017 2.8 (A) 0.86 - 1.14 Final       ASSESSMENT / PLAN  INR assessment THER    Recheck INR In: 5 WEEKS    INR Location Clinic      Anticoagulation Summary as of 10/26/2017     INR goal 2.0-3.0   Today's INR 2.8   Maintenance plan 2 mg (2 mg x 1) on Tue, Thu, Sat; 4 mg (2 mg x 2) all other days   Full instructions 2 mg on Tue, Thu, Sat; 4 mg all other days   Weekly total 22 mg   No change documented Ellen Negro RN   Plan last modified Brandy Miller RN (8/28/2017)   Next INR check 11/30/2017   Priority INR   Target end date Indefinite    Indications   Long-term (current) use of anticoagulants [Z79.01] [Z79.01]  Atrial fibrillation (H) [I48.91]         Anticoagulation Episode Summary     INR check location     Preferred lab     Send INR reminders to [facility]    Comments TAKES WARFARIN IN THE MORNING        Anticoagulation Care Providers     Provider Role Specialty Phone number    Alvarado Florian MD [facility] [phone]            See the Encounter Report to view Anticoagulation Flowsheet and Dosing Calendar (Go to Encounters tab in chart review, and find the Anticoagulation Therapy Visit)    Dosage adjustment made based on physician directed care plan.    INR 2.8 today.  Continue with 2 mg Tues, Thu, Sat;  4 mg all other days = 22 mg weekly.  Recheck in 5 weeks.     Ellen Negro RN

## 2017-11-10 ENCOUNTER — TRANSFERRED RECORDS (OUTPATIENT)
Dept: HEALTH INFORMATION MANAGEMENT | Facility: CLINIC | Age: 82
End: 2017-11-10

## 2017-11-29 ENCOUNTER — MEDICAL CORRESPONDENCE (OUTPATIENT)
Dept: HEALTH INFORMATION MANAGEMENT | Facility: CLINIC | Age: 82
End: 2017-11-29

## 2017-11-29 ENCOUNTER — OFFICE VISIT (OUTPATIENT)
Dept: FAMILY MEDICINE | Facility: CLINIC | Age: 82
End: 2017-11-29
Payer: COMMERCIAL

## 2017-11-29 VITALS
HEART RATE: 78 BPM | SYSTOLIC BLOOD PRESSURE: 95 MMHG | RESPIRATION RATE: 12 BRPM | OXYGEN SATURATION: 97 % | BODY MASS INDEX: 23.49 KG/M2 | TEMPERATURE: 97.2 F | HEIGHT: 68 IN | WEIGHT: 155 LBS | DIASTOLIC BLOOD PRESSURE: 61 MMHG

## 2017-11-29 DIAGNOSIS — I48.91 ATRIAL FIBRILLATION, UNSPECIFIED TYPE (H): ICD-10-CM

## 2017-11-29 DIAGNOSIS — M1A.4790 OTHER SECONDARY CHRONIC GOUT OF FOOT WITHOUT TOPHUS, UNSPECIFIED LATERALITY: ICD-10-CM

## 2017-11-29 DIAGNOSIS — R60.9 FLUID RETENTION: ICD-10-CM

## 2017-11-29 DIAGNOSIS — R79.89 LOW VITAMIN D LEVEL: ICD-10-CM

## 2017-11-29 DIAGNOSIS — Z23 NEED FOR PROPHYLACTIC VACCINATION AGAINST STREPTOCOCCUS PNEUMONIAE (PNEUMOCOCCUS): ICD-10-CM

## 2017-11-29 DIAGNOSIS — R53.81 PHYSICAL DECONDITIONING: Primary | ICD-10-CM

## 2017-11-29 DIAGNOSIS — E03.9 HYPOTHYROIDISM, UNSPECIFIED TYPE: ICD-10-CM

## 2017-11-29 DIAGNOSIS — N18.30 CKD (CHRONIC KIDNEY DISEASE) STAGE 3, GFR 30-59 ML/MIN (H): ICD-10-CM

## 2017-11-29 LAB
ALBUMIN UR-MCNC: NEGATIVE MG/DL
APPEARANCE UR: CLEAR
BILIRUB UR QL STRIP: NEGATIVE
COLOR UR AUTO: YELLOW
ERYTHROCYTE [DISTWIDTH] IN BLOOD BY AUTOMATED COUNT: 14.7 % (ref 10–15)
ERYTHROCYTE [SEDIMENTATION RATE] IN BLOOD BY WESTERGREN METHOD: 56 MM/H (ref 0–20)
GLUCOSE UR STRIP-MCNC: NEGATIVE MG/DL
HCT VFR BLD AUTO: 35.3 % (ref 40–53)
HGB BLD-MCNC: 11.2 G/DL (ref 13.3–17.7)
HGB UR QL STRIP: ABNORMAL
KETONES UR STRIP-MCNC: NEGATIVE MG/DL
LEUKOCYTE ESTERASE UR QL STRIP: NEGATIVE
MCH RBC QN AUTO: 31.6 PG (ref 26.5–33)
MCHC RBC AUTO-ENTMCNC: 31.7 G/DL (ref 31.5–36.5)
MCV RBC AUTO: 100 FL (ref 78–100)
NITRATE UR QL: NEGATIVE
PH UR STRIP: 6.5 PH (ref 5–7)
PLATELET # BLD AUTO: 304 10E9/L (ref 150–450)
RBC # BLD AUTO: 3.54 10E12/L (ref 4.4–5.9)
RBC #/AREA URNS AUTO: ABNORMAL /HPF
SOURCE: ABNORMAL
SP GR UR STRIP: 1.01 (ref 1–1.03)
UROBILINOGEN UR STRIP-ACNC: 0.2 EU/DL (ref 0.2–1)
WBC # BLD AUTO: 7.1 10E9/L (ref 4–11)
WBC #/AREA URNS AUTO: ABNORMAL /HPF

## 2017-11-29 PROCEDURE — 85027 COMPLETE CBC AUTOMATED: CPT | Performed by: FAMILY MEDICINE

## 2017-11-29 PROCEDURE — 80053 COMPREHEN METABOLIC PANEL: CPT | Performed by: FAMILY MEDICINE

## 2017-11-29 PROCEDURE — 36415 COLL VENOUS BLD VENIPUNCTURE: CPT | Performed by: FAMILY MEDICINE

## 2017-11-29 PROCEDURE — 84443 ASSAY THYROID STIM HORMONE: CPT | Performed by: FAMILY MEDICINE

## 2017-11-29 PROCEDURE — 82306 VITAMIN D 25 HYDROXY: CPT | Performed by: FAMILY MEDICINE

## 2017-11-29 PROCEDURE — 85652 RBC SED RATE AUTOMATED: CPT | Performed by: FAMILY MEDICINE

## 2017-11-29 PROCEDURE — 99214 OFFICE O/P EST MOD 30 MIN: CPT | Performed by: FAMILY MEDICINE

## 2017-11-29 PROCEDURE — 81001 URINALYSIS AUTO W/SCOPE: CPT | Performed by: FAMILY MEDICINE

## 2017-11-29 RX ORDER — ALLOPURINOL 100 MG/1
200 TABLET ORAL 3 TIMES DAILY PRN
Qty: 360 TABLET | Refills: 1 | Status: ON HOLD | OUTPATIENT
Start: 2017-11-29 | End: 2018-01-18 | Stop reason: DRUGHIGH

## 2017-11-29 RX ORDER — FUROSEMIDE 20 MG
20 TABLET ORAL DAILY
Qty: 30 TABLET | Refills: 1 | Status: ON HOLD | COMMUNITY
Start: 2017-11-29 | End: 2018-01-18

## 2017-11-29 RX ORDER — LISINOPRIL 10 MG/1
5 TABLET ORAL DAILY
Qty: 45 TABLET | Refills: 1 | Status: ON HOLD | COMMUNITY
Start: 2017-11-29 | End: 2018-01-18

## 2017-11-29 NOTE — PROGRESS NOTES
SUBJECTIVE:   Earl Sanchez is a 89 year old male who presents to clinic today for the following health issues:      Fatigue       Duration: 3 weeks     Description (location/character/radiation): tired, falling asleep easily     Intensity:  moderate    Accompanying signs and symptoms: feeling weak, moving slow    History (similar episodes/previous evaluation): None    Precipitating or alleviating factors: None    Therapies tried and outcome: None       Problem list and histories reviewed & adjusted, as indicated.  Additional history: as documented    Patient Active Problem List   Diagnosis     Hypertension goal BP (blood pressure) < 140/90     CAD (coronary artery disease)     Osteoarthritis     Gait apraxia     Atrial fibrillation (H)     BPH (benign prostatic hyperplasia)     Heart failure (H)     Hyperlipidemia with target LDL less than 100     CKD (chronic kidney disease) stage 3, GFR 30-59 ml/min     Microalbuminuria     Anemia     Hypothyroidism     Elevated alkaline phosphatase level     Advance care planning     Health Care Home     Chronic anticoagulation     Elevated fasting glucose     Toe inflammation     Elevated uric acid in blood     Gastritis     Abdominal aortic aneurysm (H)     Advanced directives, counseling/discussion     Long-term (current) use of anticoagulants [Z79.01]     Hereditary multiple exostosis     General weakness     Tear of meniscus of left knee     Weakness generalized     Past Surgical History:   Procedure Laterality Date     ANKLE SURGERY      ORIF ankle fracture     CORONARY ARTERY BYPASS  2007    CAB X 5 CABG with LIMA to LAD and saphenous vein grafts to, OM2,SVG to diag 1, and sequential PDA     HEART CATH LEFT HEART CATH  12/10/07     HERNIA REPAIR, INGUINAL RT/LT      right     PHACOEMULSIFICATION CLEAR CORNEA WITH TORIC INTRAOCULAR LENS IMPLANT  4/10/2014    Procedure: PHACOEMULSIFICATION CLEAR CORNEA WITH TORIC INTRAOCULAR LENS IMPLANT;  RIGHT  PHACOEMULSIFICATION CLEAR CORNEA WITH TORIC INTRAOCULAR LENS IMPLANT ;  Surgeon: Carlo Tee MD;  Location: [facility]     PHACOEMULSIFICATION CLEAR CORNEA WITH TORIC INTRAOCULAR LENS IMPLANT  4/22/2014    Procedure: PHACOEMULSIFICATION CLEAR CORNEA WITH TORIC INTRAOCULAR LENS IMPLANT;  Surgeon: Carlo Tee MD;  Location: [facility]       Social History   Substance Use Topics     Smoking status: Never Smoker     Smokeless tobacco: Never Used     Alcohol use Yes      Comment: occ - one glass of wine last night     Family History   Problem Relation Age of Onset     Hypertension Mother      Hypertension Maternal Grandmother          Current Outpatient Prescriptions   Medication Sig Dispense Refill     allopurinol (ZYLOPRIM) 100 MG tablet Take 2 tablets (200 mg) by mouth 3 times daily as needed 360 tablet 1     lisinopril (PRINIVIL/ZESTRIL) 10 MG tablet Take 0.5 tablets (5 mg) by mouth daily 45 tablet 1     furosemide (LASIX) 20 MG tablet Take 1 tablet (20 mg) by mouth daily 30 tablet 1     levothyroxine (SYNTHROID/LEVOTHROID) 50 MCG tablet TAKE 1 TABLET DAILY 90 tablet 1     pravastatin (PRAVACHOL) 40 MG tablet TAKE 1 TABLET DAILY 90 tablet 1     JANTOVEN 2 MG tablet TAKE 2 TABLETS (4 MG) BY MOUTH ON MONDAY AND FRIDAY, AND 1 TABLET (2 MG) ALL OTHER DAYS OR AS DIRECTED BY ACC NURSES 135 tablet 0     doxazosin (CARDURA) 4 MG tablet Take 1 tablet (4 mg) by mouth At Bedtime 90 tablet 3     acetaminophen (TYLENOL) 500 MG tablet Take 1,000 mg by mouth every 6 hours as needed for mild pain        aspirin (ASPIRIN LOW DOSE) 81 MG tablet Take 81 mg by mouth daily        [DISCONTINUED] lisinopril (PRINIVIL/ZESTRIL) 10 MG tablet TAKE ONE-HALF (1/2) TABLET DAILY 45 tablet 1     [DISCONTINUED] furosemide (LASIX) 20 MG tablet Take 1 tablet (20 mg) by mouth daily 30 tablet 1     nitroglycerin (NITROSTAT) 0.4 MG SL tablet Place 1 tablet (0.4 mg) under the tongue every 5 minutes as needed for chest pain (Patient not  "taking: Reported on 10/10/2017) 25 tablet 0     No Known Allergies  Recent Labs   Lab Test  07/17/17   1157 06/19/17 06/16/17   0450   03/22/16   1406  06/11/15   1039   04/16/15   1039  02/05/15   1344   04/08/14   1052  02/03/14   0917   03/12/13   1018   A1C   --    --    --    --    --    --    --    --   5.9   --   6.2*   --    --   5.6   LDL   --    --    --    --   43   --    --   47   --    --    --   61   --    --    HDL   --    --    --    --   51   --    --   55   --    --    --   54   --    --    TRIG   --    --    --    --   135   --    --   89   --    --    --   71   --    --    ALT  13   --    --    --    --   17   --    --    --    --    --   18   --    --    CR  1.29*   --   1.15   < >   --   1.62*   --    --   1.60*   < >   --    --    < >  1.68*   GFRESTIMATED  52*   --   60*   < >   --   40*   --    --   41*   < >   --    --    < >  39*   GFRESTBLACK  63   --   72   < >   --   49*   --    --   50*   < >   --    --    < >  47*   POTASSIUM  4.3   --   3.7   < >   --   3.9   --    --   4.3   < >  4.1   --    < >  4.2   TSH   --   3.23  2.79   < >   --    --    < >   --    --    < >  4.74   --    --   2.71    < > = values in this interval not displayed.      BP Readings from Last 3 Encounters:   11/29/17 95/61   10/10/17 106/67   08/29/17 111/68    Wt Readings from Last 3 Encounters:   11/29/17 155 lb (70.3 kg)   10/10/17 159 lb (72.1 kg)   08/29/17 158 lb (71.7 kg)               Reviewed and updated as needed this visit by clinical staff     Reviewed and updated as needed this visit by Provider         ROS:  Constitutional, HEENT, cardiovascular, pulmonary, gi and gu systems are negative, except as otherwise noted.      OBJECTIVE:   BP 95/61 (Cuff Size: Adult Regular)  Pulse 78  Temp 97.2  F (36.2  C) (Tympanic)  Resp 12  Ht 5' 8\" (1.727 m)  Wt 155 lb (70.3 kg)  SpO2 97%  BMI 23.57 kg/m2  Body mass index is 23.57 kg/(m^2).  GENERAL: healthy, alert and no distress  NECK: no adenopathy, no " asymmetry, masses, or scars and thyroid normal to palpation  RESP: lungs clear to auscultation - no rales, rhonchi or wheezes  CV: regular rate and rhythm, normal S1 S2, no S3 or S4, no murmur, click or rub, no peripheral edema and peripheral pulses strong  ABDOMEN: soft, nontender, no hepatosplenomegaly, no masses and bowel sounds normal  MS: no gross musculoskeletal defects noted, no edema        ASSESSMENT/PLAN:   ASSESSMENT / PLAN:  (R53.81) Physical deconditioning  (primary encounter diagnosis)  Comment: has been feeling weak and having more nap during day time, will have him to check lab to make sure if having sign of infection   Plan: CBC with platelets, Comprehensive metabolic         panel, TSH with free T4 reflex, Vitamin D         Deficiency, ESR: Erythrocyte sedimentation         rate, UA with Microscopic reflex to Culture,         TSH with free T4 reflex            (Z23) Need for prophylactic vaccination against Streptococcus pneumoniae (pneumococcus)      (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, unspecified laterality  Comment: has been stable with current dose of meds, will renew   Plan: allopurinol (ZYLOPRIM) 100 MG tablet            (N18.3) CKD (chronic kidney disease) stage 3, GFR 30-59 ml/min  Comment: has low running BP, will have him to hold taking BP meds and diuretics   Plan: lisinopril (PRINIVIL/ZESTRIL) 10 MG tablet            (R60.9) Fluid retention  Comment: mentioned above   Plan: furosemide (LASIX) 20 MG tablet            (E55.9) Low vitamin D level  Comment: has been feeling weak, and currently not taking vit D, will recheck lab and update pt   Plan: Vitamin D Deficiency            (I48.91) Atrial fibrillation, unspecified type (H)  Comment: stable   Plan: will keep watching sx     (E03.9) Hypothyroidism, unspecified type  Comment: mentioned above, will check lab   Plan: TSH with free T4 reflex              Alvarado Florian MD  [facility]

## 2017-11-29 NOTE — LETTER
November 30, 2017      Earl Sanchez  [address] [place] [address] [address]  [place] [facility] [address]        Dear ,    We are writing to inform you of your test results.      You maybe able to check the lab results via [employer], it showed your lab results including urine test and complete blood cell counts showed no sign of infection, but it showed low hemoglobin as was last time. Please keep working on well balanced diet with iron enriched food item including dark leafy vegetables and beans with tree nuts.  By the way, your kidney function is significantly suppressed, your symptom seems related with dehydration as was noted during your last visit. Please keep working on hydration as was suggested at the meeting. You have elevated Sed rate without sign of acute infection nor inflamation. We would like to recheck it with kidney function in 1 month again for follow up. Please plan returning  to clinic in a 1 month for it. Your vitamin D level is also very low. Your symptom seems from the combined issue of dehydration and low vitamin D. I will send the high weekly dose(50,000 IU per week for 8 weeks). Please  and start at your earliest convenience.    Resulted Orders   CBC with platelets   Result Value Ref Range    WBC 7.1 4.0 - 11.0 10e9/L    RBC Count 3.54 (L) 4.4 - 5.9 10e12/L    Hemoglobin 11.2 (L) 13.3 - 17.7 g/dL    Hematocrit 35.3 (L) 40.0 - 53.0 %     78 - 100 fl    MCH 31.6 26.5 - 33.0 pg    MCHC 31.7 31.5 - 36.5 g/dL    RDW 14.7 10.0 - 15.0 %    Platelet Count 304 150 - 450 10e9/L   Comprehensive metabolic panel   Result Value Ref Range    Sodium 138 133 - 144 mmol/L    Potassium 4.3 3.4 - 5.3 mmol/L    Chloride 103 94 - 109 mmol/L    Carbon Dioxide 27 20 - 32 mmol/L    Anion Gap 8 3 - 14 mmol/L    Glucose 91 70 - 99 mg/dL      Comment:      Non Fasting    Urea Nitrogen 41 (H) 7 - 30 mg/dL    Creatinine 1.60 (H) 0.66 - 1.25 mg/dL    GFR Estimate 41 (L) >60 mL/min/1.7m2       Comment:      Non  GFR Calc    GFR Estimate If Black 49 (L) >60 mL/min/1.7m2      Comment:       GFR Calc    Calcium 9.1 8.5 - 10.1 mg/dL    Bilirubin Total 0.5 0.2 - 1.3 mg/dL    Albumin 3.6 3.4 - 5.0 g/dL    Protein Total 8.0 6.8 - 8.8 g/dL    Alkaline Phosphatase 99 40 - 150 U/L    ALT 16 0 - 70 U/L    AST 21 0 - 45 U/L   TSH with free T4 reflex   Result Value Ref Range    TSH 1.38 0.40 - 4.00 mU/L   ESR: Erythrocyte sedimentation rate   Result Value Ref Range    Sed Rate 56 (H) 0 - 20 mm/h   UA with Microscopic reflex to Culture   Result Value Ref Range    Color Urine Yellow     Appearance Urine Clear     Glucose Urine Negative NEG^Negative mg/dL    Bilirubin Urine Negative NEG^Negative    Ketones Urine Negative NEG^Negative mg/dL    Specific Gravity Urine 1.015 1.003 - 1.035    pH Urine 6.5 5.0 - 7.0 pH    Protein Albumin Urine Negative NEG^Negative mg/dL    Urobilinogen Urine 0.2 0.2 - 1.0 EU/dL    Nitrite Urine Negative NEG^Negative    Blood Urine Trace (A) NEG^Negative    Leukocyte Esterase Urine Negative NEG^Negative    Source Midstream Urine     WBC Urine O - 2 OTO2^O - 2 /HPF    RBC Urine O - 2 OTO2^O - 2 /HPF       If you have any questions or concerns, please call the clinic at the number listed above.       Sincerely,        Alvarado Florian MD

## 2017-11-29 NOTE — NURSING NOTE
"Chief Complaint   Patient presents with     Fatigue       Initial BP 95/61 (Cuff Size: Adult Regular)  Pulse 78  Temp 97.2  F (36.2  C) (Tympanic)  Resp 12  Ht 5' 8\" (1.727 m)  Wt 155 lb (70.3 kg)  SpO2 97%  BMI 23.57 kg/m2 Estimated body mass index is 23.57 kg/(m^2) as calculated from the following:    Height as of this encounter: 5' 8\" (1.727 m).    Weight as of this encounter: 155 lb (70.3 kg).  Medication Reconciliation: complete   Melanie Delcid, CMA    "

## 2017-11-30 ENCOUNTER — ANTICOAGULATION THERAPY VISIT (OUTPATIENT)
Dept: NURSING | Facility: CLINIC | Age: 82
End: 2017-11-30
Payer: COMMERCIAL

## 2017-11-30 DIAGNOSIS — Z79.01 LONG-TERM (CURRENT) USE OF ANTICOAGULANTS: ICD-10-CM

## 2017-11-30 DIAGNOSIS — I48.91 ATRIAL FIBRILLATION, UNSPECIFIED TYPE (H): ICD-10-CM

## 2017-11-30 LAB
ALBUMIN SERPL-MCNC: 3.6 G/DL (ref 3.4–5)
ALP SERPL-CCNC: 99 U/L (ref 40–150)
ALT SERPL W P-5'-P-CCNC: 16 U/L (ref 0–70)
ANION GAP SERPL CALCULATED.3IONS-SCNC: 8 MMOL/L (ref 3–14)
AST SERPL W P-5'-P-CCNC: 21 U/L (ref 0–45)
BILIRUB SERPL-MCNC: 0.5 MG/DL (ref 0.2–1.3)
BUN SERPL-MCNC: 41 MG/DL (ref 7–30)
CALCIUM SERPL-MCNC: 9.1 MG/DL (ref 8.5–10.1)
CHLORIDE SERPL-SCNC: 103 MMOL/L (ref 94–109)
CO2 SERPL-SCNC: 27 MMOL/L (ref 20–32)
CREAT SERPL-MCNC: 1.6 MG/DL (ref 0.66–1.25)
DEPRECATED CALCIDIOL+CALCIFEROL SERPL-MC: 16 UG/L (ref 20–75)
GFR SERPL CREATININE-BSD FRML MDRD: 41 ML/MIN/1.7M2
GLUCOSE SERPL-MCNC: 91 MG/DL (ref 70–99)
INR POINT OF CARE: 2.4 (ref 0.86–1.14)
POTASSIUM SERPL-SCNC: 4.3 MMOL/L (ref 3.4–5.3)
PROT SERPL-MCNC: 8 G/DL (ref 6.8–8.8)
SODIUM SERPL-SCNC: 138 MMOL/L (ref 133–144)
TSH SERPL DL<=0.005 MIU/L-ACNC: 1.38 MU/L (ref 0.4–4)

## 2017-11-30 PROCEDURE — 85610 PROTHROMBIN TIME: CPT | Mod: QW

## 2017-11-30 PROCEDURE — 99207 ZZC NO CHARGE NURSE ONLY: CPT

## 2017-11-30 PROCEDURE — 36416 COLLJ CAPILLARY BLOOD SPEC: CPT

## 2017-11-30 RX ORDER — ERGOCALCIFEROL 1.25 MG/1
50000 CAPSULE, LIQUID FILLED ORAL
Qty: 8 CAPSULE | Refills: 0 | Status: SHIPPED | OUTPATIENT
Start: 2017-11-30 | End: 2018-01-19

## 2017-11-30 NOTE — PROGRESS NOTES
ANTICOAGULATION FOLLOW-UP CLINIC VISIT    Patient Name:  Earl Sanchez  Date:  11/30/2017  Contact Type:  Face to Face    SUBJECTIVE:     Patient Findings     Positives No Problem Findings           OBJECTIVE    INR Protime   Date Value Ref Range Status   11/30/2017 2.4 (A) 0.86 - 1.14 Final       ASSESSMENT / PLAN  INR assessment THER    Recheck INR In: 5 WEEKS    INR Location Clinic      Anticoagulation Summary as of 11/30/2017     INR goal 2.0-3.0   Today's INR 2.4   Maintenance plan 2 mg (2 mg x 1) on Tue, Thu, Sat; 4 mg (2 mg x 2) all other days   Full instructions 2 mg on Tue, Thu, Sat; 4 mg all other days   Weekly total 22 mg   No change documented Ellen Negro RN   Plan last modified Brandy Miller RN (8/28/2017)   Next INR check 1/4/2018   Priority INR   Target end date Indefinite    Indications   Long-term (current) use of anticoagulants [Z79.01] [Z79.01]  Atrial fibrillation (H) [I48.91]         Anticoagulation Episode Summary     INR check location     Preferred lab     Send INR reminders to [facility]    Comments TAKES WARFARIN IN THE MORNING        Anticoagulation Care Providers     Provider Role Specialty Phone number    Alvarado Florian MD [facility] [phone]            See the Encounter Report to view Anticoagulation Flowsheet and Dosing Calendar (Go to Encounters tab in chart review, and find the Anticoagulation Therapy Visit)    Dosage adjustment made based on physician directed care plan.    INR therapeutic at 2.4 today.  Denies problems/changes.  Continue with 2 mg on Tues, Thu, Sat;  4 mg all other days = 22 mg weekly.  Recheck in 5 weeks or sooner if problems/concerns. Patient will check with [facility] to see if he can have INR done at the facility instead of coming to clinic    Ellen Negro RN

## 2017-11-30 NOTE — MR AVS SNAPSHOT
Earl Sanchez   11/30/2017 10:30 AM   Anticoagulation Therapy Visit    Description:  89 year old male   Provider:  [facility]   Department:  [facility]           INR as of 11/30/2017     Today's INR 2.4      Anticoagulation Summary as of 11/30/2017     INR goal 2.0-3.0   Today's INR 2.4   Full instructions 2 mg on Tue, Thu, Sat; 4 mg all other days   Next INR check 1/4/2018    Indications   Long-term (current) use of anticoagulants [Z79.01] [Z79.01]  Atrial fibrillation (H) [I48.91]         Description     INR therapeutic at 2.4 today.  Denies problems/changes.  Continue with 2 mg on Tues, Thu, Sat;  4 mg all other days = 22 mg weekly.  Recheck in 5 weeks or sooner if problems/concerns.         Your next Anticoagulation Clinic appointment(s)     Jan 04, 2018 10:30 AM CST   Anticoagulation Visit with [facility]   [facility] ([facility])    [address] [place] [id number] [phone]              Contact Numbers     Clinic Number:         November 2017 Details    Sun Mon Tue Wed Thu Fri Sat        1               2               3               4                 5               6               7               8               9               10               11                 12               13               14               15               16               17               18                 19               20               21               22               23               24               25                 26               27               28               29               30      2 mg   See details         Date Details   11/30 This INR check               How to take your warfarin dose     To take:  2 mg Take 1 of the 2 mg tablets.           December 2017 Details    Sun Mon Tue Wed Thu Fri Sat          1      4 mg         2      2 mg           3      4 mg         4      4 mg         5      2 mg         6       4 mg         7      2 mg         8      4 mg         9      2 mg           10      4 mg         11      4 mg         12      2 mg         13      4 mg         14      2 mg         15      4 mg         16      2 mg           17      4 mg         18      4 mg         19      2 mg         20      4 mg         21      2 mg         22      4 mg         23      2 mg           24      4 mg         25      4 mg         26      2 mg         27      4 mg         28      2 mg         29      4 mg         30      2 mg           31      4 mg                Date Details   No additional details            How to take your warfarin dose     To take:  2 mg Take 1 of the 2 mg tablets.    To take:  4 mg Take 2 of the 2 mg tablets.           January 2018 Details    Sun Mon Tue Wed Thu Fri Sat      1      4 mg         2      2 mg         3      4 mg         4            5               6                 7               8               9               10               11               12               13                 14               15               16               17               18               19               20                 21               22               23               24               25               26               27                 28               29               30               31                   Date Details   No additional details    Date of next INR:  1/4/2018         How to take your warfarin dose     To take:  2 mg Take 1 of the 2 mg tablets.    To take:  4 mg Take 2 of the 2 mg tablets.

## 2017-12-18 DIAGNOSIS — Z79.01 CHRONIC ANTICOAGULATION: ICD-10-CM

## 2017-12-18 DIAGNOSIS — I48.20 CHRONIC ATRIAL FIBRILLATION (H): ICD-10-CM

## 2017-12-19 NOTE — TELEPHONE ENCOUNTER
Prescription approved per [facility], [facility] or [facility] refill protocol.  See high alert interaction at refill with SOLO Ellsworth RN - Triage  [facility]

## 2017-12-19 NOTE — TELEPHONE ENCOUNTER
Last Written Prescription Date:  7/17/17  Last Fill Quantity: 135,  # refills: 0   Last Office Visit with G, P or [facility] prescribing provider:  11/29/17   Future Office Visit:     Requested Prescriptions   Pending Prescriptions Disp Refills     JANTOVEN 2 MG tablet [Pharmacy Med Name: JANTOVEN 2MG TABS] 135 tablet 0     Sig: TAKE 2 TABLETS (4 MG) BY MOUTH ON MONDAY AND FRIDAY, AND 1 TABLET (2 MG) ALL OTHER DAYS OR AS DIRECTED BY [facility] NURSES    Vitamin K Antagonists Failed    12/18/2017 10:02 AM       Failed - INR is within goal in the past 6 weeks    Confirm INR is within goal in the past 6 weeks.     Recent Labs   Lab Test 11/30/17   INR  2.4*                      Passed - Recent or future visit with authorizing provider's specialty    Patient had office visit in the last year or has a visit in the next 30 days with authorizing provider.  See chart review.              Passed - Patient is 18 years of age or older

## 2017-12-20 RX ORDER — WARFARIN SODIUM 2 MG/1
TABLET ORAL
Qty: 135 TABLET | Refills: 0 | Status: ON HOLD | OUTPATIENT
Start: 2017-12-20 | End: 2018-01-18 | Stop reason: DRUGHIGH

## 2017-12-22 DIAGNOSIS — Z79.01 CHRONIC ANTICOAGULATION: ICD-10-CM

## 2017-12-22 DIAGNOSIS — I48.20 CHRONIC ATRIAL FIBRILLATION (H): ICD-10-CM

## 2017-12-26 RX ORDER — WARFARIN SODIUM 2 MG/1
TABLET ORAL
Qty: 135 TABLET | Refills: 0 | OUTPATIENT
Start: 2017-12-26

## 2017-12-26 NOTE — TELEPHONE ENCOUNTER
"Warfarin       Last Written Prescription Date: 12/20/17  Last Fill Quantity: 135,  # refills: 0   Last Office Visit with [facility], P or [facility] prescribing provider: 11/29/17    Lab Results   Component Value Date    INR 2.4 11/30/2017    INR 2.8 10/26/2017    INR 2.6 09/26/2017    INR 2.8 09/05/2017     Refill available at pharmacy.  Request refused as \"duplicate\" sent back to pharmacy.      Ellen Negro RN                                                       "

## 2018-01-16 ENCOUNTER — OFFICE VISIT (OUTPATIENT)
Dept: FAMILY MEDICINE | Facility: CLINIC | Age: 83
End: 2018-01-16
Payer: COMMERCIAL

## 2018-01-16 ENCOUNTER — ANTICOAGULATION THERAPY VISIT (OUTPATIENT)
Dept: NURSING | Facility: CLINIC | Age: 83
End: 2018-01-16
Payer: COMMERCIAL

## 2018-01-16 ENCOUNTER — CARE COORDINATION (OUTPATIENT)
Dept: CARE COORDINATION | Facility: CLINIC | Age: 83
End: 2018-01-16

## 2018-01-16 VITALS
RESPIRATION RATE: 14 BRPM | TEMPERATURE: 97.5 F | OXYGEN SATURATION: 99 % | HEART RATE: 73 BPM | SYSTOLIC BLOOD PRESSURE: 137 MMHG | DIASTOLIC BLOOD PRESSURE: 82 MMHG

## 2018-01-16 DIAGNOSIS — Z91.81 AT RISK FOR FALLING: ICD-10-CM

## 2018-01-16 DIAGNOSIS — Z79.01 LONG-TERM (CURRENT) USE OF ANTICOAGULANTS: ICD-10-CM

## 2018-01-16 DIAGNOSIS — I48.91 ATRIAL FIBRILLATION, UNSPECIFIED TYPE (H): ICD-10-CM

## 2018-01-16 DIAGNOSIS — F09 COGNITIVE DYSFUNCTION: ICD-10-CM

## 2018-01-16 DIAGNOSIS — R26.89 POOR BALANCE: ICD-10-CM

## 2018-01-16 DIAGNOSIS — Z99.3 WHEELCHAIR BOUND: ICD-10-CM

## 2018-01-16 DIAGNOSIS — R53.81 PHYSICAL DECONDITIONING: Primary | ICD-10-CM

## 2018-01-16 DIAGNOSIS — Z23 NEED FOR PROPHYLACTIC VACCINATION AGAINST STREPTOCOCCUS PNEUMONIAE (PNEUMOCOCCUS): ICD-10-CM

## 2018-01-16 LAB — INR POINT OF CARE: 1.9 (ref 0.86–1.14)

## 2018-01-16 PROCEDURE — 99214 OFFICE O/P EST MOD 30 MIN: CPT | Performed by: FAMILY MEDICINE

## 2018-01-16 PROCEDURE — 36416 COLLJ CAPILLARY BLOOD SPEC: CPT

## 2018-01-16 PROCEDURE — 99207 ZZC NO CHARGE NURSE ONLY: CPT

## 2018-01-16 PROCEDURE — 85610 PROTHROMBIN TIME: CPT | Mod: QW

## 2018-01-16 NOTE — PROGRESS NOTES
SUBJECTIVE:   Earl Sanchez is a 89 year old male who presents to clinic today for the following health issues:      Mobility Issues       Description (location/character/radiation): Mobility issues.           Problem list and histories reviewed & adjusted, as indicated.  Additional history: as documented    Patient Active Problem List   Diagnosis     Hypertension goal BP (blood pressure) < 140/90     CAD (coronary artery disease)     Osteoarthritis     Gait apraxia     Atrial fibrillation (H)     BPH (benign prostatic hyperplasia)     Heart failure (H)     Hyperlipidemia with target LDL less than 100     CKD (chronic kidney disease) stage 3, GFR 30-59 ml/min     Microalbuminuria     Anemia     Hypothyroidism     Elevated alkaline phosphatase level     Advance care planning     Health Care Home     Chronic anticoagulation     Elevated fasting glucose     Toe inflammation     Elevated uric acid in blood     Gastritis     Abdominal aortic aneurysm (H)     Advanced directives, counseling/discussion     Long-term (current) use of anticoagulants [Z79.01]     Hereditary multiple exostosis     General weakness     Tear of meniscus of left knee     Weakness generalized     Past Surgical History:   Procedure Laterality Date     ANKLE SURGERY      ORIF ankle fracture     CORONARY ARTERY BYPASS  2007    CAB X 5 CABG with LIMA to LAD and saphenous vein grafts to, OM2,SVG to diag 1, and sequential PDA     HEART CATH LEFT HEART CATH  12/10/07     HERNIA REPAIR, INGUINAL RT/LT      right     PHACOEMULSIFICATION CLEAR CORNEA WITH TORIC INTRAOCULAR LENS IMPLANT  4/10/2014    Procedure: PHACOEMULSIFICATION CLEAR CORNEA WITH TORIC INTRAOCULAR LENS IMPLANT;  RIGHT PHACOEMULSIFICATION CLEAR CORNEA WITH TORIC INTRAOCULAR LENS IMPLANT ;  Surgeon: Carlo Tee MD;  Location: [facility]     PHACOEMULSIFICATION CLEAR CORNEA WITH TORIC INTRAOCULAR LENS IMPLANT  4/22/2014    Procedure: PHACOEMULSIFICATION CLEAR CORNEA WITH  TORIC INTRAOCULAR LENS IMPLANT;  Surgeon: Carlo Tee MD;  Location: [facility]       Social History   Substance Use Topics     Smoking status: Never Smoker     Smokeless tobacco: Never Used     Alcohol use Yes      Comment: occ - one glass of wine last night     Family History   Problem Relation Age of Onset     Hypertension Mother      Hypertension Maternal Grandmother          Current Outpatient Prescriptions   Medication Sig Dispense Refill     JANTOVEN 2 MG tablet TAKE 2 TABLETS (4 MG) BY MOUTH ON MONDAY AND FRIDAY, AND 1 TABLET (2 MG) ALL OTHER DAYS OR AS DIRECTED BY ACC NURSES 135 tablet 0     vitamin D (ERGOCALCIFEROL) 80975 UNIT capsule Take 1 capsule (50,000 Units) by mouth every 7 days for 8 doses 8 capsule 0     allopurinol (ZYLOPRIM) 100 MG tablet Take 2 tablets (200 mg) by mouth 3 times daily as needed 360 tablet 1     lisinopril (PRINIVIL/ZESTRIL) 10 MG tablet Take 0.5 tablets (5 mg) by mouth daily 45 tablet 1     furosemide (LASIX) 20 MG tablet Take 1 tablet (20 mg) by mouth daily 30 tablet 1     levothyroxine (SYNTHROID/LEVOTHROID) 50 MCG tablet TAKE 1 TABLET DAILY 90 tablet 1     pravastatin (PRAVACHOL) 40 MG tablet TAKE 1 TABLET DAILY 90 tablet 1     doxazosin (CARDURA) 4 MG tablet Take 1 tablet (4 mg) by mouth At Bedtime 90 tablet 3     acetaminophen (TYLENOL) 500 MG tablet Take 1,000 mg by mouth every 6 hours as needed for mild pain        nitroglycerin (NITROSTAT) 0.4 MG SL tablet Place 1 tablet (0.4 mg) under the tongue every 5 minutes as needed for chest pain 25 tablet 0     aspirin (ASPIRIN LOW DOSE) 81 MG tablet Take 81 mg by mouth daily        No Known Allergies  Recent Labs   Lab Test  11/29/17   1213  07/17/17   1157 06/19/17 03/22/16   1406  06/11/15   1039   04/16/15   1039  02/05/15   1344   04/08/14   1052  02/03/14   0917   03/12/13   1018   A1C   --    --    --    --    --    --    --    --   5.9   --   6.2*   --    --   5.6   LDL   --    --    --    --   43   --    --    47   --    --    --   61   --    --    HDL   --    --    --    --   51   --    --   55   --    --    --   54   --    --    TRIG   --    --    --    --   135   --    --   89   --    --    --   71   --    --    ALT  16  13   --    --    --   17   --    --    --    --    --   18   --    --    CR  1.60*  1.29*   --    < >   --   1.62*   --    --   1.60*   < >   --    --    < >  1.68*   GFRESTIMATED  41*  52*   --    < >   --   40*   --    --   41*   < >   --    --    < >  39*   GFRESTBLACK  49*  63   --    < >   --   49*   --    --   50*   < >   --    --    < >  47*   POTASSIUM  4.3  4.3   --    < >   --   3.9   --    --   4.3   < >  4.1   --    < >  4.2   TSH  1.38   --   3.23   < >   --    --    < >   --    --    < >  4.74   --    --   2.71    < > = values in this interval not displayed.      BP Readings from Last 3 Encounters:   01/16/18 140/82   11/29/17 95/61   10/10/17 106/67    Wt Readings from Last 3 Encounters:   11/29/17 155 lb (70.3 kg)   10/10/17 159 lb (72.1 kg)   08/29/17 158 lb (71.7 kg)           Reviewed and updated as needed this visit by clinical staff     Reviewed and updated as needed this visit by Provider         ROS:  Constitutional, HEENT, cardiovascular, pulmonary, gi and gu systems are negative, except as otherwise noted.      OBJECTIVE:   /82 (Cuff Size: Adult Large)  Pulse 73  Temp 97.5  F (36.4  C) (Tympanic)  Resp 14  SpO2 99%  There is no height or weight on file to calculate BMI.  GENERAL: healthy, alert and no distress  NECK: no adenopathy, no asymmetry, masses, or scars and thyroid normal to palpation  RESP: lungs clear to auscultation - no rales, rhonchi or wheezes  CV: regular rate and rhythm, normal S1 S2, no S3 or S4, no murmur, click or rub, no peripheral edema and peripheral pulses strong  ABDOMEN: soft, nontender, no hepatosplenomegaly, no masses and bowel sounds normal  MS: decreased muscle tone on upper and lower extremities both, seriously off balance, has normal  sensory   NEURO: weakness of upper and lower extremities , sensory exam grossly normal, oriented times/person/place, speech slightly delayed, cranial nerves 2-12 intact, DTR's normal and symmetric, rapid alternating movements slightly delayed        ASSESSMENT/PLAN:   ASSESSMENT / PLAN:  (R53.81) Physical deconditioning  (primary encounter diagnosis)  Comment: has been physically and cognitively deteriorating since December, not able to ambulate with walker and currently wheelchair bound, has decreased memory  And sleep and nap more than usual, has normal solid food intake but not good with liquid intake,   Has multiple report about fall from his senior living  Based on the safely and his deteriorating health status, he should change the placement to NH or assistant living place with 24 hours nursing staff. Starting OT/PT on site migth help as well  He would have great benefits if our  mobile care could follow him to prevent increasing the risk of fall with health hazard    Plan: CARE COORDINATION REFERRAL            (Z91.81) At risk for falling      (Z23) Need for prophylactic vaccination against Streptococcus pneumoniae (pneumococcus)      (R26.89) Poor balance  Comment: mentioned above, he's wheelchair bound now, need placement change as mentioned above   Plan: CARE COORDINATION REFERRAL            (F09) Cognitive dysfunction  Comment: has been changed for last 1 month, also having subtle personality change as well, encouraged him to see neurologist for f/u to make sure if has additional stroke   Plan: CARE COORDINATION REFERRAL            (Z99.3) Wheelchair bound  Comment: mentioned above   Plan: CARE COORDINATION REFERRAL              FUTURE APPOINTMENTS:       - Follow-up visit in 3 months     Alvarado Florian MD  [facility]

## 2018-01-16 NOTE — NURSING NOTE
"Chief Complaint   Patient presents with     Mobility Issues       Initial /82 (Cuff Size: Adult Large)  Pulse 73  Temp 97.5  F (36.4  C) (Tympanic)  Resp 14  SpO2 99% Estimated body mass index is 23.57 kg/(m^2) as calculated from the following:    Height as of 11/29/17: 5' 8\" (1.727 m).    Weight as of 11/29/17: 155 lb (70.3 kg).  Medication Reconciliation: complete   Melanie Delcid, CMA    "

## 2018-01-16 NOTE — MR AVS SNAPSHOT
After Visit Summary   1/16/2018    Ealr Sanchez    MRN: [id number]           Patient Information     Date Of Birth          9/17/1926        Visit Information        Provider Department      1/16/2018 9:40 AM Alvarado Florian MD [facility] [place]        Today's Diagnoses     Physical deconditioning    -  1    At risk for falling        Need for prophylactic vaccination against Streptococcus pneumoniae (pneumococcus)        Poor balance        Cognitive dysfunction        Wheelchair bound           Follow-ups after your visit        Additional Services     CARE COORDINATION REFERRAL       Services are provided by a Care Coordinator for people with complex needs such as: medical, social, or financial troubles.  The Care Coordinator works with the patient and their Primary Care Provider to determine health goals, obtain resources, achieve outcomes, and develop care plans that help coordinate the patient's care.     Reason for Referral: Caregiver Concerns, Inpatient to Outpatient Transition, Mental Status/Behavioral Changes and Other: fast deteriorating cognitive function, not able to ADL and IADL, needs step-up placement(NH or ass living with 24 hours nursing staff assistance). Plus, he might have good benefits if he could be involved in [facility] mobile clinic. Please check on if he is indicated for it       Clinical Staff have discussed the Care Coordination Referral with the patient and/or caregiver: yes    Please contact his son Pako([phone])                  Your next 10 appointments already scheduled     Feb 20, 2018  1:30 PM CST   Anticoagulation Visit with [facility]   [facility] [place] [facility] ([facility] [place])    [address] [place] [id number] [phone]              Who to contact     If you have questions or need follow up information about today's clinic visit or your schedule please contact [facility] directly at [phone].  Normal or non-critical lab and imaging results will be communicated to you by MyChart, letter or phone within 4 business days after the clinic has received the results. If you do not hear from us within 7 days, please contact the clinic through [employer] or phone. If you have a critical or abnormal lab result, we will notify you by phone as soon as possible.  Submit refill requests through [employer] or call your pharmacy and they will forward the refill request to us. Please allow 3 business days for your refill to be completed.          Additional Information About Your Visit        [employer] Information     [employer] gives you secure access to your electronic health record. If you see a primary care provider, you can also send messages to your care team and make appointments. If you have questions, please call your primary care clinic.  If you do not have a primary care provider, please call [phone] and they will assist you.        Care EveryWhere ID     This is your Care EveryWhere ID. This could be used by other organizations to access your [place] medical records  [id number]        Your Vitals Were     Pulse Temperature Respirations Pulse Oximetry          73 97.5  F (36.4  C) (Tympanic) 14 99%         Blood Pressure from Last 3 Encounters:   01/16/18 137/82   11/29/17 95/61   10/10/17 106/67    Weight from Last 3 Encounters:   11/29/17 155 lb (70.3 kg)   10/10/17 159 lb (72.1 kg)   08/29/17 158 lb (71.7 kg)              We Performed the Following     CARE COORDINATION REFERRAL        Primary Care Provider Office Phone # Fax #    Alvarado Florian [phone]       [address] [facility] [address]        Equal Access to Services     [facility] FRANCISCO : Hadii ana m Rees, wabilly cee, qaybta richard gibson, araceli littlejohn. So [facility] [phone].    ATENCIÓN: Si habla español, tiene a stephen disposición servicios  stephania de asistencia lingüística. [employer] [phone].    We comply with applicable federal civil rights laws and [place] laws. We do not discriminate on the basis of race, color, national origin, age, disability, sex, sexual orientation, or gender identity.            Thank you!     Thank you for choosing [facility] [place]  for your care. Our goal is always to provide you with excellent care. Hearing back from our patients is one way we can continue to improve our services. Please take a few minutes to complete the written survey that you may receive in the mail after your visit with us. Thank you!             Your Updated Medication List - Protect others around you: Learn how to safely use, store and throw away your medicines at www.disposemymeds.org.          This list is accurate as of: 1/16/18 11:33 AM.  Always use your most recent med list.                   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, unspecified laterality       ASPIRIN LOW DOSE 81 MG tablet   Generic drug:  aspirin      Take 81 mg by mouth daily        doxazosin 4 MG tablet    CARDURA    90 tablet    Take 1 tablet (4 mg) by mouth At Bedtime    Benign non-nodular prostatic hyperplasia without lower urinary tract symptoms       JANTOVEN 2 MG tablet   Generic drug:  warfarin     135 tablet    TAKE 2 TABLETS (4 MG) BY MOUTH ON MONDAY AND FRIDAY, AND 1 TABLET (2 MG) ALL OTHER DAYS OR AS DIRECTED BY [facility] NURSES    Chronic anticoagulation, Chronic atrial fibrillation (H)       LASIX 20 MG tablet   Generic drug:  furosemide     30 tablet    Take 1 tablet (20 mg) by mouth daily    Fluid retention       levothyroxine 50 MCG tablet     SYNTHROID/LEVOTHROID    90 tablet    TAKE 1 TABLET DAILY    Hypothyroidism, unspecified type       lisinopril 10 MG tablet    PRINIVIL/ZESTRIL    45 tablet    Take 0.5 tablets (5 mg) by mouth daily    CKD (chronic kidney disease) stage 3, GFR 30-59 ml/min       nitroGLYcerin 0.4 MG sublingual tablet    NITROSTAT    25 tablet    Place 1 tablet (0.4 mg) under the tongue every 5 minutes as needed for chest pain    CAD (coronary artery disease)       pravastatin 40 MG tablet    PRAVACHOL    90 tablet    TAKE 1 TABLET DAILY    Hyperlipidemia with target LDL less than 100       vitamin D 30434 UNIT capsule    ERGOCALCIFEROL    8 capsule    Take 1 capsule (50,000 Units) by mouth every 7 days for 8 doses    Low vitamin D level

## 2018-01-16 NOTE — PROGRESS NOTES
Clinic Care Coordination Contact  Care Team Conversations  Call back from vasyl Min. He and his sisters care for him and see him often, especially on the weekends. He lives in assisted living and gets bathing 3 times a week and has an escort to get him to his meals in dining room. He used to ambulate with walker throughout the building, but now uses it only in apartment.  Sister thinks he naps 70% of the day.  Is missing medications some days.      They have his name on waiting list at [facility]. Discussed options and needs.  Pako understands that they need to find long term care health care center versus TCU.  Where patient currently lives, there is only AL.  Discussed adding more services to the AL services in place. Pako thinks he needs to move. He will talk to sisters to see if they have any questions.      Explained to Pako that the complex care clinic has closed so [facility].     Plan- call Pako in two weeks to assess needs.   Social Javed Negron  [facility]  [email or web address]  [phone]        Clinic Care Coordination Contact  [facility]/Voicemail    Referral Source: PCP- Had OV with PCP today and referral to care coordination- asked to contact vasyl Min.    Reason for Referral: Caregiver Concerns, Inpatient to Outpatient Transition, Mental Status/Behavioral Changes and Other: fast deteriorating cognitive function, not able to ADL and IADL, needs step-up placement(NH or ass living with 24 hours nursing staff assistance). Plus, he might have good benefits if he could be involved in [facility]. Please check on if he is indicated for it   Clinical Data: Care Coordinator Outreach  Outreach attempted x 1 to vasyl Min.  Left message on voicemail with call back information and requested return call.  Plan:  Care Coordinator will try to reach son again in 1-2 business days.    Estrella Pang   [facility]  [email or web address]  [phone]

## 2018-01-16 NOTE — PROGRESS NOTES
ANTICOAGULATION FOLLOW-UP CLINIC VISIT    Patient Name:  Earl Sanchez  Date:  1/16/2018  Contact Type:  Face to Face    SUBJECTIVE:     Patient Findings     Positives No Problem Findings           OBJECTIVE    INR Protime   Date Value Ref Range Status   01/16/2018 1.9 (A) 0.86 - 1.14 Final       ASSESSMENT / PLAN  INR assessment THER    Recheck INR In: 5 WEEKS    INR Location Clinic      Anticoagulation Summary as of 1/16/2018     INR goal 2.0-3.0   Today's INR 1.9!   Maintenance plan 2 mg (2 mg x 1) on Tue, Thu, Sat; 4 mg (2 mg x 2) all other days   Full instructions 2 mg on Tue, Thu, Sat; 4 mg all other days   Weekly total 22 mg   No change documented Ellen Negro RN   Plan last modified Brandy Miller RN (8/28/2017)   Next INR check 2/20/2018   Priority INR   Target end date Indefinite    Indications   Long-term (current) use of anticoagulants [Z79.01] [Z79.01]  Atrial fibrillation (H) [I48.91]         Anticoagulation Episode Summary     INR check location     Preferred lab     Send INR reminders to [facility]    Comments TAKES WARFARIN IN THE MORNING        Anticoagulation Care Providers     Provider Role Specialty Phone number    Alvarado Florian MD [facility] [phone]            See the Encounter Report to view Anticoagulation Flowsheet and Dosing Calendar (Go to Encounters tab in chart review, and find the Anticoagulation Therapy Visit)    Dosage adjustment made based on physician directed care plan.    INR therapeutic at 1.9 today.  Denies changes/problems  Has been stable on 22 mg weekly dose.  Will continue with 2 mg on Tue, Thu, Sat;  4 mg all other days = 22 mg weekly.  Recheck in 5 weeks or sooner if problems/concerns.     Ellen Negro, KENDRICK

## 2018-01-16 NOTE — MR AVS SNAPSHOT
Earl Sanchez   1/16/2018 11:00 AM   Anticoagulation Therapy Visit    Description:  89 year old male   Provider:  [facility]   Department:  Ec Nurse           INR as of 1/16/2018     Today's INR 1.9!      Anticoagulation Summary as of 1/16/2018     INR goal 2.0-3.0   Today's INR 1.9!   Full instructions 2 mg on Tue, Thu, Sat; 4 mg all other days   Next INR check 2/20/2018    Indications   Long-term (current) use of anticoagulants [Z79.01] [Z79.01]  Atrial fibrillation (H) [I48.91]         Description     INR therapeutic at 1.9 today.  Denies changes/problems  Has been stable on 22 mg weekly dose.  Will continue with 2 mg on Tue, Thu, Sat;  4 mg all other days = 22 mg weekly.  Recheck in 5 weeks or sooner if problems/concerns.         Your next Anticoagulation Clinic appointment(s)     Jan 16, 2018 11:00 AM CST   Anticoagulation Visit with [facility]   [facility] ([address] [address]   [phone]            Feb 20, 2018  1:30 PM CST   Anticoagulation Visit with [facility] CL[facility]IC   [facility] ([address] [address]   [phone]              Contact Numbers     Clinic Number:         January 2018 Details    Sun Mon Tue Wed Thu Fri Sat      1               2               3               4               5               6                 7               8               9               10               11               12               13                 14               15               16      2 mg   See details      17      4 mg         18      2 mg         19      4 mg         20      2 mg           21      4 mg         22      4 mg         23      2 mg         24      4 mg         25      2 mg         26      4 mg         27      2 mg           28      4 mg         29      4 mg         30       2 mg         31      4 mg             Date Details   01/16 This INR check               How to take your warfarin dose     To take:  2 mg Take 1 of the 2 mg tablets.    To take:  4 mg Take 2 of the 2 mg tablets.           February 2018 Details    Sun Mon Tue Wed Thu Fri Sat         1      2 mg         2      4 mg         3      2 mg           4      4 mg         5      4 mg         6      2 mg         7      4 mg         8      2 mg         9      4 mg         10      2 mg           11      4 mg         12      4 mg         13      2 mg         14      4 mg         15      2 mg         16      4 mg         17      2 mg           18      4 mg         19      4 mg         20            21               22               23               24                 25               26               27               28                   Date Details   No additional details    Date of next INR:  2/20/2018         How to take your warfarin dose     To take:  2 mg Take 1 of the 2 mg tablets.    To take:  4 mg Take 2 of the 2 mg tablets.

## 2018-01-17 ENCOUNTER — HOSPITAL ENCOUNTER (OUTPATIENT)
Facility: CLINIC | Age: 83
Setting detail: OBSERVATION
Discharge: NURSING, REHAB OR LONG-TERM CARE | End: 2018-01-18
Attending: EMERGENCY MEDICINE | Admitting: INTERNAL MEDICINE
Payer: COMMERCIAL

## 2018-01-17 ENCOUNTER — CARE COORDINATION (OUTPATIENT)
Dept: CARE COORDINATION | Facility: CLINIC | Age: 83
End: 2018-01-17

## 2018-01-17 ENCOUNTER — TELEPHONE (OUTPATIENT)
Dept: FAMILY MEDICINE | Facility: CLINIC | Age: 83
End: 2018-01-17

## 2018-01-17 ENCOUNTER — APPOINTMENT (OUTPATIENT)
Dept: GENERAL RADIOLOGY | Facility: CLINIC | Age: 83
End: 2018-01-17
Attending: EMERGENCY MEDICINE
Payer: COMMERCIAL

## 2018-01-17 ENCOUNTER — APPOINTMENT (OUTPATIENT)
Dept: CT IMAGING | Facility: CLINIC | Age: 83
End: 2018-01-17
Attending: EMERGENCY MEDICINE
Payer: COMMERCIAL

## 2018-01-17 DIAGNOSIS — R26.81 GAIT INSTABILITY: Primary | ICD-10-CM

## 2018-01-17 DIAGNOSIS — I48.91 ATRIAL FIBRILLATION, UNSPECIFIED TYPE (H): ICD-10-CM

## 2018-01-17 DIAGNOSIS — R29.6 FALLING: ICD-10-CM

## 2018-01-17 DIAGNOSIS — R41.82 ALTERED MENTAL STATUS, UNSPECIFIED ALTERED MENTAL STATUS TYPE: ICD-10-CM

## 2018-01-17 DIAGNOSIS — R25.8 ATHETOSIS: Primary | ICD-10-CM

## 2018-01-17 DIAGNOSIS — R20.0 NUMBNESS: ICD-10-CM

## 2018-01-17 DIAGNOSIS — I63.9 ACUTE CVA (CEREBROVASCULAR ACCIDENT) (H): ICD-10-CM

## 2018-01-17 DIAGNOSIS — R53.1 ASTHENIA: ICD-10-CM

## 2018-01-17 DIAGNOSIS — R62.7 ADULT FAILURE TO THRIVE: ICD-10-CM

## 2018-01-17 LAB
ALBUMIN SERPL-MCNC: 3.3 G/DL (ref 3.4–5)
ALBUMIN UR-MCNC: NEGATIVE MG/DL
ALP SERPL-CCNC: 118 U/L (ref 40–150)
ALT SERPL W P-5'-P-CCNC: 18 U/L (ref 0–70)
ANION GAP SERPL CALCULATED.3IONS-SCNC: 8 MMOL/L (ref 3–14)
APPEARANCE UR: CLEAR
AST SERPL W P-5'-P-CCNC: 17 U/L (ref 0–45)
BACTERIA #/AREA URNS HPF: ABNORMAL /HPF
BASOPHILS # BLD AUTO: 0.1 10E9/L (ref 0–0.2)
BASOPHILS NFR BLD AUTO: 1 %
BILIRUB SERPL-MCNC: 0.5 MG/DL (ref 0.2–1.3)
BILIRUB UR QL STRIP: NEGATIVE
BUN SERPL-MCNC: 22 MG/DL (ref 7–30)
CALCIUM SERPL-MCNC: 8.8 MG/DL (ref 8.5–10.1)
CHLORIDE SERPL-SCNC: 104 MMOL/L (ref 94–109)
CO2 SERPL-SCNC: 27 MMOL/L (ref 20–32)
COLOR UR AUTO: YELLOW
CREAT SERPL-MCNC: 1.1 MG/DL (ref 0.66–1.25)
DIFFERENTIAL METHOD BLD: ABNORMAL
EOSINOPHIL # BLD AUTO: 0.2 10E9/L (ref 0–0.7)
EOSINOPHIL NFR BLD AUTO: 2.6 %
ERYTHROCYTE [DISTWIDTH] IN BLOOD BY AUTOMATED COUNT: 15.1 % (ref 10–15)
GFR SERPL CREATININE-BSD FRML MDRD: 63 ML/MIN/1.7M2
GLUCOSE SERPL-MCNC: 127 MG/DL (ref 70–99)
GLUCOSE UR STRIP-MCNC: NEGATIVE MG/DL
HCT VFR BLD AUTO: 34 % (ref 40–53)
HGB BLD-MCNC: 11.1 G/DL (ref 13.3–17.7)
HGB UR QL STRIP: ABNORMAL
IMM GRANULOCYTES # BLD: 0 10E9/L (ref 0–0.4)
IMM GRANULOCYTES NFR BLD: 0.4 %
INR PPP: 1.66 (ref 0.86–1.14)
KETONES UR STRIP-MCNC: NEGATIVE MG/DL
LEUKOCYTE ESTERASE UR QL STRIP: NEGATIVE
LYMPHOCYTES # BLD AUTO: 1.2 10E9/L (ref 0.8–5.3)
LYMPHOCYTES NFR BLD AUTO: 17.8 %
MCH RBC QN AUTO: 31.6 PG (ref 26.5–33)
MCHC RBC AUTO-ENTMCNC: 32.6 G/DL (ref 31.5–36.5)
MCV RBC AUTO: 97 FL (ref 78–100)
MONOCYTES # BLD AUTO: 0.7 10E9/L (ref 0–1.3)
MONOCYTES NFR BLD AUTO: 9.9 %
NEUTROPHILS # BLD AUTO: 4.8 10E9/L (ref 1.6–8.3)
NEUTROPHILS NFR BLD AUTO: 68.3 %
NITRATE UR QL: NEGATIVE
NRBC # BLD AUTO: 0 10*3/UL
NRBC BLD AUTO-RTO: 0 /100
PH UR STRIP: 5 PH (ref 5–7)
PLATELET # BLD AUTO: 204 10E9/L (ref 150–450)
POTASSIUM SERPL-SCNC: 3.6 MMOL/L (ref 3.4–5.3)
PROT SERPL-MCNC: 7.5 G/DL (ref 6.8–8.8)
RBC # BLD AUTO: 3.51 10E12/L (ref 4.4–5.9)
RBC #/AREA URNS AUTO: ABNORMAL /HPF
SODIUM SERPL-SCNC: 139 MMOL/L (ref 133–144)
SOURCE: ABNORMAL
SP GR UR STRIP: 1.01 (ref 1–1.03)
UROBILINOGEN UR STRIP-ACNC: 0.2 EU/DL (ref 0.2–1)
WBC # BLD AUTO: 7 10E9/L (ref 4–11)
WBC #/AREA URNS AUTO: ABNORMAL /HPF

## 2018-01-17 PROCEDURE — 25000128 H RX IP 250 OP 636: Performed by: EMERGENCY MEDICINE

## 2018-01-17 PROCEDURE — 96360 HYDRATION IV INFUSION INIT: CPT

## 2018-01-17 PROCEDURE — 99285 EMERGENCY DEPT VISIT HI MDM: CPT | Mod: 25

## 2018-01-17 PROCEDURE — 80053 COMPREHEN METABOLIC PANEL: CPT | Performed by: EMERGENCY MEDICINE

## 2018-01-17 PROCEDURE — 70450 CT HEAD/BRAIN W/O DYE: CPT

## 2018-01-17 PROCEDURE — 96361 HYDRATE IV INFUSION ADD-ON: CPT

## 2018-01-17 PROCEDURE — 81001 URINALYSIS AUTO W/SCOPE: CPT | Performed by: EMERGENCY MEDICINE

## 2018-01-17 PROCEDURE — 85025 COMPLETE CBC W/AUTO DIFF WBC: CPT | Performed by: EMERGENCY MEDICINE

## 2018-01-17 PROCEDURE — 85610 PROTHROMBIN TIME: CPT | Performed by: EMERGENCY MEDICINE

## 2018-01-17 PROCEDURE — 71046 X-RAY EXAM CHEST 2 VIEWS: CPT

## 2018-01-17 RX ADMIN — SODIUM CHLORIDE 500 ML: 9 INJECTION, SOLUTION INTRAVENOUS at 22:17

## 2018-01-17 NOTE — LETTER
Transition Communication Hand-off for Care Transitions to Next Level of Care Provider    Name: Earl Sanchez  MRN #: [id number]  Primary Care Provider: Alvarado Florian     Primary Clinic: [address] [place] [id number]     Reason for Hospitalization:  Gait instability [R26.81]  Altered mental status, unspecified altered mental status type [R41.82]  Admit Date/Time: 1/17/2018  9:28 PM  Discharge Date: 1/18/18  Payor Source: Payor: ARE / Plan: [employer] / Product Type: HMO /     Readmission Assessment Measure (WICHO) Risk Score/category:Average         Reason for Communication Hand-off Referral:   Pt was admitted just overnight for ataxia and increased confusion.  MRI of brain was done.  He was very calm today.  He discharged to [facility].  The Hospitalist that followed pt felt it would be a good idea if his Neurologist, Dr Blackburn continue to follow for continuity of care.  Dr Blackburn has left [facility] and is now at [facility] or [facility] in [facility].  If you have time, can you work on this with pt and his family once he is out of [facility]?    Thank-you,    Татьяна Alvarado  RN Care Coordinator  [facility]    AVS/Discharge Summary is the source of truth; this is a helpful guide for improved communication of patient story

## 2018-01-17 NOTE — TELEPHONE ENCOUNTER
Reason for Call:  Other     Detailed comments: Pt son Reynaldo is calling regarding his fathers health directive. Wants to know how to make sure it is a part of pt medical record.     Phone Number Patient can be reached at: Other phone number:  [phone]    Best Time: anytime     Can we leave a detailed message on this number? YES    Call taken on 1/17/2018 at 12:42 PM by Elizabeth Crandall

## 2018-01-17 NOTE — IP AVS SNAPSHOT
MRN:[id number]                      After Visit Summary   1/17/2018    Earl Sanchez    MRN: [id number]           Thank you!     Thank you for choosing [facility] for your care. Our goal is always to provide you with excellent care. Hearing back from our patients is one way we can continue to improve our services. Please take a few minutes to complete the written survey that you may receive in the mail after you visit with us. Thank you!        Patient Information     Date Of Birth          9/17/1926        About your hospital stay     You were admitted on:  January 18, 2018 You last received care in the:  [place] [address] [facility]    You were discharged on:  January 18, 2018        Reason for your hospital stay       You were admitted for gait instability and confusion.                  Who to Call     For medical emergencies, please call 911.  For non-urgent questions about your medical care, please call your primary care provider or clinic, [phone]          Attending Provider     Provider Specialty    Magdaleno De La Cruz MD Emergency Medicine    Onofre, Dennis Telles MD Internal Medicine       Primary Care Provider Office Phone # Fax #    Alvarado Florian [phone]      After Care Instructions     Activity - Up with nursing assistance           Advance Diet as Tolerated       Follow this diet upon discharge:       Regular Diet Adult            Fall precautions           General info for SNF       Length of Stay Estimate: Short Term Care: Estimated # of Days <30  Condition at Discharge: Stable  Level of care:skilled   Rehabilitation Potential: Fair  Admission H&P remains valid and up-to-date: Yes  Recent Chemotherapy: N/A  Use Nursing Home Standing Orders: N/A            Mantoux instructions       Give two-step Mantoux (PPD) Per Facility Policy Yes                  Follow-up Appointments     Follow Up and recommended labs and  tests       Follow up with [facility] physician.  The following labs/tests are recommended: INR on 1/19.  Follow up with Dr. Blackburn of Neurology at next available appointment, otherwise follow up with [facility] to establish new provider.                  Your next 10 appointments already scheduled     Feb 20, 2018  1:30 PM CST   Anticoagulation Visit with  ANTICOAGULATION CLINIC   [facility]    [address] [facility] [id number] [phone]              Additional Services     Occupational Therapy Adult Consult       Evaluate and treat as clinically indicated.    Reason:  deconditioning            Physical Therapy Adult Consult       Evaluate and treat as clinically indicated.    Reason:  deconditioning                  Further instructions from your care team       Discharge to [facility]  [phone].    Warfarin Instruction     You have started taking a medicine called warfarin. This is a blood-thinning medicine (anticoagulant). It helps prevent and treat blood clots.      Before leaving the hospital, make sure you know how much to take and how long to take it.      You will need regular blood tests to make sure your blood is clotting safely. It is very important to see your doctor for regular blood tests.    Talk to your doctor before taking any new medicine (this includes over-the-counter drugs and herbal products). Many medicines can interact with warfarin. This may cause more bleeding or too much clotting.     Eating a lot of vitamin K--found in green, leafy vegetables--can change the way warfarin works in your body. Do NOT avoid these foods. Instead, try to eat the same amount each day.     Bleeding is the most common side-effect of warfarin. You may notice bleeding gums, a bloody nose, bruises and bleeding longer when you cut yourself. See a doctor at once if:   o You cough up blood  o You find blood in your  "stool (poop)  o You have a deep cut, or a cut that bleeds longer than 10 minutes   o You have a bad cut, hard fall, accident or hit your head (go to urgent care or the emergency room).    For women who can get pregnant: This medicine can harm an unborn baby. Be very careful not to get pregnant while taking this medicine. If you think you might be pregnant, call your doctor right away.    For more information, read \"Guide to Warfarin Therapy,  the booklet you received in the hospital.        Pending Results     No orders found for last 3 day(s).            Statement of Approval     Ordered          01/18/18 1433  I have reviewed and agree with all the recommendations and orders detailed in this document.  EFFECTIVE NOW     Approved and electronically signed by:  Allen Graham MD             Admission Information     Date & Time Provider Department Dept. Phone    1/17/2018 Dennis Onofre MD [place] [address] [facility] [phone]      Your Vitals Were     Blood Pressure Pulse Temperature Respirations Weight Pulse Oximetry    126/78 (BP Location: Right arm) 62 97.4  F (36.3  C) (Oral) 16 74 kg (163 lb 2.3 oz) 97%    BMI (Body Mass Index)                   24.81 kg/m2           MyChart Information     [facility] gives you secure access to your electronic health record. If you see a primary care provider, you can also send messages to your care team and make appointments. If you have questions, please call your primary care clinic.  If you do not have a primary care provider, please call [phone] and they will assist you.        Care EveryWhere ID     This is your Care EveryWhere ID. This could be used by other organizations to access your [place] medical records  [id number]        Equal Access to Services     [facility] : Rachel Rees, anat cee, araceli altamirano. So [facility] [phone].    ATENCIÓN: Si habla " español, tiene a stephen disposición servicios gratuitos de asistencia lingüística. [employer] [phone].    We comply with applicable federal civil rights laws and [place] laws. We do not discriminate on the basis of race, color, national origin, age, disability, sex, sexual orientation, or gender identity.               Review of your medicines      CONTINUE these medicines which may have CHANGED, or have new prescriptions. If we are uncertain of the size of tablets/capsules you have at home, strength may be listed as something that might have changed.        Dose / Directions    warfarin 2 MG tablet   Commonly known as:  COUMADIN   This may have changed:    - medication strength  - how much to take  - how to take this  - when to take this  - additional instructions   Used for:  Atrial fibrillation, unspecified type (H)        Dose:  5 mg   Take 2.5 tablets (5 mg) by mouth once for 1 dose   Quantity:  60 tablet   Refills:  1         CONTINUE these medicines which have NOT CHANGED        Dose / Directions    acetaminophen 500 MG tablet   Commonly known as:  TYLENOL        Dose:  1000 mg   Take 1,000 mg by mouth every 6 hours as needed for mild pain   Refills:  0       allopurinol 100 MG tablet   Commonly known as:  ZYLOPRIM        Dose:  100 mg   Take 100 mg by mouth 2 times daily   Refills:  0       ASPIRIN LOW DOSE 81 MG tablet   Generic drug:  aspirin        Dose:  81 mg   Take 81 mg by mouth daily   Refills:  0       doxazosin 4 MG tablet   Commonly known as:  CARDURA   Used for:  Benign non-nodular prostatic hyperplasia without lower urinary tract symptoms        Dose:  4 mg   Take 1 tablet (4 mg) by mouth At Bedtime   Quantity:  90 tablet   Refills:  3       levothyroxine 50 MCG tablet   Commonly known as:  SYNTHROID/LEVOTHROID   Used for:  Hypothyroidism, unspecified type        TAKE 1 TABLET DAILY   Quantity:  90 tablet   Refills:  1       nitroGLYcerin 0.4 MG sublingual tablet   Commonly known as:  NITROSTAT    Used for:  CAD (coronary artery disease)        Dose:  0.4 mg   Place 1 tablet (0.4 mg) under the tongue every 5 minutes as needed for chest pain   Quantity:  25 tablet   Refills:  0       pravastatin 40 MG tablet   Commonly known as:  PRAVACHOL   Used for:  Hyperlipidemia with target LDL less than 100        TAKE 1 TABLET DAILY   Quantity:  90 tablet   Refills:  1       vitamin D 08020 UNIT capsule   Commonly known as:  ERGOCALCIFEROL   Used for:  Low vitamin D level        Dose:  83483 Units   Take 1 capsule (50,000 Units) by mouth every 7 days for 8 doses   Quantity:  8 capsule   Refills:  0            Where to get your medicines      Some of these will need a paper prescription and others can be bought over the counter. Ask your nurse if you have questions.     You don't need a prescription for these medications     warfarin 2 MG tablet                Protect others around you: Learn how to safely use, store and throw away your medicines at www.disposemymeds.org.             Medication List: This is a list of all your medications and when to take them. Check marks below indicate your daily home schedule. Keep this list as a reference.      Medications           Morning Afternoon Evening Bedtime As Needed    acetaminophen 500 MG tablet   Commonly known as:  TYLENOL   Take 1,000 mg by mouth every 6 hours as needed for mild pain   Last time this was given:  650 mg on 1/18/2018 12:07 AM                                allopurinol 100 MG tablet   Commonly known as:  ZYLOPRIM   Take 100 mg by mouth 2 times daily                                ASPIRIN LOW DOSE 81 MG tablet   Take 81 mg by mouth daily   Generic drug:  aspirin                                doxazosin 4 MG tablet   Commonly known as:  CARDURA   Take 1 tablet (4 mg) by mouth At Bedtime                                levothyroxine 50 MCG tablet   Commonly known as:  SYNTHROID/LEVOTHROID   TAKE 1 TABLET DAILY   Last time this was given:  50 mcg on 1/18/2018   9:51 AM                                nitroGLYcerin 0.4 MG sublingual tablet   Commonly known as:  NITROSTAT   Place 1 tablet (0.4 mg) under the tongue every 5 minutes as needed for chest pain                                pravastatin 40 MG tablet   Commonly known as:  PRAVACHOL   TAKE 1 TABLET DAILY   Last time this was given:  40 mg on 1/18/2018  9:51 AM                                vitamin D 83683 UNIT capsule   Commonly known as:  ERGOCALCIFEROL   Take 1 capsule (50,000 Units) by mouth every 7 days for 8 doses                                warfarin 2 MG tablet   Commonly known as:  COUMADIN   Take 2.5 tablets (5 mg) by mouth once for 1 dose   Last time this was given:  2 mg on 1/18/2018  1:56 AM

## 2018-01-17 NOTE — IP AVS SNAPSHOT
"[place] [address] [facility]: [phone]                                              INTERAGENCY TRANSFER FORM - PHYSICIAN ORDERS   2018                    Hospital Admission Date: 2018  KATHRYN GARNER   : 1926  Sex: Male        Attending Provider: (none)    Allergies:  No Known Allergies    Infection:  None   Service:  HOSPITALIST    Ht:  1.727 m (5' 8\")   Wt:  74 kg (163 lb 2.3 oz)   Admission Wt:  74 kg (163 lb 2.3 oz)    BMI:  24.81 kg/m 2   BSA:  1.88 m 2            Patient PCP Information     Provider PCP Type    Alvarado Florian MD General      ED Clinical Impression     Diagnosis Description Comment Added By Time Added    Gait instability [R26.81] Gait instability [R26.81]  Magdaleno De La Cruz MD 2018 11:54 PM    Altered mental status, unspecified altered mental status type [R41.82] Altered mental status, unspecified altered mental status type [R41.82]  Magdaleno De La Cruz MD 2018 11:54 PM      Hospital Problems as of 2018              Priority Class Noted POA    Ataxia Medium  2018 Yes      Non-Hospital Problems as of 2018              Priority Class Noted    Hypertension goal BP (blood pressure) < 140/90 High  Unknown    CAD (coronary artery disease) High  Unknown    Atrial fibrillation (H) High  Unknown    Heart failure (H) High  Unknown    Osteoarthritis Medium  Unknown    Gait apraxia Medium  Unknown    BPH (benign prostatic hyperplasia) Medium  Unknown    Hyperlipidemia with target LDL less than 100 Medium  Unknown    CKD (chronic kidney disease) stage 3, GFR 30-59 ml/min High  2012    Anemia High  2012    Microalbuminuria Medium  2012    Hypothyroidism Medium  2012    Elevated alkaline phosphatase level Medium  2012    Advance care planning Low  2012    Health Care Home Low  2012    Chronic anticoagulation High  2012    Elevated fasting glucose High  " 3/15/2013    Toe inflammation Medium  1/30/2014    Elevated uric acid in blood Medium  1/30/2014    Gastritis Medium  Unknown    Abdominal aortic aneurysm (H) Medium  Unknown    Advanced directives, counseling/discussion Medium  12/10/2015    Long-term (current) use of anticoagulants [Z79.01] Medium  3/11/2016    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    General weakness Medium  12/15/2016    Tear of meniscus of left knee Medium  12/19/2016    Weakness generalized Medium  6/16/2017      Code Status History     Date Active Date Inactive Code Status Order ID Comments User Context    1/18/2018  2:33 PM  DNR/DNI [id number]  Allen Graham MD Outpatient    1/18/2018  1:28 AM 1/18/2018  2:33 PM DNR/DNI [id number]  Dennis Onofre MD Inpatient    6/16/2017  8:06 AM 6/16/2017  7:18 PM DNR/DNI [id number]  Mla Weiss MD Inpatient    12/17/2016  9:20 AM 6/16/2017  8:06 AM DNR/DNI [id number]  Omari Sims MD Outpatient    12/16/2016  2:41 AM 12/17/2016  9:20 AM DNR/DNI [id number]  Vy Walsh RN Inpatient    12/15/2016  5:21 PM 12/16/2016  2:41 AM Full Code [id number]  Mal Weiss MD Inpatient         Medication Review      CONTINUE these medications which may have CHANGED, or have new prescriptions. If we are uncertain of the size of tablets/capsules you have at home, strength may be listed as something that might have changed.        Dose / Directions Comments    warfarin 2 MG tablet   Commonly known as:  COUMADIN   This may have changed:    - medication strength  - how much to take  - how to take this  - when to take this  - additional instructions   Used for:  Atrial fibrillation, unspecified type (H)        Dose:  5 mg   Take 2.5 tablets (5 mg) by mouth once for 1 dose   Quantity:  60 tablet   Refills:  1          CONTINUE these medications which have NOT CHANGED        Dose / Directions Comments    acetaminophen 500 MG tablet   Commonly known as:  TYLENOL        Dose:   1000 mg   Take 1,000 mg by mouth every 6 hours as needed for mild pain   Refills:  0        allopurinol 100 MG tablet   Commonly known as:  ZYLOPRIM        Dose:  100 mg   Take 100 mg by mouth 2 times daily   Refills:  0        ASPIRIN LOW DOSE 81 MG tablet   Generic drug:  aspirin        Dose:  81 mg   Take 81 mg by mouth daily   Refills:  0        doxazosin 4 MG tablet   Commonly known as:  CARDURA   Used for:  Benign non-nodular prostatic hyperplasia without lower urinary tract symptoms        Dose:  4 mg   Take 1 tablet (4 mg) by mouth At Bedtime   Quantity:  90 tablet   Refills:  3        levothyroxine 50 MCG tablet   Commonly known as:  SYNTHROID/LEVOTHROID   Used for:  Hypothyroidism, unspecified type        TAKE 1 TABLET DAILY   Quantity:  90 tablet   Refills:  1        nitroGLYcerin 0.4 MG sublingual tablet   Commonly known as:  NITROSTAT   Used for:  CAD (coronary artery disease)        Dose:  0.4 mg   Place 1 tablet (0.4 mg) under the tongue every 5 minutes as needed for chest pain   Quantity:  25 tablet   Refills:  0        pravastatin 40 MG tablet   Commonly known as:  PRAVACHOL   Used for:  Hyperlipidemia with target LDL less than 100        TAKE 1 TABLET DAILY   Quantity:  90 tablet   Refills:  1        vitamin D 68527 UNIT capsule   Commonly known as:  ERGOCALCIFEROL   Used for:  Low vitamin D level        Dose:  14195 Units   Take 1 capsule (50,000 Units) by mouth every 7 days for 8 doses   Quantity:  8 capsule   Refills:  0                  Further instructions from your care team       Discharge to [facility]  [phone].    Summary of Visit     Reason for your hospital stay       You were admitted for gait instability and confusion.             After Care     Activity - Up with nursing assistance           Advance Diet as Tolerated       Follow this diet upon discharge:       Regular Diet Adult       Fall precautions           General info for SNF       Length of Stay Estimate: Short Term Care:  Estimated # of Days <30  Condition at Discharge: Stable  Level of care:skilled   Rehabilitation Potential: Fair  Admission H&P remains valid and up-to-date: Yes  Recent Chemotherapy: N/A  Use Nursing Home Standing Orders: N/A       Mantoux instructions       Give two-step Mantoux (PPD) Per Facility Policy Yes             Referrals     Occupational Therapy Adult Consult       Evaluate and treat as clinically indicated.    Reason:  deconditioning       Physical Therapy Adult Consult       Evaluate and treat as clinically indicated.    Reason:  deconditioning             Your next 10 appointments already scheduled     Feb 20, 2018  1:30 PM CST   Anticoagulation Visit with [facility] ANTICOAGULATION CLINIC   [facility] ([facility]    [address] [place] [id number] [phone]              Follow-Up Appointment Instructions     Future Labs/Procedures    Follow Up and recommended labs and tests     Comments:    Follow up with senior living physician.  The following labs/tests are recommended: INR on 1/19.  Follow up with Dr. Blackburn of Neurology at next available appointment, otherwise follow up with [facility] to establish new provider.      Follow-Up Appointment Instructions     Follow Up and recommended labs and tests       Follow up with senior living physician.  The following labs/tests are recommended: INR on 1/19.  Follow up with Dr. Blackburn of Neurology at next available appointment, otherwise follow up with [facility] to establish new provider.             Statement of Approval     Ordered          01/18/18 1433  I have reviewed and agree with all the recommendations and orders detailed in this document.  EFFECTIVE NOW     Approved and electronically signed by:  Allen Graham MD

## 2018-01-17 NOTE — TELEPHONE ENCOUNTER
Left non detailed message for patient to return call.  Minal Daley RN   [facility] - Triage

## 2018-01-17 NOTE — PROGRESS NOTES
Clinic Care Coordination Contact  Care Team Conversations  Call from son Pako. Patient's neurologist has ordered labs and the order was sent to [facility]. Sophiamargareth's clinic is [facility] and it is extreme effort to get patient into clinic.  He asked if [facility] could come out to do blood draws for labs.  Reviewed parameters of home care and that patient may qualify for that. Discussed that [facility] can pay for in home labs by company [facility]   Medicare Certified Home Health Agency  Ph : [phone]   FAX : [phone]     They need a face sheet and order faxed to them to initiate service.     Pako would like to have neurologist send order to [facility] to do the blood draw.  He asked CC to send request to neurologist.     Plan- Routing note to neurologist and to Dr. Florian. Will assist with patient as needed. Will call son in one week.     Estrella Pang,   [facility]  [email or web address]  [phone]

## 2018-01-17 NOTE — IP AVS SNAPSHOT
"    [place] [address] [facility]: [phone]                                              INTERAGENCY TRANSFER FORM - LAB / IMAGING / EKG / EMG RESULTS   2018                    Hospital Admission Date: 2018  KATHRYN GARNER   : 1926  Sex: Male        Attending Provider: Dennis Onofre MD     Allergies:  No Known Allergies    Infection:  None   Service:  HOSPITALIST    Ht:  1.727 m (5' 8\")   Wt:  74 kg (163 lb 2.3 oz)   Admission Wt:  74 kg (163 lb 2.3 oz)    BMI:  24.81 kg/m 2   BSA:  1.88 m 2            Patient PCP Information     Provider PCP Type    Alvarado Florian MD General         Lab Results - 3 Days      INR [[id number]] (Abnormal)  Resulted: 18 1203, Result status: Final result    Ordering provider: Dennis Onofre MD  18 1106 Resulting lab: [facility]    Specimen Information    Type Source Collected On   Blood  18 1117          Components       Value Reference Range Flag Lab   INR 1.77 0.86 - 1.14 H FrStHsLb            *UA reflex to Microscopic [[id number]] (Abnormal)  Resulted: 18 2253, Result status: Final result    Ordering provider: Magdaleno De La Cruz MD  18 2221 Resulting lab: [facility]    Specimen Information    Type Source Collected On   Midstream Urine  18 2221          Components       Value Reference Range Flag Lab   Color Urine Yellow   74767   Appearance Urine Clear   99229   Glucose Urine Negative NEG^Negative mg/dL  67185   Bilirubin Urine Negative NEG^Negative  82518   Ketones Urine Negative NEG^Negative mg/dL  68792   Specific [place] Urine 1.010 1.003 - 1.035  16257   Blood Urine Trace NEG^Negative A 70082   pH Urine 5.0 5.0 - 7.0 pH  30271   Protein Albumin Urine Negative NEG^Negative mg/dL  36474   Urobilinogen Urine 0.2 0.2 - 1.0 EU/dL  44649   Nitrite Urine Negative NEG^Negative  51174   Leukocyte Esterase Urine Negative NEG^Negative  " 47644   Source Midstream Urine   07714            Urine Microscopic [[id number]] (Abnormal)  Resulted: 01/17/18 2253, Result status: Final result    Ordering provider: Magdaleno De La Cruz MD  01/17/18 2221 Resulting lab: [facility]    Specimen Information    Type Source Collected On     01/17/18 2221          Components       Value Reference Range Flag Lab   WBC Urine O - 2 OTO2^O - 2 /HPF  85098   RBC Urine O - 2 OTO2^O - 2 /HPF  75721   Bacteria Urine Moderate NEG^Negative /HPF A 76240            Comprehensive metabolic panel [[id number]] (Abnormal)  Resulted: 01/17/18 2241, Result status: Final result    Ordering provider: Magdaleno De La Cruz MD  01/17/18 2204 Resulting lab: [facility]    Specimen Information    Type Source Collected On   Blood  01/17/18 2212          Components       Value Reference Range Flag Lab   Sodium 139 133 - 144 mmol/L  FrStHsLb   Potassium 3.6 3.4 - 5.3 mmol/L  FrStHsLb   Chloride 104 94 - 109 mmol/L  FrStHsLb   Carbon Dioxide 27 20 - 32 mmol/L  FrStHsLb   Anion Gap 8 3 - 14 mmol/L  FrStHsLb   Glucose 127 70 - 99 mg/dL H FrStHsLb   Urea Nitrogen 22 7 - 30 mg/dL  FrStHsLb   Creatinine 1.10 0.66 - 1.25 mg/dL  FrStHsLb   GFR Estimate 63 >60 mL/min/1.7m2  FrStHsLb   Comment:  Non  GFR Calc   GFR Estimate If Black 76 >60 mL/min/1.7m2  FrStHsLb   Comment:  African American GFR Calc   Calcium 8.8 8.5 - 10.1 mg/dL  FrStHsLb   Bilirubin Total 0.5 0.2 - 1.3 mg/dL  FrStHsLb   Albumin 3.3 3.4 - 5.0 g/dL L FrStHsLb   Protein Total 7.5 6.8 - 8.8 g/dL  FrStHsLb   Alkaline Phosphatase 118 40 - 150 U/L  FrStHsLb   ALT 18 0 - 70 U/L  FrStHsLb   AST 17 0 - 45 U/L  FrStHsLb            INR [[id number]] (Abnormal)  Resulted: 01/17/18 2240, Result status: Final result    Ordering provider: Magdaleno De La Cruz MD  01/17/18 2204 Resulting lab: [facility]    Specimen Information    Type  Source Collected On   Blood  01/17/18 2212          Components       Value Reference Range Flag Lab   INR 1.66 0.86 - 1.14 H FrStHsLb            CBC with platelets differential [[id number]] (Abnormal)  Resulted: 01/17/18 2224, Result status: Final result    Ordering provider: Magdaleno De La Cruz MD  01/17/18 2204 Resulting lab: [facility]    Specimen Information    Type Source Collected On   Blood  01/17/18 2212          Components       Value Reference Range Flag Lab   WBC 7.0 4.0 - 11.0 10e9/L  FrStHsLb   RBC Count 3.51 4.4 - 5.9 10e12/L L FrStHsLb   Hemoglobin 11.1 13.3 - 17.7 g/dL L FrStHsLb   Hematocrit 34.0 40.0 - 53.0 % L FrStHsLb   MCV 97 78 - 100 fl  FrStHsLb   MCH 31.6 26.5 - 33.0 pg  FrStHsLb   MCHC 32.6 31.5 - 36.5 g/dL  FrStHsLb   RDW 15.1 10.0 - 15.0 % H FrStHsLb   Platelet Count 204 150 - 450 10e9/L  FrStHsLb   Diff Method Automated Method   FrStHsLb   % Neutrophils 68.3 %  FrStHsLb   % Lymphocytes 17.8 %  FrStHsLb   % Monocytes 9.9 %  FrStHsLb   % Eosinophils 2.6 %  FrStHsLb   % Basophils 1.0 %  FrStHsLb   % Immature Granulocytes 0.4 %  FrStHsLb   Nucleated RBCs 0 0 /100  FrStHsLb   Absolute Neutrophil 4.8 1.6 - 8.3 10e9/L  FrStHsLb   Absolute Lymphocytes 1.2 0.8 - 5.3 10e9/L  FrStHsLb   Absolute Monocytes 0.7 0.0 - 1.3 10e9/L  FrStHsLb   Absolute Eosinophils 0.2 0.0 - 0.7 10e9/L  FrStHsLb   Absolute Basophils 0.1 0.0 - 0.2 10e9/L  FrStHsLb   Abs Immature Granulocytes 0.0 0 - 0.4 10e9/L  FrStHsLb   Absolute Nucleated RBC 0.0   FrStHsLb            INR point of care [[id number]] (Abnormal)  Resulted: 01/16/18 0000, Result status: Final result    Resulting lab: [facility]     Specimen Information    Type Source Collected On     01/16/18           Components       Value Reference Range Flag Lab   INR Protime 1.9 0.86 - 1.14 A             Testing Performed By     Lab - Abbreviation Name Director Address Valid Date Range    14 - [facility] Unknown [address] [place] [address] 05/08/15 1057 - Present    56 - [facility] [address]  [place] [address] 09/10/13 1354 - Present    [address] - Unknown [facility] Unknown [address] [place] [address] 07/23/15 1146 - Present            Unresulted Labs (24h ago through future)    Start       Ordered    01/19/18 0600  INR  (warfarin (COUMADIN) Pharmacy Consult-INITIAL ORDER)  DAILY,   Routine      01/18/18 0128    01/19/18 0600  TSH  AM DRAW,   Routine     Comments:  Last Lab Result: TSH (mU/L)       Date                     Value                 11/29/2017               1.38             ----------    01/18/18 0223         Imaging Results - 3 Days      MR Brain w/o & w Contrast [[id number]]  Resulted: 01/18/18 1026, Result status: Final result    Ordering provider: Dennis Onofre MD  01/18/18 0128 Resulted by: Az Castano MD    Performed: 01/18/18 0825 - 01/18/18 0923 Resulting lab: RADIOLOGY RESULTS    Narrative:       MRI BRAIN WITHOUT AND WITH CONTRAST  1/18/2018 9:23 AM    HISTORY:  concern for CVA, cerebellar CVA;      TECHNIQUE:  Multiplanar, multisequence MRI of the brain without and  with 10 mL Gadavist    COMPARISON: CT dated 1/17/2018    FINDINGS:  There is generalized atrophy of the brain.  White matter  changes are present in the cerebral hemispheres that are consistent  with small vessel ischemic disease in this age patient. There is no  evidence of hemorrhage, mass, acute infarct, or anomaly. Again noted  is a prominent cyst in the region of the right choroidal fissure. This  measures approximately 1.8 cm in transverse dimension by 1 cm in AP  dimension. This is felt to be an incidental finding. There are no  gadolinium enhancing lesions.   The facial structures appear normal.  The arteries at the base of the brain and the dural venous sinuses  appear patent.       Impression:        IMPRESSION:    1. No acute pathology. No bleed, mass, or acute cerebellar infarcts  are seen.  2. There is generalized atrophy of the brain.  White matter changes  are present in the cerebral hemispheres that are consistent with small  vessel ischemic disease in this age patient..  3.  Cyst in the region of the right choroidal fissure. This is felt to  be an incidental finding.      DAPHNIE HONG MD      MRA Neck w/o & w Contrast Angiogram [[id number]]  Resulted: 01/18/18 1025, Result status: Final result    Ordering provider: Dennis Onofre MD  01/18/18 0803 Resulted by: zA Hong MD    Performed: 01/18/18 0834 - 01/18/18 0944 Resulting lab: RADIOLOGY RESULTS    Narrative:       MRA ANGIOGRAM NECK W/O & W CONTRAST 1/18/2018 9:44 AM     HISTORY:  routine; Acute CVA (cerebrovascular accident) (H)    TECHNIQUE:  Sequential axial images of the neck were obtained using  2-dimensional time-of-flight before contrast and 3-dimensional  time-of-flight after the uneventful administration of 10 mL Gadavist  iv contrast.    COMPARISON:  None.    FINDINGS: Stenosis is relative to the distal internal carotid  diameter. There is significant venous contamination on the post  gadolinium images.    Right Carotid: Mild atherosclerotic plaque is seen at the right  carotid bifurcation. No significant stenosis is seen at the  bifurcation relative to the distal internal carotid diameter.    Left Carotid: The proximal left internal carotid artery is tortuous.   No significant stenosis is seen at the bifurcation relative to the  distal internal carotid diameter.    Vertebrals: Antegrade flow is seen in both vertebral arteries. The  left vertebral artery is dominant supplying the basilar artery. The  right vertebral artery is small. 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    No arterial dissection is identified.      Impression:       IMPRESSION:   1. No stenosis is seen at either carotid  bifurcation.  2. Normal-appearing left vertebral artery.  3. Small right vertebral artery. 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    DAPHNIE HONG MD      CT Head w/o Contrast [[id number]]  Resulted: 01/17/18 2307, Result status: Final result    Ordering provider: Magdaleno De La Cruz MD  01/17/18 2204 Resulted by: Farhad Dumont MD    Performed: 01/17/18 2246 - 01/17/18 2302 Resulting lab: RADIOLOGY RESULTS    Narrative:       CT SCAN OF THE HEAD WITHOUT CONTRAST  1/17/2018 11:02 PM     HISTORY: AMS and new delirium, on Coumadin, please query bleed.     TECHNIQUE: Axial images of the head and coronal reformations without  IV contrast material. Radiation dose for this scan was reduced using  automated exposure control, adjustment of the mA and/or kV according  to patient size, or iterative reconstruction technique.    COMPARISON: 6/16/2017.    FINDINGS: There is generalized atrophy of the brain. There is low  attenuation in the white matter of the cerebral hemispheres consistent  with sequelae of small vessel ischemic disease. There is no evidence  of intracranial hemorrhage, mass, acute infarct or anomaly.     The visualized portions of the sinuses and mastoids appear normal.  There is no evidence of trauma.      Impression:       IMPRESSION: No acute abnormality.    FARHAD DUMONT MD      XR Chest 2 Views [[id number]]  Resulted: 01/17/18 2253, Result status: Final result    Ordering provider: Magdaleno De La Cruz MD  01/17/18 2204 Resulted by: Jeet Landa MD    Performed: 01/17/18 2239 - 01/17/18 2239 Resulting lab: RADIOLOGY RESULTS    Narrative:       CHEST TWO VIEWS  1/17/2018 10:39 PM     COMPARISON: Two view chest x-ray 9/10/2012    HISTORY: Question pneumonia.      Impression:       IMPRESSION: Median sternotomy changes again noted. There is been  interval worsening of cardiomegaly since the comparison study with  moderate  cardiomegaly currently. There are no airspace opacities to  suggest pneumonia. There is no pleural effusion or pneumothorax. There  is mild prominence of the perihilar pulmonary vasculature, but no  definite evidence for hussain pulmonary edema.    ROMERO VALENZUELA MD      Testing Performed By     Lab - Abbreviation Name Director Address Valid Date Range    104 - Rad Rslts RADIOLOGY RESULTS Unknown Unknown 02/16/05 1553 - Present            Encounter-Level Documents:     There are no encounter-level documents.      Order-Level Documents:     There are no order-level documents.

## 2018-01-17 NOTE — IP AVS SNAPSHOT
` ` Patient Information     Patient Name Sex     Earl Sanchez ([id number]) Male 1926       Room Bed    [facility]      Patient Demographics     Address Phone E-mail Address    [address] [place] [address] [address]  [place] [address] [phone] (Home)  none (Work)  none (Mobile) [email or web address]      Patient Ethnicity & Race     Ethnic Group Patient Race    American White      Emergency Contact(s)     Name Relation Home Work Mobile    Shasta Avalos  Daughter [phone] none [phone]    Pako Sanchez Son [phone]      Reynaldo Sanchez Son   [phone]      Documents on File        Status Date Received Description       Documents for the Patient    RW Privacy Notice  () 06     Insurance Card  () 06     Consent Form  06     Waiver - Payment  () 06     Privacy Notice - Coleman Received 07     Consent Form  07     Insurance Card Received () 08     Consent Form  08     Waiver - Payment  () 08     Consent for Services - CIM Received () 11     Privacy Notice - CIM Received 11     Patient ID Received () 09/10/12     Consent for Services - Hospital/Clinic Received () 12     External Medication Information Consent Accepted () 12     HIM CASSIE Authorization - File Only   12 [facility] Gregorio Coleman    Privacy Notice - Coleman Received 12     Consent to Communicate Received () 12 Email Consent 12    Insurance Card Received () 09/10/12 [facility]    Consent for EHR Access  13 Copied from existing Consent for services - C/HOD collected on 2012    [facility] Specified Other       Consent for Services - Hospital/Clinic Received () 13     External Medication Information Consent Patient Refused 13     Consent to Communicate Received 13 PHI    Patient ID Received () 13     Insurance Card  Received () 13     HIM CASSIE Authorization - File Only   Vaishnavi Auth Release 14    Consent for Services - Hospital/Clinic Received () 14     Insurance Card Received () 02/12/15 [employer]are    Consent for Services - Hospital/Clinic Received () 09/03/15     Business/Insurance/Care Coordination/Health Form - Patient  09/15/15 APPLICATION FOR DISABILITY PARKING CERTIFICATE FORM, 8/24/15    Consent for Services/Privacy Notice - Hospital/Clinic Received () 16     Insurance Card Received () 16 ucare    [facility] CASSIE Authorization  16     Advance Directives and Living Will Received 16 Resuscitation Guidelines 2012    Advance Directives and Living Will Not Received  Validation of AD 10/17/2012    Advance Directives and Living Will Received 16 Health Care Directive 10/17/2012    Insurance Card Received 17 ucare    Consent for Services/Privacy Notice - Hospital/Clinic Received 17     Patient ID Received () 17     Business/Insurance/Care Coordination/Health Form - Patient  17 [employer], DOSING CONSIDERATION, 17    HIM CASSIE Authorization  17     Patient ID Received 17 Pt states no new ID, does not drive    HIM CASSIE Authorization  17     Care Everywhere Prospective Auth Received 10/26/17     Business/Insurance/Care Coordination/Health Form - Patient  18 PATIENT SAFETY AND HEALTH CONSIDERATIONS 17 [employer]    Consent for Services - Geriatrics Received 18     Advance Directives and Living Will Received (Deleted) 06     External Medication Information Consent  (Deleted)      Consent for Services/Privacy Notice - Hospital/Clinic  (Deleted)         Documents for the Encounter    CMS IM for Patient Signature       Observation Notice Received 18       Admission Information     Attending Provider Admitting Provider Admission Type Admission Date/Time     Dennis Onofer MD Schneider, Eric Stephen, MD Emergency 01/17/18 2128    Discharge Date Hospital Service Auth/Cert Status Service Area     Hospitalist [facility]    Unit Room/Bed Admission Status        [facility] SPEC UNIT 6605/[id number] Admission (Confirmed)       Admission     Complaint    [employer]      Hospital Account     Name Acct ID Class Status Primary Coverage    Earl Sanchez [id number] Observation Open UCARE - [employer] FOR S[employer]IORS            Guarantor Account (for Hospital Account #[id number])     Name Relation to Pt Service Area Active? Acct Type    Earl Sanchez  FCS Yes Personal/Family    Address Phone          [address] [place] [address] [address] [place] [address] [phone](H)  none(O)              Coverage Information (for Hospital Account #[id number])     F/O Payor/Plan Precert #    UCARE/UCARE FOR SENIORS     Subscriber Subscriber #    Earl Sanchez [id number]    Address Phone    [address]  [place] [address] [phone]

## 2018-01-17 NOTE — IP AVS SNAPSHOT
"` `           [place] [address] [facility]: [phone]                                              INTERAGENCY TRANSFER FORM - NURSING   2018                    Hospital Admission Date: 2018  KATHRYN GARNER   : 1926  Sex: Male        Attending Provider: Dennis Onofre MD     Allergies:  No Known Allergies    Infection:  None   Service:  HOSPITALIST    Ht:  1.727 m (5' 8\")   Wt:  74 kg (163 lb 2.3 oz)   Admission Wt:  74 kg (163 lb 2.3 oz)    BMI:  24.81 kg/m 2   BSA:  1.88 m 2            Patient PCP Information     Provider PCP Type    Alvarado Florian MD General      Current Code Status     Date Active Code Status Order ID Comments User Context       Prior      Code Status History     Date Active Date Inactive Code Status Order ID Comments User Context    2018  2:33 PM  DNR/DNI [id number]  Allen Graham MD Outpatient    2018  1:28 AM 2018  2:33 PM DNR/DNI [id number]  Dennis Onofre MD Inpatient    2017  8:06 AM 2017  7:18 PM DNR/DNI [id number]  Mal Weiss MD Inpatient    2016  9:20 AM 2017  8:06 AM DNR/DNI [id number]  Omari Sims MD Outpatient    2016  2:41 AM 2016  9:20 AM DNR/DNI [id number]  Vy Walsh RN Inpatient    12/15/2016  5:21 PM 2016  2:41 AM Full Code [id number]  Mal Weiss MD Inpatient      Advance Directives        Does patient have a scanned Advance Directive/ACP document in EPIC?           Yes (pt states that it should be on file here)        Hospital Problems as of 2018              Priority Class Noted POA    Ataxia Medium  2018 Yes      Non-Hospital Problems as of 2018              Priority Class Noted    Hypertension goal BP (blood pressure) < 140/90 High  Unknown    CAD (coronary artery disease) High  Unknown    Atrial fibrillation (H) High  Unknown    Heart failure (H) High  Unknown    Osteoarthritis Medium  Unknown    Gait " apraxia Medium  Unknown    BPH (benign prostatic hyperplasia) Medium  Unknown    Hyperlipidemia with target LDL less than 100 Medium  Unknown    CKD (chronic kidney disease) stage 3, GFR 30-59 ml/min High  8/29/2012    Anemia High  8/29/2012    Microalbuminuria Medium  8/29/2012    Hypothyroidism Medium  8/29/2012    Elevated alkaline phosphatase level Medium  8/29/2012    Advance care planning Low  9/6/2012    Health Care Home Low  9/12/2012    Chronic anticoagulation High  11/29/2012    Elevated fasting glucose High  3/15/2013    Toe inflammation Medium  1/30/2014    Elevated uric acid in blood Medium  1/30/2014    Gastritis Medium  Unknown    Abdominal aortic aneurysm (H) Medium  Unknown    Advanced directives, counseling/discussion Medium  12/10/2015    Long-term (current) use of anticoagulants [Z79.01] Medium  3/11/2016    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    General weakness Medium  12/15/2016    Tear of meniscus of left knee Medium  12/19/2016    Weakness generalized Medium  6/16/2017      Immunizations     Name Date      Influenza (High Dose) 3 valent vaccine 10/10/17     Influenza (High Dose) 3 valent vaccine 10/06/16     Influenza (High Dose) 3 valent vaccine 10/08/15     Influenza (High Dose) 3 valent vaccine 10/27/14     Influenza (High Dose) 3 valent vaccine 11/07/13     Influenza (High Dose) 3 valent vaccine 10/02/12     TDAP Vaccine (Adacel) 08/21/12          END      ASSESSMENT     Discharge Profile Flowsheet     DISCHARGE NEEDS ASSESSMENT     PAS Number  -- (n/a) 09/26/17 1526    Concerns To Be Addressed  physical/sexual safety concerns 01/18/18 1413   Senior Linkage Line Referral Placed  -- (n/a) 09/26/17 1526    Concerns Comments  Message from son. They took patient to ED upon recommendation of PCP. He was admitted to hospital and is being [facility]  01/18/18 1413   F/U Appointment Brochure Provided  01/16/18 01/18/18 1413    Equipment Currently Used at Home  grab  "bar;walker, rolling;other (see comments) 01/18/18 1413   Referrals Placed  -- (In home labs) 01/18/18 1413    # of Referrals Placed by CTS  Senior Linkage Line;Post Acute Facilities 01/18/18 1338   SKIN      GASTROINTESTINAL (ADULT,PEDIATRIC,OB)     Inspection of bony prominences  Full 01/18/18 1433    GI WDL  ex 01/18/18 1433   Inspection under devices  Full 01/18/18 1433    Abdominal Appearance  rounded 01/18/18 0428   Skin WDL  ex;color;characteristics 01/18/18 1433    All Quadrants Bowel Sounds  audible and active in all quadrants 01/18/18 0428   Skin Color/Characteristics  bruised (ecchymotic);redness blanchable 01/18/18 1433    Last Bowel Movement  01/17/18 01/18/18 0428   Skin Temperature  warm 01/18/18 1433    GI Signs/Symptoms  constipation 01/18/18 1433   Skin Moisture  dry 01/18/18 1433    Passing flatus  yes 01/18/18 0428   Skin Elasticity  quick return to original state 01/18/18 0428    COMMUNICATION ASSESSMENT     Skin Integrity  bruise(s);scab(s);scar(s) 01/18/18 1433    Patient's communication style  spoken language (English or Bilingual) 01/17/18 2038   SAFETY      FINAL RESOURCES     Safety WDL  WDL 01/18/18 1433    Resources List  Transitional Care 01/18/18 1413   All Alarms  alarm(s) activated and audible 01/18/18 1433    Other Resources  Other (see comment) 09/26/17 1526                      Assessment WDL (Within Defined Limits) Definitions           Safety WDL     Effective: 09/28/15    Row Information: <b>WDL Definition:</b> Bed in low position, wheels locked; call light in reach; upper side rails up x 2; ID band on<br> <font color=\"gray\"><i>Item=AS safety wdl>>List=AS safety wdl>>Version=F14</i></font>      Skin WDL     Effective: 09/28/15    Row Information: <b>WDL Definition:</b> Warm; dry; intact; elastic; without discoloration; pressure points without redness<br> <font color=\"gray\"><i>Item=AS skin wdl>>List=AS skin wdl>>Version=F14</i></font>      Vitals     Vital Signs Flowsheet     " VITAL SIGNS     Weight  74 kg (163 lb 2.3 oz) 01/18/18 0655    Temp  97.4  F (36.3  C) 01/18/18 1306   Weight Method  Bed scale 01/18/18 0655    Temp src  Oral 01/18/18 1306   CYNTHIA COMA SCALE      Resp  16 01/18/18 1306   Best Eye Response  4-->(E4) spontaneous 01/18/18 1433    Pulse  62 01/18/18 1306   Best Motor Response  6-->(M6) obeys commands 01/18/18 1433    Pulse/Heart Rate Source  Monitor 01/18/18 1306   Best Verbal Response  5-->(V5) oriented 01/18/18 1433    BP  126/78 01/18/18 1306   Cynthia Coma Scale Score  15 01/18/18 1433    BP Location  Right arm 01/18/18 1306   POSITIONING      OXYGEN THERAPY     Body Position  independently positioning 01/18/18 1433    SpO2  97 % 01/18/18 1306   Head of Bed (HOB)  HOB at 20 degrees 01/18/18 0655    O2 Device  None (Room air) 01/18/18 1306   Positioning/Transfer Devices  pillows;in use 01/18/18 0723    PAIN/COMFORT     DAILY CARE      Patient Currently in Pain  denies 01/18/18 1306   Activity Management  up in chair 01/18/18 1433    0-10 Pain Scale  0 01/18/18 0121   Activity Assistance Provided  assistance, 2 people 01/18/18 1433    HEIGHT AND WEIGHT     Assistive Device Utilized  gait belt;walker 01/18/18 1433    Height  -- (unable to obtain) 01/16/18 0948                 Patient Lines/Drains/Airways Status    Active LINES/DRAINS/AIRWAYS     Name: Placement date: Placement time: Site: Days: Last dressing change:    Wound 10/21/14 Nose Abrasion(s) 10/21/14   2123   Nose   1184     Wound 10/21/14 Left Knee Abrasion(s) 10/21/14   2124   Knee   1184     Wound 10/21/14 Right;Upper Lip Laceration 10/21/14   2124   Lip   1184     Wound 12/15/16 Coccyx Other (comment) Redness 12/15/16   2259   Coccyx   398     Incision/Surgical Site 04/22/14 Left Eye 04/22/14   1345    1367             Patient Lines/Drains/Airways Status    Active PICC/CVC     None            Intake/Output Detail Report     Date Intake Output    Shift IV Piggyback Total Total       Noc 01/16/18 2300 -  01/17/18 0659 -- -- -- 0    Day 01/17/18 0700 - 01/17/18 1459 -- -- -- 0    Sandra 01/17/18 1500 - 01/17/18 2259 -- -- -- 0    Noc 01/17/18 2300 - 01/18/18 0659 500 500 -- 500    Day 01/18/18 0700 - 01/18/18 1459 -- -- -- 0      Last Void/BM       Most Recent Value    Urine Occurrence 1 at 01/18/2018 0700    Stool Occurrence       Case Management/Discharge Planning     Case Management/Discharge Planning Flowsheet     REFERRAL INFORMATION     Equipment Currently Used at Home  grab bar;walker, rolling;other (see comments) 01/18/18 1413    Arrived From  home or self-care 06/16/17 1034   Resources List  Transitional Care 01/18/18 1413    # of Referrals Placed by CTS  Senior Linkage Line;Post Acute Facilities 01/18/18 1338   Other Resources  Other (see comment) 09/26/17 1526    Record Reviewed  clinical discipline documentation;history and physical 01/18/18 1338   PAS Number  -- (n/a) 09/26/17 1526    CTS Assigned to Case  jake jacob 01/18/18 1338   Senior Linkage Line Referral Placed  -- (n/a) 09/26/17 1526    LIVING ENVIRONMENT     F/U Appointment Brochure Provided  01/16/18 01/18/18 1413    Lives With  alone;facility resident 06/16/17 1034   Referrals Placed  -- (In home labs) 01/18/18 1413    Living Arrangements  assisted living 06/16/17 1034   ABUSE RISK SCREEN      COPING/STRESS     QUESTION TO PATIENT:  Has a member of your family or a partner(now or in the past) intimidated, hurt, manipulated, or controlled you in any way?  no 01/18/18 0127    Major Change/Loss/Stressor  none 06/16/17 0824   QUESTION TO PATIENT: Do you feel safe going back to the place where you are living?  yes 01/18/18 0127    ASSESSMENT/CONCERNS TO BE ADDRESSED     OBSERVATION: Is there reason to believe there has been maltreatment of a vulnerable adult (ie. Physical/Sexual/Emotional abuse, self neglect, lack of adequate food, shelter, medical care, or financial exploitation)?  no 01/18/18 0127    Concerns To Be Addressed  physical/sexual safety  concerns 01/18/18 1413   (R) MENTAL HEALTH SUICIDE RISK      Concerns Comments  Message from son. They took patient to ED upon recommendation of PCP. He was admitted to hospital and is being discharged to [facility].  01/18/18 1413   Are you depressed or being treated for depression?  No 01/18/18 0125    FINAL RESOURCES

## 2018-01-17 NOTE — TELEPHONE ENCOUNTER
Reason for Call:  Other     Detailed comments: Pako is calling about his father, Earl. He says he is currently at [facility] and he needs to be moved into a skilled nursing facility. He said they've sent out applications for this. He says in the interim, Earl may need to be in a transitional care center until they can get him into skilled nursing. Pako reports he has been using his call button for everything at [facility] to summon the nurses and he never remembers why they are there when they show up.     Phone Number Patient can be reached at: Home number on file [phone] (home)    Best Time: Anytime    Can we leave a detailed message on this number? YES    Call taken on 1/17/2018 at 10:35 AM by Shana Major

## 2018-01-17 NOTE — TELEPHONE ENCOUNTER
Spoke with patients son, he states a sibling will drop off a current copy of living will.   Minal Daley RN   [facility] - Triage

## 2018-01-17 NOTE — IP AVS SNAPSHOT
` `     [place] [address] [facility]: [phone]            Medication Administration Report for Earl Sanchez as of 01/18/18 4882   Legend:    Given Hold Not Given Due Canceled Entry Other Actions    Time Time (Time) Time  Time-Action       Inactive    Active    Linked        Medications 01/12/18 01/13/18 01/14/18 01/15/18 01/16/18 01/17/18 01/18/18    acetaminophen (TYLENOL) Suppository 650 mg  Dose: 650 mg Freq: EVERY 4 HOURS PRN Route: RE  PRN Reason: mild pain  Start: 01/18/18 0128   Admin Instructions: Alternate ibuprofen (if ordered) with acetaminophen.  Maximum acetaminophen dose from all sources = 75 mg/kg/day not to exceed 4 grams/day.               acetaminophen (TYLENOL) tablet 650 mg  Dose: 650 mg Freq: EVERY 4 HOURS PRN Route: PO  PRN Reason: mild pain  Start: 01/18/18 0128   Admin Instructions: Alternate ibuprofen (if ordered) with acetaminophen.  Maximum acetaminophen dose from all sources = 75 mg/kg/day not to exceed 4 grams/day.               aspirin EC EC tablet 81 mg  Dose: 81 mg Freq: DAILY Route: PO  Start: 01/18/18 0900          0951 (81 mg)-Given           doxazosin (CARDURA) tablet 4 mg  Dose: 4 mg Freq: AT BEDTIME Route: PO  Start: 01/18/18 0130          (0154)-Not Given       [ ] 2200           furosemide (LASIX) tablet 20 mg  Dose: 20 mg Freq: DAILY Route: PO  Start: 01/18/18 0900          (0951)-Not Given           levothyroxine (SYNTHROID/LEVOTHROID) tablet 50 mcg  Dose: 50 mcg Freq: DAILY Route: PO  Start: 01/18/18 0900   Admin Instructions: Separate oral administration of iron- or calcium-containing products and levothyroxine by at least 4 hours.           0951 (50 mcg)-Given           lisinopril (PRINIVIL/ZESTRIL) tablet 5 mg  Dose: 5 mg Freq: DAILY Route: PO  Start: 01/18/18 0900          (0951)-Not Given           naloxone (NARCAN) injection 0.1-0.4 mg  Dose: 0.1-0.4 mg Freq: EVERY 2 MIN PRN Route: IV  PRN Reason: opioid reversal  Start: 01/18/18 0128    Admin Instructions: For respiratory rate LESS than or EQUAL to 8.  Partial reversal dose:  0.1 mg titrated q 2 minutes for Analgesia Side Effects Monitoring Sedation Level of 3 (frequently drowsy, arousable, drifts to sleep during conversation).Full reversal dose:  0.4 mg bolus for Analgesia Side Effects Monitoring Sedation Level of 4 (somnolent, minimal or no response to stimulation).  For ordered doses up to 2mg give IVP. Give each 0.4mg over 15 seconds in emergency situations. For non-emergent situations further dilute in 9mL of NS to facilitate titration of response.               nitroGLYcerin (NITROSTAT) sublingual tablet 0.4 mg  Dose: 0.4 mg Freq: EVERY 5 MIN PRN Route: SL  PRN Reason: chest pain  Start: 01/18/18 0128              ondansetron (ZOFRAN-ODT) ODT tab 4 mg  Dose: 4 mg Freq: EVERY 6 HOURS PRN Route: PO  PRN Reasons: nausea,vomiting  Start: 01/18/18 0128   Admin Instructions: This is Step 1 of nausea and vomiting management.  If nausea not resolved in 15 minutes, go to Step 2 prochlorperazine (COMPAZINE). Do not push through foil backing. Peel back foil and gently remove. Place on tongue immediately. Administration with liquid unnecessary  With dry hands, peel back foil backing and gently remove tablet; do not push oral disintegrating tablet through foil backing; administer immediately on tongue and oral disintegrating tablet dissolves in seconds; then swallow with saliva; liquid not required.              Or  ondansetron (ZOFRAN) injection 4 mg  Dose: 4 mg Freq: EVERY 6 HOURS PRN Route: IV  PRN Reasons: nausea,vomiting  Start: 01/18/18 0128   Admin Instructions: This is Step 1 of nausea and vomiting management.  If nausea not resolved in 15 minutes, go to Step 2 prochlorperazine (COMPAZINE).  Irritant. For ordered doses up to 4 mg, give IV Push undiluted over 2-5 minutes.               pravastatin (PRAVACHOL) tablet 40 mg  Dose: 40 mg Freq: DAILY Route: PO  Start: 01/18/18 0900          0951 (40  mg)-Given           Warfarin Therapy Reminder (Check START DATE - warfarin may be starting in the FUTURE)  Freq: CONTINUOUS PRN Route: XX  Start: 01/18/18 0128   Admin Instructions: *Note to reorder warfarin daily*  Pharmacy Warfarin Dosing Service  Patient is on Warfarin Therapy - check for daily order              Future Medications  Medications 01/12/18 01/13/18 01/14/18 01/15/18 01/16/18 01/17/18 01/18/18       warfarin (COUMADIN) tablet 5 mg  Dose: 5 mg Freq: ONCE AT 6PM Route: PO  Start: 01/18/18 1800          [ ] 1800          Completed Medications  Medications 01/12/18 01/13/18 01/14/18 01/15/18 01/16/18 01/17/18 01/18/18         Dose: 500 mL Freq: ONCE Route: IV  Last Dose: Stopped (01/18/18 0001)  Start: 01/17/18 2205   End: 01/18/18 0001 2217 (500 mL)-New Bag        0001-Stopped             Dose: 650 mg Freq: ONCE Route: PO  Start: 01/18/18 0001   End: 01/18/18 0007   Admin Instructions: Maximum acetaminophen dose from all sources = 75 mg/kg/day not to exceed 4 grams/day.           0007 (650 mg)-Given             Dose: 10 mL Freq: ONCE Route: IV  Start: 01/18/18 0930   End: 01/18/18 0924          0924 (10 mL)-Given             Dose: 30 mL Freq: ONCE Route: IV  Start: 01/18/18 0930   End: 01/18/18 0924          0924 (30 mL)-Given             Dose: 2 mg Freq: ONCE Route: PO  Start: 01/18/18 0145   End: 01/18/18 0156          0156 (2 mg)-Given        Medications 01/12/18 01/13/18 01/14/18 01/15/18 01/16/18 01/17/18 01/18/18

## 2018-01-17 NOTE — TELEPHONE ENCOUNTER
Patient's son Pako is calling to request that Dr. Florian call him this evening at  [phone].    Pako is frustrated because he first left a message at 10:30 this morning and it was not sent to Dr. Florian.     He did not want to give me a message or share any details of the situation.  States that Earl cannot be left alone. States that they have applications out for skilled nursing, put the patient is on a waiting list.    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 Requests that I have Dr. Florian paged to call him.    Asked the name of the  is at the clinic.    Left message on Dr. Florian's cell phone at 4:57 pm to please call me back regarding a patient call back.     Dr. Florian called back at 5:09. He was driving at the time of the call and will call patient's son back if possible this evening. If not he will call him back tomorrow. Sent text to Dr. Florian with Pako's number.    Brandy Miller RN

## 2018-01-17 NOTE — IP AVS SNAPSHOT
` `           [place] [address] [facility]: [phone]                 INTERAGENCY TRANSFER FORM - NOTES (H&P, Discharge Summary, Consults, Procedures, Therapies)   2018                    Hospital Admission Date: 2018  KATHRYN GARNER   : 1926  Sex: Male        Patient PCP Information     Provider PCP Type    Alvarado Florian MD General         History & Physicals      H&P by Dennis Onofre MD at 2018  2:07 AM     Author:  Dennis Onofre MD Service:  Hospitalist Author Type:  Physician    Filed:  2018  2:23 AM Date of Service:  2018  2:07 AM Creation Time:  2018  2:06 AM    Status:  Signed :  Dennis Onofre MD (Physician)         [facility]    History and Physical  Hospitalist       Date of Admission:  2018  Date of Service (when I saw the patient):[ES1.1] 18    Assessment & Plan[ES1.2]   Kathryn Garner is a 89 year old male who presents with increased confusion reported agitation as well as difficulty walking    Gait abnormality  Confusion/agitation   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  Family is not available at the time of visit so history is obtained from the medical record.  As well as patient is somewhat a poor historian.  He appears as if he has had progressive ataxia in and see his primary care provider recently, on , for this.  His balance became worse prompting his presentation to the emergency department.  He was unable to stand even with the assist of 2.  He denies any chest pain or shortness of breath.  Denies fevers chills.  Denies any GI complaints or constipation.  -Admit to observation  -We will attempt an MRI/MRA; the nurses reported that he has some metal in his body; however, he had an MRI done in 2017 so I suspect  is fine for an MRI.  From June 2017 to present to confirm any notes that the patient had any metallic device placed.  Concern is for a cerebellar stroke.  -Physical therapy and Occupational Therapy  -I suspect he will need placement  -Other potential etiologies for worsening gait and confusion including urinalysis chest x-ray influenza check head CT and labs were all fine.  -We will check a TSH for completeness    Atrial fibrillation  History of coronary disease with CABG in 2007  He has history of atrial fibrillation and is currently on warfarin for this.  It does not appear to be in any rate limiting medications.  -Continue warfarin for now  -We may need to consider stopping his warfarin  as he is a fall risk if he is going to be ambulatory.  -Continue his daily aspirin    Hypothyroidism  Continue prior to admission Synthroid 50 mcg a day    History of chronic kidney disease  Baseline creatinine is variable but is in the low to mid ones range.  On presentation to the hospital stay his creatinine is 1.1.  -Avoid nephrotoxins  -With creatinine at 1.1 it will be safe to give him gadolinium for an MRI    Hypertension  Continue his Lasix and lisinopril    Hyperlipidemia  Continue his pravastatin    BPH  Continue his prior to admission doxazosin    DVT Prophylaxis: Pneumatic Compression Devices  Code Status: DNR / DNI    Disposition: Expected discharge in 1-2 days once assessment for CVA is done and therapies have been done with the intent of finding a safe disposition  Next[ES1.1]    Dennis Onofre[ES1.2], MD  [phone] (P)  Text Page[ES1.1]     Primary Care Physician[ES1.2]   [ES1.1] Alvarado Florian    Chief Complaint[ES1.2]    Gait ataxia, confusion/agitation    History is obtained from the patient and medical records[ES1.1]    History of Present Illness[ES1.2]   Earl Sanchez is a 89 year old male who presents with the above complaints.  Most of the history had obtained from the medical record as the  patient is a poor historian.  Apparently he has had progressive gait ataxia and had seen primary Dr. Florian a day prior to presentation to the emergency department.  Given his weakness as well as agitation confusion is brought to the emergency department.  The concern was for stroke.  He denies any chest pain shortness of breath nausea vomiting to me.  He denies any dizziness or palpitations.  He has a history of atrial fibrillation.[ES1.1]    Past Medical History[ES1.2]    I have reviewed this patient's medical history and updated it with pertinent information if needed.[ES1.1]   Past Medical History:   Diagnosis Date     Abdominal aortic aneurysm (H)     per 2/12/15 abdominal US, mild aneurysm measuring [id number]     Acute MI 1981, 2007     Atrial fibrillation (H)      BPH (benign prostatic hyperplasia)      CAD (coronary artery disease)     CABG 2007, f/b cardio, Dr. 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     Gastritis     treated with zantac     Gastro-oesophageal reflux disease      Heart failure (H)      Hyperlipidaemia LDL goal <100      Hypertension goal BP (blood pressure) < 140/90      Osteoarthritis     low back, hips, knees     Renal disease     renal insuff     Thyroid disease        Past Surgical History[ES1.2]   I have reviewed this patient's surgical history and updated it with pertinent information if needed.[ES1.1]  Past Surgical History:   Procedure Laterality Date     ANKLE SURGERY      ORIF ankle fracture     CORONARY ARTERY BYPASS  2007    CAB X 5 CABG with LIMA to LAD and saphenous vein grafts to, OM2,SVG to diag 1, and sequential PDA     HEART CATH LEFT HEART CATH  12/10/07     HERNIA REPAIR, INGUINAL RT/LT      right     PHACOEMULSIFICATION CLEAR CORNEA WITH TORIC INTRAOCULAR LENS IMPLANT  4/10/2014    Procedure: PHACOEMULSIFICATION CLEAR CORNEA WITH TORIC INTRAOCULAR LENS IMPLANT;  RIGHT PHACOEMULSIFICATION CLEAR CORNEA WITH  TORIC INTRAOCULAR LENS IMPLANT ;  Surgeon: Carlo Tee MD;  Location: [facility]     PHACOEMULSIFICATION CLEAR CORNEA WITH TORIC INTRAOCULAR LENS IMPLANT  4/22/2014    Procedure: PHACOEMULSIFICATION CLEAR CORNEA WITH TORIC INTRAOCULAR LENS IMPLANT;  Surgeon: Carlo Tee MD;  Location: [facility]       Prior to Admission Medications   Prior to Admission Medications   Prescriptions Last Dose Informant Patient Reported? Taking?   JANTOVEN 2 MG tablet   No No   Sig: TAKE 2 TABLETS (4 MG) BY MOUTH ON MONDAY AND FRIDAY, AND 1 TABLET (2 MG) ALL OTHER DAYS OR AS DIRECTED BY [facility] NURSES   acetaminophen (TYLENOL) 500 MG tablet  Self Yes No   Sig: Take 1,000 mg by mouth every 6 hours as needed for mild pain    allopurinol (ZYLOPRIM) 100 MG tablet   No No   Sig: Take 2 tablets (200 mg) by mouth 3 times daily as needed   aspirin (ASPIRIN LOW DOSE) 81 MG tablet  Self Yes No   Sig: Take 81 mg by mouth daily    doxazosin (CARDURA) 4 MG tablet  Self No No   Sig: Take 1 tablet (4 mg) by mouth At Bedtime   furosemide (LASIX) 20 MG tablet   Yes No   Sig: Take 1 tablet (20 mg) by mouth daily   levothyroxine (SYNTHROID/LEVOTHROID) 50 MCG tablet   No No   Sig: TAKE 1 TABLET DAILY   lisinopril (PRINIVIL/ZESTRIL) 10 MG tablet   Yes No   Sig: Take 0.5 tablets (5 mg) by mouth daily   nitroglycerin (NITROSTAT) 0.4 MG SL tablet  Self No No   Sig: Place 1 tablet (0.4 mg) under the tongue every 5 minutes as needed for chest pain   pravastatin (PRAVACHOL) 40 MG tablet   No No   Sig: TAKE 1 TABLET DAILY   vitamin D (ERGOCALCIFEROL) 97176 UNIT capsule   No No   Sig: Take 1 capsule (50,000 Units) by mouth every 7 days for 8 doses      Facility-Administered Medications: None     Allergies   No Known Allergies    Social History[ES1.2]   I have reviewed this patient's social history and updated it with pertinent information if needed. Earl Hart Laura[ES1.1]  reports that he has never smoked. He has never used smokeless tobacco. He  reports that he drinks alcohol. He reports that he does not use illicit drugs.    Family History[ES1.2]   I have reviewed this patient's family history and updated it with pertinent information if needed.[ES1.1]   Family History   Problem Relation Age of Onset     Hypertension Mother      Hypertension Maternal Grandmother        Review of Systems[ES1.2]   The 10 point Review of Systems is negative other than noted in the HPI or here.  Note the patient is a poor historian[ES1.1]    Physical Exam   Temp: 97.1  F (36.2  C) Temp src: Oral BP: 124/85 Pulse: 62   Resp: 16 SpO2: 98 % O2 Device: None (Room air)[ES1.2]    Vital Signs with Ranges[ES1.1]  0 lbs 0 oz[ES1.2]    Constitutional: alert, oriented and in no acute distress  Eyes: EOMI, PERRL  HEENT: OP clear, somewhat hard of hearing  Respiratory: CTA B without w/c  Cardiovascular: Regular without murmurs  GI: soft, nontender, nondistended, no HSM  Lymph/Hematologic: no cervical LAD  Genitourinary: deferred  Skin: no rashes or lesions grossly  Musculoskeletal: no deformities or arthritis  Neurologic: CN II-XII, BROWER, strength and sensation were intact on exam  Psychiatric: mood mood was fine he is mildly agitated[ES1.1]    Data[ES1.2]   Data reviewed today:  I personally reviewed a head CT that was negative.  Chest x-ray which showed cardiomegaly but no overt signs of heart failure..[ES1.1]    Recent Labs  Lab 01/17/18  2212 01/16/18   WBC 7.0  --    HGB 11.1*  --    MCV 97  --      --    INR 1.66* 1.9*     --    POTASSIUM 3.6  --    CHLORIDE 104  --    CO2 27  --    BUN 22  --    CR 1.10  --    ANIONGAP 8  --    JAYME 8.8  --    *  --    ALBUMIN 3.3*  --    PROTTOTAL 7.5  --    BILITOTAL 0.5  --    ALKPHOS 118  --    ALT 18  --    AST 17  --        Recent Results (from the past 24 hour(s))   XR Chest 2 Views    Narrative    CHEST TWO VIEWS  1/17/2018 10:39 PM     COMPARISON: Two view chest x-ray 9/10/2012    HISTORY: Question pneumonia.       Impression    IMPRESSION: Median sternotomy changes again noted. There is been  interval worsening of cardiomegaly since the comparison study with  moderate cardiomegaly currently. There are no airspace opacities to  suggest pneumonia. There is no pleural effusion or pneumothorax. There  is mild prominence of the perihilar pulmonary vasculature, but no  definite evidence for hussain pulmonary edema.    ROMERO VALENZUELA MD   CT Head w/o Contrast    Narrative    CT SCAN OF THE HEAD WITHOUT CONTRAST  1/17/2018 11:02 PM     HISTORY: AMS and new delirium, on Coumadin, please query bleed.     TECHNIQUE: Axial images of the head and coronal reformations without  IV contrast material. Radiation dose for this scan was reduced using  automated exposure control, adjustment of the mA and/or kV according  to patient size, or iterative reconstruction technique.    COMPARISON: 6/16/2017.    FINDINGS: There is generalized atrophy of the brain. There is low  attenuation in the white matter of the cerebral hemispheres consistent  with sequelae of small vessel ischemic disease. There is no evidence  of intracranial hemorrhage, mass, acute infarct or anomaly.     The visualized portions of the sinuses and mastoids appear normal.  There is no evidence of trauma.      Impression    IMPRESSION: No acute abnormality.    OSMAN ARECHIGA MD[ES1.2]          Revision History        User Key Date/Time User Provider Type Action    > ES1.2 1/18/2018  2:23 AM Dennis Onofre MD Physician Sign     ES1.1 1/18/2018  2:06 AM Dennis Onofre MD Physician                   Discharge Summaries     No notes of this type exist for this encounter.      Consult Notes     No notes of this type exist for this encounter.         Progress Notes - Physician (Notes from 01/15/18 through 01/18/18)      Progress Notes by Estrella Pang BSW at 1/18/2018  2:13 PM     Author:  Estrella Pang BSW Service:  (none) Author Type:      Filed:   1/18/2018  2:16 PM Encounter Date:  1/17/2018 Status:  Signed    :  Estrella Pang BSW ()           Clinic Care Coordination Contact  Care Team Conversations  Message from Pako. They took patient to ED and he was admitted to hospital as he could not walk. Dr. Florian had instructed them to go to ED. Did chart review and he is being discharged to [facility].     Will ask care navigator to follow while in TCU.     Plan- cc to call when discharged from TCU.    Social Javed Negron  [facility]  [email or web address]  [phone][LC1.1]         Revision History        User Key Date/Time User Provider Type Action    > LC1.1 1/18/2018  2:16 PM Estrella Pang BSW  Sign            Progress Notes by Jeniffer Donald LICSW at 1/18/2018  1:17 PM     Author:  Jeniffer Donald LICSW Service:  (none) Author Type:      Filed:  1/18/2018  1:37 PM Date of Service:  1/18/2018  1:17 PM Creation Time:  1/18/2018  1:17 PM    Status:  Signed :  Jeniffer Donald LICSW ()         Care Transition Initial Assessment - SW     Met with: patient and spoke with son Pako  Active Problems:    Ataxia         DATA  Patient lives at [facility].  Per Etienne, their DON, ([phone], patient was receiving services as follows; am and pm cares with dressing, tolieting, escort to the diningroom.  His family manages his medications with an alarm system and the aides from [facility] did not think patient was getting up to take his medications.   Recently patient was putting his call light on frequently and when the staff arrived he could not remember what he wanted.  Etienne said they were beginning to have discussions that patient may need Enhanced Living Care.    Identified issues/concerns regarding health management:  Patient lives at [facility],patient admitted with confusion and ataxia.  TCU is recommended.  Patient has [facility] which waives the three  night Medicare SNF requirement.  Care Coordinator met with patient and he requested [facility] or [facility].  Writer met with patient and explained his insurance benefit. Patient requests a double room.   ASSESSMENT  Cognitive Status: alert with poor short term memory  Concerns to be addressed:  None, as patient in agreement with TCU     PLAN  Financial costs for the patient includes nonbe  Patient given options and choices for discharge yes.  Patient/family is agreeable to the plan? yes  Patient Goals and Preferences: TCU  Patient anticipates discharging to:  [facility]  Accepted by [facility].  Son Pako aware.  Patient will be transported at 1400 to [facility].[KH1.1]           Revision History        User Key Date/Time User Provider Type Action    > KH1.1 1/18/2018  1:37 PM Jeniffer Donald [facility]  Sign            Progress Notes by Татьяна Alvarado RN at 1/18/2018 10:58 AM     Author:  Татьяна Alvarado RN Service:  Care Coordinator Author Type:      Filed:  1/18/2018  1:01 PM Date of Service:  1/18/2018 10:58 AM Creation Time:  1/18/2018 12:58 PM    Status:  Addendum :  Татьяна Alvarado, RN ()         Care Coordinator met with pt this AM concerning Observation status and he has an Observation brochure.  Pt resides at [facility].  He receives AM and HS cares and help thru day as needed.  His family sets his medications up.  It was anticipated that pt would require a TCU. Pt is in agreement and would want [facility] and if they were unable to accept, he would want [facility].  SW was updated with plans to discharge today.[HN1.1]     Revision History        User Key Date/Time User Provider Type Action    > [N/A] 1/18/2018  1:01 PM Татьяна Alvarado, RN Case Manager Addend     HN1.1 1/18/2018  1:01 PM Татьяна Alvarado RN Case Manager Sign            Progress Notes by Allen Graham MD at 1/18/2018 12:07 PM     Author:  Allen Graham MD Service:  Hospitalist Author Type:  Physician     Filed:  1/18/2018 12:10 PM Date of Service:  1/18/2018 12:07 PM Creation Time:  1/18/2018 12:07 PM    Status:  Signed :  Allen Graham MD (Physician)         Brief Hospitalist Progress Note    No charge note, patient admitted early this morning by Dr. Onofre.  MRI/MRA without acute abnormality.  All other work-up unremarkable.  TSH pending, but was wnl only 6 weeks ago - have very low suspicion this is the issue.  Patient has been calm and cooperative today and offers no complaints to me other than ongoing ataxia, appears alert and appropriate.  Agreeable to TCU placement.  Recommend outpatient follow up with Dr. Blackburn (who he had seen in Nov 2017 for gait abnormality), who felt he had some parkinsonian features and I suspect his presentation represents progression of this.[PB1.1]     Revision History        User Key Date/Time User Provider Type Action    > PB1.1 1/18/2018 12:10 PM Allen Graham MD Physician Sign            ED Provider Notes by Magdaleno De La Cruz MD at 1/17/2018  8:35 PM     Author:  Magdaleno De La Cruz MD Service:  Emergency Medicine Author Type:  Physician    Filed:  1/18/2018  3:48 AM Date of Service:  1/17/2018  8:35 PM Creation Time:  1/17/2018 11:56 PM    Status:  Signed :  Magdaleno De La Cruz MD (Physician)           History     Chief Complaint:  Altered Mental Status     HPI   Earl Sanchez is a 89 year old male who presents with[AI1.1] gait abnormalities for the last 2-3 weeks, with an acute change last night in behavior noted at his skilled nursing facility.  Specifically, patient has been requiring more people to help him walk, but has not had any focal weakness is noted.  This is being evaluated by patient's primary care doctor and he has an MRI scheduled in the outpatient setting.  Most concerning to family who brings patient to the ER, is that last night in his skilled nursing facility, he rang the  call light roughly 100 times, and when nurses would come to investigate, he would have no understanding of why he had pushed the call light.  Patient also appears to have had several other incidences of confusion like this/forgetting where he is or what he was doing, over the last 2-3 days.  They have called her primary care doctor, who is recommended that Mr. Sanchez come to the ER today for evaluation.[MF1.1]    Allergies:  No known Drug Allergies      Medications:    JANTOVEN 2 MG tablet  vitamin D (ERGOCALCIFEROL) 00193 UNIT capsule  allopurinol (ZYLOPRIM) 100 MG tablet  lisinopril (PRINIVIL/ZESTRIL) 10 MG tablet  furosemide (LASIX) 20 MG tablet  levothyroxine (SYNTHROID/LEVOTHROID) 50 MCG tablet  pravastatin (PRAVACHOL) 40 MG tablet  doxazosin (CARDURA) 4 MG tablet  acetaminophen (TYLENOL) 500 MG tablet  nitroglycerin (NITROSTAT) 0.4 MG SL tablet  aspirin (ASPIRIN LOW DOSE) 81 MG tablet    Past Medical History:    Abdominal aortic aneurysm  Acute MI  A-Fib  BPH  CAD  CKD  Duodenal ulcer with hemorrhage  Gait apraxia  Gastritis  GERD  Heart failure  Hyperlipidemia  Hypertension  Osteoarthritis  Renal disease  Thyroid disease    Past Surgical History:    ORIF ankle fracture  Coronary artery bypass  Heart cath left   Right hernia repair, inguinal  Phacoemulsification clear cornea with standard intraocular lens implant    Family History:    Mother- hypertension  Maternal grandmother- hypertension    Social History:  Marital Status:   [5]  Social History   Substance Use Topics     Smoking status: Never Smoker     Smokeless tobacco: Never Used     Alcohol use Yes      Comment: occ - one glass of wine last night        Review of Systems   Constitutional: Negative for[AI1.1] chills[MF1.1] and[AI1.1] fever[MF1.1].   Cardiovascular: Negative for[AI1.1] chest pain[MF1.1].   Gastrointestinal: Negative for[AI1.1] abdominal pain[MF1.1],[AI1.1] diarrhea[MF1.1] and[AI1.1] nausea[MF1.1].   Musculoskeletal: Positive  for[AI1.1] gait problem[MF1.1]. Negative for[AI1.1] arthralgias[MF1.1] and[AI1.1] back pain[MF1.1].   Neurological: Negative for[AI1.1] dizziness[MF1.1],[AI1.1] seizures[MF1.1],[AI1.1] speech difficulty[MF1.1],[AI1.1] light-headedness[MF1.1] and[AI1.1] numbness[MF1.1].[AI1.1]   All other systems reviewed and are negative[MF1.1].      Physical Exam   First Vitals:  BP: 115/79  Pulse: 59  Temp: 97.9  F (36.6  C)  Resp: 17  SpO2: 97 %      Physical Exam[AI1.1]  Vitals: reviewed by me  General: Pt seen on [facility], pleasant, cooperative, and alert to conversation  Eyes: Tracking well, clear conjunctiva BL  ENT: MMM, midline trachea.   Lungs:   No tachypnea, no accessory muscle use. No respiratory distress.  Lungs are clear to auscultation bilaterally  CV: Rate as above, irregularly irregular rhythm.  No murmurs heard on auscultation  Abd: Soft, non tender, no guarding, no rebound. Non distended  MSK: no peripheral edema or joint effusion.  No evidence of trauma  Skin: No rash, normal turgor and temperature  Neuro: Clear speech and no facial droop.  Bilateral upper and bilateral lower extremities with sensation intact light touch and 5 out of 5 motor throughout.  Cranial nerves II through XII are intact bilaterally.  Patient is unable to ambulate independently, appears mildly ataxic and unable to support his own weight when standing.  Psych: Not RIS, no e/o AH/VH[MF1.1]      Emergency Department Course   Imaging:  Radiology findings were communicated with the patient who voiced understanding of the findings.    CT Head w/o Contrast  Final Result  IMPRESSION: No acute abnormality.    OSMAN ARECHIGA MD    XR Chest 2 Views  Final Result  IMPRESSION: Median sternotomy changes again noted. There is been  interval worsening of cardiomegaly since the comparison study with  moderate cardiomegaly currently. There are no airspace opacities to  suggest pneumonia. There is no pleural effusion or pneumothorax. There  is mild  prominence of the perihilar pulmonary vasculature, but no  definite evidence for hussain pulmonary edema.    ROMERO VALENZUELA MD  Reading per radiology      Laboratory:  Laboratory findings were communicated with the patient who voiced understanding of the findings.    Ua: trace blood (A), moderate bacteria (A)  Urine microscopic: moderate bacteria (A)  INR: 1.66 (H)  CBC: WBC 7.0, HGB 11.1 (L),   CMP: glucose 127 (H), albumin 3.3 (L), o/w WNL (Creatinine 1.1)    Interventions:  2217 NS 500ml IV Bolus     Emergency Department Course:  Nursing notes and vitals reviewed.  I performed an exam of the patient as documented above.   I discussed the treatment plan with the patient. They expressed understanding of this plan and consented to admission. I discussed the patient with Dr. Onofre, who will admit the patient to a monitored bed for further evaluation and treatment.    I personally reviewed the imaging and lab results with the patient and answered all related questions prior to admission for observation.  Impression & Plan      Medical Decision Making:  This patient is a 89 years old male who presents with altered mental status and gait abnormality and possible delirium with sun downing. The patient has normal neurological exam here but does fail his trial of ambulation and is quite unsteady. Patient has negative workup and no evidence for organic cause of delirium, but does certainly warrant further investigation given his acute decline over the last several weeks. I agree with the primary care provider who has ordered an MRI, although I do think it would benefit the family to have this done sooner rather than later and as such I have admitted for an MRI to rule out a cerebellar stroke. Patient is mentating appropriately here and has no clear stroke syndrome, but again quite unstable on his gait and this is a subacute change for the family. No evidence of concurrent UTI, pneumonia or influenza here in the ED.  patient also may benefit from physical therapy and OT and even in the setting of a negative MRI family states they do nothing he could adequately be discharged back to his skilled nursing home and therefore may need admission for placement as well. With this in mind I have spoke to Dr. Onofre who has very generously agreed to accept care of the patient to an observation state for MRI and placement. Will monitor very carefully until bed is available.     Diagnosis:    ICD-10-CM    1. Gait instability R26.81    2. Altered mental status, unspecified altered mental status type R41.82      Disposition:   Admission for observation    Scribe Disclosure:  Ezio ANDUJAR am serving as a scribe at 11:56 PM on 1/17/2018 to document services personally performed by Magdaleno De La Cruz MD, based on my observations and the provider's statements to me.       EMERGENCY DEPARTMENT[AI1.1]       Magdaleno De La Cruz MD  01/18/18 0348  [MF1.2]     Revision History        User Key Date/Time User Provider Type Action    > MF1.2 1/18/2018  3:48 AM Magdaleno De La Cruz MD Physician Sign     MF1.1 1/18/2018  3:45 AM Magdaleno De La Cruz MD Physician      AI1.1 1/18/2018 12:45 AM Ezio Her Scribe Share            ED Notes by Evelia Salamanca RN at 1/18/2018 12:50 AM     Author:  Evelia Salamanca RN Service:  (none) Author Type:  Registered Nurse    Filed:  1/18/2018 12:50 AM Date of Service:  1/18/2018 12:50 AM Creation Time:  1/18/2018 12:50 AM    Status:  Signed :  Evelia Salamanca RN (Registered Nurse)         Observation video shown to pt.[RK1.1]     Revision History        User Key Date/Time User Provider Type Action    > RK1.1 1/18/2018 12:50 AM Evelia Salamanca RN Registered Nurse Sign            ED Notes by Evelia Salamanca RN at 1/17/2018 11:49 PM     Author:  Evelia Salamanca RN Service:  (none) Author Type:  Registered Nurse    Filed:  1/17/2018 11:56 PM  Date of Service:  1/17/2018 11:49 PM Creation Time:  1/17/2018 11:56 PM    Status:  Signed :  Evelia Salamanca, RN (Registered Nurse)         [facility]  ED Nurse Handoff Report    ED Chief complaint: Altered Mental Status (Family brings pt in because he has had increased confusion and agitation over the last couple of days but famly has noted a decline in the last month. Told by primary to come in and get him evaluated for infection. Thought perhaps he may have had a microstroke.)      ED Diagnosis:   Final diagnoses:   None       Code Status: DNR / DNI    Allergies: No Known Allergies    Activity level - Baseline/Home:  Increased decline over last 2 weeks. Fall risk d/t impaired gait.    Activity Level - Current:   Unable to stand with 2 assist in the ER d/t weekend.     Needed?: No    Isolation: No  Infection: Not Applicable    Bariatric?: No    Vital Signs:   Vitals:    01/17/18 2200 01/17/18 2218   BP: 115/79 115/79   Pulse: 59    Resp: 17    Temp: 97.9  F (36.6  C)    TempSrc: Oral    SpO2: 97% 98%       Cardiac Rhythm: ,        Pain level: 0-10 Pain Scale: 0    Is this patient confused?: Yes-? Early dementia or sundowners which is new and why he is presenting.    Patient Report: Initial Complaint: Pt brought in by family d/t increased confusion and gait abnormality over last month. Last night pushed his nurses call light over 100 times and would then not want anything. Sent in by primary MD because of potential for early dementia or stroke.  Focused Assessment: Pt unable to ambulate with 2 assist. Pt was not confused down on the ER. 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  Tests Performed: CT head and labs  Abnormal Results: none  Treatments provided: 1L NS bolus    Family Comments: Family present and very supportive.     OBS brochure/video discussed/provided to patient: N/A    ED Medications:   Medications   0.9% sodium chloride BOLUS (500 mLs Intravenous New Bag 1/17/18 2217)        Drips infusing?:  No      ED NURSE PHONE NUMBER: [phone][RK1.1]          Revision History        User Key Date/Time User Provider Type Action    > RK1.1 1/17/2018 11:56 PM Evelia Salamanca, RN Registered Nurse Sign            ED Notes by Scott Langston RN at 1/17/2018 11:27 PM     Author:  Scott Langston RN Service:  ED Special Procedures Author Type:  Registered Nurse    Filed:  1/17/2018 11:27 PM Date of Service:  1/17/2018 11:27 PM Creation Time:  1/17/2018 11:27 PM    Status:  Signed :  Scott Langston RN (Registered Nurse)         Report given to on coming RN[SR1.1]      Revision History        User Key Date/Time User Provider Type Action    > SR1.1 1/17/2018 11:27 PM Scott Langston RN Registered Nurse Sign            ED Notes by Bryant Mcduffie at 1/17/2018 11:04 PM     Author:  Bryant Mcduffie Service:  (none) Author Type:  Emergency Room Technician    Filed:  1/17/2018 11:06 PM Date of Service:  1/17/2018 11:04 PM Creation Time:  1/17/2018 11:04 PM    Status:  Addendum :  Bryant Mcduffie (Emergency Room Technician)         Tried to get pt up to walk. Pt couldn't stand up assist of 2.[MF1.1]      Revision History        User Key Date/Time User Provider Type Action    > [N/A] 1/17/2018 11:06 PM Bryant Mcduffie Emergency Room Technician Addend     MF1.1 1/17/2018 11:06 PM Bryant Mcduffie Emergency Room Technician Sign            Progress Notes by Estrella Pang BSW at 1/17/2018 10:47 AM     Author:  Estrella Pang BSW Service:  (none) Author Type:      Filed:  1/17/2018 11:04 AM Encounter Date:  1/17/2018 Status:  Signed    :  Estrella Pang BSW ()           Clinic Care Coordination Contact  Care Team Conversations  Call from son Pako. Patient's neurologist has ordered labs and the order was sent to [facility]. [facility] clinic [facility] and it is extreme effort to get patient into clinic.  He asked if [facility]  could come out to do blood draws for labs.  Reviewed parameters of home care and that patient may qualify for that. Discussed that [facility] can pay for in home labs by company [facility]   Medicare Certified Home Health Agency  Ph : [phone]   FAX : [phone]     They need a face sheet and order faxed to them to initiate service.     Pako would like to have neurologist send order to [facility] to do the blood draw.  He asked CC to send request to neurologist.     Plan- Routing note to neurologist and to Dr. Florian. Will assist with patient as needed. Will call son in one week.     Estrella Pang   [facility]  [email or web address]  [phone][LC1.1]                   Revision History        User Key Date/Time User Provider Type Action    > LC1.1 1/17/2018 11:04 AM Estrella Pang BSW  Sign            Progress Notes by Estrella Pang BSW at 1/16/2018  1:26 PM     Author:  Estrella Pang BSW Service:  (none) Author Type:      Filed:  1/16/2018  4:04 PM Encounter Date:  1/16/2018 Status:  Addendum    :  Estrella Pang BSW ()           Clinic Care Coordination Contact  Care Team Conversations  Call back from son Pako. He and his sisters care for him and see him often, especially on the weekends. He lives in assisted living and gets bathing 3 times a week and has an escort to get him to his meals in dining room. He used to ambulate with walker throughout the building, but now uses it only in apartment.  Sister thinks he naps 70% of the day.  Is missing medications some days.      They have his name on waiting list at [facility]. Discussed options and needs.  Pako understands that they need to find long term care health care center versus TCU.  Where patient currently lives, there is only AL.  Discussed adding more services to the AL services in place. Pako thinks he needs to move. He will talk to sisters to see if they  have any questions.      Explained to Pako that the complex care clinic has closed so [facility] has to go [facility].     Plan- call Pako in two weeks to assess needs.   Social Javed Negron  [facility]  [email or web address]  [phone][LC1.1]        Clinic Care Coordination Contact  [facility]/Voicemail    Referral Source: PCP- Had OV with PCP today and referral to care coordination- asked to contact vasyl Min.    Reason for Referral: Caregiver Concerns, Inpatient to Outpatient Transition, Mental Status/Behavioral Changes and Other: fast deteriorating cognitive function, not able to ADL and IADL, needs step-up placement(NH or ass living with 24 hours nursing staff assistance). Plus, he might have good benefits if he could be involved in  mobile clinic. Please check on if he is indicated for it   Clinical Data: Care Coordinator Outreach  Outreach attempted x 1 to son Pako.  Left message on voicemail with call back information and requested return call.  Plan:  Care Coordinator will try to reach son again in 1-2 business days.    Social Javed Negron  [facility]  [email or web address]  [phone][LC1.2]         Revision History        User Key Date/Time User Provider Type Action    > LC1.1 1/16/2018  4:04 PM Estrella Pang BSW  Addend     LC1.2 1/16/2018  1:29 PM Estrella Pang BSW  Sign            Progress Notes by Alvarado Florian MD at 1/16/2018  9:40 AM     Author:  Alvarado Florian MD Service:  (none) Author Type:  Physician    Filed:  1/16/2018 11:33 AM Encounter Date:  1/16/2018 Status:  Signed    :  Alvarado Florian MD (Physician)             SUBJECTIVE:   Earl Sanchez is a 89 year old male who presents to clinic today for the following health issues:      Mobility Issues       Description (location/character/radiation):[IL1.1] Mobility issues.[IL1.2]           Problem list and histories reviewed & adjusted, as  indicated.  Additional history:[IL1.1] as documented[WC1.1]    Patient Active Problem List   Diagnosis     Hypertension goal BP (blood pressure) < 140/90     CAD (coronary artery disease)     Osteoarthritis     Gait apraxia     Atrial fibrillation (H)     BPH (benign prostatic hyperplasia)     Heart failure (H)     Hyperlipidemia with target LDL less than 100     CKD (chronic kidney disease) stage 3, GFR 30-59 ml/min     Microalbuminuria     Anemia     Hypothyroidism     Elevated alkaline phosphatase level     Advance care planning     Health Care Home     Chronic anticoagulation     Elevated fasting glucose     Toe inflammation     Elevated uric acid in blood     Gastritis     Abdominal aortic aneurysm (H)     Advanced directives, counseling/discussion     Long-term (current) use of anticoagulants [Z79.01]     Hereditary multiple exostosis     General weakness     Tear of meniscus of left knee     Weakness generalized     Past Surgical History:   Procedure Laterality Date     ANKLE SURGERY      ORIF ankle fracture     CORONARY ARTERY BYPASS  2007    CAB X 5 CABG with LIMA to LAD and saphenous vein grafts to, OM2,SVG to diag 1, and sequential PDA     HEART CATH LEFT HEART CATH  12/10/07     HERNIA REPAIR, INGUINAL RT/LT      right     PHACOEMULSIFICATION CLEAR CORNEA WITH TORIC INTRAOCULAR LENS IMPLANT  4/10/2014    Procedure: PHACOEMULSIFICATION CLEAR CORNEA WITH TORIC INTRAOCULAR LENS IMPLANT;  RIGHT PHACOEMULSIFICATION CLEAR CORNEA WITH TORIC INTRAOCULAR LENS IMPLANT ;  Surgeon: Carlo Tee MD;  Location: [facility]     PHACOEMULSIFICATION CLEAR CORNEA WITH TORIC INTRAOCULAR LENS IMPLANT  4/22/2014    Procedure: PHACOEMULSIFICATION CLEAR CORNEA WITH TORIC INTRAOCULAR LENS IMPLANT;  Surgeon: Carlo Tee MD;  Location: [facility]       Social History   Substance Use Topics     Smoking status: Never Smoker     Smokeless tobacco: Never Used     Alcohol use Yes      Comment: occ - one glass of wine last  night     Family History   Problem Relation Age of Onset     Hypertension Mother      Hypertension Maternal Grandmother          Current Outpatient Prescriptions   Medication Sig Dispense Refill     JANTOVEN 2 MG tablet TAKE 2 TABLETS (4 MG) BY MOUTH ON MONDAY AND FRIDAY, AND 1 TABLET (2 MG) ALL OTHER DAYS OR AS DIRECTED BY [facility] NURSES 135 tablet 0     vitamin D (ERGOCALCIFEROL) 75353 UNIT capsule Take 1 capsule (50,000 Units) by mouth every 7 days for 8 doses 8 capsule 0     allopurinol (ZYLOPRIM) 100 MG tablet Take 2 tablets (200 mg) by mouth 3 times daily as needed 360 tablet 1     lisinopril (PRINIVIL/ZESTRIL) 10 MG tablet Take 0.5 tablets (5 mg) by mouth daily 45 tablet 1     furosemide (LASIX) 20 MG tablet Take 1 tablet (20 mg) by mouth daily 30 tablet 1     levothyroxine (SYNTHROID/LEVOTHROID) 50 MCG tablet TAKE 1 TABLET DAILY 90 tablet 1     pravastatin (PRAVACHOL) 40 MG tablet TAKE 1 TABLET DAILY 90 tablet 1     doxazosin (CARDURA) 4 MG tablet Take 1 tablet (4 mg) by mouth At Bedtime 90 tablet 3     acetaminophen (TYLENOL) 500 MG tablet Take 1,000 mg by mouth every 6 hours as needed for mild pain        nitroglycerin (NITROSTAT) 0.4 MG SL tablet Place 1 tablet (0.4 mg) under the tongue every 5 minutes as needed for chest pain 25 tablet 0     aspirin (ASPIRIN LOW DOSE) 81 MG tablet Take 81 mg by mouth daily        No Known Allergies  Recent Labs   Lab Test  11/29/17   1213  07/17/17   1157 06/19/17 03/22/16   1406  06/11/15   1039   04/16/15   1039  02/05/15   1344   04/08/14   1052  02/03/14   0917   03/12/13   1018   A1C   --    --    --    --    --    --    --    --   5.9   --   6.2*   --    --   5.6   LDL   --    --    --    --   43   --    --   47   --    --    --   61   --    --    HDL   --    --    --    --   51   --    --   55   --    --    --   54   --    --    TRIG   --    --    --    --   135   --    --   89   --    --    --   71   --    --    ALT  16  13   --    --    --   17   --    --    --     --    --   18   --    --    CR  1.60*  1.29*   --    < >   --   1.62*   --    --   1.60*   < >   --    --    < >  1.68*   GFRESTIMATED  41*  52*   --    < >   --   40*   --    --   41*   < >   --    --    < >  39*   GFRESTBLACK  49*  63   --    < >   --   49*   --    --   50*   < >   --    --    < >  47*   POTASSIUM  4.3  4.3   --    < >   --   3.9   --    --   4.3   < >  4.1   --    < >  4.2   TSH  1.38   --   3.23   < >   --    --    < >   --    --    < >  4.74   --    --   2.71    < > = values in this interval not displayed.      BP Readings from Last 3 Encounters:   01/16/18 140/82   11/29/17 95/61   10/10/17 106/67    Wt Readings from Last 3 Encounters:   11/29/17 155 lb (70.3 kg)   10/10/17 159 lb (72.1 kg)   08/29/17 158 lb (71.7 kg)[WC1.2]           Reviewed and updated as needed this visit by clinical staff     Reviewed and updated as needed this visit by Provider[IL1.1]         ROS:  Constitutional, HEENT, cardiovascular, pulmonary, gi and gu systems are negative, except as otherwise noted.      OBJECTIVE:[WC1.1]   /82 (Cuff Size: Adult Large)  Pulse 73  Temp 97.5  F (36.4  C) (Tympanic)  Resp 14  SpO2 99%[WC1.3]  There is no height or weight on file to calculate BMI.[WC1.2]  GENERAL: healthy, alert and no distress  NECK: no adenopathy, no asymmetry, masses, or scars and thyroid normal to palpation  RESP: lungs clear to auscultation - no rales, rhonchi or wheezes  CV: regular rate and rhythm, normal S1 S2, no S3 or S4, no murmur, click or rub, no peripheral edema and peripheral pulses strong  ABDOMEN: soft, nontender, no hepatosplenomegaly, no masses and bowel sounds normal  MS: decreased muscle tone on upper and lower extremities both, seriously off balance, has normal sensory   NEURO: weakness of upper and lower extremities , sensory exam grossly normal, oriented times/person/place, speech slightly delayed, cranial nerves 2-12 intact, DTR's normal and symmetric, rapid alternating movements  slightly delayed        ASSESSMENT/PLAN:   ASSESSMENT / PLAN:  (R53.81) Physical deconditioning  (primary encounter diagnosis)  Comment:[WC1.1] has been physically and cognitively deteriorating since December, not able to ambulate with walker and currently wheelchair bound, has decreased memory  And sleep and nap more than usual, has normal solid food intake but not good with liquid intake,   Has multiple report about fall from his senior living  Based on the safely and his deteriorating health status, he should change the placement to NH or assistant living place with 24 hours nursing staff. Starting OT/PT on site migth help as well  He would have great benefits if our  mobile care could follow him to prevent increasing the risk of fall with health hazard[WC1.4]    Plan: CARE COORDINATION REFERRAL            (Z91.81) At risk for falling      (Z23) Need for prophylactic vaccination against Streptococcus pneumoniae (pneumococcus)      (R26.89) Poor balance  Comment:[WC1.1] mentioned above, he's wheelchair bound now, need placement change as mentioned above[WC1.4]   Plan: CARE COORDINATION REFERRAL            (F09) Cognitive dysfunction  Comment:[WC1.1] has been changed for last 1 month, also having subtle personality change as well, encouraged him to see neurologist for f/u to make sure if has additional stroke[WC1.4]   Plan: CARE COORDINATION REFERRAL            (Z99.3) Wheelchair bound  Comment:[WC1.1] mentioned above[WC1.4]   Plan: CARE COORDINATION REFERRAL[WC1.1]              FUTURE APPOINTMENTS:       - Follow-up visit in 3 months[WC1.4]     Alvarado Florian MD  [facility] [place]     Revision History        User Key Date/Time User Provider Type Action    > WC1.4 1/16/2018 11:33 AM Alvarado Florian MD Physician Sign     WC1.3 1/16/2018 10:26 AM Alvarado Florian MD Physician      WC1.2 1/16/2018 10:24 AM Alvarado Florian MD Physician      WC1.1 1/16/2018 10:22 AM Alvarado Florian MD Physician      IL1.2 1/16/2018  9:48  AM Melanie Delcid CMA Medical Assistant Sign at close encounter     IL1.1 1/16/2018  9:38 AM Melanie Delcid CMA Medical Assistant             Progress Notes by Ellen Negro RN at 1/16/2018 10:25 AM     Author:  Ellen Negro RN Service:  (none) Author Type:  Registered Nurse    Filed:  1/16/2018 10:25 AM Encounter Date:  1/16/2018 Status:  Signed    :  Marky, Ellen, RN (Registered Nurse)             ANTICOAGULATION FOLLOW-UP CLINIC VISIT    Patient Name:  Earl Sanchez  Date:  1/16/2018  Contact Type:  Face to Face    SUBJECTIVE:     Patient Findings     Positives No Problem Findings           OBJECTIVE    INR Protime   Date Value Ref Range Status   01/16/2018 1.9 (A) 0.86 - 1.14 Final       ASSESSMENT / PLAN  INR assessment THER    Recheck INR In: 5 WEEKS    INR Location Clinic      Anticoagulation Summary as of 1/16/2018     INR goal 2.0-3.0   Today's INR 1.9!   Maintenance plan 2 mg (2 mg x 1) on Tue, Thu, Sat; 4 mg (2 mg x 2) all other days   Full instructions 2 mg on Tue, Thu, Sat; 4 mg all other days   Weekly total 22 mg   No change documented Ellen Negro RN   Plan last modified Brandy Miller RN (8/28/2017)   Next INR check 2/20/2018   Priority INR   Target end date Indefinite    Indications   Long-term (current) use of anticoagulants [Z79.01] [Z79.01]  Atrial fibrillation (H) [I48.91]         Anticoagulation Episode Summary     INR check location     Preferred lab     Send INR reminders to  [facility]    Comments TAKES WARFARIN IN THE MORNING        Anticoagulation Care Providers     Provider Role Specialty Phone number    Alvarado Florian MD Responsible Family Practice [phone]            See the Encounter Report to view Anticoagulation Flowsheet and Dosing Calendar (Go to Encounters tab in chart review, and find the Anticoagulation Therapy Visit)    Dosage adjustment made based on physician directed care plan.    INR therapeutic at 1.9 today.  Denies changes/problems  Has been stable on 22 mg  weekly dose.  Will continue with 2 mg on Tue, Thu, Sat;  4 mg all other days = 22 mg weekly.  Recheck in 5 weeks or sooner if problems/concerns.     Ellen Negro RN[QD1.1]                  Revision History        User Key Date/Time User Provider Type Action    > QD1.1 1/16/2018 10:25 AM Ellen Negro RN Registered Nurse Sign                  Procedure Notes     No notes of this type exist for this encounter.      Progress Notes - Therapies (Notes from 01/15/18 through 01/18/18)     No notes of this type exist for this encounter.

## 2018-01-18 ENCOUNTER — APPOINTMENT (OUTPATIENT)
Dept: MRI IMAGING | Facility: CLINIC | Age: 83
End: 2018-01-18
Attending: INTERNAL MEDICINE
Payer: COMMERCIAL

## 2018-01-18 VITALS
SYSTOLIC BLOOD PRESSURE: 126 MMHG | WEIGHT: 163.14 LBS | DIASTOLIC BLOOD PRESSURE: 78 MMHG | HEART RATE: 62 BPM | BODY MASS INDEX: 24.81 KG/M2 | TEMPERATURE: 97.4 F | RESPIRATION RATE: 16 BRPM | OXYGEN SATURATION: 97 %

## 2018-01-18 PROBLEM — R27.0 ATAXIA: Status: ACTIVE | Noted: 2018-01-18

## 2018-01-18 LAB — INR PPP: 1.77 (ref 0.86–1.14)

## 2018-01-18 PROCEDURE — 25000128 H RX IP 250 OP 636: Performed by: INTERNAL MEDICINE

## 2018-01-18 PROCEDURE — 25000132 ZZH RX MED GY IP 250 OP 250 PS 637: Performed by: INTERNAL MEDICINE

## 2018-01-18 PROCEDURE — 85610 PROTHROMBIN TIME: CPT | Performed by: INTERNAL MEDICINE

## 2018-01-18 PROCEDURE — 27210995 ZZH RX 272: Performed by: INTERNAL MEDICINE

## 2018-01-18 PROCEDURE — 70553 MRI BRAIN STEM W/O & W/DYE: CPT

## 2018-01-18 PROCEDURE — G0378 HOSPITAL OBSERVATION PER HR: HCPCS

## 2018-01-18 PROCEDURE — 70549 MR ANGIOGRAPH NECK W/O&W/DYE: CPT

## 2018-01-18 PROCEDURE — 40000893 ZZH STATISTIC PT IP EVAL DEFER

## 2018-01-18 PROCEDURE — 99236 HOSP IP/OBS SAME DATE HI 85: CPT | Performed by: INTERNAL MEDICINE

## 2018-01-18 PROCEDURE — 36415 COLL VENOUS BLD VENIPUNCTURE: CPT | Performed by: INTERNAL MEDICINE

## 2018-01-18 PROCEDURE — 99207 ZZC APP CREDIT; MD BILLING SHARED VISIT: CPT | Performed by: HOSPITALIST

## 2018-01-18 PROCEDURE — A9585 GADOBUTROL INJECTION: HCPCS | Performed by: INTERNAL MEDICINE

## 2018-01-18 PROCEDURE — 25000132 ZZH RX MED GY IP 250 OP 250 PS 637: Performed by: EMERGENCY MEDICINE

## 2018-01-18 RX ORDER — WARFARIN SODIUM 5 MG/1
5 TABLET ORAL
Status: DISCONTINUED | OUTPATIENT
Start: 2018-01-18 | End: 2018-01-18 | Stop reason: HOSPADM

## 2018-01-18 RX ORDER — NALOXONE HYDROCHLORIDE 0.4 MG/ML
.1-.4 INJECTION, SOLUTION INTRAMUSCULAR; INTRAVENOUS; SUBCUTANEOUS
Status: DISCONTINUED | OUTPATIENT
Start: 2018-01-18 | End: 2018-01-18 | Stop reason: HOSPADM

## 2018-01-18 RX ORDER — ACYCLOVIR 200 MG/1
30 CAPSULE ORAL ONCE
Status: COMPLETED | OUTPATIENT
Start: 2018-01-18 | End: 2018-01-18

## 2018-01-18 RX ORDER — WARFARIN SODIUM 2 MG/1
2 TABLET ORAL ONCE
Status: COMPLETED | OUTPATIENT
Start: 2018-01-18 | End: 2018-01-18

## 2018-01-18 RX ORDER — DOXAZOSIN 4 MG/1
4 TABLET ORAL AT BEDTIME
Status: DISCONTINUED | OUTPATIENT
Start: 2018-01-18 | End: 2018-01-18 | Stop reason: HOSPADM

## 2018-01-18 RX ORDER — ASPIRIN 81 MG/1
81 TABLET ORAL DAILY
Status: DISCONTINUED | OUTPATIENT
Start: 2018-01-18 | End: 2018-01-18 | Stop reason: HOSPADM

## 2018-01-18 RX ORDER — ONDANSETRON 4 MG/1
4 TABLET, ORALLY DISINTEGRATING ORAL EVERY 6 HOURS PRN
Status: DISCONTINUED | OUTPATIENT
Start: 2018-01-18 | End: 2018-01-18 | Stop reason: HOSPADM

## 2018-01-18 RX ORDER — ALLOPURINOL 100 MG/1
100 TABLET ORAL 2 TIMES DAILY
COMMUNITY
End: 2021-01-01

## 2018-01-18 RX ORDER — PRAVASTATIN SODIUM 40 MG
40 TABLET ORAL DAILY
Status: DISCONTINUED | OUTPATIENT
Start: 2018-01-18 | End: 2018-01-18 | Stop reason: HOSPADM

## 2018-01-18 RX ORDER — ACETAMINOPHEN 650 MG/1
650 SUPPOSITORY RECTAL EVERY 4 HOURS PRN
Status: DISCONTINUED | OUTPATIENT
Start: 2018-01-18 | End: 2018-01-18 | Stop reason: HOSPADM

## 2018-01-18 RX ORDER — ACETAMINOPHEN 325 MG/1
650 TABLET ORAL EVERY 4 HOURS PRN
Status: DISCONTINUED | OUTPATIENT
Start: 2018-01-18 | End: 2018-01-18 | Stop reason: HOSPADM

## 2018-01-18 RX ORDER — WARFARIN SODIUM 2 MG/1
5 TABLET ORAL ONCE
Qty: 60 TABLET | Refills: 1 | DISCHARGE
Start: 2018-01-18 | End: 2018-01-19

## 2018-01-18 RX ORDER — GADOBUTROL 604.72 MG/ML
10 INJECTION INTRAVENOUS ONCE
Status: COMPLETED | OUTPATIENT
Start: 2018-01-18 | End: 2018-01-18

## 2018-01-18 RX ORDER — ACETAMINOPHEN 325 MG/1
650 TABLET ORAL ONCE
Status: COMPLETED | OUTPATIENT
Start: 2018-01-18 | End: 2018-01-18

## 2018-01-18 RX ORDER — NITROGLYCERIN 0.4 MG/1
0.4 TABLET SUBLINGUAL EVERY 5 MIN PRN
Status: DISCONTINUED | OUTPATIENT
Start: 2018-01-18 | End: 2018-01-18 | Stop reason: HOSPADM

## 2018-01-18 RX ORDER — LISINOPRIL 5 MG/1
5 TABLET ORAL DAILY
Status: DISCONTINUED | OUTPATIENT
Start: 2018-01-18 | End: 2018-01-18 | Stop reason: HOSPADM

## 2018-01-18 RX ORDER — FUROSEMIDE 20 MG
20 TABLET ORAL DAILY
Status: DISCONTINUED | OUTPATIENT
Start: 2018-01-18 | End: 2018-01-18 | Stop reason: HOSPADM

## 2018-01-18 RX ORDER — ONDANSETRON 2 MG/ML
4 INJECTION INTRAMUSCULAR; INTRAVENOUS EVERY 6 HOURS PRN
Status: DISCONTINUED | OUTPATIENT
Start: 2018-01-18 | End: 2018-01-18 | Stop reason: HOSPADM

## 2018-01-18 RX ORDER — LEVOTHYROXINE SODIUM 50 UG/1
50 TABLET ORAL DAILY
Status: DISCONTINUED | OUTPATIENT
Start: 2018-01-18 | End: 2018-01-18 | Stop reason: HOSPADM

## 2018-01-18 RX ADMIN — SODIUM CHLORIDE 30 ML: 9 INJECTION, SOLUTION INTRAMUSCULAR; INTRAVENOUS; SUBCUTANEOUS at 09:24

## 2018-01-18 RX ADMIN — ASPIRIN 81 MG: 81 TABLET, COATED ORAL at 09:51

## 2018-01-18 RX ADMIN — GADOBUTROL 10 ML: 604.72 INJECTION INTRAVENOUS at 09:24

## 2018-01-18 RX ADMIN — PRAVASTATIN SODIUM 40 MG: 40 TABLET ORAL at 09:51

## 2018-01-18 RX ADMIN — WARFARIN SODIUM 2 MG: 2 TABLET ORAL at 01:56

## 2018-01-18 RX ADMIN — ACETAMINOPHEN 650 MG: 325 TABLET, FILM COATED ORAL at 00:07

## 2018-01-18 RX ADMIN — LEVOTHYROXINE SODIUM 50 MCG: 50 TABLET ORAL at 09:51

## 2018-01-18 ASSESSMENT — ENCOUNTER SYMPTOMS
ABDOMINAL PAIN: 0
NUMBNESS: 0
LIGHT-HEADEDNESS: 0
DIZZINESS: 0
BACK PAIN: 0
CHILLS: 0
ARTHRALGIAS: 0
NAUSEA: 0
SPEECH DIFFICULTY: 0
DIARRHEA: 0
SEIZURES: 0
FEVER: 0

## 2018-01-18 NOTE — PLAN OF CARE
Problem: Patient Care Overview  Goal: Plan of Care/Patient Progress Review  PT and OT: Orders received, discussed with social work. Patient plans to discharge to TCU, unsafe to return home at this time. Defer PT and OT evals to TCU setting.

## 2018-01-18 NOTE — PHARMACY-ADMISSION MEDICATION HISTORY
"Admission medication history interview status for the 1/17/2018  admission is complete. See EPIC admission navigator for prior to admission medications      Medication history source reliability:Good    Actions taken by pharmacist (provider contacted, etc): Medication history obtained from patient's son Pako ([phone]).    Additional medication history information not noted on PTA med list : Patient's son states his furosemide and lisinopril have been discontinued \"about a month ago.\" He is also unsure if the patient has any sublingual nitroglycerin tablets at home, but he knows he has not been using nitroglycerin. Patient's son states he has one more dose of Vitamin D left to take (he takes them once weekly on Wednesdays). Patient's Jantoven current dose was obtained from Anticoagulation Clinic Visit note from 01/16/2018.    Medication reconciliation/reorder completed by provider prior to medication history? No    Time spent in this activity: 30 minutes    Prior to Admission medications    Medication Sig Last Dose Taking? Auth Provider   allopurinol (ZYLOPRIM) 100 MG tablet Take 100 mg by mouth 2 times daily 1/17/2018 at PM Yes Unknown, Entered By History   Warfarin Sodium (JANTOVEN PO) Take 2 mg by mouth once daily on Tuesday, Thursday, and Saturday. Take 4 mg by mouth on all other days of the week. 1/17/2018 at AM Yes Unknown, Entered By History   vitamin D (ERGOCALCIFEROL) 45860 UNIT capsule Take 1 capsule (50,000 Units) by mouth every 7 days for 8 doses 1/17/2018 at One dose left for 01/24  Yes Alvarado Florian MD   levothyroxine (SYNTHROID/LEVOTHROID) 50 MCG tablet TAKE 1 TABLET DAILY 1/17/2018 at AM Yes Alvarado Florian MD   pravastatin (PRAVACHOL) 40 MG tablet TAKE 1 TABLET DAILY 1/17/2018 at PM Yes Alvarado Florian MD   doxazosin (CARDURA) 4 MG tablet Take 1 tablet (4 mg) by mouth At Bedtime 1/17/2018 at PM Yes Alvarado Florian MD   acetaminophen (TYLENOL) 500 MG tablet Take 1,000 mg by mouth every 6 hours as needed " for mild pain  PRN Yes Reported, Patient   aspirin (ASPIRIN LOW DOSE) 81 MG tablet Take 81 mg by mouth daily  1/17/2018 at AM Yes Reported, Patient   nitroglycerin (NITROSTAT) 0.4 MG SL tablet Place 1 tablet (0.4 mg) under the tongue every 5 minutes as needed for chest pain Family unsure if he has any at home; hasn't been using at Unknown time  Alvarado Florian MD

## 2018-01-18 NOTE — ED NOTES
[facility]  ED Nurse Handoff Report    ED Chief complaint: Altered Mental Status (Family brings pt in because he has had increased confusion and agitation over the last couple of days but famly has noted a decline in the last month. Told by primary to come in and get him evaluated for infection. Thought perhaps he may have had a microstroke.)      ED Diagnosis:   Final diagnoses:   None       Code Status: DNR / DNI    Allergies: No Known Allergies    Activity level - Baseline/Home:  Increased decline over last 2 weeks. Fall risk d/t impaired gait.    Activity Level - Current:   Unable to stand with 2 assist in the ER d/t weekend.     Needed?: No    Isolation: No  Infection: Not Applicable    Bariatric?: No    Vital Signs:   Vitals:    01/17/18 2200 01/17/18 2218   BP: 115/79 115/79   Pulse: 59    Resp: 17    Temp: 97.9  F (36.6  C)    TempSrc: Oral    SpO2: 97% 98%       Cardiac Rhythm: ,        Pain level: 0-10 Pain Scale: 0    Is this patient confused?: Yes-? Early dementia or sundowners which is new and why he is presenting.    Patient Report: Initial Complaint: Pt brought in by family d/t increased confusion and gait abnormality over last month. Last night pushed his nurses call light over 100 times and would then not want anything. Sent in by primary MD because of potential for early dementia or stroke.  Focused Assessment: Pt unable to ambulate with 2 assist. Pt was not confused down on the ER. 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  Tests Performed: CT head and labs  Abnormal Results: none  Treatments provided: 1L NS bolus    Family Comments: Family present and very supportive.     OBS brochure/video discussed/provided to patient: N/A    ED Medications:   Medications   0.9% sodium chloride BOLUS (500 mLs Intravenous New Bag 1/17/18 2217)       Drips infusing?:  No      ED NURSE PHONE NUMBER: [phone]

## 2018-01-18 NOTE — PROGRESS NOTES
Brief Hospitalist Progress Note    No charge note, patient admitted early this morning by Dr. Onofre.  MRI/MRA without acute abnormality.  All other work-up unremarkable.  TSH pending, but was wnl only 6 weeks ago - have very low suspicion this is the issue.  Patient has been calm and cooperative today and offers no complaints to me other than ongoing ataxia, appears alert and appropriate.  Agreeable to TCU placement.  Recommend outpatient follow up with Dr. Blackburn (who he had seen in Nov 2017 for gait abnormality), who felt he had some parkinsonian features and I suspect his presentation represents progression of this.

## 2018-01-18 NOTE — PROGRESS NOTES
Care Transition Initial Assessment -      Met with: patient and spoke with son Pako  Active Problems:    Ataxia         DATA  Patient lives at [facility].  Per Etienne, their DON, ([phone], patient was receiving services as follows; am and pm cares with dressing, tolieting, escort to the diningroom.  His family manages his medications with an alarm system and the aides from [facility] did not think patient was getting up to take his medications.   Recently patient was putting his call light on frequently and when the staff arrived he could not remember what he wanted.  Etienne said they were beginning to have discussions that patient may need [facility] Living Care.    Identified issues/concerns regarding health management:  Patient lives at [facility],patient admitted with confusion and ataxia.  TCU is recommended.  Patient has [facility] which waives the three night Medicare SNF requirement.  Care Coordinator met with patient and he requested [facility] or [facility].  Writer met with patient and explained his insurance benefit. Patient requests a double room.   ASSESSMENT  Cognitive Status: alert with poor short term memory  Concerns to be addressed:  None, as patient in agreement with TCU     PLAN  Financial costs for the patient includes nonbe  Patient given options and choices for discharge yes.  Patient/family is agreeable to the plan? yes  Patient Goals and Preferences: TCU  Patient anticipates discharging to:  [facility]  Accepted by [facility].  Son Pako aware.  Patient will be transported at 1400 to [facility].

## 2018-01-18 NOTE — H&P
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  Family is not available at the time of visit so history is obtained from the medical record.  As well as patient is somewhat a poor historian.  He appears as if he has had progressive ataxia in and see his primary care provider recently, on 16 January, for this.  His balance became worse prompting his presentation to the emergency department.  He was unable to stand even with the assist of 2.  He denies any chest pain or shortness of breath.  Denies fevers chills.  Denies any GI complaints or constipation.  -Admit to observation  -We will attempt an MRI/MRA; the nurses reported that he has some metal in his body; however, he had an MRI done in June 2017 so I suspect is fine for an MRI.  From June 2017 to present to confirm any notes that the patient had any metallic device placed.  Concern is for a cerebellar stroke.  -Physical therapy and Occupational Therapy  -I suspect he will need placement  -Other potential etiologies for worsening gait and confusion including urinalysis chest x-ray influenza check head CT and labs were all fine.  -We will check a TSH for completeness    Atrial fibrillation  History of coronary disease with CABG in 2007  He has history of atrial fibrillation and is currently on warfarin for this.  It does not appear to be in any rate limiting medications.  -Continue warfarin for now  -We may need to consider stopping his warfarin  as he is a fall risk if he is going to be  ambulatory.  -Continue his daily aspirin    Hypothyroidism  Continue prior to admission Synthroid 50 mcg a day    History of chronic kidney disease  Baseline creatinine is variable but is in the low to mid ones range.  On presentation to the hospital stay his creatinine is 1.1.  -Avoid nephrotoxins  -With creatinine at 1.1 it will be safe to give him gadolinium for an MRI    Hypertension  Continue his Lasix and lisinopril    Hyperlipidemia  Continue his pravastatin    BPH  Continue his prior to admission doxazosin    DVT Prophylaxis: Pneumatic Compression Devices  Code Status: DNR / DNI    Disposition: Expected discharge in 1-2 days once assessment for CVA is done and therapies have been done with the intent of finding a safe disposition  Next    Dennis Onofre MD  [phone] (P)  Text Page     Primary Care Physician   Dr. Alvarado Florian    Chief Complaint    Gait ataxia, confusion/agitation    History is obtained from the patient and medical records    History of Present Illness   Earl Sanchez is a 89 year old male who presents with the above complaints.  Most of the history had obtained from the medical record as the patient is a poor historian.  Apparently he has had progressive gait ataxia and had seen primary Dr. Florian a day prior to presentation to the emergency department.  Given his weakness as well as agitation confusion is brought to the emergency department.  The concern was for stroke.  He denies any chest pain shortness of breath nausea vomiting to me.  He denies any dizziness or palpitations.  He has a history of atrial fibrillation.    Past Medical History    I have reviewed this patient's medical history and updated it with pertinent information if needed.   Past Medical History:   Diagnosis Date     Abdominal aortic aneurysm (H)     per 2/12/15 abdominal US, mild aneurysm measuring [id number]     Acute MI 1981, 2007     Atrial fibrillation (H)      BPH (benign prostatic hyperplasia)       CAD (coronary artery disease)     CABG 2007, f/b cardio, Dr. 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  Past Surgical History:   Procedure Laterality Date     ANKLE SURGERY      ORIF ankle fracture     CORONARY ARTERY BYPASS  2007    CAB X 5 CABG with LIMA to LAD and saphenous vein grafts to, OM2,SVG to diag 1, and sequential PDA     HEART CATH LEFT HEART CATH  12/10/07     HERNIA REPAIR, INGUINAL RT/LT      right     PHACOEMULSIFICATION CLEAR CORNEA WITH TORIC INTRAOCULAR LENS IMPLANT  4/10/2014    Procedure: PHACOEMULSIFICATION CLEAR CORNEA WITH TORIC INTRAOCULAR LENS IMPLANT;  RIGHT PHACOEMULSIFICATION CLEAR CORNEA WITH TORIC INTRAOCULAR LENS IMPLANT ;  Surgeon: Carlo Tee MD;  Location: [facility]     PHACOEMULSIFICATION CLEAR CORNEA WITH TORIC INTRAOCULAR LENS IMPLANT  4/22/2014    Procedure: PHACOEMULSIFICATION CLEAR CORNEA WITH TORIC INTRAOCULAR LENS IMPLANT;  Surgeon: Carlo Tee MD;  Location: [facility]       Prior to Admission Medications   Prior to Admission Medications   Prescriptions Last Dose Informant Patient Reported? Taking?   JANTOVEN 2 MG tablet   No No   Sig: TAKE 2 TABLETS (4 MG) BY MOUTH ON MONDAY AND FRIDAY, AND 1 TABLET (2 MG) ALL OTHER DAYS OR AS DIRECTED BY [facility] NURSES   acetaminophen (TYLENOL) 500 MG tablet  Self Yes No   Sig: Take 1,000 mg by mouth every 6 hours as needed for mild pain    allopurinol (ZYLOPRIM) 100 MG tablet   No No   Sig: Take 2 tablets (200 mg) by mouth 3 times daily as needed    aspirin (ASPIRIN LOW DOSE) 81 MG tablet  Self Yes No   Sig: Take 81 mg by mouth daily    doxazosin (CARDURA) 4 MG tablet  Self No No   Sig: Take 1 tablet (4 mg) by mouth At Bedtime   furosemide (LASIX) 20 MG tablet   Yes No   Sig: Take 1 tablet (20 mg) by mouth daily   levothyroxine (SYNTHROID/LEVOTHROID) 50 MCG tablet   No No   Sig: TAKE 1 TABLET DAILY   lisinopril (PRINIVIL/ZESTRIL) 10 MG tablet   Yes No   Sig: Take 0.5 tablets (5 mg) by mouth daily   nitroglycerin (NITROSTAT) 0.4 MG SL tablet  Self No No   Sig: Place 1 tablet (0.4 mg) under the tongue every 5 minutes as needed for chest pain   pravastatin (PRAVACHOL) 40 MG tablet   No No   Sig: TAKE 1 TABLET DAILY   vitamin D (ERGOCALCIFEROL) 72716 UNIT capsule   No No   Sig: Take 1 capsule (50,000 Units) by mouth every 7 days for 8 doses      Facility-Administered Medications: None     Allergies   No Known Allergies    Social History   I have reviewed this patient's social history and updated it with pertinent information if needed. Earl Sanchez  reports that he has never smoked. He has never used smokeless tobacco. He reports that he drinks alcohol. He reports that he does not use illicit drugs.    Family History   I have reviewed this patient's family history and updated it with pertinent information if needed.   Family History   Problem Relation Age of Onset     Hypertension Mother      Hypertension Maternal Grandmother        Review of Systems   The 10 point Review of Systems is negative other than noted in the HPI or here.  Note the patient is a poor historian    Physical Exam   Temp: 97.1  F (36.2  C) Temp src: Oral BP: 124/85 Pulse: 62   Resp: 16 SpO2: 98 % O2 Device: None (Room air)    Vital Signs with Ranges  0 lbs 0 oz    Constitutional: alert, oriented and in no acute distress  Eyes: EOMI, PERRL  HEENT: OP clear, somewhat hard of hearing  Respiratory: CTA B without w/c  Cardiovascular: Regular without murmurs  GI: soft, nontender,  nondistended, no HSM  Lymph/Hematologic: no cervical LAD  Genitourinary: deferred  Skin: no rashes or lesions grossly  Musculoskeletal: no deformities or arthritis  Neurologic: CN II-XII, BROWER, strength and sensation were intact on exam  Psychiatric: mood mood was fine he is mildly agitated    Data   Data reviewed today:  I personally reviewed a head CT that was negative.  Chest x-ray which showed cardiomegaly but no overt signs of heart failure..    Recent Labs  Lab 01/17/18  2212 01/16/18   WBC 7.0  --    HGB 11.1*  --    MCV 97  --      --    INR 1.66* 1.9*     --    POTASSIUM 3.6  --    CHLORIDE 104  --    CO2 27  --    BUN 22  --    CR 1.10  --    ANIONGAP 8  --    JAYME 8.8  --    *  --    ALBUMIN 3.3*  --    PROTTOTAL 7.5  --    BILITOTAL 0.5  --    ALKPHOS 118  --    ALT 18  --    AST 17  --        Recent Results (from the past 24 hour(s))   XR Chest 2 Views    Narrative    CHEST TWO VIEWS  1/17/2018 10:39 PM     COMPARISON: Two view chest x-ray 9/10/2012    HISTORY: Question pneumonia.      Impression    IMPRESSION: Median sternotomy changes again noted. There is been  interval worsening of cardiomegaly since the comparison study with  moderate cardiomegaly currently. There are no airspace opacities to  suggest pneumonia. There is no pleural effusion or pneumothorax. There  is mild prominence of the perihilar pulmonary vasculature, but no  definite evidence for hussain pulmonary edema.    ROMERO VALENZUELA MD   CT Head w/o Contrast    Narrative    CT SCAN OF THE HEAD WITHOUT CONTRAST  1/17/2018 11:02 PM     HISTORY: AMS and new delirium, on Coumadin, please query bleed.     TECHNIQUE: Axial images of the head and coronal reformations without  IV contrast material. Radiation dose for this scan was reduced using  automated exposure control, adjustment of the mA and/or kV according  to patient size, or iterative reconstruction technique.    COMPARISON: 6/16/2017.    FINDINGS: There is generalized  atrophy of the brain. There is low  attenuation in the white matter of the cerebral hemispheres consistent  with sequelae of small vessel ischemic disease. There is no evidence  of intracranial hemorrhage, mass, acute infarct or anomaly.     The visualized portions of the sinuses and mastoids appear normal.  There is no evidence of trauma.      Impression    IMPRESSION: No acute abnormality.    OSMAN ARECHIGA MD

## 2018-01-18 NOTE — PHARMACY-ANTICOAGULATION SERVICE
Clinical Pharmacy - Warfarin Dosing Consult     Pharmacy has been consulted to manage this patient s warfarin therapy.  Indication: Atrial Fibrillation  Therapy Goal: INR 2-3  Warfarin Prior to Admission: Yes  Warfarin PTA Regimen: 2mg TTSa; 4mg MWFSu    INR   Date Value Ref Range Status   01/17/2018 1.66 (H) 0.86 - 1.14 Final     INR Protime   Date Value Ref Range Status   01/16/2018 1.9 (A) 0.86 - 1.14 Final       Recommend warfarin 2 mg today.  Pharmacy will monitor Earl Sanchez daily and order warfarin doses to achieve specified goal.      Please contact pharmacy as soon as possible if the warfarin needs to be held for a procedure or if the warfarin goals change.

## 2018-01-18 NOTE — DISCHARGE SUMMARY
[facility]    Discharge Summary  Hospitalist    Date of Admission:  1/17/2018  Date of Discharge:  1/18/2018  Discharging Provider: Allen Graham  Date of Service (when I saw the patient): 01/18/18    Discharge Diagnoses   Gait abnormality  Acute encephalopathy  Atrial fibrillation  Hx CAD s/p CABG  Hypothyroidism  CKD  HTN  Hyperlipidemia  BPH    History of Present Illness   Earl Sanchez is an 89 year old male who presented with gait abnormality and agitated delirium.    Hospital Course   Earl Sanchez was admitted on 1/17/2018.  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  Hx of progressive ataxia for which he has been evaluated by Neurology in 11/2017 (felt he had some Parkinsonian features) and followed up with PCP 1/16/18 due to worsening gait.  Work-up this admission including BMP, CBC, LFT, UA, CXR, head CT, head and neck MRI/MRA all negative for acute pathology.  His agitation has resolved and he has been calm and cooperative during hospitalization, currently alert and appropriate; etiology for acute confusional episode unclear.  Suspect his gait difficulties are due to progression of possible Parkinsons, which could possibly explain confusion as well.  Recommend follow up with Neurology as outpatient.     Atrial fibrillation  History of coronary disease with CABG in 2007  He has history of atrial fibrillation and is currently on warfarin for this.  It does not appear to be in any rate limiting medications.  INR subtherapeutic at 1.77 on discharge, PharmD recommends 5 mg coumadin on 1/18.  Will obtain INR 1/19 for further dosing (note PTA regimen of coumadin 2 mg T,Th,Sa and 4 mg all other days).  If gait/balance remains an issue at TCU,  consideration should be given to discontinuing coumadin.  He will continue on PTA aspirin, statin, ACE, lasix.      Hypothyroidism  Continue PTA Synthroid.     CKD  Baseline creatinine is variable but is in the low to mid ones range. Remains stable.      Hypertension  Continue PTA Lasix and lisinopril     Hyperlipidemia  Continue PTA pravastatin     BPH  Continue PTA doxazosin    Allen Graham    Significant Results and Procedures   See imaging below    Pending Results   These results will be followed up by: N/A  Unresulted Labs Ordered in the Past 30 Days of this Admission     No orders found for last 61 day(s).          Code Status   DNR / DNI       Primary Care Physician   Alvarado Florian    Constitutional: well developed, well nourished male in no acute distress  Respiratory: lungs clear to auscultation bilaterally, no crackles or wheezes, no tachypnea  Cardiovascular: irregular rhythm, normal rate, normal S1/S2, no murmur, rubs or gallops  GI: abdomen soft, non-tender, non-distended, normal bowel sounds  Lymph/Hematologic: No peripheral edema  Musculoskeletal: Extremities warm and well perfused  Neurologic: alert and appropriate, cranial nerves grossly intact, gross motor movements intact  Psychiatric: normal affect    Discharge Disposition   Discharged to nursing home  Condition at discharge: Stable    Consultations This Hospital Stay   PHARMACY TO DOSE WARFARIN  OCCUPATIONAL THERAPY ADULT IP CONSULT  PHYSICAL THERAPY ADULT IP CONSULT  OCCUPATIONAL THERAPY ADULT IP CONSULT  PHYSICAL THERAPY ADULT IP CONSULT    Time Spent on this Encounter   IAllen, personally saw the patient today and spent greater than 30 minutes discharging this patient.    Discharge Orders     General info for SNF   Length of Stay Estimate: Short Term Care: Estimated # of Days <30  Condition at Discharge: Stable  Level of care:skilled   Rehabilitation Potential: Fair  Admission H&P remains valid and up-to-date: Yes  Recent Chemotherapy:  N/A  Use Nursing Home Standing Orders: N/A     Mantoux instructions   Give two-step Mantoux (PPD) Per Facility Policy Yes     Reason for your hospital stay   You were admitted for gait instability and confusion.     Follow Up and recommended labs and tests   Follow up with [facility] physician.  The following labs/tests are recommended: INR on 1/19.  Follow up with Dr. Blackburn of Neurology at next available appointment, otherwise follow up with [facility] to establish new provider.     Activity - Up with nursing assistance     DNR/DNI     Occupational Therapy Adult Consult   Evaluate and treat as clinically indicated.    Reason:  deconditioning     Physical Therapy Adult Consult   Evaluate and treat as clinically indicated.    Reason:  deconditioning     Fall precautions     Advance Diet as Tolerated   Follow this diet upon discharge:      Regular Diet Adult       Discharge Medications   Current Discharge Medication List      CONTINUE these medications which have CHANGED    Details   warfarin (COUMADIN) 2 MG tablet Take 2.5 tablets (5 mg) by mouth once for 1 dose  Qty: 60 tablet, Refills: 1    Associated Diagnoses: Atrial fibrillation, unspecified type (H)         CONTINUE these medications which have NOT CHANGED    Details   allopurinol (ZYLOPRIM) 100 MG tablet Take 100 mg by mouth 2 times daily      vitamin D (ERGOCALCIFEROL) 95380 UNIT capsule Take 1 capsule (50,000 Units) by mouth every 7 days for 8 doses  Qty: 8 capsule, Refills: 0    Associated Diagnoses: Low vitamin D level      levothyroxine (SYNTHROID/LEVOTHROID) 50 MCG tablet TAKE 1 TABLET DAILY  Qty: 90 tablet, Refills: 1    Associated Diagnoses: Hypothyroidism, unspecified type      pravastatin (PRAVACHOL) 40 MG tablet TAKE 1 TABLET DAILY  Qty: 90 tablet, Refills: 1    Associated Diagnoses: Hyperlipidemia with target LDL less than 100      doxazosin (CARDURA) 4 MG tablet Take 1 tablet (4 mg) by mouth At Bedtime  Qty: 90 tablet,  Refills: 3    Associated Diagnoses: Benign non-nodular prostatic hyperplasia without lower urinary tract symptoms      acetaminophen (TYLENOL) 500 MG tablet Take 1,000 mg by mouth every 6 hours as needed for mild pain       aspirin (ASPIRIN LOW DOSE) 81 MG tablet Take 81 mg by mouth daily       nitroglycerin (NITROSTAT) 0.4 MG SL tablet Place 1 tablet (0.4 mg) under the tongue every 5 minutes as needed for chest pain  Qty: 25 tablet, Refills: 0    Associated Diagnoses: CAD (coronary artery disease)         STOP taking these medications       lisinopril (PRINIVIL/ZESTRIL) 10 MG tablet Comments:   Reason for Stopping:         furosemide (LASIX) 20 MG tablet Comments:   Reason for Stopping:             Allergies   No Known Allergies  Data   Most Recent 3 CBC's:  Recent Labs   Lab Test  01/17/18 2212 11/29/17 1213 07/17/17   1157   WBC  7.0  7.1  6.6   HGB  11.1*  11.2*  11.7*   MCV  97  100  98   PLT  204  304  197      Most Recent 3 BMP's:  Recent Labs   Lab Test  01/17/18 2212 11/29/17 1213  07/17/17   1157   NA  139  138  142   POTASSIUM  3.6  4.3  4.3   CHLORIDE  104  103  109   CO2  27  27  28   BUN  22  41*  24   CR  1.10  1.60*  1.29*   ANIONGAP  8  8  5   JAYME  8.8  9.1  8.7   GLC  127*  91  94     Most Recent 2 LFT's:  Recent Labs   Lab Test  01/17/18 2212 11/29/17   1213   AST  17  21   ALT  18  16   ALKPHOS  118  99   BILITOTAL  0.5  0.5     Most Recent INR's and Anticoagulation Dosing History:  Anticoagulation Dose History     Recent Dosing and Labs Latest Ref Rng & Units 9/5/2017 9/26/2017 10/26/2017 11/30/2017 1/16/2018 1/17/2018 1/18/2018    Warfarin 2 mg - - - - - - - 2 mg    INR 0.86 - 1.14 - - - - - 1.66(H) 1.77(H)    INR 0.86 - 1.14 2.8(A) 2.6(A) 2.8(A) 2.4(A) 1.9(A) - -        Most Recent TSH, T4 and A1c Labs:  Recent Labs   Lab Test  11/29/17   1213   02/05/15   1344   08/28/12   0911   TSH  1.38   < >   --    < >  9.78*   T4   --    --    --    --   1.07   A1C   --    --   5.9   < >    --     < > = values in this interval not displayed.     Results for orders placed or performed during the hospital encounter of 01/17/18   CT Head w/o Contrast    Narrative    CT SCAN OF THE HEAD WITHOUT CONTRAST  1/17/2018 11:02 PM     HISTORY: AMS and new delirium, on Coumadin, please query bleed.     TECHNIQUE: Axial images of the head and coronal reformations without  IV contrast material. Radiation dose for this scan was reduced using  automated exposure control, adjustment of the mA and/or kV according  to patient size, or iterative reconstruction technique.    COMPARISON: 6/16/2017.    FINDINGS: There is generalized atrophy of the brain. There is low  attenuation in the white matter of the cerebral hemispheres consistent  with sequelae of small vessel ischemic disease. There is no evidence  of intracranial hemorrhage, mass, acute infarct or anomaly.     The visualized portions of the sinuses and mastoids appear normal.  There is no evidence of trauma.      Impression    IMPRESSION: No acute abnormality.    OSMAN ARECHIGA MD   XR Chest 2 Views    Narrative    CHEST TWO VIEWS  1/17/2018 10:39 PM     COMPARISON: Two view chest x-ray 9/10/2012    HISTORY: Question pneumonia.      Impression    IMPRESSION: Median sternotomy changes again noted. There is been  interval worsening of cardiomegaly since the comparison study with  moderate cardiomegaly currently. There are no airspace opacities to  suggest pneumonia. There is no pleural effusion or pneumothorax. There  is mild prominence of the perihilar pulmonary vasculature, but no  definite evidence for hussain pulmonary edema.    ROMERO VALENZUELA MD   MR Brain w/o & w Contrast    Narrative    MRI BRAIN WITHOUT AND WITH CONTRAST  1/18/2018 9:23 AM    HISTORY:  concern for CVA, cerebellar CVA;      TECHNIQUE:  Multiplanar, multisequence MRI of the brain without and  with 10 mL Gadavist    COMPARISON: CT dated 1/17/2018    FINDINGS:  There is generalized atrophy of the  brain.  White matter  changes are present in the cerebral hemispheres that are consistent  with small vessel ischemic disease in this age patient. There is no  evidence of hemorrhage, mass, acute infarct, or anomaly. Again noted  is a prominent cyst in the region of the right choroidal fissure. This  measures approximately 1.8 cm in transverse dimension by 1 cm in AP  dimension. This is felt to be an incidental finding. There are no  gadolinium enhancing lesions.   The facial structures appear normal.  The arteries at the base of the brain and the dural venous sinuses  appear patent.       Impression    IMPRESSION:    1. No acute pathology. No bleed, mass, or acute cerebellar infarcts  are seen.  2. There is generalized atrophy of the brain.  White matter changes  are present in the cerebral hemispheres that are consistent with small  vessel ischemic disease in this age patient..  3.  Cyst in the region of the right choroidal fissure. This is felt to  be an incidental finding.      DAPHNIE HONG MD   MRA Neck w/o & w Contrast Angiogram    Narrative    MRA ANGIOGRAM NECK W/O & W CONTRAST 1/18/2018 9:44 AM     HISTORY:  routine; Acute CVA (cerebrovascular accident) (H)    TECHNIQUE:  Sequential axial images of the neck were obtained using  2-dimensional time-of-flight before contrast and 3-dimensional  time-of-flight after the uneventful administration of 10 mL Gadavist  iv contrast.    COMPARISON:  None.    FINDINGS: Stenosis is relative to the distal internal carotid  diameter. There is significant venous contamination on the post  gadolinium images.    Right Carotid: Mild atherosclerotic plaque is seen at the right  carotid bifurcation. No significant stenosis is seen at the  bifurcation relative to the distal internal carotid diameter.    Left Carotid: The proximal left internal carotid artery is tortuous.   No significant stenosis is seen at the bifurcation relative to the  distal internal carotid  diameter.    Vertebrals: Antegrade flow is seen in both vertebral arteries. The  left vertebral artery is dominant supplying the basilar artery. The  right vertebral artery is small. 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    No arterial dissection is identified.      Impression    IMPRESSION:   1. No stenosis is seen at either carotid bifurcation.  2. Normal-appearing left vertebral artery.  3. Small right vertebral artery. 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    DAPHNIE HONG MD

## 2018-01-18 NOTE — PROVIDER NOTIFICATION
Call to Dr. Onofre regarding MRI order, patient has a cardiac wire stent in place and we are unsure if MRI compatible.  Advised that MRI can wait until morning so that we can locate and call patient's cardiologist for clarification.  Cat Israel RN  1/18/2018

## 2018-01-18 NOTE — PROGRESS NOTES
Clinic Care Coordination Contact  Care Team Conversations  Message from Pako. They took patient to ED and he was admitted to hospital as he could not walk. Dr. Florian had instructed them to go to ED. Did chart review and he is being discharged to [facility].     Will ask care navigator to follow while in TCU.     Plan- cc to call when discharged from TCU.    Estrella Pang,   [facility]  [email or web address]  [phone]

## 2018-01-18 NOTE — TELEPHONE ENCOUNTER
Son Reynaldo from [place] called to report that the patient has used the emergency call light over 100 times today and was banging on the walls. States that he is unable to rest and is moaning.     Asking if the patient should go to the [facility].    I called [facility] and asked to speak with the nurse for the assisted living. Informed the nurse only that the family was calling the clinic with concerns. The nurse, Ale,  said that she had not heard that there was any new problem today and would check on the patient. States that she knows that the patient is confused.     Brandy Miller RN

## 2018-01-18 NOTE — PLAN OF CARE
Problem: Patient Care Overview  Goal: Plan of Care/Patient Progress Review  Outcome: Adequate for Discharge Date Met: 01/18/18  Pt denies any discomfort. Pt was able to take a few steps to get to the chair with assist of 2 and a walker. Pt has been oriented this shift. VSS. Appetite was good for breakfast. Pt is incont of urine. Pt states he has not had a BM for about 3 days, prune juice given. Pt discharging this afternoon.  Goal: Discharge Needs Assessment  Outcome: Adequate for Discharge Date Met: 01/18/18  Pt discharged with belongings via wheelchair to [facility] for further rehab.

## 2018-01-18 NOTE — PLAN OF CARE
Problem: Patient Care Overview  Goal: Plan of Care/Patient Progress Review  Outcome: No Change  New admit this shift. A/O x4 at times but also intermittently confused. Incontinent of urine. Did not get up this shift but reportedly has unsteady gait. Will likely require A2. VSS ex bradycardic at times. Denies pain. Neuros and CMS intact. Will have MRI later today to rule out microstroke. Head CT was neg. IV SL. On obs. OT/PT consults. Nurse will continue to monitor.

## 2018-01-18 NOTE — ED PROVIDER NOTES
History     Chief Complaint:  Altered Mental Status     HPI   Earl Sanchez is a 89 year old male who presents with gait abnormalities for the last 2-3 weeks, with an acute change last night in behavior noted at his skilled nursing facility.  Specifically, patient has been requiring more people to help him walk, but has not had any focal weakness is noted.  This is being evaluated by patient's primary care doctor and he has an MRI scheduled in the outpatient setting.  Most concerning to family who brings patient to the ER, is that last night in his skilled nursing facility, he rang the call light roughly 100 times, and when nurses would come to investigate, he would have no understanding of why he had pushed the call light.  Patient also appears to have had several other incidences of confusion like this/forgetting where he is or what he was doing, over the last 2-3 days.  They have called her primary care doctor, who is recommended that Mr. Sanchez come to the ER today for evaluation.    Allergies:  No known Drug Allergies      Medications:    JANTOVEN 2 MG tablet  vitamin D (ERGOCALCIFEROL) 18161 UNIT capsule  allopurinol (ZYLOPRIM) 100 MG tablet  lisinopril (PRINIVIL/ZESTRIL) 10 MG tablet  furosemide (LASIX) 20 MG tablet  levothyroxine (SYNTHROID/LEVOTHROID) 50 MCG tablet  pravastatin (PRAVACHOL) 40 MG tablet  doxazosin (CARDURA) 4 MG tablet  acetaminophen (TYLENOL) 500 MG tablet  nitroglycerin (NITROSTAT) 0.4 MG SL tablet  aspirin (ASPIRIN LOW DOSE) 81 MG tablet    Past Medical History:    Abdominal aortic aneurysm  Acute MI  A-Fib  BPH  CAD  CKD  Duodenal ulcer with hemorrhage  Gait apraxia  Gastritis  GERD  Heart failure  Hyperlipidemia  Hypertension  Osteoarthritis  Renal disease  Thyroid disease    Past Surgical History:    ORIF ankle fracture  Coronary artery bypass  Heart cath left   Right hernia repair, inguinal  Phacoemulsification clear cornea with standard intraocular lens  implant    Family History:    Mother- hypertension  Maternal grandmother- hypertension    Social History:  Marital Status:   [5]  Social History   Substance Use Topics     Smoking status: Never Smoker     Smokeless tobacco: Never Used     Alcohol use Yes      Comment: occ - one glass of wine last night        Review of Systems   Constitutional: Negative for chills and fever.   Cardiovascular: Negative for chest pain.   Gastrointestinal: Negative for abdominal pain, diarrhea and nausea.   Musculoskeletal: Positive for gait problem. Negative for arthralgias and back pain.   Neurological: Negative for dizziness, seizures, speech difficulty, light-headedness and numbness.   All other systems reviewed and are negative.      Physical Exam   First Vitals:  BP: 115/79  Pulse: 59  Temp: 97.9  F (36.6  C)  Resp: 17  SpO2: 97 %      Physical Exam  Vitals: reviewed by me  General: Pt seen on [facility], cooperative, and alert to conversation  Eyes: Tracking well, clear conjunctiva BL  ENT: MMM, midline trachea.   Lungs:   No tachypnea, no accessory muscle use. No respiratory distress.  Lungs are clear to auscultation bilaterally  CV: Rate as above, irregularly irregular rhythm.  No murmurs heard on auscultation  Abd: Soft, non tender, no guarding, no rebound. Non distended  MSK: no peripheral edema or joint effusion.  No evidence of trauma  Skin: No rash, normal turgor and temperature  Neuro: Clear speech and no facial droop.  Bilateral upper and bilateral lower extremities with sensation intact light touch and 5 out of 5 motor throughout.  Cranial nerves II through XII are intact bilaterally.  Patient is unable to ambulate independently, appears mildly ataxic and unable to support his own weight when standing.  Psych: Not RIS, no e/o AH/VH      Emergency Department Course   Imaging:  Radiology findings were communicated with the patient who voiced understanding of the findings.    CT Head w/o  Contrast  Final Result  IMPRESSION: No acute abnormality.    OSMAN ARECHIGA MD    XR Chest 2 Views  Final Result  IMPRESSION: Median sternotomy changes again noted. There is been  interval worsening of cardiomegaly since the comparison study with  moderate cardiomegaly currently. There are no airspace opacities to  suggest pneumonia. There is no pleural effusion or pneumothorax. There  is mild prominence of the perihilar pulmonary vasculature, but no  definite evidence for hussain pulmonary edema.    ROMERO VALENZUELA MD  Reading per radiology      Laboratory:  Laboratory findings were communicated with the patient who voiced understanding of the findings.    Ua: trace blood (A), moderate bacteria (A)  Urine microscopic: moderate bacteria (A)  INR: 1.66 (H)  CBC: WBC 7.0, HGB 11.1 (L),   CMP: glucose 127 (H), albumin 3.3 (L), o/w WNL (Creatinine 1.1)    Interventions:  2217 NS 500ml IV Bolus     Emergency Department Course:  Nursing notes and vitals reviewed.  I performed an exam of the patient as documented above.   I discussed the treatment plan with the patient. They expressed understanding of this plan and consented to admission. I discussed the patient with Dr. Onofre, who will admit the patient to a monitored bed for further evaluation and treatment.    I personally reviewed the imaging and lab results with the patient and answered all related questions prior to admission for observation.  Impression & Plan      Medical Decision Making:  This patient is a 89 years old male who presents with altered mental status and gait abnormality and possible delirium with sun downing. The patient has normal neurological exam here but does fail his trial of ambulation and is quite unsteady. Patient has negative workup and no evidence for organic cause of delirium, but does certainly warrant further investigation given his acute decline over the last several weeks. I agree with the primary care provider who has ordered  an MRI, although I do think it would benefit the family to have this done sooner rather than later and as such I have admitted for an MRI to rule out a cerebellar stroke. Patient is mentating appropriately here and has no clear stroke syndrome, but again quite unstable on his gait and this is a subacute change for the family. No evidence of concurrent UTI, pneumonia or influenza here in the ED. patient also may benefit from physical therapy and OT and even in the setting of a negative MRI family states they do nothing he could adequately be discharged back to his skilled nursing home and therefore may need admission for placement as well. With this in mind I have spoke to Dr. Onofre who has very generously agreed to accept care of the patient to an observation state for MRI and placement. Will monitor very carefully until bed is available.     Diagnosis:    ICD-10-CM    1. Gait instability R26.81    2. Altered mental status, unspecified altered mental status type R41.82      Disposition:   Admission for observation    Scribe Disclosure:  I, Ezio Churchillabel, am serving as a scribe at 11:56 PM on 1/17/2018 to document services personally performed by Magdaleno De La Cruz MD, based on my observations and the provider's statements to me.       EMERGENCY DEPARTMENT       Magdaleno De La Cruz MD  01/18/18 [id number]

## 2018-01-18 NOTE — PROGRESS NOTES
PAS-RR    D: Per DHS regulation, SW completed and submitted PAS-RR to [place] [employer] Direct Connect via the Senior LinkAge Line.  PAS-RR confirmation # is : [id number]    I: YAYO spoke with son Pako and they are aware a PAS-RR has been submitted.  YAYO reviewed with Pako that they may be contacted for a follow up appointment within 10 days of hospital discharge if their SNF stay is < 30 days.  Contact information for Senior LinkAge Line was also provided.    A: Pako verbalized understanding.    P: Further questions may be directed to [facility] LinkAge Line at #[phone], option #4 for PAS-RR staff.

## 2018-01-18 NOTE — PROGRESS NOTES
Care Coordinator met with pt this AM concerning Observation status and he has an Observation brochure.  Pt resides at [facility].  He receives AM and HS cares and help thru day as needed.  His family sets his medications up.  It was anticipated that pt would require a TCU. Pt is in agreement and would want [facility] and if they were unable to accept, he would want [facility].  SW was updated with plans to discharge today.

## 2018-01-18 NOTE — TELEPHONE ENCOUNTER
Called and have a conversation with Pako.  Based on the symptom, his delirium has been worsening seriously, and more confused throughout today with 'sundowning sign'.  It could possibly be related unknown infection or septic condition, so he should be evaluated at ER. Plus, he might need step-up care unit with 24 hours nursing staff. Unfortunately, he has been in the waiting list. Hopefully, he could have a better disposition plan after ER visit for the current issue.

## 2018-01-19 ENCOUNTER — NURSING HOME VISIT (OUTPATIENT)
Dept: GERIATRICS | Facility: CLINIC | Age: 83
End: 2018-01-19
Payer: COMMERCIAL

## 2018-01-19 VITALS
WEIGHT: 161 LBS | HEART RATE: 71 BPM | TEMPERATURE: 97.7 F | DIASTOLIC BLOOD PRESSURE: 89 MMHG | BODY MASS INDEX: 24.4 KG/M2 | OXYGEN SATURATION: 96 % | HEIGHT: 68 IN | SYSTOLIC BLOOD PRESSURE: 157 MMHG | RESPIRATION RATE: 18 BRPM

## 2018-01-19 DIAGNOSIS — I25.10 CORONARY ARTERY DISEASE INVOLVING NATIVE CORONARY ARTERY OF NATIVE HEART WITHOUT ANGINA PECTORIS: ICD-10-CM

## 2018-01-19 DIAGNOSIS — R41.89 COGNITIVE IMPAIRMENT: ICD-10-CM

## 2018-01-19 DIAGNOSIS — N18.30 CKD (CHRONIC KIDNEY DISEASE) STAGE 3, GFR 30-59 ML/MIN (H): ICD-10-CM

## 2018-01-19 DIAGNOSIS — G93.40 ACUTE ENCEPHALOPATHY: ICD-10-CM

## 2018-01-19 DIAGNOSIS — R27.0 ATAXIA: Primary | ICD-10-CM

## 2018-01-19 DIAGNOSIS — I48.20 CHRONIC ATRIAL FIBRILLATION (H): ICD-10-CM

## 2018-01-19 DIAGNOSIS — I10 HYPERTENSION GOAL BP (BLOOD PRESSURE) < 140/90: ICD-10-CM

## 2018-01-19 DIAGNOSIS — N40.0 BENIGN PROSTATIC HYPERPLASIA, UNSPECIFIED WHETHER LOWER URINARY TRACT SYMPTOMS PRESENT: ICD-10-CM

## 2018-01-19 PROCEDURE — 99310 SBSQ NF CARE HIGH MDM 45: CPT | Performed by: NURSE PRACTITIONER

## 2018-01-19 NOTE — PROGRESS NOTES
"[facility]  PRIMARY CARE PROVIDER AND CLINIC:  Alvarado Florian [address] / [facility] [address]  Chief Complaint   Patient presents with     Hospital F/U       HPI:    Earl Sanchez is a 89 year old  (9/17/1926),admitted to the [facility] from [facility].  Hospital stay 01/17/2018 through 01/18/2018.  Admitted to this facility for  rehab, medical management and nursing care.  HPI information obtained from: facility chart records, facility staff, patient report and [facility] chart review.  He was hospitalized from his AL with worsening confusion, agitation and difficulty ambulating.   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 He had extensive work up, including MRI/MRA head and neck, all negative for acute pathology. Labs within range, no infection found. Agitation resolved.   Medical history significant for afib on chronic coumadin, CAD with CABG in 2007, CKD, HTN.     Current issues are:         Ataxia-up with walker and assist. Requires assist of 1 with all cares.   Acute encephalopathy  Cognitive impairment-sleepy and does not respond appropriately to questions. Denies pain. Not able to tell me where he lives, \" I live here.\" Cooperative with cares.    reports family is looking for long term care.   Chronic atrial fibrillation (H)-HR: 57-78. Not on rate controlling agent. INR 2.1 today, up from 1.77 on coumadin 5 mg daily. Usual home dose is 2 mg Tues, Thurs and Sat, 4 mg the other days. No bleeding noted.   Coronary artery disease involving native coronary artery of native heart without angina pectoris  CKD (chronic kidney disease) stage 3, GFR 30-59 ml/min  Hypertension goal BP (blood pressure) < 140/90-BPs: 157/89, 158/81, 150/77  Benign prostatic hyperplasia, unspecified whether lower urinary tract symptoms present-denies urinary symptoms.       CODE STATUS/ADVANCE " DIRECTIVES DISCUSSION:   DNR / DNI  Patient's living condition: lives in an assisted living facility [facility]. 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     ALLERGIES:Review of patient's allergies indicates no known allergies.  PAST MEDICAL HISTORY:  has a past medical history of Abdominal aortic aneurysm (H); Acute MI (1981, 2007); Atrial fibrillation (H); BPH (benign prostatic hyperplasia); CAD (coronary artery disease); Chronic kidney disease; Duodenal ulcer with hemorrhage; Gait apraxia; Gastritis; Gastro-oesophageal reflux disease; Heart failure (H); Hyperlipidaemia LDL goal <100; Hypertension goal BP (blood pressure) < 140/90; Osteoarthritis; Renal disease; and Thyroid disease. He also has no past medical history of Malignant hyperthermia or PONV (postoperative nausea and vomiting).  PAST SURGICAL HISTORY:  has a past surgical history that includes hernia repair, inguinal rt/lt; Ankle surgery; Phacoemulsification clear cornea with toric intraocular lens implant (4/10/2014); Phacoemulsification clear cornea with toric intraocular lens implant (4/22/2014); coronary artery bypass (2007); and Heart Cath Left heart cath (12/10/07).  FAMILY HISTORY: family history includes Hypertension in his maternal grandmother and mother.  SOCIAL HISTORY:  reports that he has never smoked. He has never used smokeless tobacco. He reports that he drinks alcohol. He reports that he does not use illicit drugs.    Post Discharge Medication Reconciliation Status: discharge medications reconciled and changed, per note/orders (see AVS).  Current Outpatient Prescriptions   Medication Sig Dispense Refill     Warfarin Sodium (COUMADIN PO) Take by mouth daily 2 mg on Tues, Thurs and Saturdays. 4 mg the other days of the week       allopurinol (ZYLOPRIM) 100 MG tablet Take 100 mg by mouth 2 times daily       levothyroxine (SYNTHROID/LEVOTHROID) 50 MCG tablet TAKE 1 TABLET DAILY 90 tablet 1     pravastatin (PRAVACHOL) 40 MG tablet TAKE 1 TABLET  "DAILY 90 tablet 1     doxazosin (CARDURA) 4 MG tablet Take 1 tablet (4 mg) by mouth At Bedtime 90 tablet 3     acetaminophen (TYLENOL) 500 MG tablet Take 1,000 mg by mouth every 6 hours as needed for mild pain        nitroglycerin (NITROSTAT) 0.4 MG SL tablet Place 1 tablet (0.4 mg) under the tongue every 5 minutes as needed for chest pain 25 tablet 0     aspirin (ASPIRIN LOW DOSE) 81 MG tablet Take 81 mg by mouth daily          ROS:  Limited due to cognitive impairment. Poor historian and does not respond to questions appropriately.     Exam:  /89  Pulse 71  Temp 97.7  F (36.5  C)  Resp 18  Ht 5' 8\" (1.727 m)  Wt 161 lb (73 kg)  SpO2 96%  BMI 24.48 kg/m2  GENERAL APPEARANCE:  Alert, in no distress  ENT:  Mouth and posterior oropharynx normal, moist mucous membranes, [place]  EYES:  EOM normal, conjunctiva and lids normal, PERRL  NECK:  No adenopathy,masses or thyromegaly  RESP:  respiratory effort and palpation of chest normal, lungs clear to auscultation , no respiratory distress  CV:  Palpation and auscultation of heart done , regular rate and rhythm, no murmur, rub, or gallop, no edema, +2 pedal pulses  ABDOMEN:  normal bowel sounds, soft, nontender, no hepatosplenomegaly or other masses  M/S:   in bed. BROWER with good strength. No joint inflammation  SKIN:  no rashes or open areas  PSYCH:  oriented to self, place, insight and judgement impaired, memory impaired     Lab/Diagnostic data:   Last Basic Metabolic Panel:  Lab Results   Component Value Date     01/17/2018      Lab Results   Component Value Date    POTASSIUM 3.6 01/17/2018     Lab Results   Component Value Date    CHLORIDE 104 01/17/2018     Lab Results   Component Value Date    JAYME 8.8 01/17/2018     Lab Results   Component Value Date    CO2 27 01/17/2018     Lab Results   Component Value Date    BUN 22 01/17/2018     Lab Results   Component Value Date    CR 1.10 01/17/2018     Lab Results   Component Value Date     01/17/2018 "     Lab Results   Component Value Date    WBC 7.0 01/17/2018     Lab Results   Component Value Date    RBC 3.51 01/17/2018     Lab Results   Component Value Date    HGB 11.1 01/17/2018     Lab Results   Component Value Date    HCT 34.0 01/17/2018     Lab Results   Component Value Date    MCV 97 01/17/2018     Lab Results   Component Value Date    MCH 31.6 01/17/2018     Lab Results   Component Value Date    MCHC 32.6 01/17/2018     Lab Results   Component Value Date    RDW 15.1 01/17/2018     Lab Results   Component Value Date     01/17/2018     ASSESSMENT / PLAN:  (R27.0) Ataxia  (primary encounter diagnosis)  (G93.40) Acute encephalopathy  (R41.89) Cognitive impairment  Comment: progressive decline with Parkinsonian features. Probable  dementia. Acute encephalopathy appears resolved.   Plan: PHYSICAL THERAPY/OT. Neurology follow up as scheduled. Per , plan is for long term care placement.     (I48.2) Chronic atrial fibrillation (H)  Comment: rate controlled. INR therapeutic  Plan: coumadin at his usual home dose: 2 mg on Tues, Thurs, Saturdays and 4 mg the other days of the week. INR 1/22/2018    (I25.10) Coronary artery disease involving native coronary artery of native heart without angina pectoris  Comment: history of CABG. No acute issues  Plan: continue ASA, statin, prn nitro.     (N18.3) CKD (chronic kidney disease) stage 3, GFR 30-59 ml/min  Comment: renal function at baseline  Plan: BMP. Avoid nephrotoxins.     (I10) Hypertension goal BP (blood pressure) < 140/90  Comment: controlled  Plan: continue doxazosin. Monitor VS    (N40.0) Benign prostatic hyperplasia, unspecified whether lower urinary tract symptoms present  Comment: chronic  Plan: continue doxazosin. Monitor for s/s of urinary retention.         Electronically signed by:  ANGEL Wagner CNP

## 2018-01-21 PROBLEM — R53.81 PHYSICAL DECONDITIONING: Status: ACTIVE | Noted: 2018-01-21

## 2018-01-21 PROBLEM — R41.89 COGNITIVE IMPAIRMENT: Status: ACTIVE | Noted: 2018-01-21

## 2018-01-21 PROBLEM — G93.40 ACUTE ENCEPHALOPATHY: Status: ACTIVE | Noted: 2018-01-21

## 2018-01-22 ENCOUNTER — NURSING HOME VISIT (OUTPATIENT)
Dept: GERIATRICS | Facility: CLINIC | Age: 83
End: 2018-01-22
Payer: COMMERCIAL

## 2018-01-22 VITALS
TEMPERATURE: 98.6 F | HEART RATE: 68 BPM | WEIGHT: 162.2 LBS | OXYGEN SATURATION: 97 % | HEIGHT: 68 IN | DIASTOLIC BLOOD PRESSURE: 82 MMHG | SYSTOLIC BLOOD PRESSURE: 162 MMHG | RESPIRATION RATE: 16 BRPM | BODY MASS INDEX: 24.58 KG/M2

## 2018-01-22 DIAGNOSIS — F02.818 LATE ONSET ALZHEIMER'S DISEASE WITH BEHAVIORAL DISTURBANCE (H): ICD-10-CM

## 2018-01-22 DIAGNOSIS — I25.10 CORONARY ARTERY DISEASE INVOLVING NATIVE CORONARY ARTERY OF NATIVE HEART WITHOUT ANGINA PECTORIS: ICD-10-CM

## 2018-01-22 DIAGNOSIS — I48.20 CHRONIC ATRIAL FIBRILLATION (H): ICD-10-CM

## 2018-01-22 DIAGNOSIS — E03.9 HYPOTHYROIDISM, UNSPECIFIED TYPE: ICD-10-CM

## 2018-01-22 DIAGNOSIS — N40.0 BENIGN PROSTATIC HYPERPLASIA, UNSPECIFIED WHETHER LOWER URINARY TRACT SYMPTOMS PRESENT: ICD-10-CM

## 2018-01-22 DIAGNOSIS — G93.40 ACUTE ENCEPHALOPATHY: ICD-10-CM

## 2018-01-22 DIAGNOSIS — G30.1 LATE ONSET ALZHEIMER'S DISEASE WITH BEHAVIORAL DISTURBANCE (H): ICD-10-CM

## 2018-01-22 DIAGNOSIS — L30.9 ECZEMA, UNSPECIFIED TYPE: ICD-10-CM

## 2018-01-22 DIAGNOSIS — R27.0 ATAXIA: Primary | ICD-10-CM

## 2018-01-22 PROCEDURE — 99306 1ST NF CARE HIGH MDM 50: CPT | Performed by: INTERNAL MEDICINE

## 2018-01-23 ENCOUNTER — CARE COORDINATION (OUTPATIENT)
Dept: CARE COORDINATION | Facility: CLINIC | Age: 83
End: 2018-01-23

## 2018-01-23 NOTE — PROGRESS NOTES
Clinic Care Coordination Contact  Care Coordination Transition Communication         Clinical Data: Patient was hospitalized at [facility] from 1/17/18 to 1/18/18 with diagnosis of Gait abnormality.     Transition to Facility:              [facility]: [facility]              Contact name and phone number/fax: [phone], Fax: [phone]    Plan: RN/SW Care Coordinator will await notification from facility staff informing RN/SW Care Coordinator of patient's discharge plans/needs. RN/SW Care Coordinator will review chart and outreach to facility staff every 4 weeks and as needed.     MARCOS Mosley  Care Navigator  [facility]  [phone]

## 2018-01-23 NOTE — PROGRESS NOTES
"Earl Sanchez is a 89 year old male seen January 22, 2018 at [facility] where he was admitted last week after [facility] overnight stay for encephalopathy with gait disturbance.  Patient has a h/o progressive ataxia, seen by Neurology and felt to have Parkinsonian features.   Workup in the hospital included MRI/MRA, labs.   No specific causes found, and his MS cleared, agitation resolved.     Today patient is seen in his room, resting abed fully dressed.   Has been working with therapies this morning, now sleeping.  States he feels \"better,\" believes his difficulty walking is related to a knee injury (by chart review in 2016).  Denies knee pain today.       Patient has itchy rash around both ankles, worse on the left.   Not sure how long that has been present.   Patient has had atrial fib for many years, anticoagulated with warfarin.   At home family sets up meds in automated reminder.         Past Medical History:   Diagnosis Date     Abdominal aortic aneurysm (H)     per 2/12/15 abdominal US, mild aneurysm measuring [id number]     Acute MI 1981, 2007     Atrial fibrillation (H)      BPH (benign prostatic hyperplasia)      CAD (coronary artery disease)     CABG 2007, f/b cardio, Dr. 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     Gastritis     treated with zantac     Gastro-oesophageal reflux disease      Heart failure (H)      Hyperlipidaemia LDL goal <100      Hypertension goal BP (blood pressure) < 140/90      Osteoarthritis     low back, hips, knees     Renal disease     renal insuff     Thyroid disease        Past Surgical History:   Procedure Laterality Date     ANKLE SURGERY      ORIF ankle fracture     CORONARY ARTERY BYPASS  2007    CAB X 5 CABG with LIMA to LAD and saphenous vein grafts to, OM2,SVG to diag 1, and sequential PDA     HEART CATH LEFT HEART CATH  12/10/07     HERNIA REPAIR, INGUINAL RT/LT      right     PHACOEMULSIFICATION CLEAR " "CORNEA WITH TORIC INTRAOCULAR LENS IMPLANT  4/10/2014    Procedure: PHACOEMULSIFICATION CLEAR CORNEA WITH TORIC INTRAOCULAR LENS IMPLANT;  RIGHT PHACOEMULSIFICATION CLEAR CORNEA WITH TORIC INTRAOCULAR LENS IMPLANT ;  Surgeon: Carlo Tee MD;  Location: [facility]     PHACOEMULSIFICATION CLEAR CORNEA WITH TORIC INTRAOCULAR LENS IMPLANT  4/22/2014    Procedure: PHACOEMULSIFICATION CLEAR CORNEA WITH TORIC INTRAOCULAR LENS IMPLANT;  Surgeon: Carlo Tee MD;  Location: [facility]     Family History   Problem Relation Age of Onset     Hypertension Mother      Hypertension Maternal Grandmother      Social History   Substance Use Topics     Smoking status: Never Smoker     Smokeless tobacco: Never Used     Alcohol use Yes      Comment: occ - one glass of wine last night      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    , lives at the [facility] by [facility] with services.    Has 3 children.     Review Of Systems  Skin: negative   Eyes: impaired vision  Ears/Nose/Throat: hearing loss  Respiratory: No shortness of breath, dyspnea on exertion, cough, or hemoptysis  Cardiovascular: irregular heart beat and exercise intolerance  Gastrointestinal: negative  Genitourinary: negative  Musculoskeletal: LE weakness, transfers with assist  Neurologic: STML, ataxia  Psychiatric: negative  Hematologic/Lymphatic/Immunologic: negative  Endocrine: negative      GENERAL APPEARANCE: alert and no distress  /82  Pulse 68  Temp 98.6  F (37  C)  Resp 16  Ht 5' 8\" (1.727 m)  Wt 162 lb 3.2 oz (73.6 kg)  SpO2 97%  BMI 24.66 kg/m2   HEENT: normocephalic, no lesion or abnormalities  NECK: no adenopathy, no asymmetry, masses, or scars and thyroid normal to palpation  RESP: lungs clear to auscultation - no rales, rhonchi or wheezes  CV: irregular rate and rhythm, normal S1 S2, 1/6 HSM  ABDOMEN:  soft, nontender, no HSM or masses and bowel sounds normal  MS: extremities normal- no gross deformities noted, no evidence of inflammation in " joints, no ext edema  SKIN: scaly dry rash both ankle, patchy.  Some maculopapular dry rash lateral left LE.     NEURO: Normal strength and tone, sensory exam grossly normal, and speech normal  PSYCH: affect okay  LYMPHATICS: No cervical,  or supraclavicular nodes     Last Basic Metabolic Panel:  Lab Results   Component Value Date     01/17/2018      Lab Results   Component Value Date    POTASSIUM 3.6 01/17/2018     Lab Results   Component Value Date    CHLORIDE 104 01/17/2018     Lab Results   Component Value Date    JAYME 8.8 01/17/2018     Lab Results   Component Value Date    CO2 27 01/17/2018     Lab Results   Component Value Date    BUN 22 01/17/2018     Lab Results   Component Value Date    CR 1.10 01/17/2018   GFR 63  Lab Results   Component Value Date     01/17/2018     Lab Results   Component Value Date    WBC 7.0 01/17/2018     Component Value Date    HGB 11.1 01/17/2018     Component Value Date    MCV 97 01/17/2018     Component Value Date     01/17/2018     TSH   Date Value Ref Range Status   11/29/2017 1.38 0.40 - 4.00 mU/L Final        IMP/PLAN:  (R27.0) Ataxia    Comment: progressive decline in ambulation, high fall risk, now WC bound.    Plan: PHYSICAL THERAPY / OCCUPATIONAL THERAPY for balance, gait, strengthening, ADLs.   Patient's goal is return to his AL apartment at the [place], but he may need more support.     Neurology follow up with Dr Cárdenas.       (G93.40) Acute encephalopathy  Comment: had confusion and agitation    Plan: still disoriented, but no behavioral symptoms now.       (G30.1,  F02.81) Late onset Alzheimer's disease with behavioral disturbance  Comment: gradual decline in cognition, low functional status.  Plan: Speech Therapy for more formal cognitive testing.       (I48.2) Chronic atrial fibrillation (H)  Comment: not requiring rate slowing meds  Plan: warfarin per INR, goal 2-2.5 given fall risk.    (I25.10) Coronary artery disease involving native coronary  artery of native heart without angina pectoris  Comment: h/o CABG  Plan: daily ASA, statin for secondary prevention.   NTG prn    (N40.0) Benign prostatic hyperplasia, unspecified whether lower urinary tract symptoms present  Comment: longstanding  Plan: continue doxazosin.       (E03.9) Hypothyroidism, unspecified type  Comment: on replacement  Plan: same dose    (L30.9) Eczema, unspecified type  Comment: on ankles, unclear etiology  Plan: triamcinolone cream to rash bid, follow.        Patty Gamble MD

## 2018-01-25 ENCOUNTER — CARE COORDINATION (OUTPATIENT)
Dept: CARE COORDINATION | Facility: CLINIC | Age: 83
End: 2018-01-25

## 2018-01-25 ENCOUNTER — NURSING HOME VISIT (OUTPATIENT)
Dept: GERIATRICS | Facility: CLINIC | Age: 83
End: 2018-01-25
Payer: COMMERCIAL

## 2018-01-25 VITALS
OXYGEN SATURATION: 99 % | RESPIRATION RATE: 18 BRPM | BODY MASS INDEX: 24.43 KG/M2 | DIASTOLIC BLOOD PRESSURE: 79 MMHG | TEMPERATURE: 96 F | HEIGHT: 68 IN | WEIGHT: 161.2 LBS | HEART RATE: 79 BPM | SYSTOLIC BLOOD PRESSURE: 137 MMHG

## 2018-01-25 DIAGNOSIS — Z79.01 ENCOUNTER FOR MONITORING COUMADIN THERAPY: ICD-10-CM

## 2018-01-25 DIAGNOSIS — Z51.81 ENCOUNTER FOR MONITORING COUMADIN THERAPY: ICD-10-CM

## 2018-01-25 DIAGNOSIS — I48.20 CHRONIC ATRIAL FIBRILLATION (H): Primary | ICD-10-CM

## 2018-01-25 DIAGNOSIS — Z79.01 CHRONIC ANTICOAGULATION: ICD-10-CM

## 2018-01-25 PROCEDURE — 99308 SBSQ NF CARE LOW MDM 20: CPT | Performed by: NURSE PRACTITIONER

## 2018-01-25 RX ORDER — TRIAMCINOLONE ACETONIDE 0.25 MG/G
CREAM TOPICAL 2 TIMES DAILY
COMMUNITY
End: 2018-01-29

## 2018-01-25 NOTE — PROGRESS NOTES
[facility]    HPI:    Earl Sanchez is a 89 year old  (9/17/1926), who is being seen today for an episodic care visit at [place] [facility] [address].   HPI information obtained from: facility chart records, facility staff, patient report and [facility] chart review. Today's concern is INR/Coumadin management for A. Fib    Bleeding Signs/Symptoms:  None  Thromboembolic Signs/Symptoms:  None    Medication Changes:  No  Dietary Changes:  No  Activity Changes: No  Bacterial/Viral Infection:  No    Missed Coumadin Doses:  None    On ASA: No    Other Concerns:  No    OBJECTIVE:    INR Today:  2.9  Current Dose:  2-4 mg daily    ASSESSMENT:     Chronic atrial fibrillation (H)  Chronic anticoagulation  Encounter for monitoring coumadin therapy  Therapeutic INR for goal of 2-3    PLAN:    New Dose: 4 mg on 1/26, 2 mg the other days      Next INR: 1/29/2018        Electronically signed by:  ANGEL Wagner CNP

## 2018-01-25 NOTE — PROGRESS NOTES
Clinic Care Coordination Contact  Care Team Conversations  Call from son Pako, who had talked to CC before about getting in home blood draws. Patient is in [facility]. Pako talked to [facility] and they need blood work done. [facility] can be reached at [phone] x [phone].  Patient is being seen by [facility] at [facility].  Will route this note to Jaycob Avila MD to have someone contact [facility] to get orders needed to do blood draw for neurology needs.      Called [facility] and  asking for nurse to call CC to get confirmation that this will be done.      Plan-respond to call back from TCU or from Dr. Avila.     Estrella Pang,   [facility]  [email or web address]  [phone]

## 2018-01-29 ENCOUNTER — NURSING HOME VISIT (OUTPATIENT)
Dept: GERIATRICS | Facility: CLINIC | Age: 83
End: 2018-01-29
Payer: COMMERCIAL

## 2018-01-29 VITALS
BODY MASS INDEX: 24.49 KG/M2 | DIASTOLIC BLOOD PRESSURE: 71 MMHG | HEIGHT: 68 IN | OXYGEN SATURATION: 99 % | TEMPERATURE: 97.1 F | SYSTOLIC BLOOD PRESSURE: 121 MMHG | HEART RATE: 72 BPM | WEIGHT: 161.6 LBS | RESPIRATION RATE: 16 BRPM

## 2018-01-29 DIAGNOSIS — I48.20 CHRONIC ATRIAL FIBRILLATION (H): Primary | ICD-10-CM

## 2018-01-29 DIAGNOSIS — Z51.81 ENCOUNTER FOR MONITORING COUMADIN THERAPY: ICD-10-CM

## 2018-01-29 DIAGNOSIS — Z79.01 CHRONIC ANTICOAGULATION: ICD-10-CM

## 2018-01-29 DIAGNOSIS — Z79.01 ENCOUNTER FOR MONITORING COUMADIN THERAPY: ICD-10-CM

## 2018-01-29 PROCEDURE — 99308 SBSQ NF CARE LOW MDM 20: CPT | Performed by: NURSE PRACTITIONER

## 2018-01-29 NOTE — PROGRESS NOTES
[facility]    HPI:    Earl Sanchez is a 89 year old  (9/17/1926), who is being seen today for an episodic care visit at [place] [facility]   HPI information obtained from: facility chart records, facility staff, patient report and [facility] chart review. Today's concern is INR/Coumadin management for A. Fib    Bleeding Signs/Symptoms:  None  Thromboembolic Signs/Symptoms:  None    Medication Changes:  No  Dietary Changes:  No  Activity Changes: No  Bacterial/Viral Infection:  No    Missed Coumadin Doses:  None    On ASA: Yes - 81 mg po q day    Other Concerns:  No    OBJECTIVE:    INR Today:  3.0  Current Dose:  2-4 mg daily    ASSESSMENT:     Chronic atrial fibrillation (H)  Chronic anticoagulation  Encounter for monitoring coumadin therapy  Therapeutic INR for goal of 2-3    PLAN:    New Dose: 2 mg daily      Next INR: 2/1/2018        Electronically signed by:  ANGEL Wagner CNP

## 2018-02-01 ENCOUNTER — NURSING HOME VISIT (OUTPATIENT)
Dept: GERIATRICS | Facility: CLINIC | Age: 83
End: 2018-02-01
Payer: COMMERCIAL

## 2018-02-01 VITALS
DIASTOLIC BLOOD PRESSURE: 81 MMHG | WEIGHT: 156.2 LBS | TEMPERATURE: 97.8 F | SYSTOLIC BLOOD PRESSURE: 135 MMHG | RESPIRATION RATE: 18 BRPM | HEIGHT: 68 IN | BODY MASS INDEX: 23.67 KG/M2 | OXYGEN SATURATION: 97 % | HEART RATE: 57 BPM

## 2018-02-01 DIAGNOSIS — R27.0 ATAXIA: Primary | ICD-10-CM

## 2018-02-01 DIAGNOSIS — N18.30 CKD (CHRONIC KIDNEY DISEASE) STAGE 3, GFR 30-59 ML/MIN (H): ICD-10-CM

## 2018-02-01 DIAGNOSIS — R41.89 COGNITIVE IMPAIRMENT: ICD-10-CM

## 2018-02-01 DIAGNOSIS — I48.20 CHRONIC ATRIAL FIBRILLATION (H): ICD-10-CM

## 2018-02-01 DIAGNOSIS — I25.10 CORONARY ARTERY DISEASE INVOLVING NATIVE CORONARY ARTERY OF NATIVE HEART WITHOUT ANGINA PECTORIS: ICD-10-CM

## 2018-02-01 DIAGNOSIS — I10 HYPERTENSION GOAL BP (BLOOD PRESSURE) < 140/90: ICD-10-CM

## 2018-02-01 DIAGNOSIS — R53.81 PHYSICAL DECONDITIONING: ICD-10-CM

## 2018-02-01 PROCEDURE — 99309 SBSQ NF CARE MODERATE MDM 30: CPT | Performed by: NURSE PRACTITIONER

## 2018-02-01 NOTE — PROGRESS NOTES
[facility]    Chief Complaint   Patient presents with     RECHECK       HPI:    Earl Sanchez is a 89 year old  (9/17/1926), who is being seen today for an episodic care visit at [place] [facility]. HPI information obtained from: facility chart records, facility staff, patient report and [facility] chart review.  He was hospitalized from his AL with worsening confusion, agitation and difficulty ambulating.   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 He had extensive work up, including MRI/MRA head and neck, all negative for acute pathology. Labs within range, no infection found. Agitation resolved.   Medical history significant for afib on chronic coumadin, CAD with CABG in 2007, CKD, HTN.     Current issues are:          Ataxia-reports feeling well with improved strength and energy. Good appetite.   Chronic atrial fibrillation (H)-HR: 57-78. INR 2.3 today on 2-4 mg coumadin daily. No bleeding.   Coronary artery disease involving native coronary artery of native heart without angina pectoris-denies cough, shortness of breath or chest pain.   Hypertension goal BP (blood pressure) < 140/90-BPs:  135/81, 112/68, 121/75  CKD (chronic kidney disease) stage 3, GFR 30-59 ml/min  Cognitive impairment-pleasant and cooperative. SLUMS 13/30, CPT 4.4/5.6  Physical deconditioning-ambulating 90 ft with walker and assist. Requires max assist with all cares.    ALLERGIES: Review of patient's allergies indicates no known allergies.  Past Medical, Surgical, Family and Social History reviewed and updated in [facility].    Current Outpatient Prescriptions   Medication Sig Dispense Refill     ERGOCALCIFEROL PO Take 50,000 unit marking on an U-100 insulin syringe by mouth every morning Wednesday       allopurinol (ZYLOPRIM) 100 MG tablet Take 100 mg by mouth 2 times daily       levothyroxine (SYNTHROID/LEVOTHROID) 50 MCG tablet TAKE 1 TABLET DAILY 90 tablet 1  "    pravastatin (PRAVACHOL) 40 MG tablet TAKE 1 TABLET DAILY 90 tablet 1     doxazosin (CARDURA) 4 MG tablet Take 1 tablet (4 mg) by mouth At Bedtime 90 tablet 3     acetaminophen (TYLENOL) 500 MG tablet Take 1,000 mg by mouth every 6 hours as needed for mild pain        nitroglycerin (NITROSTAT) 0.4 MG SL tablet Place 1 tablet (0.4 mg) under the tongue every 5 minutes as needed for chest pain 25 tablet 0     aspirin (ASPIRIN LOW DOSE) 81 MG tablet Take 81 mg by mouth daily        Warfarin Sodium (COUMADIN PO) Take 2-4 mg by mouth daily 2 mg on Tues, Thurs, Sat and 4 mg the other days of the week       Medications reviewed:  Medications reconciled to facility chart and changes were made to reflect current medications as identified as above med list. Below are the changes that were made:   Medications stopped since last EPIC medication reconciliation:   There are no discontinued medications.    Medications started since last [facility] medication reconciliation:  No orders of the defined types were placed in this encounter.    REVIEW OF SYSTEMS:  10 point ROS of systems including Constitutional, Eyes, Respiratory, Cardiovascular, Gastroenterology, Genitourinary, Integumentary, Muscularskeletal, Psychiatric were all negative except for pertinent positives noted in my HPI.    Physical Exam:  /81  Pulse 57  Temp 97.8  F (36.6  C)  Resp 18  Ht 5' 8\" (1.727 m)  Wt 156 lb 3.2 oz (70.9 kg)  SpO2 97%  BMI 23.75 kg/m2  GENERAL APPEARANCE:  Alert, in no distress  ENT:  Mouth and posterior oropharynx normal, moist mucous membranes, [place]  EYES:  EOM normal, conjunctiva and lids normal  NECK:  No adenopathy,masses or thyromegaly  RESP:  respiratory effort and palpation of chest normal, lungs clear to auscultation , no respiratory distress  CV:  Palpation and auscultation of heart done , irregular rate and rhythm, no murmur,no edema, +2 pedal pulses  ABDOMEN:   soft, nontender, no hepatosplenomegaly or other masses  M/S:   " in bed. BROWER with good strength. No joint inflammation  SKIN:  Fading erythema both LE. No open areas  PSYCH:  oriented to self, place, insight and judgement impaired, memory impaired     Recent Labs:   CBC RESULTS:   Recent Labs   Lab Test  01/17/18 2212 11/29/17   1213   WBC  7.0  7.1   RBC  3.51*  3.54*   HGB  11.1*  11.2*   HCT  34.0*  35.3*   MCV  97  100   MCH  31.6  31.6   MCHC  32.6  31.7   RDW  15.1*  14.7   PLT  204  304       Last Basic Metabolic Panel:  Recent Labs   Lab Test  01/17/18 2212 11/29/17   1213   NA  139  138   POTASSIUM  3.6  4.3   CHLORIDE  104  103   JAYME  8.8  9.1   CO2  27  27   BUN  22  41*   CR  1.10  1.60*   GLC  127*  91         ASSESSMENT / PLAN:  (R27.0) Ataxia  (primary encounter diagnosis)  Comment: progressive decline in gait, possible Parkinsonian features and/or dementia.   Plan: continue therapies. Neurology follow up as scheduled-neurologist has requested the following labs; CBC, CMP, CK, vitamin B12.     (I48.2) Chronic atrial fibrillation (H)  Comment: rate controlled. INR therapeutic.   Plan: continue coumadin 2-4 mg daily. INR 2/5/2018    (I25.10) Coronary artery disease involving native coronary artery of native heart without angina pectoris  Comment: no acute issues  Plan: continue statin, prn nitro, ASA    (I10) Hypertension goal BP (blood pressure) < 140/90  Comment: controlled  Plan: continue doxazosin. Monitor VS    (N18.3) CKD (chronic kidney disease) stage 3, GFR 30-59 ml/min  Comment: renal function at baseline  Plan: follow BMP    (R41.89) Cognitive impairment  Comment: some improvement since tcu admission. Moderate deficits. Probable dementia  Plan: supportive care    (R53.81) Physical deconditioning  Comment: slowly progressing in therapies  Plan: continue PHYSICAL THERAPY/OT. Goal is to return to AL with higher level services.         Electronically signed by  ANGEL Wagner CNP

## 2018-02-05 ENCOUNTER — HOSPITAL LABORATORY (OUTPATIENT)
Dept: OTHER | Facility: CLINIC | Age: 83
End: 2018-02-05

## 2018-02-05 ENCOUNTER — NURSING HOME VISIT (OUTPATIENT)
Dept: GERIATRICS | Facility: CLINIC | Age: 83
End: 2018-02-05
Payer: COMMERCIAL

## 2018-02-05 VITALS
BODY MASS INDEX: 24.33 KG/M2 | WEIGHT: 160 LBS | SYSTOLIC BLOOD PRESSURE: 133 MMHG | DIASTOLIC BLOOD PRESSURE: 79 MMHG | OXYGEN SATURATION: 98 % | RESPIRATION RATE: 18 BRPM | TEMPERATURE: 98.1 F | HEART RATE: 69 BPM

## 2018-02-05 DIAGNOSIS — Z79.01 LONG-TERM (CURRENT) USE OF ANTICOAGULANTS: ICD-10-CM

## 2018-02-05 DIAGNOSIS — I48.20 CHRONIC ATRIAL FIBRILLATION (H): Primary | ICD-10-CM

## 2018-02-05 DIAGNOSIS — Z51.81 ENCOUNTER FOR MONITORING COUMADIN THERAPY: ICD-10-CM

## 2018-02-05 DIAGNOSIS — Z79.01 ENCOUNTER FOR MONITORING COUMADIN THERAPY: ICD-10-CM

## 2018-02-05 LAB
ALBUMIN SERPL-MCNC: 3.3 G/DL (ref 3.4–5)
ALP SERPL-CCNC: 97 U/L (ref 40–150)
ALT SERPL W P-5'-P-CCNC: 15 U/L (ref 0–70)
ANION GAP SERPL CALCULATED.3IONS-SCNC: 6 MMOL/L (ref 3–14)
AST SERPL W P-5'-P-CCNC: 21 U/L (ref 0–45)
BASOPHILS # BLD AUTO: 0.1 10E9/L (ref 0–0.2)
BASOPHILS NFR BLD AUTO: 1 %
BILIRUB SERPL-MCNC: 0.7 MG/DL (ref 0.2–1.3)
BUN SERPL-MCNC: 29 MG/DL (ref 7–30)
CALCIUM SERPL-MCNC: 8.6 MG/DL (ref 8.5–10.1)
CHLORIDE SERPL-SCNC: 105 MMOL/L (ref 94–109)
CK SERPL-CCNC: 58 U/L (ref 30–300)
CO2 SERPL-SCNC: 28 MMOL/L (ref 20–32)
CREAT SERPL-MCNC: 1.11 MG/DL (ref 0.66–1.25)
DIFFERENTIAL METHOD BLD: ABNORMAL
EOSINOPHIL # BLD AUTO: 0.3 10E9/L (ref 0–0.7)
EOSINOPHIL NFR BLD AUTO: 4.1 %
ERYTHROCYTE [DISTWIDTH] IN BLOOD BY AUTOMATED COUNT: 15.2 % (ref 10–15)
GFR SERPL CREATININE-BSD FRML MDRD: 62 ML/MIN/1.7M2
GLUCOSE SERPL-MCNC: 143 MG/DL (ref 70–99)
HCT VFR BLD AUTO: 34.6 % (ref 40–53)
HGB BLD-MCNC: 11.2 G/DL (ref 13.3–17.7)
IMM GRANULOCYTES # BLD: 0 10E9/L (ref 0–0.4)
IMM GRANULOCYTES NFR BLD: 0.3 %
LYMPHOCYTES # BLD AUTO: 1.2 10E9/L (ref 0.8–5.3)
LYMPHOCYTES NFR BLD AUTO: 19.7 %
MCH RBC QN AUTO: 31.3 PG (ref 26.5–33)
MCHC RBC AUTO-ENTMCNC: 32.4 G/DL (ref 31.5–36.5)
MCV RBC AUTO: 97 FL (ref 78–100)
MONOCYTES # BLD AUTO: 0.5 10E9/L (ref 0–1.3)
MONOCYTES NFR BLD AUTO: 7.5 %
NEUTROPHILS # BLD AUTO: 4.1 10E9/L (ref 1.6–8.3)
NEUTROPHILS NFR BLD AUTO: 67.4 %
NRBC # BLD AUTO: 0 10*3/UL
NRBC BLD AUTO-RTO: 0 /100
PLATELET # BLD AUTO: 193 10E9/L (ref 150–450)
POTASSIUM SERPL-SCNC: 3.8 MMOL/L (ref 3.4–5.3)
PROT SERPL-MCNC: 7.3 G/DL (ref 6.8–8.8)
RBC # BLD AUTO: 3.58 10E12/L (ref 4.4–5.9)
SODIUM SERPL-SCNC: 139 MMOL/L (ref 133–144)
VIT B12 SERPL-MCNC: 434 PG/ML (ref 193–986)
WBC # BLD AUTO: 6.1 10E9/L (ref 4–11)

## 2018-02-05 PROCEDURE — 99308 SBSQ NF CARE LOW MDM 20: CPT | Performed by: NURSE PRACTITIONER

## 2018-02-05 NOTE — PROGRESS NOTES
[facility] [facility]    HPI:    Earl Sanchez is a 89 year old  (9/17/1926), who is being seen today for [facility] [place] [facility]   HPI information obtained from: facility chart records, facility staff, patient report and [facility] chart review. Today's concern is INR/Coumadin management for A. Fib    Bleeding Signs/Symptoms:  None  Thromboembolic Signs/Symptoms:  None    Medication Changes:  No  Dietary Changes:  No  Activity Changes: No  Bacterial/Viral Infection:  No    Missed Coumadin Doses:  None    On ASA: No    Other Concerns:  No    OBJECTIVE:    INR Today:  2.6  Current Dose:  2-4 mg daily    ASSESSMENT:     Chronic atrial fibrillation (H)  Long-term (current) use of anticoagulants  Encounter for monitoring coumadin therapy  Therapeutic INR for goal of 2-3    PLAN:    New Dose: 2 mg on Tues, Thurs and Sat. 4 mg the other days of the week      Next INR: 2/9/2018        Electronically signed by:  ANGEL Wagner CNP

## 2018-02-07 ENCOUNTER — NURSING HOME VISIT (OUTPATIENT)
Dept: GERIATRICS | Facility: CLINIC | Age: 83
End: 2018-02-07
Payer: COMMERCIAL

## 2018-02-07 VITALS
TEMPERATURE: 98.7 F | DIASTOLIC BLOOD PRESSURE: 73 MMHG | RESPIRATION RATE: 18 BRPM | BODY MASS INDEX: 24.33 KG/M2 | WEIGHT: 160 LBS | SYSTOLIC BLOOD PRESSURE: 140 MMHG | OXYGEN SATURATION: 99 % | HEART RATE: 68 BPM

## 2018-02-07 DIAGNOSIS — L30.9 ECZEMA, UNSPECIFIED TYPE: Primary | ICD-10-CM

## 2018-02-07 PROCEDURE — 99308 SBSQ NF CARE LOW MDM 20: CPT | Performed by: NURSE PRACTITIONER

## 2018-02-07 NOTE — PROGRESS NOTES
[facility]    Chief Complaint   Patient presents with     RECHECK       HPI:    Earl Sanchez is a 89 year old  (9/17/1926), who is being seen today for an episodic care visit at [facility].    HPI information obtained from: facility chart records, facility staff, patient report and [facility] chart review.   He came to this facility 1/18/2108 for short term rehab and medical management following hospitalization with worsening confusion, agitation and difficulty ambulating. 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 He had extensive work up, including MRI/MRA head and neck, all negative for acute pathology. Labs within range, no infection found. Agitation resolved.    Today's concern is:  Eczema, unspecified type-recently completed a course of triamcinolone for an erythematous,  rash of both ankles and lower legs. Rash resolved, but has now recurred. New area on right forearm. Very itchy and has been scratching.     REVIEW OF SYSTEMS:  4 point ROS including Respiratory, CV, GI and , other than that noted in the HPI,  is negative    /73  Pulse 68  Temp 98.7  F (37.1  C)  Resp 18  Wt 160 lb (72.6 kg)  SpO2 99%  BMI 24.33 kg/m2  GENERAL APPEARANCE:  Alert, in no distress  SKIN: patchy, dry, erythematous rash both ankles and lower legs L>R and few areas on right forearm. Surrounding scratch marks, skin intact    ASSESSMENT / PLAN:  (L30.9) Eczema, unspecified type  (primary encounter diagnosis)  Comment: recurrent affected areas both lower legs and now right forearm  Plan: triamcinolone bid for 10 days and reassess. Continue Eucerin for dryness.         Electronically signed by:  ANGEL Wagner CNP

## 2018-02-09 ENCOUNTER — NURSING HOME VISIT (OUTPATIENT)
Dept: GERIATRICS | Facility: CLINIC | Age: 83
End: 2018-02-09
Payer: COMMERCIAL

## 2018-02-09 VITALS
SYSTOLIC BLOOD PRESSURE: 140 MMHG | DIASTOLIC BLOOD PRESSURE: 73 MMHG | OXYGEN SATURATION: 99 % | RESPIRATION RATE: 18 BRPM | WEIGHT: 160 LBS | BODY MASS INDEX: 24.33 KG/M2 | HEART RATE: 68 BPM | TEMPERATURE: 98.7 F

## 2018-02-09 DIAGNOSIS — Z51.81 ENCOUNTER FOR THERAPEUTIC DRUG MONITORING: ICD-10-CM

## 2018-02-09 DIAGNOSIS — I48.20 CHRONIC ATRIAL FIBRILLATION (H): Primary | ICD-10-CM

## 2018-02-09 DIAGNOSIS — Z79.01 LONG-TERM (CURRENT) USE OF ANTICOAGULANTS: ICD-10-CM

## 2018-02-09 PROCEDURE — 99308 SBSQ NF CARE LOW MDM 20: CPT | Performed by: NURSE PRACTITIONER

## 2018-02-09 RX ORDER — TRIAMCINOLONE ACETONIDE 0.25 MG/G
CREAM TOPICAL 2 TIMES DAILY PRN
COMMUNITY
End: 2018-02-20

## 2018-02-09 NOTE — PROGRESS NOTES
[place] [facility]    HPI:    Earl Sanchez is a 89 year old  (9/17/1926), who is being seen today for an [facility] [place] [facility]   HPI information obtained from: facility chart records, facility staff, patient report and [facility] chart review. Today's concern is INR/Coumadin management for A. Fib    Bleeding Signs/Symptoms:  None  Thromboembolic Signs/Symptoms:  None    Medication Changes:  No  Dietary Changes:  No  Activity Changes: No  Bacterial/Viral Infection:  No    Missed Coumadin Doses:  None    On ASA: Yes - 81 mg po q day    Other Concerns:  No    OBJECTIVE:  /73  Pulse 68  Temp 98.7  F (37.1  C)  Resp 18  Wt 160 lb (72.6 kg)  SpO2 99%  BMI 24.33 kg/m2   Awake male, up with therapies. No distress. On room air.   INR Today:  3.0  Current Dose:  2mg QD    ASSESSMENT:  Chronic atrial fibrillation (H)  Long-term (current) use of anticoagulants  Encounter for therapeutic drug monitoring    Therapeutic INR for goal of 2-3    PLAN:    New Dose: Coumadin 4 mg PO M and F and 2 mg PO other days      Next INR: 2/13/18      Electronically signed by:  ANGEL Briscoe CNP

## 2018-02-13 ENCOUNTER — NURSING HOME VISIT (OUTPATIENT)
Dept: GERIATRICS | Facility: CLINIC | Age: 83
End: 2018-02-13
Payer: COMMERCIAL

## 2018-02-13 VITALS
SYSTOLIC BLOOD PRESSURE: 104 MMHG | TEMPERATURE: 96.6 F | OXYGEN SATURATION: 97 % | WEIGHT: 161.2 LBS | HEART RATE: 58 BPM | DIASTOLIC BLOOD PRESSURE: 62 MMHG | RESPIRATION RATE: 16 BRPM | BODY MASS INDEX: 24.51 KG/M2

## 2018-02-13 DIAGNOSIS — Z51.81 ENCOUNTER FOR MONITORING COUMADIN THERAPY: ICD-10-CM

## 2018-02-13 DIAGNOSIS — Z79.01 ENCOUNTER FOR MONITORING COUMADIN THERAPY: ICD-10-CM

## 2018-02-13 DIAGNOSIS — I48.20 CHRONIC ATRIAL FIBRILLATION (H): Primary | ICD-10-CM

## 2018-02-13 DIAGNOSIS — Z79.01 CHRONIC ANTICOAGULATION: ICD-10-CM

## 2018-02-13 PROCEDURE — 99308 SBSQ NF CARE LOW MDM 20: CPT | Performed by: NURSE PRACTITIONER

## 2018-02-13 NOTE — PROGRESS NOTES
[place]    HPI:    Earl Sanchez is a 89 year old  (9/17/1926), who is being seen today for [facility] [place] [facility]   HPI information obtained from: facility chart records, facility staff, patient report and [facility] chart review. Today's concern is INR/Coumadin management for A. Fib    Bleeding Signs/Symptoms:  None  Thromboembolic Signs/Symptoms:  None    Medication Changes:  No  Dietary Changes:  No  Activity Changes: No  Bacterial/Viral Infection:  No    Missed Coumadin Doses:  None    On ASA: Yes - 81 mg po q day    Other Concerns:  No    OBJECTIVE:    INR Today:  2.9  Current Dose:  2-4 mg daily    ASSESSMENT:     Chronic atrial fibrillation (H)  Chronic anticoagulation  Encounter for monitoring coumadin therapy  Therapeutic INR for goal of 2-3    PLAN:    New Dose: 2 mg on Tues, Thurs, Sat, 4 mg the other days of the week    Next INR: 2/16/2018        Electronically signed by:  ANGEL Wagner CNP

## 2018-02-14 ENCOUNTER — DISCHARGE SUMMARY NURSING HOME (OUTPATIENT)
Dept: GERIATRICS | Facility: CLINIC | Age: 83
End: 2018-02-14
Payer: COMMERCIAL

## 2018-02-14 VITALS
BODY MASS INDEX: 24.3 KG/M2 | OXYGEN SATURATION: 97 % | HEART RATE: 64 BPM | SYSTOLIC BLOOD PRESSURE: 131 MMHG | TEMPERATURE: 97.2 F | RESPIRATION RATE: 16 BRPM | DIASTOLIC BLOOD PRESSURE: 88 MMHG | WEIGHT: 159.8 LBS

## 2018-02-14 DIAGNOSIS — N18.30 CKD (CHRONIC KIDNEY DISEASE) STAGE 3, GFR 30-59 ML/MIN (H): ICD-10-CM

## 2018-02-14 DIAGNOSIS — I25.10 CORONARY ARTERY DISEASE INVOLVING NATIVE CORONARY ARTERY OF NATIVE HEART WITHOUT ANGINA PECTORIS: ICD-10-CM

## 2018-02-14 DIAGNOSIS — R41.89 COGNITIVE IMPAIRMENT: ICD-10-CM

## 2018-02-14 DIAGNOSIS — R27.0 ATAXIA: Primary | ICD-10-CM

## 2018-02-14 DIAGNOSIS — R53.81 PHYSICAL DECONDITIONING: ICD-10-CM

## 2018-02-14 DIAGNOSIS — I48.20 CHRONIC ATRIAL FIBRILLATION (H): ICD-10-CM

## 2018-02-14 DIAGNOSIS — L30.9 ECZEMA, UNSPECIFIED TYPE: ICD-10-CM

## 2018-02-14 DIAGNOSIS — I10 HYPERTENSION GOAL BP (BLOOD PRESSURE) < 140/90: ICD-10-CM

## 2018-02-14 DIAGNOSIS — G93.40 ACUTE ENCEPHALOPATHY: ICD-10-CM

## 2018-02-14 PROCEDURE — 99316 NF DSCHRG MGMT 30 MIN+: CPT | Performed by: NURSE PRACTITIONER

## 2018-02-14 NOTE — PROGRESS NOTES
[place] [facility] DISCHARGE SUMMARY    PATIENT'S NAME: Earl Sanchez  YOB: 1926  MEDICAL RECORD NUMBER:  [id number]    PRIMARY CARE PROVIDER AND CLINIC RESPONSIBLE AFTER TRANSFER: Christiana ORTIZ CNP, Patty Gamble MD    CODE STATUS/ADVANCE DIRECTIVES DISCUSSION:   DNR / DNI     No Known Allergies    TRANSFERRING PROVIDERS: ANGEL Wagner CNP, Patty Gamble MD  DATE OF SNF ADMISSION:  January / 18 / 2018  DATE OF SNF (anticipated) DISCHARGE: February / 16 / 2018  DISCHARGE DISPOSITION: [facility]   Nursing Facility: [facility] stay 01/17/2018 to 01/18/2018.     Condition on Discharge:  Improving.  Cognitive Scores: SLUMS 13/30 and CPT 4.4/5.6    Equipment: walker    DISCHARGE DIAGNOSIS:   1. Ataxia    2. Chronic atrial fibrillation (H)    3. Coronary artery disease involving native coronary artery of native heart without angina pectoris    4. Hypertension goal BP (blood pressure) < 140/90    5. CKD (chronic kidney disease) stage 3, GFR 30-59 ml/min    6. Acute encephalopathy    7. Cognitive impairment    8. Eczema, unspecified type    9. Physical deconditioning        HPI Nursing Facility Course:  HPI information obtained from: facility chart records, facility staff, patient report and [facility] chart review.He came to this facility for short term rehab and medical management following hospitalization with worsening confusion, agitation and difficulty ambulating. History of progressive ataxia, followed by Neurology  and felt to have Parkinsonian features. He had extensive work up, including MRI/MRA head and neck, all negative for acute pathology. Labs within range, no infection found. Agitation resolved, although he remained confused at hospital discharge.   He has worked with PHYSICAL THERAPY and OT with good results. Ambulating 240 ft with walker and stand by assist.  "Requires assist of 1 with all cares. TUG 1'40\", indicating high fall risk. He previously lived at [place], but will be moving to [place] for higher level of care.   ASSESSMENT / PLAN:  (R27.0) Ataxia  (primary encounter diagnosis)  Comment: progressive decline in gait, functional status and cognition with possible Parkinsonian features and/or dementia. Labs ordered by Neurology done 2/5/2018 were faxed to Dr Nell Saldana, [phone].   Plan: discharge to [facility] with [facility] team to assume primary care. Neurology follow up as scheduled by family.     (I48.2) Chronic atrial fibrillation (H)  Comment: HR: 58-67, not on rate controlling agent.  Last INR was 2.9 on 2/13/2018, stable on 2-4 mg daily. Previous home dose was 2 mg on Tues, Thurs and Sat, 4 mg the other days of the week.   Plan: INR in am prior to discharge.     (I25.10) Coronary artery disease involving native coronary artery of native heart without angina pectoris  Comment: history of CABG in 2007. No acute issues  Plan: continue ASA, statin, prn nitro.     (I10) Hypertension goal BP (blood pressure) < 140/90  Comment: controlled with BPs: 131/88, 139/86, 104/62  Plan: continue doxazosin.     (N18.3) CKD (chronic kidney disease) stage 3, GFR 30-59 ml/min  Comment: renal function at baseline  Plan: follow BMP. Avoid nephrotoxins.     (G93.40) Acute encephalopathy  (R41.89) Cognitive impairment  Comment: cognition much improved from tcu admission. Cognitive scores indicate moderate deficits. Pleasant and cooperative.   Plan: supportive care.     (L30.9) Eczema, unspecified type  Comment: he's had intermittent patchy red, itchy rash of both ankles and lower legs, as well as right forearm. Improved with triamcinolone.   Plan: continue triamcinolone prn.     (R53.81) Physical deconditioning  Comment: progress in therapies as above.   Plan: recommend ongoing therapies for gait training, balance and ADL " safety.      PAST MEDICAL HISTORY:  has a past medical history of Abdominal aortic aneurysm (H); Acute MI (1981, 2007); Atrial fibrillation (H); BPH (benign prostatic hyperplasia); CAD (coronary artery disease); Chronic kidney disease; Duodenal ulcer with hemorrhage; Gait apraxia; Gastritis; Gastro-oesophageal reflux disease; Heart failure (H); Hyperlipidaemia LDL goal <100; Hypertension goal BP (blood pressure) < 140/90; Osteoarthritis; Renal disease; and Thyroid disease. He also has no past medical history of Malignant hyperthermia or PONV (postoperative nausea and vomiting).    DISCHARGE MEDICATIONS:  Current Outpatient Prescriptions   Medication Sig Dispense Refill     triamcinolone (KENALOG) 0.025 % cream Apply topically 2 times daily Apply to lower legs, ankles, R arm for 10 days.       ERGOCALCIFEROL PO Take 50,000 unit marking on an U-100 insulin syringe by mouth every morning Wednesday       Warfarin Sodium (COUMADIN PO) Take 2 mg by mouth daily Tuesday and Thursday. 4mg Wednesday. Recheck INR 2/16       allopurinol (ZYLOPRIM) 100 MG tablet Take 100 mg by mouth 2 times daily       levothyroxine (SYNTHROID/LEVOTHROID) 50 MCG tablet TAKE 1 TABLET DAILY 90 tablet 1     pravastatin (PRAVACHOL) 40 MG tablet TAKE 1 TABLET DAILY 90 tablet 1     doxazosin (CARDURA) 4 MG tablet Take 1 tablet (4 mg) by mouth At Bedtime 90 tablet 3     acetaminophen (TYLENOL) 500 MG tablet Take 1,000 mg by mouth every 6 hours as needed for mild pain        nitroglycerin (NITROSTAT) 0.4 MG SL tablet Place 1 tablet (0.4 mg) under the tongue every 5 minutes as needed for chest pain 25 tablet 0     aspirin (ASPIRIN LOW DOSE) 81 MG tablet Take 81 mg by mouth daily          MEDICATION CHANGES/RATIONALE:   Coumadin dosed for goal INR 2-3  Triamcinolone for dermatitis/eczema  Controlled medications sent with patient:   not applicable/none     ROS:    10 point ROS of systems including Constitutional, Eyes, Respiratory, Cardiovascular,  Gastroenterology, Genitourinary, Integumentary, Muscularskeletal, Psychiatric were all negative except for pertinent positives noted in my HPI.    Physical Exam:   Vitals: /88  Pulse 64  Temp 97.2  F (36.2  C)  Resp 16  Wt 159 lb 12.8 oz (72.5 kg)  SpO2 97%  BMI 24.3 kg/m2  BMI= Body mass index is 24.3 kg/(m^2).    GENERAL APPEARANCE:  Alert, in no distress  ENT:  Mouth and posterior oropharynx normal, moist mucous membranes, [place]  EYES:  EOM normal, conjunctiva and lids normal  NECK:  No adenopathy,masses or thyromegaly  RESP:  respiratory effort and palpation of chest normal, lungs clear to auscultation , no respiratory distress  CV:  Palpation and auscultation of heart done , irregular rate and rhythm, no murmur,no edema, +2 pedal pulses  ABDOMEN:   soft, nontender, no hepatosplenomegaly or other masses  M/S:   Gait steady with walker.  BROWER with good strength. No joint inflammation  SKIN:  Faint erythema both ankles, forearm is clear. No open areas  PSYCH:  oriented to self, place, situation, insight and judgement impaired, memory impaired     DISCHARGE PLAN:  Occupational Therapy, Physical Therapy at [facility]  Patient instructed to follow-up with:  PCP in 7 days      [facility] scheduled appointments: None    MTM referral needed and placed by this provider: No    Pending labs: INR will be checked 2/16/2018 prior to discharge  SNF labs   CBC RESULTS:   Recent Labs   Lab Test  02/05/18   1005  01/17/18 2212   WBC  6.1  7.0   RBC  3.58*  3.51*   HGB  11.2*  11.1*   HCT  34.6*  34.0*   MCV  97  97   MCH  31.3  31.6   MCHC  32.4  32.6   RDW  15.2*  15.1*   PLT  193  204       Last Basic Metabolic Panel:  Recent Labs   Lab Test  02/05/18   1005  01/17/18   2212   NA  139  139   POTASSIUM  3.8  3.6   CHLORIDE  105  104   JAYME  8.6  8.8   CO2  28  27   BUN  29  22   CR  1.11  1.10   GLC  143*  127*       Liver Function Studies -   Recent Labs   Lab Test  02/05/18   1005  01/17/18   2212   PROTTOTAL   7.3  7.5   ALBUMIN  3.3*  3.3*   BILITOTAL  0.7  0.5   ALKPHOS  97  118   AST  21  17   ALT  15  18       TSH   Date Value Ref Range Status   11/29/2017 1.38 0.40 - 4.00 mU/L Final   06/19/2017 3.23 0.30 - 4.50 uIU/mL Final       TOTAL DISCHARGE TIME:   Greater than 30 minutes  Electronically signed by:  ANGEL Wagner CNP

## 2018-02-16 ENCOUNTER — NURSING HOME VISIT (OUTPATIENT)
Dept: GERIATRICS | Facility: CLINIC | Age: 83
End: 2018-02-16
Payer: COMMERCIAL

## 2018-02-16 VITALS
DIASTOLIC BLOOD PRESSURE: 75 MMHG | OXYGEN SATURATION: 98 % | BODY MASS INDEX: 24.3 KG/M2 | SYSTOLIC BLOOD PRESSURE: 136 MMHG | HEART RATE: 91 BPM | RESPIRATION RATE: 20 BRPM | WEIGHT: 159.8 LBS | TEMPERATURE: 97.2 F

## 2018-02-16 DIAGNOSIS — I48.20 CHRONIC ATRIAL FIBRILLATION (H): Primary | ICD-10-CM

## 2018-02-16 DIAGNOSIS — Z51.81 ENCOUNTER FOR MONITORING COUMADIN THERAPY: ICD-10-CM

## 2018-02-16 DIAGNOSIS — I48.20 CHRONIC ATRIAL FIBRILLATION (H): ICD-10-CM

## 2018-02-16 DIAGNOSIS — I25.10 CORONARY ARTERY DISEASE INVOLVING NATIVE CORONARY ARTERY OF NATIVE HEART WITHOUT ANGINA PECTORIS: ICD-10-CM

## 2018-02-16 DIAGNOSIS — R53.81 PHYSICAL DECONDITIONING: ICD-10-CM

## 2018-02-16 DIAGNOSIS — L30.9 ECZEMA, UNSPECIFIED TYPE: ICD-10-CM

## 2018-02-16 DIAGNOSIS — R27.0 ATAXIA: Primary | ICD-10-CM

## 2018-02-16 DIAGNOSIS — Z79.01 CHRONIC ANTICOAGULATION: ICD-10-CM

## 2018-02-16 DIAGNOSIS — Z79.01 ENCOUNTER FOR MONITORING COUMADIN THERAPY: ICD-10-CM

## 2018-02-16 DIAGNOSIS — I10 HYPERTENSION GOAL BP (BLOOD PRESSURE) < 140/90: ICD-10-CM

## 2018-02-16 DIAGNOSIS — G93.40 ACUTE ENCEPHALOPATHY: ICD-10-CM

## 2018-02-16 DIAGNOSIS — R41.89 COGNITIVE IMPAIRMENT: ICD-10-CM

## 2018-02-16 DIAGNOSIS — N18.30 CKD (CHRONIC KIDNEY DISEASE) STAGE 3, GFR 30-59 ML/MIN (H): ICD-10-CM

## 2018-02-16 PROCEDURE — 99310 SBSQ NF CARE HIGH MDM 45: CPT | Performed by: NURSE PRACTITIONER

## 2018-02-16 PROCEDURE — 99308 SBSQ NF CARE LOW MDM 20: CPT | Performed by: NURSE PRACTITIONER

## 2018-02-16 NOTE — PROGRESS NOTES
[place] [facility]    HPI:    Earl Sanchez is a 89 year old  (9/17/1926), who is being seen today for an episodic care visit at [place] [facility].   HPI information obtained from: facility chart records, facility staff, patient report and [facility] chart review. Today's concern is INR/Coumadin management for A. Fib    Bleeding Signs/Symptoms:  None  Thromboembolic Signs/Symptoms:  None    Medication Changes:  No  Dietary Changes:  No  Activity Changes: No  Bacterial/Viral Infection:  No    Missed Coumadin Doses:  None    On ASA: Yes - 81 mg po q day    Other Concerns:  Yes: discharging to long term care today    OBJECTIVE:    INR Today:  2.9  Current Dose:  2 mg on Tues, Thurs, Sat and 4 mg the other days of the week    ASSESSMENT:     Chronic atrial fibrillation (H)  Chronic anticoagulation  Encounter for monitoring coumadin therapy  Therapeutic INR for goal of 2-3    PLAN:    New Dose: No Change      Next INR: 1 week, routine lab day at [place]        Electronically signed by:  ANGEL Wagner CNP

## 2018-02-19 ENCOUNTER — RECORDS - HEALTHEAST (OUTPATIENT)
Dept: LAB | Facility: CLINIC | Age: 83
End: 2018-02-19

## 2018-02-19 ENCOUNTER — CARE COORDINATION (OUTPATIENT)
Dept: CARE COORDINATION | Facility: CLINIC | Age: 83
End: 2018-02-19

## 2018-02-20 ENCOUNTER — TRANSFERRED RECORDS (OUTPATIENT)
Dept: HEALTH INFORMATION MANAGEMENT | Facility: CLINIC | Age: 83
End: 2018-02-20

## 2018-02-20 VITALS
SYSTOLIC BLOOD PRESSURE: 128 MMHG | RESPIRATION RATE: 18 BRPM | OXYGEN SATURATION: 96 % | DIASTOLIC BLOOD PRESSURE: 74 MMHG | TEMPERATURE: 97 F | HEART RATE: 68 BPM | WEIGHT: 161.3 LBS | BODY MASS INDEX: 24.53 KG/M2

## 2018-02-20 LAB
ANION GAP SERPL CALCULATED.3IONS-SCNC: 6 MMOL/L (ref 5–18)
ANION GAP SERPL CALCULATED.3IONS-SCNC: 6 MMOL/L (ref 5–18)
BUN SERPL-MCNC: 26 MG/DL (ref 8–28)
BUN SERPL-MCNC: 26 MG/DL (ref 8–28)
CALCIUM SERPL-MCNC: 9 MG/DL (ref 8.5–10.5)
CALCIUM SERPL-MCNC: 9 MG/DL (ref 8.5–10.5)
CHLORIDE BLD-SCNC: 108 MMOL/L (ref 98–107)
CHLORIDE SERPLBLD-SCNC: 108 MMOL/L (ref 98–107)
CO2 SERPL-SCNC: 25 MMOL/L (ref 22–31)
CO2 SERPL-SCNC: 25 MMOL/L (ref 22–31)
CREAT SERPL-MCNC: 1.22 MG/DL (ref 0.7–1.3)
CREAT SERPL-MCNC: 1.22 MG/DL (ref 0.7–1.3)
ERYTHROCYTE [DISTWIDTH] IN BLOOD BY AUTOMATED COUNT: 15 % (ref 11–14.5)
ERYTHROCYTE [DISTWIDTH] IN BLOOD BY AUTOMATED COUNT: 15 % (ref 11–14.5)
GFR SERPL CREATININE-BSD FRML MDRD: 56 ML/MIN/1.73M2
GFR SERPL CREATININE-BSD FRML MDRD: 56 ML/MIN/1.73M2
GLUCOSE BLD-MCNC: 131 MG/DL (ref 70–125)
GLUCOSE SERPL-MCNC: 131 MG/DL (ref 70–125)
HCT VFR BLD AUTO: 32.6 % (ref 40–54)
HCT VFR BLD AUTO: 32.6 % (ref 40–54)
HEMOGLOBIN: 10.2 G/DL (ref 14–18)
HGB BLD-MCNC: 10.2 G/DL (ref 14–18)
INR PPP: 2.02 (ref 0.9–1.1)
INR PPP: 2.02 (ref 0.9–1.1)
MCH RBC QN AUTO: 30.9 PG (ref 27–34)
MCH RBC QN AUTO: 30.9 PG (ref 27–34)
MCHC RBC AUTO-ENTMCNC: 31.3 G/DL (ref 32–36)
MCHC RBC AUTO-ENTMCNC: 31.3 G/DL (ref 32–36)
MCV RBC AUTO: 99 FL (ref 80–100)
MCV RBC AUTO: 99 FL (ref 80–100)
PLATELET # BLD AUTO: 176 THOU/UL (ref 140–440)
PLATELET # BLD AUTO: 176 THOU/UL (ref 140–440)
PMV BLD AUTO: 10.7 FL (ref 8.5–12.5)
POTASSIUM BLD-SCNC: 3.8 MMOL/L (ref 3.5–5)
POTASSIUM SERPL-SCNC: 3.8 MMOL/L (ref 3.5–5)
RBC # BLD AUTO: 3.3 MILL/UL (ref 4.4–6.2)
RBC # BLD AUTO: 3.3 MILL/UL (ref 4.4–6.2)
SODIUM SERPL-SCNC: 139 MMOL/L (ref 136–145)
SODIUM SERPL-SCNC: 139 MMOL/L (ref 136–145)
WBC # BLD AUTO: 6.9 THOU/UL (ref 4–11)
WBC: 6.9 THOU/UL (ref 4–11)

## 2018-02-20 NOTE — PROGRESS NOTES
"[place] [facility]    PRIMARY CARE PROVIDER AND CLINIC:  Alvarado Florian [address] / [facility] [id number]    Chief Complaint   Patient presents with     [facility]       HPI:    Earl Sanchez is a 89 year old  (9/17/1926),admitted to [facility] from [place] [facility].  Nursing Home stay 1/18/2018 through 2/16/2018.  Admitted to this facility for  rehab, medical management and nursing care.  HPI information obtained from: facility chart records, facility staff and patient report.  Current issues are:        HPI Nursing Facility Course:  HPI information obtained from: facility chart records, facility staff, patient report and [facility] chart review.He came to this facility for short term rehab and medical management following hospitalization with worsening confusion, agitation and difficulty ambulating. History of progressive ataxia, followed by Neurology  and felt to have Parkinsonian features. He had extensive work up, including MRI/MRA head and neck, all negative for acute pathology. Labs within range, no infection found. Agitation resolved, although he remained confused at hospital discharge.   He has worked with PHYSICAL THERAPY and OT with good results. Ambulating 240 ft with walker and stand by assist. Requires assist of 1 with all cares. TUG 1'40\", indicating high fall risk. He previously lived at [place], but will be moving to [place] for higher level of care.        Ataxia  Chronic atrial fibrillation (H)  Coronary artery disease involving native coronary artery of native heart without angina pectoris  Hypertension goal BP (blood pressure) < 140/90  CKD (chronic kidney disease) stage 3, GFR 30-59 ml/min  Acute encephalopathy  Cognitive impairment  Eczema, unspecified type  Physical deconditioning     Resident pleasant during visit; sitting up in wheelchair for lunch. Previously resided at [facility] where he was admitted and rehabbed for " encephalopathy with gait disturbance. Has a history of ataxia which was felt have parkinsonian features per neurology. MRI/MRA during hospitalization demonstrated no clear cause and s/sx resolved.   -Denies CP, SOB or lightheadedness.   -Denies pain upon exam.   -Reported he does not want medication changes.     CODE STATUS/ADVANCE DIRECTIVES DISCUSSION:   CPR/Full code   Patient's living condition: Assisted Living:  [facility] [place]    ALLERGIES:No known allergies  PAST MEDICAL HISTORY:  has a past medical history of Abdominal aortic aneurysm (H); Acute MI (1981, 2007); Atrial fibrillation (H); BPH (benign prostatic hyperplasia); CAD (coronary artery disease); Chronic kidney disease; Duodenal ulcer with hemorrhage; Gait apraxia; Gastritis; Gastro-oesophageal reflux disease; Heart failure (H); Hyperlipidaemia LDL goal <100; Hypertension goal BP (blood pressure) < 140/90; Osteoarthritis; Renal disease; and Thyroid disease. He also has no past medical history of Malignant hyperthermia or PONV (postoperative nausea and vomiting).  PAST SURGICAL HISTORY:  has a past surgical history that includes hernia repair, inguinal rt/lt; Ankle surgery; Phacoemulsification clear cornea with toric intraocular lens implant (4/10/2014); Phacoemulsification clear cornea with toric intraocular lens implant (4/22/2014); coronary artery bypass (2007); and Heart Cath Left heart cath (12/10/07).  FAMILY HISTORY: family history includes Hypertension in his maternal grandmother and mother.  SOCIAL HISTORY:  reports that he has never smoked. He has never used smokeless tobacco. He reports that he drinks alcohol. He reports that he does not use illicit drugs.    Post Discharge Medication Reconciliation Status: discharge medications reconciled, continue medications without change.  Current Outpatient Prescriptions   Medication Sig Dispense Refill     triamcinolone (KENALOG) 0.025 % cream Apply topically every 12 hours as needed to rash  to the affected area prn, apply tin layer to the rash at the LE.       LEVOTHYROXINE SODIUM PO Take 20 mcg by mouth daily       ERGOCALCIFEROL PO Take 50,000 Units by mouth every morning Wednesday        Warfarin Sodium (COUMADIN PO) As directed, per INR       allopurinol (ZYLOPRIM) 100 MG tablet Take 100 mg by mouth 2 times daily       pravastatin (PRAVACHOL) 40 MG tablet TAKE 1 TABLET DAILY 90 tablet 1     doxazosin (CARDURA) 4 MG tablet Take 1 tablet (4 mg) by mouth At Bedtime 90 tablet 3     acetaminophen (TYLENOL) 500 MG tablet Take 1,000 mg by mouth every 6 hours as needed for mild pain        nitroglycerin (NITROSTAT) 0.4 MG SL tablet Place 1 tablet (0.4 mg) under the tongue every 5 minutes as needed for chest pain 25 tablet 0     aspirin (ASPIRIN LOW DOSE) 81 MG tablet Take 81 mg by mouth daily          ROS:  10 point ROS of systems including Constitutional, Eyes, Respiratory, Cardiovascular, Gastroenterology, Genitourinary, Integumentary, Muscularskeletal, Psychiatric were all negative except for pertinent positives noted in my HPI.    Exam:  /74  Pulse 68  Temp 97  F (36.1  C)  Resp 18  Wt 161 lb 4.8 oz (73.2 kg)  SpO2 96%  BMI 24.53 kg/m2  GENERAL APPEARANCE:  Alert, in no distress  ENT:  Mouth and posterior oropharynx normal, moist mucous membranes, normal hearing acuity  RESP:  respiratory effort and palpation of chest normal, lungs clear to auscultation , no respiratory distress  CV:  Palpation and auscultation of heart done , regular rate and rhythm, no murmur, rub, or gallop  M/S:   Gait and station normal  Digits and nails normal  SKIN:  Inspection of skin and subcutaneous tissue baseline, Palpation of skin and subcutaneous tissue baseline  NEURO:   Cranial nerves 2-12 are normal tested and grossly at patient's baseline  PSYCH:  oriented X 3, affect and mood normal     BP Readin/76, 121/80                   HR:  68-76    Lab/Diagnostic data:  CBC RESULTS:   Recent Labs   Lab Test   02/05/18   1005  01/17/18   2212   WBC  6.1  7.0   RBC  3.58*  3.51*   HGB  11.2*  11.1*   HCT  34.6*  34.0*   MCV  97  97   MCH  31.3  31.6   MCHC  32.4  32.6   RDW  15.2*  15.1*   PLT  193  204       Last Basic Metabolic Panel:  Recent Labs   Lab Test  02/05/18   1005  01/17/18   2212   NA  139  139   POTASSIUM  3.8  3.6   CHLORIDE  105  104   JAYME  8.6  8.8   CO2  28  27   BUN  29  22   CR  1.11  1.10   GLC  143*  127*       Liver Function Studies -   Recent Labs   Lab Test  02/05/18   1005  01/17/18   2212   PROTTOTAL  7.3  7.5   ALBUMIN  3.3*  3.3*   BILITOTAL  0.7  0.5   ALKPHOS  97  118   AST  21  17   ALT  15  18       TSH   Date Value Ref Range Status   11/29/2017 1.38 0.40 - 4.00 mU/L Final   06/19/2017 3.23 0.30 - 4.50 uIU/mL Final       Lab Results   Component Value Date    A1C 5.9 02/05/2015    A1C 6.2 04/08/2014     ASSESSMENT / PLAN:  (R27.0) Ataxia  (primary encounter diagnosis)  Comment: Progressive decline in gait, functional status and cognition with possible Parkinsonian features and/or dementia.   Plan: Continue LTC support with medication administration, meals, and safety. Neurology follow up as scheduled by family.      (I48.2) Chronic atrial fibrillation (H)  Comment: HR:76, not on rate controlling agent.  Last INR was 2.9 on 2/13/2018, stable on 2-4 mg daily.   Plan: INR next lab day. Warfarin per INR      (I25.10) Coronary artery disease involving native coronary artery of native heart without angina pectoris  Comment: History of CABG in 2007. No acute issues  Plan: continue ASA, statin, prn nitro.      (I10) Hypertension goal BP (blood pressure) < 140/90  Comment: controlled with BPs: 131/88, 139/86, 104/62  Plan: continue doxazosin.      (N18.3) CKD (chronic kidney disease) stage 3, GFR 30-59 ml/min  Comment: renal function at baseline  Plan: follow BMP nex lab day. Avoid nephrotoxins.      (G93.40) Acute encephalopathy  (R41.89) Cognitive impairment  Comment: cognition much improved from  tcu admission. Cognitive scores indicate moderate deficits. Pleasant and cooperative.   Plan: supportive care.      (L30.9) Eczema, unspecified type  Comment: he's had intermittent patchy red, itchy rash of both ankles and lower legs, as well as right forearm. Improved with triamcinolone.  Plan: continue triamcinolone prn.      Orders:  See above for lab orders.     Total time spent with patient visit at the skilled nursing facility was 45 min including patient visit and review of past records. Greater than 50% of total time spent with counseling and coordinating care due to new admission [place] [facility]    Electronically signed by:  ANGEL Corrigan CNP

## 2018-02-20 NOTE — PROGRESS NOTES
Clinic Care Coordination Contact    Situation: Patient chart reviewed by care coordinator.    Background: Patient was in rehab at [facility] and CC had care navigator following. Received notification of discharge from [facility]     Assessment: Did chart review and patient is now in [facility] for long term care and will be followed by [facility].      Plan/Recommendations: No further outreach by this CC as he will have care coordination by  Geriatrics.     Estrella Pang,   [facility]  [email or web address]  [phone]

## 2018-02-21 RX ORDER — TRIAMCINOLONE ACETONIDE 0.25 MG/G
CREAM TOPICAL EVERY 12 HOURS PRN
COMMUNITY

## 2018-03-01 ENCOUNTER — ANTICOAGULATION THERAPY VISIT (OUTPATIENT)
Dept: NURSING | Facility: CLINIC | Age: 83
End: 2018-03-01

## 2018-03-01 DIAGNOSIS — I48.20 CHRONIC ATRIAL FIBRILLATION (H): ICD-10-CM

## 2018-03-01 DIAGNOSIS — Z79.01 LONG-TERM (CURRENT) USE OF ANTICOAGULANTS: ICD-10-CM

## 2018-03-05 ENCOUNTER — RECORDS - HEALTHEAST (OUTPATIENT)
Dept: LAB | Facility: CLINIC | Age: 83
End: 2018-03-05

## 2018-03-06 ENCOUNTER — TRANSFERRED RECORDS (OUTPATIENT)
Dept: HEALTH INFORMATION MANAGEMENT | Facility: CLINIC | Age: 83
End: 2018-03-06

## 2018-03-06 LAB
INR PPP: 2.38 (ref 0.9–1.1)
INR PPP: 2.38 (ref 0.9–1.1)

## 2018-03-19 ENCOUNTER — RECORDS - HEALTHEAST (OUTPATIENT)
Dept: LAB | Facility: CLINIC | Age: 83
End: 2018-03-19

## 2018-03-20 ENCOUNTER — TRANSFERRED RECORDS (OUTPATIENT)
Dept: HEALTH INFORMATION MANAGEMENT | Facility: CLINIC | Age: 83
End: 2018-03-20

## 2018-03-20 LAB
INR PPP: 2.45 (ref 0.9–1.1)
INR PPP: 2.45 (ref 0.9–1.1)

## 2018-03-23 ENCOUNTER — CLINICAL UPDATE (OUTPATIENT)
Dept: PHARMACY | Facility: CLINIC | Age: 83
End: 2018-03-23

## 2018-03-23 NOTE — PROGRESS NOTES
This patient's medication list and chart were reviewed as part of the service provided by [facility].    Assessment/Recommendations:  1. (Vitamin D deficiency):  Pt was started on high dose vitamin D weekly for 8 weeks at end of November.  Appears per Epic med list, that he has continued on the high dose.  Please consider d'c of high dose vitamin D and switch to vitamin D3 1000u daily for maintenance dose supplementation.  Could consider checking level prior to switch, but not necessary.  2. (BPH):  Pt has been on Doxazosin 4mg nightly for BPH. Consider switch to Tamsulosin 0.4mg nightly if pt having side effects such as dizziness, orthostasis, edema, increased heart failure symptoms.  These side effects tend to be lower with Tamsulosin, however, may see pt's BP rise with switch, and would need to monitor b/c Doxazosin likely adding some BP control.      Christen Xie, Pharm.D.,[facility] [place]  Board Certified Geriatric Pharmacist  Medication Therapy Management Pharmacist  [phone]

## 2018-03-29 ENCOUNTER — NURSING HOME VISIT (OUTPATIENT)
Dept: GERIATRICS | Facility: CLINIC | Age: 83
End: 2018-03-29
Payer: COMMERCIAL

## 2018-03-29 VITALS
OXYGEN SATURATION: 96 % | HEART RATE: 62 BPM | BODY MASS INDEX: 24.63 KG/M2 | WEIGHT: 162 LBS | RESPIRATION RATE: 18 BRPM | SYSTOLIC BLOOD PRESSURE: 136 MMHG | DIASTOLIC BLOOD PRESSURE: 66 MMHG | TEMPERATURE: 97.5 F

## 2018-03-29 DIAGNOSIS — I25.10 CORONARY ARTERY DISEASE INVOLVING NATIVE CORONARY ARTERY OF NATIVE HEART WITHOUT ANGINA PECTORIS: ICD-10-CM

## 2018-03-29 DIAGNOSIS — N18.30 BENIGN HYPERTENSION WITH CHRONIC KIDNEY DISEASE, STAGE III (H): Primary | ICD-10-CM

## 2018-03-29 DIAGNOSIS — G30.1 LATE ONSET ALZHEIMER'S DISEASE WITHOUT BEHAVIORAL DISTURBANCE (H): ICD-10-CM

## 2018-03-29 DIAGNOSIS — N40.0 BENIGN PROSTATIC HYPERPLASIA, UNSPECIFIED WHETHER LOWER URINARY TRACT SYMPTOMS PRESENT: ICD-10-CM

## 2018-03-29 DIAGNOSIS — R48.2 GAIT APRAXIA: ICD-10-CM

## 2018-03-29 DIAGNOSIS — I48.20 CHRONIC ATRIAL FIBRILLATION (H): ICD-10-CM

## 2018-03-29 DIAGNOSIS — E03.9 HYPOTHYROIDISM, UNSPECIFIED TYPE: ICD-10-CM

## 2018-03-29 DIAGNOSIS — I12.9 BENIGN HYPERTENSION WITH CHRONIC KIDNEY DISEASE, STAGE III (H): Primary | ICD-10-CM

## 2018-03-29 DIAGNOSIS — F02.80 LATE ONSET ALZHEIMER'S DISEASE WITHOUT BEHAVIORAL DISTURBANCE (H): ICD-10-CM

## 2018-03-29 PROCEDURE — 99306 1ST NF CARE HIGH MDM 50: CPT | Performed by: INTERNAL MEDICINE

## 2018-03-30 NOTE — PROGRESS NOTES
"Earl Sanchez is a 89 year old male seen March 30, 2018 at [facility] where he was admitted last month (2/2018) seen for initial visit.   Patient is seen in his room, resting abed.   Awakens to answer questions, but gets sidetracked on a particular topic and perseverates on that.     He has been busy today, at [employer] and other activities downstairs, now napping after lunch.    States he is \"much,much better\"    Patient had brief hospitalization in January 2018 for encephalopathy with gait disturbance.  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   Workup in the hospital included MRI/MRA, labs.   No specific causes found, and his MS cleared, agitation resolved.    He was in [facility] for a month, improved, but needed more support than AL, so has transitioned to LTC for permanent placement.     Patient has had atrial fib for many years, anticoagulated with warfarin.       Past Medical History:   Diagnosis Date     Abdominal aortic aneurysm (H)     per 2/12/15 abdominal US, mild aneurysm measuring [id number]     Acute MI 1981, 2007     Atrial fibrillation (H)      BPH (benign prostatic hyperplasia)      CAD (coronary artery disease)     CABG 2007, f/b cardio, Dr. 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     Gastritis     treated with zantac     Gastro-oesophageal reflux disease      Heart failure (H)      Hyperlipidaemia LDL goal <100      Hypertension goal BP (blood pressure) < 140/90      Osteoarthritis     low back, hips, knees     Renal disease     renal insuff     Thyroid disease        Past Surgical History:   Procedure Laterality Date     ANKLE SURGERY      ORIF ankle fracture     CORONARY ARTERY BYPASS  2007    CAB X 5 CABG with LIMA to LAD and saphenous vein grafts to, OM2,SVG to diag 1, and sequential PDA     HEART CATH LEFT HEART CATH  12/10/07     HERNIA REPAIR, INGUINAL RT/LT      " "right     PHACOEMULSIFICATION CLEAR CORNEA WITH TORIC INTRAOCULAR LENS IMPLANT  4/10/2014    Procedure: PHACOEMULSIFICATION CLEAR CORNEA WITH TORIC INTRAOCULAR LENS IMPLANT;  RIGHT PHACOEMULSIFICATION CLEAR CORNEA WITH TORIC INTRAOCULAR LENS IMPLANT ;  Surgeon: Carlo Tee MD;  Location: [facility]     PHACOEMULSIFICATION CLEAR CORNEA WITH TORIC INTRAOCULAR LENS IMPLANT  4/22/2014    Procedure: PHACOEMULSIFICATION CLEAR CORNEA WITH TORIC INTRAOCULAR LENS IMPLANT;  Surgeon: Carlo Tee MD;  Location: [facility]       Family History   Problem Relation Age of Onset     Hypertension Mother      Hypertension Maternal Grandmother        Social History   Substance Use Topics     Smoking status: Never Smoker     Smokeless tobacco: Never Used     Alcohol use Yes      Comment: occ - one glass of wine last night        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    , lived at the [place] [facility] with services until 1/2018.    Has 3 children.     Review Of Systems  Skin: eczema, improved with triamcinolone  Eyes: impaired vision  Ears/Nose/Throat: hearing loss  Respiratory: No shortness of breath, dyspnea on exertion, cough, or hemoptysis  Cardiovascular: irregular heart beat and exercise intolerance  Gastrointestinal: negative  Genitourinary: negative  Musculoskeletal: LE weakness, transfers with assist, ambulates with FWW and CGA, needs assist of one for cares.  TUG 1'40\"  Neurologic: STML, ataxia   SLUMS 13/30  CPT 4.4    Psychiatric: negative  Hematologic/Lymphatic/Immunologic: negative  Endocrine: negative      GENERAL APPEARANCE: alert and no distress  /66  Pulse 62  Temp 97.5  F (36.4  C)  Resp 18  Wt 162 lb (73.5 kg)  SpO2 96%  BMI 24.63 kg/m2   HEENT: normocephalic, no lesion or abnormalities  NECK: no adenopathy, no asymmetry, masses, or scars and thyroid normal to palpation  RESP: lungs clear to auscultation - no rales, rhonchi or wheezes  CV: irregular rate and rhythm, normal S1 S2, 1/6 " HSM  ABDOMEN:  soft, nontender, no HSM or masses and bowel sounds normal  MS: extremities normal- no gross deformities noted, no evidence of inflammation in joints, no ext edema  SKIN: scaly dry rash both ankle, patchy.  Some maculopapular dry rash lateral left LE.     NEURO: Normal strength and tone, sensory exam grossly normal, and speech normal  PSYCH: affect okay  LYMPHATICS: No cervical,  or supraclavicular nodes    Lab Results   Component Value Date    INR 2.38 03/06/2018    INR 2.02 02/20/2018      Last Basic Metabolic Panel:  Lab Results   Component Value Date     02/20/2018      Lab Results   Component Value Date    POTASSIUM 3.8 02/20/2018     Lab Results   Component Value Date    CHLORIDE 108 02/20/2018     Lab Results   Component Value Date    JAYME 9.0 02/20/2018     Lab Results   Component Value Date    CO2 25 02/20/2018     Lab Results   Component Value Date    BUN 26 02/20/2018     Lab Results   Component Value Date    CR 1.22 02/20/2018   GFR 56  Lab Results   Component Value Date     02/20/2018     Lab Results   Component Value Date    WBC 6.9 02/20/2018      HGB 10.2 02/20/2018      MCV 99 02/20/2018       02/20/2018        IMP/PLAN:  (I12.9,  N18.3) Benign hypertension with chronic kidney disease, stage III    Comment:   BP Readings from Last 3 Encounters:   03/29/18 136/66   02/20/18 128/74   02/16/18 136/75      Plan: remains on doxazosin for bp control and BPH.   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    Plan: PHYSICAL THERAPY / OCCUPATIONAL THERAPY for balance, gait, strengthening, ADLs.     Neurology follow up with Dr Nell Saldana    (G93.40) Acute encephalopathy  Comment: intermittent confusion and agitation    Plan: still disoriented, but no behavioral symptoms now.       (G30.1,  F02.81) Late onset Alzheimer's disease with behavioral disturbance  Comment: gradual decline in cognition, low  functional status.  Plan: LTC support for meds, meals, activity.       (I48.2) Chronic atrial fibrillation (H)  Comment: not requiring rate slowing meds  Plan: warfarin per INR, goal 2-2.5 given fall risk.    (I25.10) Coronary artery disease involving native coronary artery of native heart without angina pectoris  Comment: h/o CABG 2007  Plan: daily ASA, statin for secondary prevention.   NTG prn    (N40.0) Benign prostatic hyperplasia, unspecified whether lower urinary tract symptoms present  Comment: longstanding  Plan: continue doxazosin.       (E03.9) Hypothyroidism, unspecified type  Comment: on replacement  Plan: same dose    Vit D deficiency  Plan: per PharmD, change to vit D3 1000 units/day     The health plan product change has happened. I have reviewed the  MDS, the preventative needs,  and facility care plan. The level of care is appropriate. I have reviewed the code status/advanced directives.      Patty Gamble MD

## 2018-04-18 ENCOUNTER — RECORDS - HEALTHEAST (OUTPATIENT)
Dept: LAB | Facility: CLINIC | Age: 83
End: 2018-04-18

## 2018-04-19 ENCOUNTER — NURSING HOME VISIT (OUTPATIENT)
Dept: GERIATRICS | Facility: CLINIC | Age: 83
End: 2018-04-19
Payer: COMMERCIAL

## 2018-04-19 ENCOUNTER — TRANSFERRED RECORDS (OUTPATIENT)
Dept: HEALTH INFORMATION MANAGEMENT | Facility: CLINIC | Age: 83
End: 2018-04-19

## 2018-04-19 VITALS
SYSTOLIC BLOOD PRESSURE: 132 MMHG | HEART RATE: 70 BPM | TEMPERATURE: 98 F | WEIGHT: 155.8 LBS | OXYGEN SATURATION: 96 % | DIASTOLIC BLOOD PRESSURE: 71 MMHG | HEIGHT: 69 IN | BODY MASS INDEX: 23.08 KG/M2 | RESPIRATION RATE: 18 BRPM

## 2018-04-19 DIAGNOSIS — Z79.01 LONG-TERM (CURRENT) USE OF ANTICOAGULANTS: ICD-10-CM

## 2018-04-19 DIAGNOSIS — I48.20 CHRONIC ATRIAL FIBRILLATION (H): Primary | ICD-10-CM

## 2018-04-19 DIAGNOSIS — Z51.81 ENCOUNTER FOR THERAPEUTIC DRUG MONITORING: ICD-10-CM

## 2018-04-19 LAB
INR PPP: 2.35 (ref 0.9–1.1)
INR PPP: 2.35 (ref 0.9–1.1)

## 2018-04-19 PROCEDURE — 99308 SBSQ NF CARE LOW MDM 20: CPT | Performed by: NURSE PRACTITIONER

## 2018-04-19 NOTE — PROGRESS NOTES
[facility]    HPI:    Earl Sanchez is a 89 year old  (9/17/1926), who is being seen today for an episodic care visit at [facility].   HPI information obtained from: facility chart records, facility staff and patient report. Today's concern is INR/Coumadin management for A. Fib    Bleeding Signs/Symptoms:  None  Thromboembolic Signs/Symptoms:  None    Medication Changes:  No  Dietary Changes:  No  Activity Changes: No  Bacterial/Viral Infection:  No    Missed Coumadin Doses:  None    On ASA: Yes - 81 mg po q day    Other Concerns:  No    OBJECTIVE:    INR Today:  2.35  Current Dose:  2 mg Tues/Thu/Sat and 4 mg rest of days     ASSESSMENT:    ICD-10-CM    1. Chronic atrial fibrillation (H) I48.2    2. Long-term (current) use of anticoagulants Z79.01    3. Encounter for therapeutic drug monitoring Z51.81        Therapeutic INR for goal of 2-3    PLAN:    New Dose: No Change      Next INR: 3 weeks    The health plan new enrollment has happened. I have reviewed the  MDS, the preventative needs,  and facility care plan. The level of care is appropriate. I have reviewed the code status/advanced directives.               Electronically signed by:  ANGEL Corrigan CNP  [facility]

## 2018-05-03 ENCOUNTER — RECORDS - HEALTHEAST (OUTPATIENT)
Dept: LAB | Facility: CLINIC | Age: 83
End: 2018-05-03

## 2018-05-03 LAB
INR PPP: 3.02 (ref 0.9–1.1)
INR PPP: 3.02 (ref 0.9–1.1)

## 2018-05-07 ENCOUNTER — RECORDS - HEALTHEAST (OUTPATIENT)
Dept: LAB | Facility: CLINIC | Age: 83
End: 2018-05-07

## 2018-05-08 ENCOUNTER — TRANSFERRED RECORDS (OUTPATIENT)
Dept: HEALTH INFORMATION MANAGEMENT | Facility: CLINIC | Age: 83
End: 2018-05-08

## 2018-05-08 LAB
INR PPP: 2.03 (ref 0.9–1.1)
INR PPP: 2.03 (ref 0.9–1.1)

## 2018-05-11 ENCOUNTER — NURSING HOME VISIT (OUTPATIENT)
Dept: GERIATRICS | Facility: CLINIC | Age: 83
End: 2018-05-11
Payer: COMMERCIAL

## 2018-05-11 DIAGNOSIS — R48.2 GAIT APRAXIA: ICD-10-CM

## 2018-05-11 DIAGNOSIS — R41.89 COGNITIVE IMPAIRMENT: ICD-10-CM

## 2018-05-11 DIAGNOSIS — I48.20 CHRONIC ATRIAL FIBRILLATION (H): Primary | ICD-10-CM

## 2018-05-11 DIAGNOSIS — I10 HYPERTENSION GOAL BP (BLOOD PRESSURE) < 140/90: ICD-10-CM

## 2018-05-11 DIAGNOSIS — G93.40 ACUTE ENCEPHALOPATHY: ICD-10-CM

## 2018-05-11 DIAGNOSIS — N40.0 BENIGN PROSTATIC HYPERPLASIA, UNSPECIFIED WHETHER LOWER URINARY TRACT SYMPTOMS PRESENT: ICD-10-CM

## 2018-05-11 PROCEDURE — 99308 SBSQ NF CARE LOW MDM 20: CPT | Performed by: INTERNAL MEDICINE

## 2018-05-11 NOTE — LETTER
5/11/2018        RE: Earl Sanchez  [address] [place] [address] [address]  [facility] [id number]        Earl Sanchez is a 89 year old male seen May 11, 2018 at [facility] where he has resided for 3 months (admit 2/2018) seen to follow up dementia with behavioral disturbances, worsened over past few weeks.   Patient has been verbally abusive to staff, agitated.    Seen on the unit today, up to WC.   Pleasant and smiling, reports he is feeling well.    Says no to pain or dyspnea.     Patient had brief hospitalization in January 2018 for encephalopathy with gait disturbance.  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   Workup in the hospital included MRI/MRA, labs.   No specific causes found, and his MS cleared, agitation resolved.    He was in [facility] for a month, improved, but needed more support than AL, so has transitioned to LTC for permanent placement.     Patient has had atrial fib for many years, anticoagulated with warfarin.       Past Medical History:   Diagnosis Date     Abdominal aortic aneurysm (H)     per 2/12/15 abdominal US, mild aneurysm measuring [id number]     Acute MI 1981, 2007     Atrial fibrillation (H)      BPH (benign prostatic hyperplasia)      CAD (coronary artery disease)     CABG 2007, f/b cardio, Dr. 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    Has 3 children.     Review Of Systems  Limited, but negative other than above.   SLUMS 13/30   CPT 4.4       EXAM:  Up to  WC, NAD  /66  Pulse 62  Temp 97.5  F (36.4  C)  Resp 18  Wt 162 lb (73.5 kg)  SpO2 96%  BMI 24.63 kg/m2   Neck supple without adenopathy  Lungs with decreased BS, no rales or wheeze  Heart irreg irreg s1s2, 1/6 HSM  Abd soft, NT, no distention, +BS  Ext without edema.   Neuro: no tremor or stiffness, +STML   Psych: affect okay.     Lab Results   Component Value Date    INR 2.03 05/08/2018        IMP/PLAN:  (I12.9,  N18.3) Benign hypertension with chronic kidney disease, stage III    Comment:  BP Readings from Last 3 Encounters:   05/01/18 136/77   04/19/18 132/71   03/29/18 136/66    Plan: remains on doxazosin for bp control and BPH.   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    Plan: PHYSICAL THERAPY / OCCUPATIONAL THERAPY for balance, gait, strengthening, ADLs.     Neurology follow up with Dr Nell Saldana    (G93.40) Acute encephalopathy  Comment: intermittent confusion and agitation    Plan: less disoriented now that he has adjusted to LTC, but behavioral symptoms as above.      Redirection as possible, consider pharmacologic intervention if worsens.        (G30.1,  F02.81) Late onset Alzheimer's disease with behavioral disturbance  Comment: gradual decline in cognition, low functional status.  Plan: LTC support for meds, meals, activity.       (I48.2) Chronic atrial fibrillation (H)  Comment: not requiring rate slowing meds  Plan: warfarin per INR, goal 2-2.5 given fall risk.    (I25.10) Coronary artery disease involving native coronary artery of native heart without angina pectoris  Comment: h/o CABG 2007  Plan: daily ASA, statin for secondary prevention.   NTG prn    (N40.0) Benign prostatic hyperplasia, unspecified whether lower urinary tract symptoms present  Comment: longstanding  Plan: continue doxazosin.       (E03.9) Hypothyroidism, unspecified type  Comment:   TSH   Date Value Ref Range Status   11/29/2017 1.38  0.40 - 4.00 mU/L Final   Plan: same dose levothyroxine, yearly TSH       Vit D deficiency  Plan: per PharmD, change to vit D3 1000 units/day    Patty Gamble MD         Sincerely,        Patty Gamble MD

## 2018-05-22 NOTE — PROGRESS NOTES
Earl Sanchez is a 89 year old male seen May 11, 2018 at [facility] where he has resided for 3 months (admit 2/2018) seen to follow up dementia with behavioral disturbances, worsened over past few weeks.   Patient has been verbally abusive to staff, agitated.    Seen on the unit today, up to WC.   Pleasant and smiling, reports he is feeling well.    Says no to pain or dyspnea.     Patient had brief hospitalization in January 2018 for encephalopathy with gait disturbance.  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   Workup in the hospital included MRI/MRA, labs.   No specific causes found, and his MS cleared, agitation resolved.    He was in [facility] for a month, improved, but needed more support than AL, so has transitioned to LTC for permanent placement.     Patient has had atrial fib for many years, anticoagulated with warfarin.       Past Medical History:   Diagnosis Date     Abdominal aortic aneurysm (H)     per 2/12/15 abdominal US, mild aneurysm measuring [id number]     Acute MI 1981, 2007     Atrial fibrillation (H)      BPH (benign prostatic hyperplasia)      CAD (coronary artery disease)     CABG 2007, f/b cardio, Dr. 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    Has 3 children.     Review Of Systems  Limited, but negative other than above.   SLUMS 13/30   CPT 4.4       EXAM:  Up to WC, NAD  /66  Pulse 62  Temp 97.5  F (36.4  C)  Resp 18  Wt 162 lb (73.5 kg)  SpO2 96%  BMI 24.63 kg/m2    Neck supple without adenopathy  Lungs with decreased BS, no rales or wheeze  Heart irreg irreg s1s2, 1/6 HSM  Abd soft, NT, no distention, +BS  Ext without edema.   Neuro: no tremor or stiffness, +STML   Psych: affect okay.     Lab Results   Component Value Date    INR 2.03 05/08/2018        IMP/PLAN:  (I12.9,  N18.3) Benign hypertension with chronic kidney disease, stage III    Comment:  BP Readings from Last 3 Encounters:   05/01/18 136/77   04/19/18 132/71   03/29/18 136/66    Plan: remains on doxazosin for bp control and BPH.   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    Plan: PHYSICAL THERAPY / OCCUPATIONAL THERAPY for balance, gait, strengthening, ADLs.     Neurology follow up with Dr Nell Saldana    (G93.40) Acute encephalopathy  Comment: intermittent confusion and agitation    Plan: less disoriented now that he has adjusted to LTC, but behavioral symptoms as above.      Redirection as possible, consider pharmacologic intervention if worsens.        (G30.1,  F02.81) Late onset Alzheimer's disease with behavioral disturbance  Comment: gradual decline in cognition, low functional status.  Plan: LTC support for meds, meals, activity.       (I48.2) Chronic atrial fibrillation (H)  Comment: not requiring rate slowing meds  Plan: warfarin per INR, goal 2-2.5 given fall risk.    (I25.10) Coronary artery disease involving native coronary artery of native heart without angina pectoris  Comment: h/o CABG 2007  Plan: daily ASA, statin for secondary prevention.   NTG prn    (N40.0) Benign prostatic hyperplasia, unspecified whether lower urinary tract symptoms present  Comment: longstanding  Plan: continue doxazosin.       (E03.9) Hypothyroidism, unspecified type  Comment:   TSH   Date Value Ref Range Status   11/29/2017 1.38 0.40 - 4.00 mU/L Final   Plan: same dose levothyroxine, yearly TSH       Vit D deficiency  Plan: per PharmD, change to  vit D3 1000 units/day    Patty Gamble MD

## 2018-05-23 ENCOUNTER — RECORDS - HEALTHEAST (OUTPATIENT)
Dept: LAB | Facility: CLINIC | Age: 83
End: 2018-05-23

## 2018-05-24 ENCOUNTER — TRANSFERRED RECORDS (OUTPATIENT)
Dept: HEALTH INFORMATION MANAGEMENT | Facility: CLINIC | Age: 83
End: 2018-05-24

## 2018-05-24 LAB
ANION GAP SERPL CALCULATED.3IONS-SCNC: 6 MMOL/L (ref 5–18)
ANION GAP SERPL CALCULATED.3IONS-SCNC: 6 MMOL/L (ref 5–18)
BUN SERPL-MCNC: 19 MG/DL (ref 8–28)
BUN SERPL-MCNC: 19 MG/DL (ref 8–28)
CALCIUM SERPL-MCNC: 9.4 MG/DL (ref 8.5–10.5)
CALCIUM SERPL-MCNC: 9.4 MG/DL (ref 8.5–10.5)
CHLORIDE BLD-SCNC: 106 MMOL/L (ref 98–107)
CHLORIDE SERPLBLD-SCNC: 106 MMOL/L (ref 98–107)
CO2 SERPL-SCNC: 28 MMOL/L (ref 22–31)
CO2 SERPL-SCNC: 28 MMOL/L (ref 22–31)
CREAT SERPL-MCNC: 1.16 MG/DL (ref 0.7–1.3)
CREAT SERPL-MCNC: 1.16 MG/DL (ref 0.7–1.3)
ERYTHROCYTE [DISTWIDTH] IN BLOOD BY AUTOMATED COUNT: 15.9 % (ref 11–14.5)
ERYTHROCYTE [DISTWIDTH] IN BLOOD BY AUTOMATED COUNT: 15.9 % (ref 11–14.5)
GFR SERPL CREATININE-BSD FRML MDRD: 59 ML/MIN/1.73M2
GFR SERPL CREATININE-BSD FRML MDRD: 59 ML/MIN/1.73M2
GLUCOSE BLD-MCNC: 91 MG/DL (ref 70–125)
GLUCOSE SERPL-MCNC: 91 MG/DL (ref 70–125)
HCT VFR BLD AUTO: 40.1 % (ref 40–54)
HCT VFR BLD AUTO: 40.1 % (ref 40–54)
HEMOGLOBIN: 13 G/DL (ref 14–18)
HGB BLD-MCNC: 13 G/DL (ref 14–18)
MCH RBC QN AUTO: 31.3 PG (ref 27–34)
MCH RBC QN AUTO: 31.3 PG (ref 27–34)
MCHC RBC AUTO-ENTMCNC: 32.4 G/DL (ref 32–36)
MCHC RBC AUTO-ENTMCNC: 32.4 G/DL (ref 32–36)
MCV RBC AUTO: 96 FL (ref 80–100)
MCV RBC AUTO: 96 FL (ref 80–100)
PLATELET # BLD AUTO: 210 THOU/UL (ref 140–440)
PLATELET # BLD AUTO: 210 THOU/UL (ref 140–440)
PMV BLD AUTO: 10.3 FL (ref 8.5–12.5)
POTASSIUM BLD-SCNC: 3.8 MMOL/L (ref 3.5–5)
POTASSIUM SERPL-SCNC: 3.8 MMOL/L (ref 3.5–5)
RBC # BLD AUTO: 4.16 MILL/UL (ref 4.4–6.2)
RBC # BLD AUTO: 4.16 MILL/UL (ref 4.4–6.2)
SODIUM SERPL-SCNC: 140 MMOL/L (ref 136–145)
SODIUM SERPL-SCNC: 140 MMOL/L (ref 136–145)
TSH SERPL DL<=0.005 MIU/L-ACNC: 5.92 UIU/ML (ref 0.3–5)
TSH SERPL-ACNC: 5.92 UIU/ML (ref 0.3–5)
WBC # BLD AUTO: 6.7 THOU/UL (ref 4–11)
WBC: 6.7 THOU/UL (ref 4–11)

## 2018-05-31 ENCOUNTER — NURSING HOME VISIT (OUTPATIENT)
Dept: GERIATRICS | Facility: CLINIC | Age: 83
End: 2018-05-31
Payer: COMMERCIAL

## 2018-05-31 VITALS
OXYGEN SATURATION: 96 % | SYSTOLIC BLOOD PRESSURE: 130 MMHG | BODY MASS INDEX: 24.81 KG/M2 | DIASTOLIC BLOOD PRESSURE: 78 MMHG | TEMPERATURE: 98.1 F | RESPIRATION RATE: 18 BRPM | HEART RATE: 78 BPM | WEIGHT: 168 LBS

## 2018-05-31 DIAGNOSIS — E03.8 TSH (THYROID-STIMULATING HORMONE DEFICIENCY): Primary | ICD-10-CM

## 2018-05-31 PROCEDURE — 99308 SBSQ NF CARE LOW MDM 20: CPT | Performed by: NURSE PRACTITIONER

## 2018-05-31 NOTE — PROGRESS NOTES
[facility]    Chief Complaint   Patient presents with     Thyroid Problem       [facility] Medical Record Number:  [id number]    HPI:    Earl Sanchez is a 89 year old  (9/17/1926), who is being seen today for an episodic care visit at [facility].  HPI information obtained from: facility chart records, facility staff and patient report.    Today's concern is:  TSH (thyroid-stimulating hormone deficiency)   TSH elevated at 5.92 on 05/29/18. Family concerned regarding increased napping/need for sleep. Currently taking synthroid 50 mcg/day.     ALLERGIES: No known allergies  Past Medical, Surgical, Family and Social History reviewed and updated in [facility].    Current Outpatient Prescriptions   Medication Sig Dispense Refill     acetaminophen (TYLENOL) 500 MG tablet Take 1,000 mg by mouth every 6 hours as needed for mild pain        allopurinol (ZYLOPRIM) 100 MG tablet Take 100 mg by mouth 2 times daily       aspirin (ASPIRIN LOW DOSE) 81 MG tablet Take 81 mg by mouth daily        doxazosin (CARDURA) 4 MG tablet Take 1 tablet (4 mg) by mouth At Bedtime 90 tablet 3     ERGOCALCIFEROL PO Take 50,000 Units by mouth every morning Wednesday        LEVOTHYROXINE SODIUM PO Take 75 mcg by mouth daily        nitroglycerin (NITROSTAT) 0.4 MG SL tablet Place 1 tablet (0.4 mg) under the tongue every 5 minutes as needed for chest pain 25 tablet 0     pravastatin (PRAVACHOL) 40 MG tablet TAKE 1 TABLET DAILY 90 tablet 1     triamcinolone (KENALOG) 0.025 % cream Apply topically every 12 hours as needed to rash to the affected area prn, apply tin layer to the rash at the LE.       Warfarin Sodium (COUMADIN PO) As directed, per INR       Medications reviewed:  Medications reconciled to facility chart and changes were made to reflect current medications as identified as above med list. Below are the changes that were made:   Medications stopped since last EPIC medication reconciliation:   There are no  discontinued medications.    Medications started since last [facility] medication reconciliation:  No orders of the defined types were placed in this encounter.    REVIEW OF SYSTEMS:  4 point ROS including Respiratory, CV, GI and , other than that noted in the HPI,  is negative    Physical Exam:  /78  Pulse 78  Temp 98.1  F (36.7  C)  Resp 18  Wt 168 lb (76.2 kg)  SpO2 96%  BMI 24.81 kg/m2  GENERAL APPEARANCE:  Alert, in no distress  ENT:  Mouth and posterior oropharynx normal, moist mucous membranes  RESP:  respiratory effort and palpation of chest normal, lungs clear to auscultation , no respiratory distress  CV:  Palpation and auscultation of heart done , regular rate and rhythm, no murmur, rub, or gallop  M/S:   Gait and station normal  Digits and nails normal  SKIN:  Inspection of skin and subcutaneous tissue baseline, Palpation of skin and subcutaneous tissue baseline  NEURO:   Cranial nerves 2-12 are normal tested and grossly at patient's baseline  PSYCH:  oriented X 3     BP Reading:                              HR:  70-78  134/76  142/78  130/76    Recent Labs:   CBC RESULTS:   Recent Labs   Lab Test 05/24/18 02/20/18   WBC  6.7  6.9   RBC  4.16*  3.30*   HGB  13.0*  10.2*   HCT  40.1  32.6*   MCV  96  99   MCH  31.3  30.9   MCHC  32.4  31.3*   RDW  15.9*  15.0*   PLT  210  176       Last Basic Metabolic Panel:  Recent Labs   Lab Test 05/24/18 02/20/18   NA  140  139   POTASSIUM  3.8  3.8   CHLORIDE  106  108*   JAYME  9.4  9.0   CO2  28  25   BUN  19  26   CR  1.16  1.22   GLC  91  131*       Liver Function Studies -   Recent Labs   Lab Test  02/05/18   1005  01/17/18   2212   PROTTOTAL  7.3  7.5   ALBUMIN  3.3*  3.3*   BILITOTAL  0.7  0.5   ALKPHOS  97  118   AST  21  17   ALT  15  18       TSH   Date Value Ref Range Status   05/24/2018 5.92 (H) 0.30 - 5.00 uIU/mL Final   11/29/2017 1.38 0.40 - 4.00 mU/L Final       Lab Results   Component Value Date    A1C 5.9 02/05/2015    A1C 6.2 04/08/2014          Assessment/Plan:  (E03.8) TSH (thyroid-stimulating hormone deficiency)  (primary encounter diagnosis)  Elevated TSH.   -Increase levothyroxine 75 mcg/day  -Recheck TSH 6/26/18.     Orders:  See above for new orders.         Electronically signed by  ANGEL Corrigan CNP

## 2018-06-04 ENCOUNTER — TELEPHONE (OUTPATIENT)
Dept: GERIATRICS | Facility: CLINIC | Age: 83
End: 2018-06-04

## 2018-06-06 ENCOUNTER — RECORDS - HEALTHEAST (OUTPATIENT)
Dept: LAB | Facility: CLINIC | Age: 83
End: 2018-06-06

## 2018-06-07 ENCOUNTER — TRANSFERRED RECORDS (OUTPATIENT)
Dept: HEALTH INFORMATION MANAGEMENT | Facility: CLINIC | Age: 83
End: 2018-06-07

## 2018-06-07 ENCOUNTER — NURSING HOME VISIT (OUTPATIENT)
Dept: GERIATRICS | Facility: CLINIC | Age: 83
End: 2018-06-07
Payer: COMMERCIAL

## 2018-06-07 VITALS
SYSTOLIC BLOOD PRESSURE: 110 MMHG | DIASTOLIC BLOOD PRESSURE: 78 MMHG | WEIGHT: 169 LBS | HEART RATE: 68 BPM | RESPIRATION RATE: 18 BRPM | BODY MASS INDEX: 24.96 KG/M2 | TEMPERATURE: 98.1 F | OXYGEN SATURATION: 96 %

## 2018-06-07 DIAGNOSIS — I48.20 CHRONIC ATRIAL FIBRILLATION (H): Primary | ICD-10-CM

## 2018-06-07 DIAGNOSIS — Z79.01 LONG-TERM (CURRENT) USE OF ANTICOAGULANTS: ICD-10-CM

## 2018-06-07 LAB
INR PPP: 1.52 (ref 0.9–1.1)
INR PPP: 1.52 (ref 0.9–1.1)

## 2018-06-07 PROCEDURE — 99308 SBSQ NF CARE LOW MDM 20: CPT | Performed by: NURSE PRACTITIONER

## 2018-06-07 NOTE — PROGRESS NOTES
[facility]    HPI:    Earl Sanchez is a 89 year old  (9/17/1926), who is being seen today for an episodic care visit at [facility].   HPI information obtained from: facility chart records, facility staff and patient report. Today's concern is INR/Coumadin management for A. Fib    Bleeding Signs/Symptoms:  None  Thromboembolic Signs/Symptoms:  None    Medication Changes:  No  Dietary Changes:  No  Activity Changes: No  Bacterial/Viral Infection:  No    Missed Coumadin Doses:  None    On ASA: Yes - 81 mg po q day    Other Concerns:  No    OBJECTIVE:    INR Today:  1.52  Current Dose:   2 mg by mouth at bedtime    ASSESSMENT:    ICD-10-CM    1. Chronic atrial fibrillation (H) I48.2    2. Long-term (current) use of anticoagulants Z79.01      Subtherapeutic INR for goal of 2-3    PLAN:    New Dose:   Warfarin 2.5 mg Tuesday  Warfarin 2.0 mg all other days.         Next INR: 6/14/18    Electronically signed by:  ANGEL Corrigan CNP

## 2018-06-13 ENCOUNTER — RECORDS - HEALTHEAST (OUTPATIENT)
Dept: LAB | Facility: CLINIC | Age: 83
End: 2018-06-13

## 2018-06-14 ENCOUNTER — TRANSFERRED RECORDS (OUTPATIENT)
Dept: HEALTH INFORMATION MANAGEMENT | Facility: CLINIC | Age: 83
End: 2018-06-14

## 2018-06-14 LAB
INR PPP: 1.45 (ref 0.9–1.1)
INR PPP: 1.45 (ref 0.9–1.1)

## 2018-06-20 ENCOUNTER — RECORDS - HEALTHEAST (OUTPATIENT)
Dept: LAB | Facility: CLINIC | Age: 83
End: 2018-06-20

## 2018-06-20 ENCOUNTER — NURSING HOME VISIT (OUTPATIENT)
Dept: GERIATRICS | Facility: CLINIC | Age: 83
End: 2018-06-20
Payer: COMMERCIAL

## 2018-06-20 VITALS
SYSTOLIC BLOOD PRESSURE: 126 MMHG | WEIGHT: 163 LBS | HEART RATE: 68 BPM | TEMPERATURE: 98.7 F | RESPIRATION RATE: 18 BRPM | BODY MASS INDEX: 24.14 KG/M2 | DIASTOLIC BLOOD PRESSURE: 72 MMHG | HEIGHT: 69 IN | OXYGEN SATURATION: 96 %

## 2018-06-20 DIAGNOSIS — F02.80 LATE ONSET ALZHEIMER'S DISEASE WITHOUT BEHAVIORAL DISTURBANCE (H): ICD-10-CM

## 2018-06-20 DIAGNOSIS — E03.9 HYPOTHYROIDISM, UNSPECIFIED TYPE: ICD-10-CM

## 2018-06-20 DIAGNOSIS — I10 ESSENTIAL HYPERTENSION: ICD-10-CM

## 2018-06-20 DIAGNOSIS — I25.10 CORONARY ARTERY DISEASE INVOLVING NATIVE CORONARY ARTERY OF NATIVE HEART WITHOUT ANGINA PECTORIS: ICD-10-CM

## 2018-06-20 DIAGNOSIS — M1A.9XX0 CHRONIC GOUT WITHOUT TOPHUS, UNSPECIFIED CAUSE, UNSPECIFIED SITE: ICD-10-CM

## 2018-06-20 DIAGNOSIS — I48.20 CHRONIC ATRIAL FIBRILLATION (H): ICD-10-CM

## 2018-06-20 DIAGNOSIS — G30.1 LATE ONSET ALZHEIMER'S DISEASE WITHOUT BEHAVIORAL DISTURBANCE (H): ICD-10-CM

## 2018-06-20 DIAGNOSIS — N40.0 BENIGN PROSTATIC HYPERPLASIA, UNSPECIFIED WHETHER LOWER URINARY TRACT SYMPTOMS PRESENT: ICD-10-CM

## 2018-06-20 DIAGNOSIS — Z79.01 LONG-TERM (CURRENT) USE OF ANTICOAGULANTS: ICD-10-CM

## 2018-06-20 DIAGNOSIS — Z00.00 ROUTINE GENERAL MEDICAL EXAMINATION AT A HEALTH CARE FACILITY: Primary | ICD-10-CM

## 2018-06-20 DIAGNOSIS — I12.9 BENIGN HYPERTENSION WITH CHRONIC KIDNEY DISEASE, STAGE III (H): ICD-10-CM

## 2018-06-20 DIAGNOSIS — N18.30 BENIGN HYPERTENSION WITH CHRONIC KIDNEY DISEASE, STAGE III (H): ICD-10-CM

## 2018-06-20 DIAGNOSIS — R48.2 GAIT APRAXIA: ICD-10-CM

## 2018-06-20 DIAGNOSIS — R27.0 ATAXIA: ICD-10-CM

## 2018-06-20 PROCEDURE — 99318 ZZC ANNUAL NURSING FAC ASSESSMNT, STABLE: CPT | Performed by: NURSE PRACTITIONER

## 2018-06-20 NOTE — PROGRESS NOTES
[facility]  Chief Complaint   Patient presents with     Annual Comprehensive Nursing Home       [place] Medical Record Number:  [id number]    HPI:    Earl Sanchez is a 89 year old  (1926), who is being seen today for an annual comprehensive visit at [facility].  HPI information obtained from: facility chart records, facility staff, patient report and [facility] chart review.      Today's concerns are:  Routine general medical examination at a health care facility  Completed today 18    Gait apraxia  Ataxia  TCU stay from 18-18 after falling at [facility].  He had extensive work up, including MRI/MRA head and neck, all negative for acute pathology and neurology felt he had parkinsonian features. It is unclear if there has been follow up with neurology.     Chronic atrial fibrillation (H)  On coumadin for anticoagulation. No s/s of bleeding. Heart rates are controlled with rates 68-75.     Coronary artery disease involving native coronary artery of native heart without angina pectoris  Essential hypertension  No complaints of pain chest pain. Not on medications for hypertension.   Based on JNC-8 goals,  patients age of 89 year old, presence of diabetes or CKD, and goals of care goal BP is  <140/90 mm Hg. Patient is stable and continue without pharmacological invention with routine assessment.BP Readin/76  110/78  130/78  HR:  68-78    Benign hypertension with chronic kidney disease, stage III    Lab Results   Component Value Date    CR 1.16 2018     Late onset Alzheimer's disease without behavioral disturbance  CPT 4.4/5.6 at TCU stay. SLUMS . BIMS 7/15 on 18. Previously living at [facility] but needing a higher level of care so he moved to [facility] in 2018. Currently wheelchair bound and requires the assistance of 2 to transfer. Has been eating well and having regular bowel  movements. Weights have ranged 155-163 lbs since February of this year.     Hypothyroidism, unspecified type  Recent increase in levothyroxine to 75 mcg daily.   TSH   Date Value Ref Range Status   05/24/2018 5.92 (H) 0.30 - 5.00 uIU/mL Final     Benign prostatic hyperplasia, unspecified whether lower urinary tract symptoms present  No urinary complaints. Managed with daily cardura    Chronic gout without tophus, unspecified cause, unspecified site  No s/s of flare. Managed with daily allopurinol     ALLERGIES: No known allergies  PROBLEM LIST:  Patient Active Problem List   Diagnosis     Hypertension goal BP (blood pressure) < 140/90     CAD (coronary artery disease)     Osteoarthritis     Gait apraxia     Atrial fibrillation (H)     BPH (benign prostatic hyperplasia)     Heart failure (H)     Hyperlipidemia with target LDL less than 100     CKD (chronic kidney disease) stage 3, GFR 30-59 ml/min     Microalbuminuria     Anemia     Hypothyroidism     Elevated alkaline phosphatase level     Advance care planning     Health Care Home     Chronic anticoagulation     Elevated fasting glucose     Toe inflammation     Elevated uric acid in blood     Gastritis     Abdominal aortic aneurysm (H)     Long-term (current) use of anticoagulants [Z79.01]     Hereditary multiple exostosis     General weakness     Tear of meniscus of left knee     Weakness generalized     Ataxia     Physical deconditioning     Cognitive impairment     Acute encephalopathy     Encounter for monitoring coumadin therapy     Eczema     PAST MEDICAL HISTORY:  has a past medical history of Abdominal aortic aneurysm (H); Acute MI (1981, 2007); Atrial fibrillation (H); BPH (benign prostatic hyperplasia); CAD (coronary artery disease); Chronic kidney disease; Duodenal ulcer with hemorrhage; Gait apraxia; Gastritis; Gastro-oesophageal reflux disease; Heart failure (H); Hyperlipidaemia LDL goal <100; Hypertension goal BP (blood pressure) < 140/90;  Osteoarthritis; Renal disease; and Thyroid disease. He also has no past medical history of Malignant hyperthermia or PONV (postoperative nausea and vomiting).  PAST SURGICAL HISTORY:  has a past surgical history that includes hernia repair, inguinal rt/lt; Ankle surgery; Phacoemulsification clear cornea with toric intraocular lens implant (4/10/2014); Phacoemulsification clear cornea with toric intraocular lens implant (4/22/2014); coronary artery bypass (2007); and Heart Cath Left heart cath (12/10/07).  FAMILY HISTORY: family history includes Hypertension in his maternal grandmother and mother.  SOCIAL HISTORY:  reports that he has never smoked. He has never used smokeless tobacco. He reports that he drinks alcohol. He reports that he does not use illicit drugs.  IMMUNIZATIONS:  Most Recent Immunizations   Administered Date(s) Administered     Influenza (High Dose) 3 valent vaccine 10/10/2017     Pneumo Conj 13-V (2010&after) 03/08/2018     TDAP Vaccine (Adacel) 08/21/2012     Above immunizations pulled from [employer] [employer]. [facility] and facility records also reconciled. Outstanding information sent to  to update [employer] [employer].  Future immunizations are not needed at this point as all recommended immunizations are up to date.   MEDICATIONS:  Current Outpatient Prescriptions   Medication Sig Dispense Refill     acetaminophen (TYLENOL) 500 MG tablet Take 1,000 mg by mouth every 6 hours as needed for mild pain        allopurinol (ZYLOPRIM) 100 MG tablet Take 100 mg by mouth 2 times daily       aspirin (ASPIRIN LOW DOSE) 81 MG tablet Take 81 mg by mouth daily        doxazosin (CARDURA) 4 MG tablet Take 1 tablet (4 mg) by mouth At Bedtime 90 tablet 3     ERGOCALCIFEROL PO Take 50,000 Units by mouth every morning Wednesday        LEVOTHYROXINE SODIUM PO Take 75 mcg by mouth daily        nitroglycerin (NITROSTAT) 0.4 MG SL tablet Place 1 tablet (0.4 mg) under the tongue every 5 minutes as needed for chest pain  "25 tablet 0     pravastatin (PRAVACHOL) 40 MG tablet TAKE 1 TABLET DAILY 90 tablet 1     triamcinolone (KENALOG) 0.025 % cream Apply topically every 12 hours as needed to rash to the affected area prn, apply tin layer to the rash at the LE.       Warfarin Sodium (COUMADIN PO) As directed, per INR       Medications reviewed:  Medications reconciled to facility chart and changes were made to reflect current medications as identified as above med list. Below are the changes that were made:   Medications stopped since last EPIC medication reconciliation:   There are no discontinued medications.    Medications started since last [facility] medication reconciliation:  No orders of the defined types were placed in this encounter.      Case Management:  I have reviewed the facility/SNF care plan/MDS which was done 5/16/18, including the falls risk, nutrition and pain screening. I also reviewed the current immunizations, and preventive care..Future cancer screening is not clinically indicated secondary to age/goals of care Patient's desire to return to the community is not present. Current Level of Care is appropriate.    Advance Directive Discussion:    I reviewed the current advanced directives as reflected in EPIC, the POLST and the facility chart, and verified the congruency of orders. I contacted the first party [facility] and left a message regarding the plan of Care.  I did not due to cognitive impairment review the advance directives with the resident.     Team Discussion:  I communicated with the appropriate disciplines involved with the Plan of Care:   Nursing      Patient Goal:  Patient's goal will be reviewed with daughter    Information reviewed:  Medications, vital signs, orders, and nursing notes.    ROS:  4 point ROS including Respiratory, CV, GI and , other than that noted in the HPI,  is negative    Exam:  /72  Pulse 68  Temp 98.7  F (37.1  C)  Resp 18  Ht 5' 9\" (1.753 m)  Wt 163 lb (73.9 kg)  SpO2 96%  " BMI 24.07 kg/m2  GENERAL APPEARANCE:  Alert, in no distress, pleasant, cooperative.  EYES:  EOM, lids, pupils and irises normal, sclera clear and conjunctiva normal, no discharge or mattering on lids or lashes noted  ENT:  Mouth normal, moist mucous membranes, nose without drainage or crusting, external ears without lesions, hearing acuity intact  NECK:  Nontender, supple, symmetrical  RESP:  respiratory effort and palpation of chest normal, no chest wall tenderness, no respiratory distress, Lung sounds clear, patient is on room air  CV:  Palpation and auscultation of heart done, rate and rhythm regular, no murmur.. Edema none in bilateral lower extremities. VASCULAR: no open areas. Warm extremities.  ABDOMEN:  normal bowel sounds, soft, nontender.  M/S:   Gait and station wheelchair bound and unsafe without assistance, , no tenderness or swelling of the joints; able to move all extremities  SKIN:  Inspection and palpation of skin and subcutaneous tissue: skin warm, dry and intact without rashes  NEURO: cranial nerves 2-12 grossly intact, no facial asymmetry, no speech deficits and able to follow directions, moves all extremities symmetrically  PSYCH:  insight and judgement intact, memory impaired, affect and mood normal    Lab/Diagnostic data:   CBC RESULTS:   Recent Labs   Lab Test 05/24/18 02/20/18   WBC  6.7  6.9   RBC  4.16*  3.30*   HGB  13.0*  10.2*   HCT  40.1  32.6*   MCV  96  99   MCH  31.3  30.9   MCHC  32.4  31.3*   RDW  15.9*  15.0*   PLT  210  176       Last Basic Metabolic Panel:  Recent Labs   Lab Test 05/24/18 02/20/18   NA  140  139   POTASSIUM  3.8  3.8   CHLORIDE  106  108*   JAYME  9.4  9.0   CO2  28  25   BUN  19  26   CR  1.16  1.22   GLC  91  131*       Liver Function Studies -   Recent Labs   Lab Test  02/05/18   1005  01/17/18   2212   PROTTOTAL  7.3  7.5   ALBUMIN  3.3*  3.3*   BILITOTAL  0.7  0.5   ALKPHOS  97  118   AST  21  17   ALT  15  18       TSH   Date Value Ref Range Status    05/24/2018 5.92 (H) 0.30 - 5.00 uIU/mL Final   11/29/2017 1.38 0.40 - 4.00 mU/L Final       Lab Results   Component Value Date    A1C 5.9 02/05/2015    A1C 6.2 04/08/2014         ASSESSMENT/PLAN  (Z00.00) Routine general medical examination at a health care facility  (primary encounter diagnosis)  Comment: completed today.   Plan: next annual due prior to 6/20/19.    (R48.2) Gait apraxia  (R27.0) Ataxia  Comment: causing falls. Evaluated by neurology in the hospital with MRI. No etiology other than thought to be parkinsonians features. Now wheelchair bound and requires the assistance of 2 for transfers. Unclear if there has been neurology follow up as there is no access to electronic record. Message left for daughter and will discuss follow up if warranted.   Plan: continue with staff assistance for transfers. Follow up with neurology is family would like.     (I48.2) Chronic atrial fibrillation (H)  (Z79.01) Long-term (current) use of anticoagulants  Comment: not on meds for rate control but heart rates have been 68-72.   Plan: continue with coumadin for anticoagulation.     (I25.10) Coronary artery disease involving native coronary artery of native heart without angina pectoris  (I10) Essential hypertension  (I12.9,  N18.3) Benign hypertension with chronic kidney disease, stage III  Comment: chronic. Controlled blood pressures. Creatinine stable and at baseline around 1.1 in may.   Plan: BMP every 6 months. Monitor blood pressure.     (G30.1,  F02.80) Late onset Alzheimer's disease without behavioral disturbance  Comment: chronic, progressive. Weights have been stable and he has been sleeping well at night. No behaviors noted by staff.   Plan: continue with 24/7 nursing care.     (E03.9) Hypothyroidism, unspecified type  Comment: TSH was adjusted 6 weeks ago. Lab is scheduled for 6/26.  Plan: continue with levothyroxine 75 mcg daily and adjust as needed to obtain TSH goal of 4-5.    (N40.0) Benign prostatic  hyperplasia, unspecified whether lower urinary tract symptoms present  Comment: chronic, controlled.   Plan: continue with cardura 4 mg at HS.     (M1A.9XX0) Chronic gout without tophus, unspecified cause, unspecified site  Comment: no s/s of flare.   Plan: continue with allopurinol 100 mg BID      Electronically signed by:  ANGEL Arnold CNP

## 2018-06-20 NOTE — LETTER
2018        RE: Earl Sanchez  [address] [place] [address] [address]  [facility] [address]        [facility]  Chief Complaint   Patient presents with     Annual Comprehensive Nursing Home       [facility] Medical Record Number:  [id number]    HPI:    Earl Sanchez is a 89 year old  (1926), who is being seen today for an annual comprehensive visit at [facility].  HPI information obtained from: facility chart records, facility staff, patient report and [facility] chart review.      Today's concerns are:  Routine general medical examination at a health care facility  Completed today 18    Gait apraxia  Ataxia  TCU stay from 18-18 after falling at [facility].  He had extensive work up, including MRI/MRA head and neck, all negative for acute pathology and neurology felt he had parkinsonian features. It is unclear if there has been follow up with neurology.     Chronic atrial fibrillation (H)  On coumadin for anticoagulation. No s/s of bleeding. Heart rates are controlled with rates 68-75.     Coronary artery disease involving native coronary artery of native heart without angina pectoris  Essential hypertension  No complaints of pain chest pain. Not on medications for hypertension.   Based on JNC-8 goals,  patients age of 89 year old, presence of diabetes or CKD, and goals of care goal BP is  <140/90 mm Hg. Patient is stable and continue without pharmacological invention with routine assessment.BP Readin/76  110/78  130/78  HR:  68-78    Benign hypertension with chronic kidney disease, stage III    Lab Results   Component Value Date    CR 1.16 2018     Late onset Alzheimer's disease without behavioral disturbance  CPT 4.4/5.6 at TCU stay. SLUMS . BIMS 7/15 on 18. Previously living at [facility] but needing a higher level of care so he moved to [facility] in 2018. Currently  wheelchair bound and requires the assistance of 2 to transfer. Has been eating well and having regular bowel movements. Weights have ranged 155-163 lbs since February of this year.     Hypothyroidism, unspecified type  Recent increase in levothyroxine to 75 mcg daily.   TSH   Date Value Ref Range Status   05/24/2018 5.92 (H) 0.30 - 5.00 uIU/mL Final     Benign prostatic hyperplasia, unspecified whether lower urinary tract symptoms present  No urinary complaints. Managed with daily cardura    Chronic gout without tophus, unspecified cause, unspecified site  No s/s of flare. Managed with daily allopurinol     ALLERGIES: No known allergies  PROBLEM LIST:  Patient Active Problem List   Diagnosis     Hypertension goal BP (blood pressure) < 140/90     CAD (coronary artery disease)     Osteoarthritis     Gait apraxia     Atrial fibrillation (H)     BPH (benign prostatic hyperplasia)     Heart failure (H)     Hyperlipidemia with target LDL less than 100     CKD (chronic kidney disease) stage 3, GFR 30-59 ml/min     Microalbuminuria     Anemia     Hypothyroidism     Elevated alkaline phosphatase level     Advance care planning     Health Care Home     Chronic anticoagulation     Elevated fasting glucose     Toe inflammation     Elevated uric acid in blood     Gastritis     Abdominal aortic aneurysm (H)     Long-term (current) use of anticoagulants [Z79.01]     Hereditary multiple exostosis     General weakness     Tear of meniscus of left knee     Weakness generalized     Ataxia     Physical deconditioning     Cognitive impairment     Acute encephalopathy     Encounter for monitoring coumadin therapy     Eczema     PAST MEDICAL HISTORY:  has a past medical history of Abdominal aortic aneurysm (H); Acute MI (1981, 2007); Atrial fibrillation (H); BPH (benign prostatic hyperplasia); CAD (coronary artery disease); Chronic kidney disease; Duodenal ulcer with hemorrhage; Gait apraxia; Gastritis; Gastro-oesophageal reflux disease;  Heart failure (H); Hyperlipidaemia LDL goal <100; Hypertension goal BP (blood pressure) < 140/90; Osteoarthritis; Renal disease; and Thyroid disease. He also has no past medical history of Malignant hyperthermia or PONV (postoperative nausea and vomiting).  PAST SURGICAL HISTORY:  has a past surgical history that includes hernia repair, inguinal rt/lt; Ankle surgery; Phacoemulsification clear cornea with toric intraocular lens implant (4/10/2014); Phacoemulsification clear cornea with toric intraocular lens implant (4/22/2014); coronary artery bypass (2007); and Heart Cath Left heart cath (12/10/07).  FAMILY HISTORY: family history includes Hypertension in his maternal grandmother and mother.  SOCIAL HISTORY:  reports that he has never smoked. He has never used smokeless tobacco. He reports that he drinks alcohol. He reports that he does not use illicit drugs.  IMMUNIZATIONS:  Most Recent Immunizations   Administered Date(s) Administered     Influenza (High Dose) 3 valent vaccine 10/10/2017     Pneumo Conj 13-V (2010&after) 03/08/2018     TDAP Vaccine (Adacel) 08/21/2012     Above immunizations pulled from [facility]. [facility] and facility records also reconciled. Outstanding information sent to  to update [facility].  Future immunizations are not needed at this point as all recommended immunizations are up to date.   MEDICATIONS:  Current Outpatient Prescriptions   Medication Sig Dispense Refill     acetaminophen (TYLENOL) 500 MG tablet Take 1,000 mg by mouth every 6 hours as needed for mild pain        allopurinol (ZYLOPRIM) 100 MG tablet Take 100 mg by mouth 2 times daily       aspirin (ASPIRIN LOW DOSE) 81 MG tablet Take 81 mg by mouth daily        doxazosin (CARDURA) 4 MG tablet Take 1 tablet (4 mg) by mouth At Bedtime 90 tablet 3     ERGOCALCIFEROL PO Take 50,000 Units by mouth every morning Wednesday        LEVOTHYROXINE SODIUM PO Take 75 mcg by mouth daily        nitroglycerin (NITROSTAT)  0.4 MG SL tablet Place 1 tablet (0.4 mg) under the tongue every 5 minutes as needed for chest pain 25 tablet 0     pravastatin (PRAVACHOL) 40 MG tablet TAKE 1 TABLET DAILY 90 tablet 1     triamcinolone (KENALOG) 0.025 % cream Apply topically every 12 hours as needed to rash to the affected area prn, apply tin layer to the rash at the LE.       Warfarin Sodium (COUMADIN PO) As directed, per INR       Medications reviewed:  Medications reconciled to facility chart and changes were made to reflect current medications as identified as above med list. Below are the changes that were made:   Medications stopped since last EPIC medication reconciliation:   There are no discontinued medications.    Medications started since last [facility] medication reconciliation:  No orders of the defined types were placed in this encounter.      Case Management:  I have reviewed the facility/SNF care plan/MDS which was done 5/16/18, including the falls risk, nutrition and pain screening. I also reviewed the current immunizations, and preventive care..Future cancer screening is not clinically indicated secondary to age/goals of care Patient's desire to return to the community is not present. Current Level of Care is appropriate.    Advance Directive Discussion:    I reviewed the current advanced directives as reflected in EPIC, the POLST and the facility chart, and verified the congruency of orders. I contacted the first party [facility] and left a message regarding the plan of Care.  I did not due to cognitive impairment review the advance directives with the resident.     Team Discussion:  I communicated with the appropriate disciplines involved with the Plan of Care:   Nursing      Patient Goal:  Patient's goal will be reviewed with daughter    Information reviewed:  Medications, vital signs, orders, and nursing notes.    ROS:  4 point ROS including Respiratory, CV, GI and , other than that noted in the HPI,  is negative    Exam:  /72   "Pulse 68  Temp 98.7  F (37.1  C)  Resp 18  Ht 5' 9\" (1.753 m)  Wt 163 lb (73.9 kg)  SpO2 96%  BMI 24.07 kg/m2  GENERAL APPEARANCE:  Alert, in no distress, pleasant, cooperative.  EYES:  EOM, lids, pupils and irises normal, sclera clear and conjunctiva normal, no discharge or mattering on lids or lashes noted  ENT:  Mouth normal, moist mucous membranes, nose without drainage or crusting, external ears without lesions, hearing acuity intact  NECK:  Nontender, supple, symmetrical  RESP:  respiratory effort and palpation of chest normal, no chest wall tenderness, no respiratory distress, Lung sounds clear, patient is on room air  CV:  Palpation and auscultation of heart done, rate and rhythm regular, no murmur.. Edema none in bilateral lower extremities. VASCULAR: no open areas. Warm extremities.  ABDOMEN:  normal bowel sounds, soft, nontender.  M/S:   Gait and station wheelchair bound and unsafe without assistance, , no tenderness or swelling of the joints; able to move all extremities  SKIN:  Inspection and palpation of skin and subcutaneous tissue: skin warm, dry and intact without rashes  NEURO: cranial nerves 2-12 grossly intact, no facial asymmetry, no speech deficits and able to follow directions, moves all extremities symmetrically  PSYCH:  insight and judgement intact, memory impaired, affect and mood normal    Lab/Diagnostic data:   CBC RESULTS:   Recent Labs   Lab Test 05/24/18 02/20/18   WBC  6.7  6.9   RBC  4.16*  3.30*   HGB  13.0*  10.2*   HCT  40.1  32.6*   MCV  96  99   MCH  31.3  30.9   MCHC  32.4  31.3*   RDW  15.9*  15.0*   PLT  210  176       Last Basic Metabolic Panel:  Recent Labs   Lab Test 05/24/18 02/20/18   NA  140  139   POTASSIUM  3.8  3.8   CHLORIDE  106  108*   JAYME  9.4  9.0   CO2  28  25   BUN  19  26   CR  1.16  1.22   GLC  91  131*       Liver Function Studies -   Recent Labs   Lab Test  02/05/18   1005  01/17/18   2212   PROTTOTAL  7.3  7.5   ALBUMIN  3.3*  3.3*   BILITOTAL  0.7  " 0.5   ALKPHOS  97  118   AST  21  17   ALT  15  18       TSH   Date Value Ref Range Status   05/24/2018 5.92 (H) 0.30 - 5.00 uIU/mL Final   11/29/2017 1.38 0.40 - 4.00 mU/L Final       Lab Results   Component Value Date    A1C 5.9 02/05/2015    A1C 6.2 04/08/2014         ASSESSMENT/PLAN  (Z00.00) Routine general medical examination at a health care facility  (primary encounter diagnosis)  Comment: completed today.   Plan: next annual due prior to 6/20/19.    (R48.2) Gait apraxia  (R27.0) Ataxia  Comment: causing falls. Evaluated by neurology in the hospital with MRI. No etiology other than thought to be parkinsonians features. Now wheelchair bound and requires the assistance of 2 for transfers. Unclear if there has been neurology follow up as there is no access to electronic record. Message left for daughter and will discuss follow up if warranted.   Plan: continue with staff assistance for transfers. Follow up with neurology is family would like.     (I48.2) Chronic atrial fibrillation (H)  (Z79.01) Long-term (current) use of anticoagulants  Comment: not on meds for rate control but heart rates have been 68-72.   Plan: continue with coumadin for anticoagulation.     (I25.10) Coronary artery disease involving native coronary artery of native heart without angina pectoris  (I10) Essential hypertension  (I12.9,  N18.3) Benign hypertension with chronic kidney disease, stage III  Comment: chronic. Controlled blood pressures. Creatinine stable and at baseline around 1.1 in may.   Plan: BMP every 6 months. Monitor blood pressure.     (G30.1,  F02.80) Late onset Alzheimer's disease without behavioral disturbance  Comment: chronic, progressive. Weights have been stable and he has been sleeping well at night. No behaviors noted by staff.   Plan: continue with 24/7 nursing care.     (E03.9) Hypothyroidism, unspecified type  Comment: TSH was adjusted 6 weeks ago. Lab is scheduled for 6/26.  Plan: continue with levothyroxine 75  mcg daily and adjust as needed to obtain TSH goal of 4-5.    (N40.0) Benign prostatic hyperplasia, unspecified whether lower urinary tract symptoms present  Comment: chronic, controlled.   Plan: continue with cardura 4 mg at HS.     (M1A.9XX0) Chronic gout without tophus, unspecified cause, unspecified site  Comment: no s/s of flare.   Plan: continue with allopurinol 100 mg BID      Electronically signed by:  ANGEL Arnold CNP        Sincerely,        ANGEL Hoyos CNP

## 2018-06-21 ENCOUNTER — TRANSFERRED RECORDS (OUTPATIENT)
Dept: HEALTH INFORMATION MANAGEMENT | Facility: CLINIC | Age: 83
End: 2018-06-21

## 2018-06-21 LAB
INR PPP: 1.53 (ref 0.9–1.1)
INR PPP: 1.53 (ref 0.9–1.1)

## 2018-06-25 ENCOUNTER — RECORDS - HEALTHEAST (OUTPATIENT)
Dept: LAB | Facility: CLINIC | Age: 83
End: 2018-06-25

## 2018-06-26 ENCOUNTER — TRANSFERRED RECORDS (OUTPATIENT)
Dept: HEALTH INFORMATION MANAGEMENT | Facility: CLINIC | Age: 83
End: 2018-06-26

## 2018-06-26 LAB
TSH SERPL DL<=0.005 MIU/L-ACNC: 2.06 UIU/ML (ref 0.3–5)
TSH SERPL-ACNC: 2.06 UIU/ML (ref 0.3–5)

## 2018-06-27 ENCOUNTER — RECORDS - HEALTHEAST (OUTPATIENT)
Dept: LAB | Facility: CLINIC | Age: 83
End: 2018-06-27

## 2018-06-28 ENCOUNTER — TRANSFERRED RECORDS (OUTPATIENT)
Dept: HEALTH INFORMATION MANAGEMENT | Facility: CLINIC | Age: 83
End: 2018-06-28

## 2018-06-28 ENCOUNTER — NURSING HOME VISIT (OUTPATIENT)
Dept: GERIATRICS | Facility: CLINIC | Age: 83
End: 2018-06-28
Payer: COMMERCIAL

## 2018-06-28 DIAGNOSIS — Z79.01 LONG TERM CURRENT USE OF ANTICOAGULANT THERAPY: ICD-10-CM

## 2018-06-28 DIAGNOSIS — I48.20 CHRONIC ATRIAL FIBRILLATION (H): Primary | ICD-10-CM

## 2018-06-28 DIAGNOSIS — Z51.81 ENCOUNTER FOR THERAPEUTIC DRUG MONITORING: ICD-10-CM

## 2018-06-28 LAB
INR PPP: 1.65 (ref 0.9–1.1)
INR PPP: 1.65 (ref 0.9–1.1)

## 2018-06-28 PROCEDURE — 99308 SBSQ NF CARE LOW MDM 20: CPT | Performed by: NURSE PRACTITIONER

## 2018-06-28 NOTE — LETTER
6/28/2018        RE: Earl Sanchez  [address] [place] [address] [address]  [facility] [address]        No notes on file      Sincerely,        ANGEL Hoyos CNP

## 2018-06-28 NOTE — PROGRESS NOTES
[facility]    HPI:    Earl Sanchez is a 89 year old  (9/17/1926), who is being seen today for an episodic care visit at [facility].  HPI information obtained from: facility chart records and facility staff. Today's concern is INR/Coumadin management for A. Fib    Bleeding Signs/Symptoms:  None  Thromboembolic Signs/Symptoms:  None    Medication Changes:  No  Dietary Changes:  No  Activity Changes: No  Bacterial/Viral Infection:  No    Missed Coumadin Doses:  None    On ASA: Yes - 81 mg po q day    Other Concerns:  No    OBJECTIVE:    INR today: 1.65 up from 1.53  Current Dose:  3 mg Thurs and 2.5 mg other days    ASSESSMENT:  1. Chronic atrial fibrillation (H)    2. Encounter for therapeutic drug monitoring    3. Long term current use of anticoagulant therapy      Subtherapeutic INR for goal of 2-3    PLAN:    New Dose: 2.5 mg M/W/F and 3 mg all other days.       Next INR: 1 week      Electronically signed by:  ANGEL Hoyos CNP

## 2018-07-03 ENCOUNTER — RECORDS - HEALTHEAST (OUTPATIENT)
Dept: LAB | Facility: CLINIC | Age: 83
End: 2018-07-03

## 2018-07-05 ENCOUNTER — NURSING HOME VISIT (OUTPATIENT)
Dept: GERIATRICS | Facility: CLINIC | Age: 83
End: 2018-07-05
Payer: COMMERCIAL

## 2018-07-05 ENCOUNTER — TRANSFERRED RECORDS (OUTPATIENT)
Dept: HEALTH INFORMATION MANAGEMENT | Facility: CLINIC | Age: 83
End: 2018-07-05

## 2018-07-05 VITALS
BODY MASS INDEX: 24.59 KG/M2 | RESPIRATION RATE: 18 BRPM | SYSTOLIC BLOOD PRESSURE: 142 MMHG | HEART RATE: 79 BPM | TEMPERATURE: 98 F | OXYGEN SATURATION: 99 % | HEIGHT: 69 IN | WEIGHT: 166 LBS | DIASTOLIC BLOOD PRESSURE: 84 MMHG

## 2018-07-05 DIAGNOSIS — Z79.01 LONG TERM CURRENT USE OF ANTICOAGULANT THERAPY: ICD-10-CM

## 2018-07-05 DIAGNOSIS — Z51.81 ENCOUNTER FOR THERAPEUTIC DRUG MONITORING: ICD-10-CM

## 2018-07-05 DIAGNOSIS — I48.20 CHRONIC ATRIAL FIBRILLATION (H): Primary | ICD-10-CM

## 2018-07-05 LAB
INR PPP: 1.95 (ref 0.9–1.1)
INR PPP: 1.95 (ref 0.9–1.1)

## 2018-07-05 PROCEDURE — 99308 SBSQ NF CARE LOW MDM 20: CPT | Performed by: NURSE PRACTITIONER

## 2018-07-05 NOTE — PROGRESS NOTES
[facility]    HPI:    Earl Sanchez is a 89 year old  (9/17/1926), who is being seen today for an episodic care visit at [facility].   HPI information obtained from: facility chart records, facility staff, patient report and [facility] chart review. Today's concern is INR/Coumadin management for A. Fib    Bleeding Signs/Symptoms:  None  Thromboembolic Signs/Symptoms:  None    Medication Changes:  No  Dietary Changes:  No  Activity Changes: No  Bacterial/Viral Infection:  No    Missed Coumadin Doses:  None    On ASA: Yes - 81 mg po q day    Other Concerns:  No    OBJECTIVE:    INR Today:  1.95  Current Dose:  2.5 mg M/W/F and 3 mg all other days.    ASSESSMENT:     Chronic atrial fibrillation (H)  Encounter for therapeutic drug monitoring  Long term current use of anticoagulant therapy    Therapeutic INR for goal of 2-3    PLAN:    New Dose: No Change      Next INR: 1 week    Electronically signed by:  ANGEL Arnold CNP

## 2018-07-11 ENCOUNTER — RECORDS - HEALTHEAST (OUTPATIENT)
Dept: LAB | Facility: CLINIC | Age: 83
End: 2018-07-11

## 2018-07-12 ENCOUNTER — NURSING HOME VISIT (OUTPATIENT)
Dept: GERIATRICS | Facility: CLINIC | Age: 83
End: 2018-07-12
Payer: COMMERCIAL

## 2018-07-12 ENCOUNTER — TRANSFERRED RECORDS (OUTPATIENT)
Dept: HEALTH INFORMATION MANAGEMENT | Facility: CLINIC | Age: 83
End: 2018-07-12

## 2018-07-12 VITALS
BODY MASS INDEX: 23.31 KG/M2 | WEIGHT: 157.4 LBS | OXYGEN SATURATION: 98 % | HEIGHT: 69 IN | SYSTOLIC BLOOD PRESSURE: 138 MMHG | HEART RATE: 82 BPM | TEMPERATURE: 98.2 F | RESPIRATION RATE: 16 BRPM | DIASTOLIC BLOOD PRESSURE: 74 MMHG

## 2018-07-12 DIAGNOSIS — Z79.01 LONG TERM CURRENT USE OF ANTICOAGULANT THERAPY: ICD-10-CM

## 2018-07-12 DIAGNOSIS — I48.20 CHRONIC ATRIAL FIBRILLATION (H): Primary | ICD-10-CM

## 2018-07-12 DIAGNOSIS — Z51.81 ENCOUNTER FOR THERAPEUTIC DRUG MONITORING: ICD-10-CM

## 2018-07-12 LAB
INR PPP: 2.08 (ref 0.9–1.1)
INR PPP: 2.08 (ref 0.9–1.1)

## 2018-07-12 PROCEDURE — 99308 SBSQ NF CARE LOW MDM 20: CPT | Performed by: NURSE PRACTITIONER

## 2018-07-12 NOTE — PROGRESS NOTES
[facility]    HPI:    Earl Sanchez is a 89 year old  (9/17/1926), who is being seen today for an episodic care visit at [facility].  HPI information obtained from: facility chart records and facility staff. Today's concern is INR/Coumadin management for A. Fib    Bleeding Signs/Symptoms:  None  Thromboembolic Signs/Symptoms:  None    Medication Changes:  No  Dietary Changes:  No  Activity Changes: No  Bacterial/Viral Infection:  No    Missed Coumadin Doses:  None    On ASA: Yes - 81 mg po q day    Other Concerns:  No    OBJECTIVE:    INR Today:  2.06  Current Dose:  2.5 mg MWF and 3 mg other days    ASSESSMENT:  1. Chronic atrial fibrillation (H)    2. Encounter for therapeutic drug monitoring    3. Long term current use of anticoagulant therapy      Therapeutic INR for goal of 2-3    PLAN:    New Dose: 2.5 mg M/W and 3 mg all other days.     Next INR: 2 weeks    Electronically signed by:  ANGEL Hoyos CNP

## 2018-07-12 NOTE — LETTER
7/12/2018        RE: Earl Sanchez  [address] [place] [address] [address]  [facility] [address]          Sincerely,        ANGEL Hoyos CNP

## 2018-07-25 ENCOUNTER — RECORDS - HEALTHEAST (OUTPATIENT)
Dept: LAB | Facility: CLINIC | Age: 83
End: 2018-07-25

## 2018-07-26 ENCOUNTER — NURSING HOME VISIT (OUTPATIENT)
Dept: GERIATRICS | Facility: CLINIC | Age: 83
End: 2018-07-26
Payer: COMMERCIAL

## 2018-07-26 ENCOUNTER — TRANSFERRED RECORDS (OUTPATIENT)
Dept: HEALTH INFORMATION MANAGEMENT | Facility: CLINIC | Age: 83
End: 2018-07-26

## 2018-07-26 VITALS
DIASTOLIC BLOOD PRESSURE: 72 MMHG | RESPIRATION RATE: 18 BRPM | WEIGHT: 161.6 LBS | OXYGEN SATURATION: 97 % | HEIGHT: 69 IN | HEART RATE: 76 BPM | TEMPERATURE: 98.6 F | SYSTOLIC BLOOD PRESSURE: 124 MMHG | BODY MASS INDEX: 23.93 KG/M2

## 2018-07-26 DIAGNOSIS — Z51.81 ENCOUNTER FOR THERAPEUTIC DRUG MONITORING: ICD-10-CM

## 2018-07-26 DIAGNOSIS — Z79.01 LONG TERM CURRENT USE OF ANTICOAGULANT THERAPY: ICD-10-CM

## 2018-07-26 DIAGNOSIS — I48.20 CHRONIC ATRIAL FIBRILLATION (H): Primary | ICD-10-CM

## 2018-07-26 LAB
INR PPP: 2.35 (ref 0.9–1.1)
INR PPP: 2.35 (ref 0.9–1.1)

## 2018-07-26 PROCEDURE — 99308 SBSQ NF CARE LOW MDM 20: CPT | Performed by: NURSE PRACTITIONER

## 2018-07-26 NOTE — LETTER
7/26/2018        RE: Earl Sanchez  [address] [place] [address] [address]  [facility] [address]        [facility]    HPI:    Earl Sanchez is a 89 year old  (9/17/1926), who is being seen today for an episodic care visit at [facility].  HPI information obtained from: facility chart records and facility staff. Today's concern is INR/Coumadin management for A. Fib    Bleeding Signs/Symptoms:  None  Thromboembolic Signs/Symptoms:  None    Medication Changes:  No  Dietary Changes:  No  Activity Changes: No  Bacterial/Viral Infection:  No    Missed Coumadin Doses:  None    On ASA: Yes - 81 mg po q day    Other Concerns:  No    OBJECTIVE:    INR Today:  2.35  Current Dose: 2.5 mg M/W and 3 mg all other days.      ASSESSMENT:  1. Chronic atrial fibrillation (H)    2. Encounter for therapeutic drug monitoring    3. Long term current use of anticoagulant therapy      Therapeutic INR for goal of 2-3    PLAN:    New Dose: No Change      Next INR: 2 weeks    Electronically signed by:  ANGEL Hoyos CNP            Sincerely,        ANGEL Hoyos CNP    
Ambulatory

## 2018-07-26 NOTE — PROGRESS NOTES
[facility]    HPI:    Earl Sanchez is a 89 year old  (9/17/1926), who is being seen today for an episodic care visit at [facility].  HPI information obtained from: facility chart records and facility staff. Today's concern is INR/Coumadin management for A. Fib    Bleeding Signs/Symptoms:  None  Thromboembolic Signs/Symptoms:  None    Medication Changes:  No  Dietary Changes:  No  Activity Changes: No  Bacterial/Viral Infection:  No    Missed Coumadin Doses:  None    On ASA: Yes - 81 mg po q day    Other Concerns:  No    OBJECTIVE:    INR Today:  2.35  Current Dose: 2.5 mg M/W and 3 mg all other days.      ASSESSMENT:  1. Chronic atrial fibrillation (H)    2. Encounter for therapeutic drug monitoring    3. Long term current use of anticoagulant therapy      Therapeutic INR for goal of 2-3    PLAN:    New Dose: No Change      Next INR: 2 weeks    Electronically signed by:  ANGEL Hoyos CNP

## 2018-08-09 ENCOUNTER — NURSING HOME VISIT (OUTPATIENT)
Dept: GERIATRICS | Facility: CLINIC | Age: 83
End: 2018-08-09
Payer: COMMERCIAL

## 2018-08-09 VITALS
HEART RATE: 74 BPM | RESPIRATION RATE: 18 BRPM | WEIGHT: 161.8 LBS | TEMPERATURE: 98 F | DIASTOLIC BLOOD PRESSURE: 84 MMHG | BODY MASS INDEX: 23.96 KG/M2 | HEIGHT: 69 IN | SYSTOLIC BLOOD PRESSURE: 134 MMHG | OXYGEN SATURATION: 98 %

## 2018-08-09 DIAGNOSIS — Z79.01 LONG TERM CURRENT USE OF ANTICOAGULANT THERAPY: ICD-10-CM

## 2018-08-09 DIAGNOSIS — I48.20 CHRONIC ATRIAL FIBRILLATION (H): Primary | ICD-10-CM

## 2018-08-09 DIAGNOSIS — Z51.81 ENCOUNTER FOR THERAPEUTIC DRUG MONITORING: ICD-10-CM

## 2018-08-09 PROCEDURE — 99308 SBSQ NF CARE LOW MDM 20: CPT | Performed by: NURSE PRACTITIONER

## 2018-08-09 NOTE — PROGRESS NOTES
[facility]    HPI:    Earl Sanchez is a 89 year old  (9/17/1926), who is being seen today for an episodic care visit at [facility].   HPI information obtained from: facility chart records, facility staff and patient report. Today's concern is INR/Coumadin management for A. Fib    Bleeding Signs/Symptoms:  None  Thromboembolic Signs/Symptoms:  None    Medication Changes:  No  Dietary Changes:  No  Activity Changes: No  Bacterial/Viral Infection:  No    Missed Coumadin Doses:  None    On ASA: Yes - 81 mg po q day    Other Concerns:  No    OBJECTIVE:    INR Today:  2.3  Current Dose:  2.5 mg M/W and 3 mg all other days.     ASSESSMENT:  1. Chronic atrial fibrillation (H)    2. Encounter for therapeutic drug monitoring    3. Long term current use of anticoagulant therapy      Therapeutic INR for goal of 2-3    PLAN:    New Dose: No Change      Next INR: 3 weeks    Electronically signed by:  ANGEL Arnold CNP

## 2018-08-09 NOTE — LETTER
8/9/2018        RE: Earl Sanchez  [address] [place] [address] [address]  [facility] [address]          [facility]    HPI:    Earl Sanchez is a 89 year old  (9/17/1926), who is being seen today for an episodic care visit at [facility].   HPI information obtained from: facility chart records, facility staff and patient report. Today's concern is INR/Coumadin management for A. Fib    Bleeding Signs/Symptoms:  None  Thromboembolic Signs/Symptoms:  None    Medication Changes:  No  Dietary Changes:  No  Activity Changes: No  Bacterial/Viral Infection:  No    Missed Coumadin Doses:  None    On ASA: Yes - 81 mg po q day    Other Concerns:  No    OBJECTIVE:    INR Today:  2.3  Current Dose:  2.5 mg M/W and 3 mg all other days.     ASSESSMENT:  1. Chronic atrial fibrillation (H)    2. Encounter for therapeutic drug monitoring    3. Long term current use of anticoagulant therapy      Therapeutic INR for goal of 2-3    PLAN:    New Dose: No Change      Next INR: 3 weeks    Electronically signed by:  ANGEL Arnold CNP            Sincerely,        ANGEL Hoyos CNP

## 2018-08-10 ENCOUNTER — RECORDS - HEALTHEAST (OUTPATIENT)
Dept: LAB | Facility: CLINIC | Age: 83
End: 2018-08-10

## 2018-08-10 ENCOUNTER — TRANSFERRED RECORDS (OUTPATIENT)
Dept: HEALTH INFORMATION MANAGEMENT | Facility: CLINIC | Age: 83
End: 2018-08-10

## 2018-08-10 LAB
INR PPP: 2.33 (ref 0.9–1.1)
INR PPP: 2.33 (ref 0.9–1.1)

## 2018-08-22 ENCOUNTER — RECORDS - HEALTHEAST (OUTPATIENT)
Dept: LAB | Facility: CLINIC | Age: 83
End: 2018-08-22

## 2018-08-23 ENCOUNTER — TRANSFERRED RECORDS (OUTPATIENT)
Dept: HEALTH INFORMATION MANAGEMENT | Facility: CLINIC | Age: 83
End: 2018-08-23

## 2018-08-23 LAB
INR PPP: 2.73 (ref 0.9–1.1)
INR PPP: 2.73 (ref 0.9–1.1)

## 2018-08-29 ENCOUNTER — RECORDS - HEALTHEAST (OUTPATIENT)
Dept: LAB | Facility: CLINIC | Age: 83
End: 2018-08-29

## 2018-08-30 LAB — INR PPP: 2.41 (ref 0.9–1.1)

## 2018-09-05 ENCOUNTER — RECORDS - HEALTHEAST (OUTPATIENT)
Dept: LAB | Facility: CLINIC | Age: 83
End: 2018-09-05

## 2018-09-06 LAB — INR PPP: 2.17 (ref 0.9–1.1)

## 2018-09-13 VITALS
RESPIRATION RATE: 18 BRPM | HEART RATE: 76 BPM | SYSTOLIC BLOOD PRESSURE: 132 MMHG | HEIGHT: 69 IN | DIASTOLIC BLOOD PRESSURE: 78 MMHG | OXYGEN SATURATION: 96 % | TEMPERATURE: 98.1 F | BODY MASS INDEX: 24.14 KG/M2 | WEIGHT: 163 LBS

## 2018-09-13 RX ORDER — POLYETHYLENE GLYCOL 3350
17 POWDER (GRAM) MISCELLANEOUS DAILY
COMMUNITY

## 2018-09-13 RX ORDER — SENNA AND DOCUSATE SODIUM 50; 8.6 MG/1; MG/1
1 TABLET, FILM COATED ORAL 2 TIMES DAILY
COMMUNITY

## 2018-09-14 ENCOUNTER — NURSING HOME VISIT (OUTPATIENT)
Dept: GERIATRICS | Facility: CLINIC | Age: 83
End: 2018-09-14
Payer: COMMERCIAL

## 2018-09-14 DIAGNOSIS — I48.20 CHRONIC ATRIAL FIBRILLATION (H): ICD-10-CM

## 2018-09-14 DIAGNOSIS — N40.0 BENIGN PROSTATIC HYPERPLASIA, UNSPECIFIED WHETHER LOWER URINARY TRACT SYMPTOMS PRESENT: Primary | ICD-10-CM

## 2018-09-14 DIAGNOSIS — I12.9 BENIGN HYPERTENSION WITH CHRONIC KIDNEY DISEASE, STAGE III (H): ICD-10-CM

## 2018-09-14 DIAGNOSIS — N18.30 BENIGN HYPERTENSION WITH CHRONIC KIDNEY DISEASE, STAGE III (H): ICD-10-CM

## 2018-09-14 DIAGNOSIS — F02.80 LATE ONSET ALZHEIMER'S DISEASE WITHOUT BEHAVIORAL DISTURBANCE (H): ICD-10-CM

## 2018-09-14 DIAGNOSIS — E55.9 VITAMIN D DEFICIENCY: ICD-10-CM

## 2018-09-14 DIAGNOSIS — G30.1 LATE ONSET ALZHEIMER'S DISEASE WITHOUT BEHAVIORAL DISTURBANCE (H): ICD-10-CM

## 2018-09-14 PROCEDURE — 99309 SBSQ NF CARE MODERATE MDM 30: CPT | Performed by: INTERNAL MEDICINE

## 2018-09-24 ENCOUNTER — RECORDS - HEALTHEAST (OUTPATIENT)
Dept: LAB | Facility: CLINIC | Age: 83
End: 2018-09-24

## 2018-09-25 ENCOUNTER — NURSING HOME VISIT (OUTPATIENT)
Dept: GERIATRICS | Facility: CLINIC | Age: 83
End: 2018-09-25
Payer: COMMERCIAL

## 2018-09-25 VITALS
BODY MASS INDEX: 24.59 KG/M2 | WEIGHT: 166 LBS | HEIGHT: 69 IN | OXYGEN SATURATION: 97 % | TEMPERATURE: 97.7 F | SYSTOLIC BLOOD PRESSURE: 154 MMHG | RESPIRATION RATE: 18 BRPM | HEART RATE: 74 BPM | DIASTOLIC BLOOD PRESSURE: 78 MMHG

## 2018-09-25 DIAGNOSIS — Z51.81 ENCOUNTER FOR THERAPEUTIC DRUG MONITORING: ICD-10-CM

## 2018-09-25 DIAGNOSIS — Z79.01 LONG TERM CURRENT USE OF ANTICOAGULANT THERAPY: ICD-10-CM

## 2018-09-25 DIAGNOSIS — I48.20 CHRONIC ATRIAL FIBRILLATION (H): Primary | ICD-10-CM

## 2018-09-25 LAB — INR PPP: 2.35 (ref 0.9–1.1)

## 2018-09-25 PROCEDURE — 99308 SBSQ NF CARE LOW MDM 20: CPT | Performed by: NURSE PRACTITIONER

## 2018-09-25 NOTE — PROGRESS NOTES
[facility]    HPI:    Earl Sanchez is a 89 year old  (9/17/1926), who is being seen today for an episodic care visit at [facility].   HPI information obtained from: facility chart records, facility staff and patient report. Today's concern is INR/Coumadin management for A. Fib    Bleeding Signs/Symptoms:  None  Thromboembolic Signs/Symptoms:  None    Medication Changes:  No  Dietary Changes:  No  Activity Changes: No  Bacterial/Viral Infection:  No    Missed Coumadin Doses:  None    On ASA: Yes - 81 mg po q day    Other Concerns:  No    OBJECTIVE:    INR Today:  2.35  Current Dose:  2.5 mg daily    ASSESSMENT:  1. Chronic atrial fibrillation (H)    2. Encounter for therapeutic drug monitoring    3. Long term current use of anticoagulant therapy      Therapeutic INR for goal of 2-3    PLAN:    New Dose: No Change      Next INR: 3 weeks: 10/16/18    Electronically signed by:  ANGEL Arnold CNP

## 2018-09-25 NOTE — LETTER
9/25/2018        RE: Earl Sanchez  [address]  [facility] [address]          [facility]    HPI:    Earl Sanchez is a 89 year old  (9/17/1926), who is being seen today for an episodic care visit at [facility].   HPI information obtained from: facility chart records, facility staff and patient report. Today's concern is INR/Coumadin management for A. Fib    Bleeding Signs/Symptoms:  None  Thromboembolic Signs/Symptoms:  None    Medication Changes:  No  Dietary Changes:  No  Activity Changes: No  Bacterial/Viral Infection:  No    Missed Coumadin Doses:  None    On ASA: Yes - 81 mg po q day    Other Concerns:  No    OBJECTIVE:    INR Today:  2.35  Current Dose:  2.5 mg daily    ASSESSMENT:  1. Chronic atrial fibrillation (H)    2. Encounter for therapeutic drug monitoring    3. Long term current use of anticoagulant therapy      Therapeutic INR for goal of 2-3    PLAN:    New Dose: No Change      Next INR: 3 weeks: 10/16/18    Electronically signed by:  ANGEL Arnold CNP              Sincerely,        ANGEL Hoyos CNP

## 2018-09-26 PROBLEM — R41.89 COGNITIVE IMPAIRMENT: Status: RESOLVED | Noted: 2018-01-21 | Resolved: 2018-09-26

## 2018-09-26 PROBLEM — N18.30 BENIGN HYPERTENSION WITH CHRONIC KIDNEY DISEASE, STAGE III (H): Status: ACTIVE | Noted: 2018-09-26

## 2018-09-26 PROBLEM — I12.9 BENIGN HYPERTENSION WITH CHRONIC KIDNEY DISEASE, STAGE III (H): Status: ACTIVE | Noted: 2018-09-26

## 2018-09-27 NOTE — PROGRESS NOTES
"Earl Sanchez is a 89 year old male seen September 14, 2018 at [facility] where he has resided for 7 months (admit 2/2018) seen to for regulatory visit.       Seen in his room, up to , just finishing his morning shave.      Pleasant and smiling, reports he is feeling well.    Says no to pain or dyspnea.   No concerns reported by nursing staff.      Patient had brief hospitalization in January 2018 for encephalopathy with gait disturbance.  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   Workup in the hospital included MRI/MRA, labs.   No specific causes found, and his MS cleared, agitation resolved.    He was in [facility] for a month, improved, but needed more support than AL, so has transitioned to LTC for permanent placement.  Continues to need assist for transfers bed to .      Patient has had atrial fib for many years, anticoagulated with warfarin.       Past Medical History:   Diagnosis Date     Abdominal aortic aneurysm (H)     per 2/12/15 abdominal US, mild aneurysm measuring [id number]     Acute MI 1981, 2007     Atrial fibrillation (H)      BPH (benign prostatic hyperplasia)      CAD (coronary artery disease)     CABG 2007, f/b cardio, Dr. 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    Has 3 children.     Review Of Systems  Limited, but negative other than above.   SLUMS 13/30   CPT 4.4   BIMS 7/15    EXAM:  Up to , NAD  /78  Pulse 76  Temp 98.1  F (36.7  C)  Resp 18  Ht 5' 9\" (1.753 m) "  Wt 163 lb (73.9 kg)  SpO2 96%  BMI 24.07 kg/m2   Neck supple without adenopathy  Lungs with decreased BS, no rales or wheeze  Heart irreg irreg s1s2, 1/6 HSM  Abd soft, NT, no distention, +BS  Ext without edema.   Neuro: no tremor or stiffness, +STML   Psych: affect okay.     Last Comprehensive Metabolic Panel:  Sodium   Date Value Ref Range Status   05/24/2018 140 136 - 145 mmol/L Final     Potassium   Date Value Ref Range Status   05/24/2018 3.8 3.5 - 5.0 mmol/L Final     Chloride   Date Value Ref Range Status   05/24/2018 106 98 - 107 mmol/L Final     Carbon Dioxide   Date Value Ref Range Status   05/24/2018 28 22 - 31 mmol/L Final     Anion Gap   Date Value Ref Range Status   05/24/2018 6 5 - 18 mmol/L Final     Glucose   Date Value Ref Range Status   05/24/2018 91 70 - 125 mg/dL Final     Urea Nitrogen   Date Value Ref Range Status   05/24/2018 19 8 - 28 mg/dL Final     Creatinine   Date Value Ref Range Status   05/24/2018 1.16 0.70 - 1.30 mg/dL Final     GFR Estimate   Date Value Ref Range Status   05/24/2018 59 (L) >60 ml/min/1.73m2 Final     Calcium   Date Value Ref Range Status   05/24/2018 9.4 8.5 - 10.5 mg/dL Final     Lab Results   Component Value Date    WBC 6.7 05/24/2018      HGB 13.0 05/24/2018      MCV 96 05/24/2018       05/24/2018           IMP/PLAN:  (I12.9,  N18.3) Benign hypertension with chronic kidney disease, stage III    Comment:  Good control, SBPs 130s, GFR stable   Plan: remains on doxazosin for bp control and BPH.   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    Plan: PHYSICAL THERAPY / OCCUPATIONAL THERAPY for balance, gait, strengthening, ADLs.     Neurology follow up with Dr Nell Saldana    (G30.1,  F02.80) Late onset Alzheimer's disease without behavioral disturbance  Comment: gradual decline in cognition, low functional status.  Plan: LTC support for meds, meals, activity.    More settled in LTC  and no recent behavioral symptoms reported.       (I48.2) Chronic atrial fibrillation (H)  Comment: not requiring rate slowing meds  Plan: warfarin per INR, goal 2-2.5 given fall risk.    (I25.10) Coronary artery disease involving native coronary artery of native heart without angina pectoris  Comment: h/o CABG 2007  Plan: daily ASA, statin for secondary prevention.   NTG prn    (N40.0) Benign prostatic hyperplasia, unspecified whether lower urinary tract symptoms present  Comment: longstanding  Plan: continue doxazosin.       (E03.9) Hypothyroidism, unspecified type  Comment:   TSH   Date Value Ref Range Status   06/26/2018 2.06 0.30 - 5.00 uIU/mL Final   Plan: same dose levothyroxine, yearly TSH       (E55.9) Vitamin D deficiency  Comment: replaced   Plan: change vit D to 2000 units/day       Patty Gamble MD

## 2018-10-16 ENCOUNTER — RECORDS - HEALTHEAST (OUTPATIENT)
Dept: LAB | Facility: CLINIC | Age: 83
End: 2018-10-16

## 2018-10-16 ENCOUNTER — TRANSFERRED RECORDS (OUTPATIENT)
Dept: HEALTH INFORMATION MANAGEMENT | Facility: CLINIC | Age: 83
End: 2018-10-16

## 2018-10-16 ENCOUNTER — NURSING HOME VISIT (OUTPATIENT)
Dept: GERIATRICS | Facility: CLINIC | Age: 83
End: 2018-10-16
Payer: COMMERCIAL

## 2018-10-16 VITALS
OXYGEN SATURATION: 97 % | RESPIRATION RATE: 18 BRPM | WEIGHT: 167.5 LBS | HEART RATE: 76 BPM | TEMPERATURE: 97.5 F | DIASTOLIC BLOOD PRESSURE: 75 MMHG | SYSTOLIC BLOOD PRESSURE: 141 MMHG | HEIGHT: 69 IN | BODY MASS INDEX: 24.81 KG/M2

## 2018-10-16 DIAGNOSIS — Z51.81 ENCOUNTER FOR THERAPEUTIC DRUG MONITORING: ICD-10-CM

## 2018-10-16 DIAGNOSIS — Z79.01 LONG TERM CURRENT USE OF ANTICOAGULANT THERAPY: ICD-10-CM

## 2018-10-16 DIAGNOSIS — I48.20 CHRONIC ATRIAL FIBRILLATION (H): Primary | ICD-10-CM

## 2018-10-16 LAB
INR PPP: 1.96 (ref 0.9–1.1)
INR PPP: 1.96 (ref 0.9–1.1)

## 2018-10-16 PROCEDURE — 99308 SBSQ NF CARE LOW MDM 20: CPT | Performed by: NURSE PRACTITIONER

## 2018-10-16 RX ORDER — DONEPEZIL HYDROCHLORIDE 5 MG/1
5 TABLET, FILM COATED ORAL AT BEDTIME
COMMUNITY
End: 2020-11-16

## 2018-10-16 NOTE — PROGRESS NOTES
[place] [facility]    [place] Medical Record Number:  [id number]  Place of Service where encounter took place:  [facility] ([facility]) [[id number]]    HPI:    Earl Sanchez is a 89 year old  (9/17/1926), who is being seen today for an episodic care visit at the above location.   HPI information obtained from: facility chart records, facility staff and patient report. Today's concern is INR/Coumadin management for A. Fib    Bleeding Signs/Symptoms:  None  Thromboembolic Signs/Symptoms:  None    Medication Changes:  No  Dietary Changes:  No  Activity Changes: No  Bacterial/Viral Infection:  No    Missed Coumadin Doses:  None    On ASA: Yes - 81 mg po q day    Other Concerns:  No    OBJECTIVE:    INR Today:  1.96   Current Dose:  2.5 mg by mouth at bedtime    ASSESSMENT:    ICD-10-CM    1. Chronic atrial fibrillation (H) I48.2    2. Encounter for therapeutic drug monitoring Z51.81    3. Long term current use of anticoagulant therapy Z79.01      Subtherapeutic INR for goal of 2-3    PLAN:    New Dose: Warfarin 2 mg Monday, Wednesday, and Friday and Warfarin 1.5 mg all other days.      Next INR: 2 weeks        Electronically signed by:  ANGEL Corrigan CNP

## 2018-10-29 ENCOUNTER — RECORDS - HEALTHEAST (OUTPATIENT)
Dept: LAB | Facility: CLINIC | Age: 83
End: 2018-10-29

## 2018-10-30 ENCOUNTER — NURSING HOME VISIT (OUTPATIENT)
Dept: GERIATRICS | Facility: CLINIC | Age: 83
End: 2018-10-30
Payer: COMMERCIAL

## 2018-10-30 ENCOUNTER — TRANSFERRED RECORDS (OUTPATIENT)
Dept: HEALTH INFORMATION MANAGEMENT | Facility: CLINIC | Age: 83
End: 2018-10-30

## 2018-10-30 VITALS
OXYGEN SATURATION: 98 % | HEART RATE: 72 BPM | RESPIRATION RATE: 16 BRPM | SYSTOLIC BLOOD PRESSURE: 121 MMHG | WEIGHT: 132.3 LBS | HEIGHT: 69 IN | BODY MASS INDEX: 19.6 KG/M2 | DIASTOLIC BLOOD PRESSURE: 65 MMHG | TEMPERATURE: 97.7 F

## 2018-10-30 DIAGNOSIS — Z79.01 LONG TERM CURRENT USE OF ANTICOAGULANT THERAPY: ICD-10-CM

## 2018-10-30 DIAGNOSIS — I48.20 CHRONIC ATRIAL FIBRILLATION (H): Primary | ICD-10-CM

## 2018-10-30 DIAGNOSIS — Z51.81 ENCOUNTER FOR THERAPEUTIC DRUG MONITORING: ICD-10-CM

## 2018-10-30 LAB
INR PPP: 2.02 (ref 0.9–1.1)
INR PPP: 2.02 (ref 0.9–1.1)

## 2018-10-30 PROCEDURE — 99308 SBSQ NF CARE LOW MDM 20: CPT | Performed by: NURSE PRACTITIONER

## 2018-10-30 NOTE — PROGRESS NOTES
[facility]    [place] Medical Record Number:  [id number]  Place of Service where encounter took place:  [facility] ([facility]GS) [[id number]]    HPI:    Earl Sanchez is a 89 year old  (9/17/1926), who is being seen today for an episodic care visit at the above location.   HPI information obtained from: facility chart records, facility staff, patient report and [facility] chart review. Today's concern is INR/Coumadin management for A. Fib    Bleeding Signs/Symptoms:  None  Thromboembolic Signs/Symptoms:  None    Medication Changes:  No  Dietary Changes:  No  Activity Changes: No  Bacterial/Viral Infection:  No    Missed Coumadin Doses:  None    On ASA: Yes - 81 mg po q day    Other Concerns:  No    OBJECTIVE:    INR Today:  2.02  Current Dose:  Warfarin 2 mg Monday, Wednesday, and Friday and Warfarin 1.5 mg all other days.    ASSESSMENT:  1. Chronic atrial fibrillation (H)    2. Encounter for therapeutic drug monitoring    3. Long term current use of anticoagulant therapy      Therapeutic INR for goal of 2-3    PLAN:    New Dose: No Change      Next INR: 3 weeks        Electronically signed by:  ANGEL Corrigan CNP  [facility]

## 2018-11-12 ENCOUNTER — RECORDS - HEALTHEAST (OUTPATIENT)
Dept: LAB | Facility: CLINIC | Age: 83
End: 2018-11-12

## 2018-11-12 ENCOUNTER — NURSING HOME VISIT (OUTPATIENT)
Dept: GERIATRICS | Facility: CLINIC | Age: 83
End: 2018-11-12
Payer: COMMERCIAL

## 2018-11-12 VITALS
BODY MASS INDEX: 24.66 KG/M2 | DIASTOLIC BLOOD PRESSURE: 71 MMHG | RESPIRATION RATE: 16 BRPM | SYSTOLIC BLOOD PRESSURE: 143 MMHG | HEART RATE: 86 BPM | WEIGHT: 167 LBS | OXYGEN SATURATION: 97 % | TEMPERATURE: 96.9 F

## 2018-11-12 DIAGNOSIS — I48.20 CHRONIC ATRIAL FIBRILLATION (H): Primary | ICD-10-CM

## 2018-11-12 DIAGNOSIS — I10 ESSENTIAL HYPERTENSION: ICD-10-CM

## 2018-11-12 DIAGNOSIS — E03.9 HYPOTHYROIDISM, UNSPECIFIED TYPE: ICD-10-CM

## 2018-11-12 DIAGNOSIS — Z79.01 LONG TERM CURRENT USE OF ANTICOAGULANT THERAPY: ICD-10-CM

## 2018-11-12 DIAGNOSIS — M1A.9XX0 CHRONIC GOUT WITHOUT TOPHUS, UNSPECIFIED CAUSE, UNSPECIFIED SITE: ICD-10-CM

## 2018-11-12 PROCEDURE — 99309 SBSQ NF CARE MODERATE MDM 30: CPT | Performed by: NURSE PRACTITIONER

## 2018-11-12 NOTE — PROGRESS NOTES
[place] [facility]    Chief Complaint   Patient presents with     detention Regulatory       [place] Medical Record Number:  [id number]  Place of Service where encounter took place:  [facility] ([facility]) [[id number]]    HPI:    Earl Sanchez is a 89 year old  (9/17/1926), who is being seen today for a federally mandated E/M visit.  HPI information obtained from: facility chart records, facility staff, patient report and [facility] chart review.     Today's concerns are:   Diagnosis Comments   1. Chronic atrial fibrillation (H)  Denies CP, SOB or palpation. Continues to be anticoagulated with warfarin per INR   2. Long term current use of anticoagulant therapy  Dosing per INR. Currently alternating 2.5  And 3 mg. No signs of acute bleeding.    3. Hypothyroidism, unspecified type  TSH   Date Value Ref Range Status   06/26/2018 2.06 0.30 - 5.00 uIU/mL Final   Currently taking synthroid 75 mcg/day. Remains asymptomatic.      4. Essential hypertension  Per nursing facility documentation. Blood pressure range 121-143/65-71. Managed on doxazosin.    5. Chronic gout without tophus, unspecified cause, unspecified site  No recent flares on allopurinol 100 mg/bid. 2          Last 3 BPs:      HR Ranges:      Current Weight:       ALLERGIES: No known allergies  PAST MEDICAL HISTORY:  has a past medical history of Abdominal aortic aneurysm (H); Acute MI (1981, 2007); Atrial fibrillation (H); BPH (benign prostatic hyperplasia); CAD (coronary artery disease); Chronic kidney disease; Duodenal ulcer with hemorrhage; Gait apraxia; Gastritis; Gastro-oesophageal reflux disease; Heart failure (H); Hyperlipidaemia LDL goal <100; Hypertension goal BP (blood pressure) < 140/90; Osteoarthritis; Renal disease; and Thyroid disease. He also has no past medical history of Malignant hyperthermia or PONV (postoperative nausea and vomiting).  PAST SURGICAL HISTORY:  has a past surgical history that includes  hernia repair, inguinal rt/lt; Ankle surgery; Phacoemulsification clear cornea with toric intraocular lens implant (4/10/2014); Phacoemulsification clear cornea with toric intraocular lens implant (4/22/2014); coronary artery bypass (2007); and Heart Cath Left heart cath (12/10/07).  FAMILY HISTORY: family history includes Hypertension in his maternal grandmother and mother.  SOCIAL HISTORY:  reports that he has never smoked. He has never used smokeless tobacco. He reports that he drinks alcohol. He reports that he does not use illicit drugs.    MEDICATIONS:  Current Outpatient Prescriptions   Medication Sig Dispense Refill     acetaminophen (TYLENOL) 500 MG tablet Take 1,000 mg by mouth every 6 hours as needed for mild pain        allopurinol (ZYLOPRIM) 100 MG tablet Take 100 mg by mouth 2 times daily       aspirin (ASPIRIN LOW DOSE) 81 MG tablet Take 81 mg by mouth daily        donepezil (ARICEPT) 5 MG tablet Take 5 mg by mouth At Bedtime       doxazosin (CARDURA) 4 MG tablet Take 1 tablet (4 mg) by mouth At Bedtime 90 tablet 3     ERGOCALCIFEROL PO Take 50,000 Units by mouth every morning Wednesday        LEVOTHYROXINE SODIUM PO Take 75 mcg by mouth daily        nitroglycerin (NITROSTAT) 0.4 MG SL tablet Place 1 tablet (0.4 mg) under the tongue every 5 minutes as needed for chest pain 25 tablet 0     polyethylene glycol 3350 POWD Take 17 g by mouth daily       pravastatin (PRAVACHOL) 40 MG tablet TAKE 1 TABLET DAILY 90 tablet 1     Sennosides-Docusate Sodium (SENNA-DOCUSATE SODIUM) 8.6-50 MG TABS Take 50 mg by mouth as needed       triamcinolone (KENALOG) 0.025 % cream Apply topically every 12 hours as needed to rash to the affected area prn, apply tin layer to the rash at the LE.       Warfarin Sodium (COUMADIN PO) As directed, per INR       Medications reviewed:  Medications reconciled to facility chart and changes were made to reflect current medications as identified as above med list. Below are the changes  that were made:   Medications stopped since last EPIC medication reconciliation:   There are no discontinued medications.    Medications started since last [facility] medication reconciliation:  No orders of the defined types were placed in this encounter.        Case Management:  I have reviewed the care plan and MDS and do agree with the plan. Patient's desire to return to the community is not present.  Information reviewed:  Medications, vital signs, orders, and nursing notes.    ROS:  4 point ROS including Respiratory, CV, GI and , other than that noted in the HPI,  is negative    Exam:  Vitals: /71  Pulse 86  Temp 96.9  F (36.1  C)  Resp 16  Wt 167 lb (75.8 kg)  SpO2 97%  BMI 24.66 kg/m2  BMI= Body mass index is 24.66 kg/(m^2).  GENERAL APPEARANCE:  Alert, in no distress  ENT:  Mouth and posterior oropharynx normal, moist mucous membranes, [place]  RESP:  respiratory effort and palpation of chest normal, lungs clear to auscultation   CV:  Palpation and auscultation of heart done , regular rate and rhythm, no murmur, rub, or gallop  M/S:   Gait and station normal  Digits and nails normal  SKIN:  Inspection of skin and subcutaneous tissue baseline, Palpation of skin and subcutaneous tissue baseline  NEURO:   Cranial nerves 2-12 are normal tested and grossly at patient's baseline  PSYCH:  oriented X 3, affect and mood normal  GENERAL APPEARANCE:  Alert, in no distress  ENT:  Mouth and posterior oropharynx normal, moist mucous membranes  RESP:  respiratory effort and palpation of chest normal, lungs clear to auscultation , no respiratory distress  CV:  Palpation and auscultation of heart done , regular rate and rhythm, no murmur, rub, or gallop  M/S:   Gait and station normal  Digits and nails normal  SKIN:  Inspection of skin and subcutaneous tissue baseline, Palpation of skin and subcutaneous tissue baseline  NEURO:   Cranial nerves 2-12 are normal tested and grossly at patient's baseline  PSYCH:  oriented X  3       Lab/Diagnostic data:  CBC RESULTS:   Recent Labs   Lab Test 05/24/18 02/20/18   WBC  6.7  6.9   RBC  4.16*  3.30*   HGB  13.0*  10.2*   HCT  40.1  32.6*   MCV  96  99   MCH  31.3  30.9   MCHC  32.4  31.3*   RDW  15.9*  15.0*   PLT  210  176       Last Basic Metabolic Panel:  Recent Labs   Lab Test 05/24/18 02/20/18   NA  140  139   POTASSIUM  3.8  3.8   CHLORIDE  106  108*   JAYME  9.4  9.0   CO2  28  25   BUN  19  26   CR  1.16  1.22   GLC  91  131*       Liver Function Studies -   Recent Labs   Lab Test  02/05/18   1005  01/17/18   2212   PROTTOTAL  7.3  7.5   ALBUMIN  3.3*  3.3*   BILITOTAL  0.7  0.5   ALKPHOS  97  118   AST  21  17   ALT  15  18       TSH   Date Value Ref Range Status   06/26/2018 2.06 0.30 - 5.00 uIU/mL Final   05/24/2018 5.92 (H) 0.30 - 5.00 uIU/mL Final     INR 10/30: 2.02  Lab Results   Component Value Date    INR 1.96 10/16/2018    INR 2.73 08/23/2018    INR 2.33 08/10/2018     ASSESSMENT/PLAN  (I48.2) Chronic atrial fibrillation (H)  (primary encounter diagnosis)  (Z79.01) Long term current use of anticoagulant therapy  Comment:Chornic, rate controlled off medication. Currently anticoagulated with warfarin.   Plan: Continue warfarin per INR. Next INR 11/20/18.     (E03.9) Hypothyroidism, unspecified type  Comment:   TSH   Date Value Ref Range Status   06/26/2018 2.06 0.30 - 5.00 uIU/mL Final     Plan: Check TSH q6 months and PRN, and keep level b/w 4-5 in elderly. Continue synthroid 75 mcg/day.     (I10) Essential hypertension  Comment: Chronic, blood pressure and BPH managed on doxazosin.   Plan: Keep SBP> 130 mmHg and DBP > 65 mmHg (levels below these increase mortality as shown by standard studies and observations). Continue current medication regimen without change and adjustments as clinically indicated.     (M1A.9XX0) Chronic gout without tophus, unspecified cause, unspecified site  Comment: No flares on current allopurinol dose.   Plan: Continue without change.        Electronically signed by:  ANGEL Corrigan [facility]

## 2018-11-13 ENCOUNTER — NURSING HOME VISIT (OUTPATIENT)
Dept: GERIATRICS | Facility: CLINIC | Age: 83
End: 2018-11-13
Payer: COMMERCIAL

## 2018-11-13 ENCOUNTER — TRANSFERRED RECORDS (OUTPATIENT)
Dept: HEALTH INFORMATION MANAGEMENT | Facility: CLINIC | Age: 83
End: 2018-11-13

## 2018-11-13 VITALS
OXYGEN SATURATION: 97 % | RESPIRATION RATE: 18 BRPM | HEART RATE: 86 BPM | SYSTOLIC BLOOD PRESSURE: 135 MMHG | WEIGHT: 163 LBS | HEIGHT: 69 IN | DIASTOLIC BLOOD PRESSURE: 78 MMHG | BODY MASS INDEX: 24.14 KG/M2 | TEMPERATURE: 97.3 F

## 2018-11-13 DIAGNOSIS — Z51.81 ENCOUNTER FOR THERAPEUTIC DRUG MONITORING: ICD-10-CM

## 2018-11-13 DIAGNOSIS — I48.20 CHRONIC ATRIAL FIBRILLATION (H): Primary | ICD-10-CM

## 2018-11-13 DIAGNOSIS — Z79.01 LONG TERM CURRENT USE OF ANTICOAGULANT THERAPY: ICD-10-CM

## 2018-11-13 LAB
INR PPP: 1.66 (ref 0.9–1.1)
INR PPP: 1.66 (ref 0.9–1.1)

## 2018-11-13 PROCEDURE — 99308 SBSQ NF CARE LOW MDM 20: CPT | Performed by: NURSE PRACTITIONER

## 2018-11-13 NOTE — PROGRESS NOTES
[facility] [place]:  [id number]  Place of Service where encounter took place:  [facility] ([facility]) [[id number]]    HPI:    Earl Sanchez is a 89 year old  (9/17/1926), who is being seen today for an episodic care visit at the above location.   HPI information obtained from: facility chart records, facility staff, patient report and [facility] chart review. Today's concern is INR/Coumadin management for A. Fib    Bleeding Signs/Symptoms:  None  Thromboembolic Signs/Symptoms:  None    Medication Changes:  No  Dietary Changes:  No  Activity Changes: No  Bacterial/Viral Infection:  No    Missed Coumadin Doses:  None    On ASA: Yes - 81 mg po q day    Other Concerns:  No    OBJECTIVE:    INR Today:  1.66  Current Dose:  Warfarin 2 mg Monday, Wednesday, and Friday and Warfarin 1.5 mg all other days.    ASSESSMENT:  1. Chronic atrial fibrillation (H)    2. Encounter for therapeutic drug monitoring    3. Long term current use of anticoagulant therapy      Therapeutic INR for goal of 2-3    PLAN:    New Dose: No Change      Next INR: 11/20/18    Electronically signed by:  ANGEL Corrigan CNP  [facility]

## 2018-11-15 ENCOUNTER — RECORDS - HEALTHEAST (OUTPATIENT)
Dept: LAB | Facility: CLINIC | Age: 83
End: 2018-11-15

## 2018-11-15 ENCOUNTER — TRANSFERRED RECORDS (OUTPATIENT)
Dept: HEALTH INFORMATION MANAGEMENT | Facility: CLINIC | Age: 83
End: 2018-11-15

## 2018-11-15 LAB
ANION GAP SERPL CALCULATED.3IONS-SCNC: 5 MMOL/L (ref 5–18)
ANION GAP SERPL CALCULATED.3IONS-SCNC: 5 MMOL/L (ref 5–18)
BUN SERPL-MCNC: 26 MG/DL (ref 8–28)
BUN SERPL-MCNC: 26 MG/DL (ref 8–28)
CALCIUM SERPL-MCNC: 9.2 MG/DL (ref 8.5–10.5)
CALCIUM SERPL-MCNC: 9.2 MG/DL (ref 8.5–10.5)
CHLORIDE BLD-SCNC: 110 MMOL/L (ref 98–107)
CHLORIDE SERPLBLD-SCNC: 110 MMOL/L (ref 98–107)
CO2 SERPL-SCNC: 25 MMOL/L (ref 22–31)
CO2 SERPL-SCNC: 25 MMOL/L (ref 22–31)
CREAT SERPL-MCNC: 0.99 MG/DL (ref 0.7–1.3)
CREAT SERPL-MCNC: 0.99 MG/DL (ref 0.7–1.3)
ERYTHROCYTE [DISTWIDTH] IN BLOOD BY AUTOMATED COUNT: 14.1 % (ref 11–14.5)
ERYTHROCYTE [DISTWIDTH] IN BLOOD BY AUTOMATED COUNT: 14.1 % (ref 11–14.5)
GFR SERPL CREATININE-BSD FRML MDRD: >60 ML/MIN/1.73M2
GFR SERPL CREATININE-BSD FRML MDRD: >60 ML/MIN/1.73M2
GLUCOSE BLD-MCNC: 70 MG/DL (ref 70–125)
GLUCOSE SERPL-MCNC: 70 MG/DL (ref 70–125)
HCT VFR BLD AUTO: 38.2 % (ref 40–54)
HCT VFR BLD AUTO: 38.2 % (ref 40–54)
HEMOGLOBIN: 11.8 G/DL (ref 14–18)
HGB BLD-MCNC: 11.8 G/DL (ref 14–18)
MCH RBC QN AUTO: 31 PG (ref 27–34)
MCH RBC QN AUTO: 31 PG (ref 27–34)
MCHC RBC AUTO-ENTMCNC: 30.9 G/DL (ref 32–36)
MCHC RBC AUTO-ENTMCNC: 30.9 G/DL (ref 32–36)
MCV RBC AUTO: 100 FL (ref 80–100)
MCV RBC AUTO: 100 FL (ref 80–100)
PLATELET # BLD AUTO: 191 THOU/UL (ref 140–440)
PLATELET # BLD AUTO: 191 THOU/UL (ref 140–440)
PMV BLD AUTO: 10.3 FL (ref 8.5–12.5)
POTASSIUM BLD-SCNC: 3.9 MMOL/L (ref 3.5–5)
POTASSIUM SERPL-SCNC: 3.9 MMOL/L (ref 3.5–5)
RBC # BLD AUTO: 3.81 MILL/UL (ref 4.4–6.2)
RBC # BLD AUTO: 3.81 MILL/UL (ref 4.4–6.2)
SODIUM SERPL-SCNC: 140 MMOL/L (ref 136–145)
SODIUM SERPL-SCNC: 140 MMOL/L (ref 136–145)
WBC # BLD AUTO: 7.2 THOU/UL (ref 4–11)
WBC: 7.2 THOU/UL (ref 4–11)

## 2018-11-19 ENCOUNTER — RECORDS - HEALTHEAST (OUTPATIENT)
Dept: LAB | Facility: CLINIC | Age: 83
End: 2018-11-19

## 2018-11-20 ENCOUNTER — TRANSFERRED RECORDS (OUTPATIENT)
Dept: HEALTH INFORMATION MANAGEMENT | Facility: CLINIC | Age: 83
End: 2018-11-20

## 2018-11-20 ENCOUNTER — NURSING HOME VISIT (OUTPATIENT)
Dept: GERIATRICS | Facility: CLINIC | Age: 83
End: 2018-11-20
Payer: COMMERCIAL

## 2018-11-20 VITALS
RESPIRATION RATE: 18 BRPM | BODY MASS INDEX: 24.59 KG/M2 | TEMPERATURE: 97.3 F | SYSTOLIC BLOOD PRESSURE: 135 MMHG | OXYGEN SATURATION: 97 % | HEIGHT: 69 IN | WEIGHT: 166 LBS | HEART RATE: 86 BPM | DIASTOLIC BLOOD PRESSURE: 78 MMHG

## 2018-11-20 DIAGNOSIS — Z79.01 LONG TERM CURRENT USE OF ANTICOAGULANT THERAPY: ICD-10-CM

## 2018-11-20 DIAGNOSIS — Z51.81 ENCOUNTER FOR THERAPEUTIC DRUG MONITORING: ICD-10-CM

## 2018-11-20 DIAGNOSIS — I48.20 CHRONIC ATRIAL FIBRILLATION (H): Primary | ICD-10-CM

## 2018-11-20 LAB
INR PPP: 2.3 (ref 0.9–1.1)
INR PPP: 2.3 (ref 0.9–1.1)

## 2018-11-20 PROCEDURE — 99308 SBSQ NF CARE LOW MDM 20: CPT | Performed by: NURSE PRACTITIONER

## 2018-11-20 NOTE — PROGRESS NOTES
[facility] Medical Record Number:  [id number]  Place of Service where encounter took place:  [facility] ([facility]) [[id number]]    HPI:    Earl Sanchez is a 89 year old  (9/17/1926), who is being seen today for an episodic care visit at the above location.   HPI information obtained from: facility chart records, facility staff, patient report and [facility] chart review. Today's concern is INR/Coumadin management for A. Fib    Bleeding Signs/Symptoms:  None  Thromboembolic Signs/Symptoms:  None    Medication Changes:  No  Dietary Changes:  No  Activity Changes: No  Bacterial/Viral Infection:  No    Missed Coumadin Doses:  None    On ASA: Yes - 81 mg po q day    Other Concerns:  No    OBJECTIVE:    INR Today:  2.30  Current Dose:  Coumadin 3 mg by mouth one time a day every Mon, Wed, Fri AND 2.5 mg PO all other days    ASSESSMENT:  1. Chronic atrial fibrillation (H)    2. Encounter for therapeutic drug monitoring    3. Long term current use of anticoagulant therapy      Therapeutic INR for goal of 2-3    PLAN:    New Dose: No Change      Next INR: 12/11/18      Electronically signed by:  ANGEL Corrigan CNP  [facility]

## 2018-12-11 ENCOUNTER — TRANSFERRED RECORDS (OUTPATIENT)
Dept: HEALTH INFORMATION MANAGEMENT | Facility: CLINIC | Age: 83
End: 2018-12-11

## 2018-12-11 ENCOUNTER — NURSING HOME VISIT (OUTPATIENT)
Dept: GERIATRICS | Facility: CLINIC | Age: 83
End: 2018-12-11
Payer: COMMERCIAL

## 2018-12-11 ENCOUNTER — RECORDS - HEALTHEAST (OUTPATIENT)
Dept: LAB | Facility: CLINIC | Age: 83
End: 2018-12-11

## 2018-12-11 VITALS
WEIGHT: 166.1 LBS | HEART RATE: 80 BPM | BODY MASS INDEX: 24.6 KG/M2 | OXYGEN SATURATION: 96 % | DIASTOLIC BLOOD PRESSURE: 78 MMHG | HEIGHT: 69 IN | TEMPERATURE: 97.2 F | SYSTOLIC BLOOD PRESSURE: 130 MMHG | RESPIRATION RATE: 18 BRPM

## 2018-12-11 DIAGNOSIS — I48.20 CHRONIC ATRIAL FIBRILLATION (H): Primary | ICD-10-CM

## 2018-12-11 DIAGNOSIS — Z79.01 LONG TERM CURRENT USE OF ANTICOAGULANT THERAPY: ICD-10-CM

## 2018-12-11 DIAGNOSIS — Z51.81 ENCOUNTER FOR THERAPEUTIC DRUG MONITORING: ICD-10-CM

## 2018-12-11 LAB
INR PPP: 2.31 (ref 0.9–1.1)
INR PPP: 2.31 (ref 0.9–1.1)

## 2018-12-11 PROCEDURE — 99308 SBSQ NF CARE LOW MDM 20: CPT | Performed by: NURSE PRACTITIONER

## 2018-12-11 ASSESSMENT — MIFFLIN-ST. JEOR: SCORE: 1393.8

## 2018-12-11 NOTE — PROGRESS NOTES
[place] [facility]    [place] Medical Record Number:  [id number]  Place of Service where encounter took place:  [facility] ([facility]) [[id number]]    HPI:    Earl Sanchez is a 89 year old  (9/17/1926), who is being seen today for an episodic care visit at the above location.   HPI information obtained from: facility chart records, facility staff and patient report. Today's concern is INR/Coumadin management for A. Fib    Bleeding Signs/Symptoms:  None  Thromboembolic Signs/Symptoms:  None    Medication Changes:  No  Dietary Changes:  No  Activity Changes: No  Bacterial/Viral Infection:  No    Missed Coumadin Doses:  None    On ASA: Yes - 81 mg po q day    Other Concerns:  No    OBJECTIVE:    INR Today:  2.3  Current Dose:   2.5 mg by mouth at bedtime every Tue, Thu, Sat, Sun and   3 mg by mouth at bedtime every Mon, Wed, Fri.    ASSESSMENT:    ICD-10-CM    1. Chronic atrial fibrillation (H) I48.2    2. Encounter for therapeutic drug monitoring Z51.81    3. Long term current use of anticoagulant therapy Z79.01        therapeutic INR for goal of 2-3    PLAN:    New Dose:warfarin 3 mg M/W/F/S/SUN and 2.5 mg all other days     Next INR: 12/20/18        Electronically signed by:  ANGEL Corrigan CNP

## 2018-12-19 ENCOUNTER — RECORDS - HEALTHEAST (OUTPATIENT)
Dept: LAB | Facility: CLINIC | Age: 83
End: 2018-12-19

## 2018-12-20 ENCOUNTER — NURSING HOME VISIT (OUTPATIENT)
Dept: GERIATRICS | Facility: CLINIC | Age: 83
End: 2018-12-20
Payer: COMMERCIAL

## 2018-12-20 ENCOUNTER — TRANSFERRED RECORDS (OUTPATIENT)
Dept: HEALTH INFORMATION MANAGEMENT | Facility: CLINIC | Age: 83
End: 2018-12-20

## 2018-12-20 VITALS
DIASTOLIC BLOOD PRESSURE: 74 MMHG | SYSTOLIC BLOOD PRESSURE: 138 MMHG | BODY MASS INDEX: 24.73 KG/M2 | TEMPERATURE: 96 F | HEART RATE: 78 BPM | RESPIRATION RATE: 18 BRPM | HEIGHT: 69 IN | WEIGHT: 167 LBS | OXYGEN SATURATION: 96 %

## 2018-12-20 DIAGNOSIS — Z79.01 LONG TERM CURRENT USE OF ANTICOAGULANT THERAPY: ICD-10-CM

## 2018-12-20 DIAGNOSIS — I48.20 CHRONIC ATRIAL FIBRILLATION (H): Primary | ICD-10-CM

## 2018-12-20 DIAGNOSIS — Z51.81 ENCOUNTER FOR THERAPEUTIC DRUG MONITORING: ICD-10-CM

## 2018-12-20 LAB
INR PPP: 1.82 (ref 0.9–1.1)
INR PPP: 1.82 (ref 0.9–1.1)

## 2018-12-20 PROCEDURE — 99308 SBSQ NF CARE LOW MDM 20: CPT | Performed by: NURSE PRACTITIONER

## 2018-12-20 ASSESSMENT — MIFFLIN-ST. JEOR: SCORE: 1397.89

## 2018-12-20 NOTE — PROGRESS NOTES
[place] [facility]    [place] Medical Record Number:  [id number]  Place of Service where encounter took place:  [facility] ([facility]) [[id number]]    HPI:    Earl Sanchez is a 89 year old  (9/17/1926), who is being seen today for an episodic care visit at the above location.   HPI information obtained from: facility chart records, facility staff and patient report. Today's concern is INR/Coumadin management for A. Fib    Bleeding Signs/Symptoms:  None  Thromboembolic Signs/Symptoms:  None    Medication Changes:  No  Dietary Changes:  No  Activity Changes: No  Bacterial/Viral Infection:  No    Missed Coumadin Doses:  None    On ASA: Yes - 81 mg po q day    Other Concerns:  No    OBJECTIVE:    INR Today:  1.82  Current Dose:  1 mg by mouth at bedtime     ASSESSMENT:    ICD-10-CM    1. Chronic atrial fibrillation (H) I48.2    2. Encounter for therapeutic drug monitoring Z51.81    3. Long term current use of anticoagulant therapy Z79.01        Subtherapeutic INR for goal of 2-3    PLAN:    New Dose: Warfarin 3g MWFSUN and Warfarin 2.5 mg all other days.       Next INR: 1/3/19      Electronically signed by:  ANGEL Corrigan CNP

## 2019-01-01 NOTE — TELEPHONE ENCOUNTER
Writer left message for resident's son Pako Sanchez at 13:49 on 6/4/18 regarding lab results. Family has been concerned regarding increased fatigue. TSH slightly elevated 5.92. Levothyroxine increased and TSH redraw scheduled for 6/26/18. Additionally inquired if there were any further concerns with POC. Awaiting call back.       ANGEL Corrigan [facility]       
negative

## 2019-01-02 ENCOUNTER — RECORDS - HEALTHEAST (OUTPATIENT)
Dept: LAB | Facility: CLINIC | Age: 84
End: 2019-01-02

## 2019-01-03 ENCOUNTER — TRANSFERRED RECORDS (OUTPATIENT)
Dept: HEALTH INFORMATION MANAGEMENT | Facility: CLINIC | Age: 84
End: 2019-01-03

## 2019-01-03 ENCOUNTER — NURSING HOME VISIT (OUTPATIENT)
Dept: GERIATRICS | Facility: CLINIC | Age: 84
End: 2019-01-03
Payer: COMMERCIAL

## 2019-01-03 VITALS
WEIGHT: 167 LBS | HEIGHT: 69 IN | SYSTOLIC BLOOD PRESSURE: 140 MMHG | HEART RATE: 62 BPM | TEMPERATURE: 97.2 F | RESPIRATION RATE: 18 BRPM | OXYGEN SATURATION: 96 % | DIASTOLIC BLOOD PRESSURE: 75 MMHG | BODY MASS INDEX: 24.73 KG/M2

## 2019-01-03 DIAGNOSIS — Z79.01 LONG TERM CURRENT USE OF ANTICOAGULANT THERAPY: ICD-10-CM

## 2019-01-03 DIAGNOSIS — Z51.81 ENCOUNTER FOR THERAPEUTIC DRUG MONITORING: ICD-10-CM

## 2019-01-03 DIAGNOSIS — I48.20 CHRONIC ATRIAL FIBRILLATION (H): Primary | ICD-10-CM

## 2019-01-03 LAB
INR PPP: 2.21 (ref 0.9–1.1)
INR PPP: 2.21 (ref 0.9–1.1)

## 2019-01-03 PROCEDURE — 99308 SBSQ NF CARE LOW MDM 20: CPT | Performed by: NURSE PRACTITIONER

## 2019-01-03 ASSESSMENT — MIFFLIN-ST. JEOR: SCORE: 1397.89

## 2019-01-03 NOTE — PROGRESS NOTES
[place] [facility]    [place] Medical Record Number:  [id number]  Place of Service where encounter took place:  [facility] ([facility]) [[id number]]    HPI:    Earl Sanchez is a 89 year old  (9/17/1926), who is being seen today for an episodic care visit at the above location.   HPI information obtained from: facility chart records, facility staff and patient report. Today's concern is INR/Coumadin management for A. Fib    Bleeding Signs/Symptoms:  None  Thromboembolic Signs/Symptoms:  None    Medication Changes:  No  Dietary Changes:  No  Activity Changes: No  Bacterial/Viral Infection:  No    Missed Coumadin Doses:  None    On ASA: Yes - 81 mg po q day    Other Concerns:  No    OBJECTIVE:    INR Today:  2.21  Current Dose:  3 mg by mouth at bedtime every Mon, Wed, Fri, Sat, Sun and 2.5 mg by mouth at bedtime every Tue, Thu    ASSESSMENT:    ICD-10-CM    1. Chronic atrial fibrillation (H) I48.2    2. Encounter for therapeutic drug monitoring Z51.81    3. Long term current use of anticoagulant therapy Z79.01        Therapeutic INR for goal of 2-3    PLAN:    New Dose: No Change      Next INR: 1/15/19      Electronically signed by:  ANGEL Corrigan CNP

## 2019-01-14 ENCOUNTER — RECORDS - HEALTHEAST (OUTPATIENT)
Dept: LAB | Facility: CLINIC | Age: 84
End: 2019-01-14

## 2019-01-15 ENCOUNTER — NURSING HOME VISIT (OUTPATIENT)
Dept: GERIATRICS | Facility: CLINIC | Age: 84
End: 2019-01-15
Payer: COMMERCIAL

## 2019-01-15 ENCOUNTER — TRANSFERRED RECORDS (OUTPATIENT)
Dept: HEALTH INFORMATION MANAGEMENT | Facility: CLINIC | Age: 84
End: 2019-01-15

## 2019-01-15 VITALS
OXYGEN SATURATION: 96 % | HEIGHT: 69 IN | BODY MASS INDEX: 24.88 KG/M2 | HEART RATE: 70 BPM | WEIGHT: 168 LBS | SYSTOLIC BLOOD PRESSURE: 136 MMHG | RESPIRATION RATE: 18 BRPM | TEMPERATURE: 96.8 F | DIASTOLIC BLOOD PRESSURE: 76 MMHG

## 2019-01-15 DIAGNOSIS — I48.20 CHRONIC ATRIAL FIBRILLATION (H): Primary | ICD-10-CM

## 2019-01-15 DIAGNOSIS — Z79.01 LONG TERM CURRENT USE OF ANTICOAGULANT THERAPY: ICD-10-CM

## 2019-01-15 DIAGNOSIS — Z51.81 ENCOUNTER FOR THERAPEUTIC DRUG MONITORING: ICD-10-CM

## 2019-01-15 LAB
INR PPP: 2.15 (ref 0.9–1.1)
INR PPP: 2.15 (ref 0.9–1.1)

## 2019-01-15 PROCEDURE — 99308 SBSQ NF CARE LOW MDM 20: CPT | Performed by: NURSE PRACTITIONER

## 2019-01-15 ASSESSMENT — MIFFLIN-ST. JEOR: SCORE: 1402.42

## 2019-01-17 VITALS
SYSTOLIC BLOOD PRESSURE: 136 MMHG | BODY MASS INDEX: 24.88 KG/M2 | RESPIRATION RATE: 18 BRPM | DIASTOLIC BLOOD PRESSURE: 76 MMHG | WEIGHT: 168 LBS | TEMPERATURE: 96.8 F | OXYGEN SATURATION: 96 % | HEART RATE: 70 BPM | HEIGHT: 69 IN

## 2019-01-17 ASSESSMENT — MIFFLIN-ST. JEOR: SCORE: 1402.42

## 2019-01-18 ENCOUNTER — NURSING HOME VISIT (OUTPATIENT)
Dept: GERIATRICS | Facility: CLINIC | Age: 84
End: 2019-01-18
Payer: COMMERCIAL

## 2019-01-18 DIAGNOSIS — R27.0 ATAXIA: ICD-10-CM

## 2019-01-18 DIAGNOSIS — E03.9 HYPOTHYROIDISM, UNSPECIFIED TYPE: ICD-10-CM

## 2019-01-18 DIAGNOSIS — I10 ESSENTIAL HYPERTENSION: Primary | ICD-10-CM

## 2019-01-18 DIAGNOSIS — N40.0 BENIGN PROSTATIC HYPERPLASIA, UNSPECIFIED WHETHER LOWER URINARY TRACT SYMPTOMS PRESENT: ICD-10-CM

## 2019-01-18 DIAGNOSIS — E55.9 VITAMIN D DEFICIENCY: ICD-10-CM

## 2019-01-18 PROCEDURE — 99308 SBSQ NF CARE LOW MDM 20: CPT | Performed by: INTERNAL MEDICINE

## 2019-01-18 NOTE — LETTER
1/18/2019        RE: Earl Sanchez  [facility]  [address]  [facility] [address]        Earl Sanchez is a 89 year old male seen January 18, 2019 at [facility] where he has resided for one year (admit 2/2018) seen to follow up ataxia and atrial fib.   Patient is seen on the unit, up to  and getting ready for a pizza lunch.    No new concerns reported.       Patient had a hospitalization in January 2018 for encephalopathy with gait disturbance.  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    He was in [facility] for a month, improved, but needed more support than AL, so transitioned to LTC for permanent placement.  Continues to need assist for transfers bed to .      Patient has had atrial fib for many years, anticoagulated with warfarin.   Also treated for BPH, CAD and hypothyroidism, which have been stable.        Past Medical History:   Diagnosis Date     Abdominal aortic aneurysm (H)     per 2/12/15 abdominal US, mild aneurysm measuring [id number]     Acute MI 1981, 2007     Atrial fibrillation (H)      BPH (benign prostatic hyperplasia)      CAD (coronary artery disease)     CABG 2007, f/b cardio, Dr. 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    [facility].      Has 3 children.     Review Of Systems  Limited, but negative other than above.   Weight is stable      SLUMS 13/30   CPT 4.4   BIMS 7/15    EXAM:  Up to WC, NAD  /76   Pulse 70   Temp 96.8  F (36  C)   Resp 18   " Ht 1.753 m (5' 9\")   Wt 76.2 kg (168 lb)   SpO2 96%   BMI 24.81 kg/m      Neck supple without adenopathy  Lungs with decreased BS, no rales or wheeze  Heart irreg irreg s1s2, 1/6 HSM  Abd soft, NT, no distention, +BS  Ext without edema.   Neuro: no tremor or stiffness, +STML   Psych: affect okay.     Last Comprehensive Metabolic Panel:  Sodium   Date Value Ref Range Status   11/15/2018 140 136 - 145 mmol/L Final     Potassium   Date Value Ref Range Status   11/15/2018 3.9 3.5 - 5.0 mmol/L Final     Chloride   Date Value Ref Range Status   11/15/2018 110 (H) 98 - 107 mmol/L Final     Carbon Dioxide   Date Value Ref Range Status   11/15/2018 25 22 - 31 mmol/L Final     Anion Gap   Date Value Ref Range Status   11/15/2018 5 5 - 18 mmol/L Final     Glucose   Date Value Ref Range Status   11/15/2018 70 70 - 125 mg/dL Final     Urea Nitrogen   Date Value Ref Range Status   11/15/2018 26 8 - 28 mg/dL Final     Creatinine   Date Value Ref Range Status   11/15/2018 0.99 0.70 - 1.30 mg/dL Final     GFR Estimate   Date Value Ref Range Status   11/15/2018 >60 >60 ml/min/1.73m2 Final     Calcium   Date Value Ref Range Status   11/15/2018 9.2 8.5 - 10.5 mg/dL Final     Lab Results   Component Value Date    WBC 7.2 11/15/2018      HGB 11.8 11/15/2018       11/15/2018       11/15/2018     Lab Results   Component Value Date    INR 1.82 12/20/2018        IMP/PLAN:  (I12.9,  N18.3) Benign hypertension with chronic kidney disease, stage III    Comment:  stable   Plan: remains on doxazosin for bp control and BPH.   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    Plan:   Neurology follow up with Dr Nell Saldana    (G30.1,  F02.80) Late onset Alzheimer's disease without behavioral disturbance  Comment: gradual decline in cognition, low functional status.  Plan: LTC support for meds, meals, activity.        (I48.2) Chronic atrial fibrillation " (H)  Comment: not requiring rate slowing meds  Plan: warfarin per INR, goal 2-2.5 given fall risk.    (I25.10) Coronary artery disease involving native coronary artery of native heart without angina pectoris  Comment: h/o CABG 2007  Plan: daily ASA, statin for secondary prevention.   NTG prn    (N40.0) Benign prostatic hyperplasia, unspecified whether lower urinary tract symptoms present  Comment: longstanding  Plan: continue doxazosin.       (E03.9) Hypothyroidism, unspecified type  Comment:   TSH   Date Value Ref Range Status   06/26/2018 2.06 0.30 - 5.00 uIU/mL Final   Plan: same dose levothyroxine, yearly TSH       (E55.9) Vitamin D deficiency  Comment: replaced   Plan: change vit D to 2000 units/day       Patty Gamble MD         Sincerely,        Patty Gamble MD

## 2019-01-21 ENCOUNTER — NURSING HOME VISIT (OUTPATIENT)
Dept: GERIATRICS | Facility: CLINIC | Age: 84
End: 2019-01-21
Payer: COMMERCIAL

## 2019-01-21 VITALS
WEIGHT: 168 LBS | RESPIRATION RATE: 18 BRPM | TEMPERATURE: 96.8 F | HEART RATE: 70 BPM | DIASTOLIC BLOOD PRESSURE: 76 MMHG | BODY MASS INDEX: 24.88 KG/M2 | SYSTOLIC BLOOD PRESSURE: 136 MMHG | HEIGHT: 69 IN | OXYGEN SATURATION: 96 %

## 2019-01-21 DIAGNOSIS — K62.89 RECTAL PAIN: Primary | ICD-10-CM

## 2019-01-21 DIAGNOSIS — K59.01 SLOW TRANSIT CONSTIPATION: ICD-10-CM

## 2019-01-21 DIAGNOSIS — K64.4 EXTERNAL HEMORRHOIDS: ICD-10-CM

## 2019-01-21 DIAGNOSIS — M25.551 RIGHT HIP PAIN: ICD-10-CM

## 2019-01-21 PROCEDURE — 99309 SBSQ NF CARE MODERATE MDM 30: CPT | Performed by: NURSE PRACTITIONER

## 2019-01-21 ASSESSMENT — MIFFLIN-ST. JEOR: SCORE: 1402.42

## 2019-01-26 NOTE — PROGRESS NOTES
"Earl Sanchez is a 89 year old male seen January 18, 2019 at [facility] where he has resided for one year (admit 2/2018) seen to follow up ataxia and atrial fib.   Patient is seen on the unit, up to  and getting ready for a pizza lunch.    No new concerns reported.       Patient had a hospitalization in January 2018 for encephalopathy with gait disturbance.  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    He was in [facility] for a month, improved, but needed more support than AL, so transitioned to LTC for permanent placement.  Continues to need assist for transfers bed to .      Patient has had atrial fib for many years, anticoagulated with warfarin.   Also treated for BPH, CAD and hypothyroidism, which have been stable.        Past Medical History:   Diagnosis Date     Abdominal aortic aneurysm (H)     per 2/12/15 abdominal US, mild aneurysm measuring [id number]     Acute MI 1981, 2007     Atrial fibrillation (H)      BPH (benign prostatic hyperplasia)      CAD (coronary artery disease)     CABG 2007, f/b cardio, Dr. 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      Has 3 children.     Review Of Systems  Limited, but negative other than above.   Weight is stable      SLUMS 13/30   CPT 4.4   BIMS 7/15    EXAM:  Up to , NAD  /76   Pulse 70   Temp 96.8  F (36  C)   Resp 18   Ht 1.753 m (5' 9\")   Wt 76.2 kg (168 lb)   SpO2 96%   BMI 24.81 kg/m     Neck supple without adenopathy  Lungs with " decreased BS, no rales or wheeze  Heart irreg irreg s1s2, 1/6 HSM  Abd soft, NT, no distention, +BS  Ext without edema.   Neuro: no tremor or stiffness, +STML   Psych: affect okay.     Last Comprehensive Metabolic Panel:  Sodium   Date Value Ref Range Status   11/15/2018 140 136 - 145 mmol/L Final     Potassium   Date Value Ref Range Status   11/15/2018 3.9 3.5 - 5.0 mmol/L Final     Chloride   Date Value Ref Range Status   11/15/2018 110 (H) 98 - 107 mmol/L Final     Carbon Dioxide   Date Value Ref Range Status   11/15/2018 25 22 - 31 mmol/L Final     Anion Gap   Date Value Ref Range Status   11/15/2018 5 5 - 18 mmol/L Final     Glucose   Date Value Ref Range Status   11/15/2018 70 70 - 125 mg/dL Final     Urea Nitrogen   Date Value Ref Range Status   11/15/2018 26 8 - 28 mg/dL Final     Creatinine   Date Value Ref Range Status   11/15/2018 0.99 0.70 - 1.30 mg/dL Final     GFR Estimate   Date Value Ref Range Status   11/15/2018 >60 >60 ml/min/1.73m2 Final     Calcium   Date Value Ref Range Status   11/15/2018 9.2 8.5 - 10.5 mg/dL Final     Lab Results   Component Value Date    WBC 7.2 11/15/2018      HGB 11.8 11/15/2018       11/15/2018       11/15/2018     Lab Results   Component Value Date    INR 1.82 12/20/2018        IMP/PLAN:  (I12.9,  N18.3) Benign hypertension with chronic kidney disease, stage III    Comment:  stable   Plan: remains on doxazosin for bp control and BPH.   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    Plan:   Neurology follow up with Dr Nell Saldana    (G30.1,  F02.80) Late onset Alzheimer's disease without behavioral disturbance  Comment: gradual decline in cognition, low functional status.  Plan: LTC support for meds, meals, activity.        (I48.2) Chronic atrial fibrillation (H)  Comment: not requiring rate slowing meds  Plan: warfarin per INR, goal 2-2.5 given fall risk.    (I25.10) Coronary artery  disease involving native coronary artery of native heart without angina pectoris  Comment: h/o CABG 2007  Plan: daily ASA, statin for secondary prevention.   NTG prn    (N40.0) Benign prostatic hyperplasia, unspecified whether lower urinary tract symptoms present  Comment: longstanding  Plan: continue doxazosin.       (E03.9) Hypothyroidism, unspecified type  Comment:   TSH   Date Value Ref Range Status   06/26/2018 2.06 0.30 - 5.00 uIU/mL Final   Plan: same dose levothyroxine, yearly TSH       (E55.9) Vitamin D deficiency  Comment: replaced   Plan: change vit D to 2000 units/day       Patty Gamble MD

## 2019-01-29 RX ORDER — SENNA AND DOCUSATE SODIUM 50; 8.6 MG/1; MG/1
1 TABLET, FILM COATED ORAL 2 TIMES DAILY
Qty: 180 TABLET | Refills: 3
Start: 2019-01-29 | End: 2019-03-26

## 2019-02-04 ENCOUNTER — RECORDS - HEALTHEAST (OUTPATIENT)
Dept: LAB | Facility: CLINIC | Age: 84
End: 2019-02-04

## 2019-02-05 ENCOUNTER — NURSING HOME VISIT (OUTPATIENT)
Dept: GERIATRICS | Facility: CLINIC | Age: 84
End: 2019-02-05
Payer: COMMERCIAL

## 2019-02-05 ENCOUNTER — TRANSFERRED RECORDS (OUTPATIENT)
Dept: HEALTH INFORMATION MANAGEMENT | Facility: CLINIC | Age: 84
End: 2019-02-05

## 2019-02-05 VITALS
WEIGHT: 178.1 LBS | OXYGEN SATURATION: 95 % | HEART RATE: 76 BPM | DIASTOLIC BLOOD PRESSURE: 80 MMHG | RESPIRATION RATE: 18 BRPM | SYSTOLIC BLOOD PRESSURE: 138 MMHG | HEIGHT: 69 IN | TEMPERATURE: 97 F | BODY MASS INDEX: 26.38 KG/M2

## 2019-02-05 DIAGNOSIS — I48.20 CHRONIC ATRIAL FIBRILLATION (H): Primary | ICD-10-CM

## 2019-02-05 DIAGNOSIS — Z51.81 ENCOUNTER FOR THERAPEUTIC DRUG MONITORING: ICD-10-CM

## 2019-02-05 DIAGNOSIS — Z79.01 LONG TERM CURRENT USE OF ANTICOAGULANT THERAPY: ICD-10-CM

## 2019-02-05 LAB
INR PPP: 2.4 (ref 0.9–1.1)
INR PPP: 2.4 (ref 0.9–1.1)

## 2019-02-05 PROCEDURE — 99308 SBSQ NF CARE LOW MDM 20: CPT | Performed by: NURSE PRACTITIONER

## 2019-02-05 ASSESSMENT — MIFFLIN-ST. JEOR: SCORE: 1448.24

## 2019-02-05 NOTE — PROGRESS NOTES
[facility]    [place] Medical Record Number:  [id number]  Place of Service where encounter took place:  [facility] ([facility]) [[id number]]    HPI:    Earl Sanchez is a 89 year old  (9/17/1926), who is being seen today for an episodic care visit at the above location.   HPI information obtained from: facility chart records, facility staff and patient report. Today's concern is INR/Coumadin management for A. Fib    Bleeding Signs/Symptoms:  None  Thromboembolic Signs/Symptoms:  None    Medication Changes:  No  Dietary Changes:  No  Activity Changes: No  Bacterial/Viral Infection:  No    Missed Coumadin Doses:  None    On ASA: Yes - 81 mg po q day    Other Concerns:  No    OBJECTIVE:    INR Today:  2.51  Current Dose:  3 mg by mouth at bedtime every Mon, Wed, Fri, Sat, Sun and 2.5 mg by mouth at bedtime every Tue, Thu.    ASSESSMENT:    ICD-10-CM    1. Chronic atrial fibrillation (H) I48.2    2. Encounter for therapeutic drug monitoring Z51.81    3. Long term current use of anticoagulant therapy Z79.01        Therapeutic INR for goal of 2-3    PLAN:    New Dose: No Change      Next INR: 3/5/19        Electronically signed by:  ANGEL Corrigan CNP

## 2019-03-04 ENCOUNTER — RECORDS - HEALTHEAST (OUTPATIENT)
Dept: LAB | Facility: CLINIC | Age: 84
End: 2019-03-04

## 2019-03-05 ENCOUNTER — TRANSFERRED RECORDS (OUTPATIENT)
Dept: HEALTH INFORMATION MANAGEMENT | Facility: CLINIC | Age: 84
End: 2019-03-05

## 2019-03-05 ENCOUNTER — NURSING HOME VISIT (OUTPATIENT)
Dept: GERIATRICS | Facility: CLINIC | Age: 84
End: 2019-03-05
Payer: COMMERCIAL

## 2019-03-05 VITALS
SYSTOLIC BLOOD PRESSURE: 148 MMHG | DIASTOLIC BLOOD PRESSURE: 88 MMHG | OXYGEN SATURATION: 95 % | WEIGHT: 173.5 LBS | HEART RATE: 76 BPM | BODY MASS INDEX: 25.7 KG/M2 | HEIGHT: 69 IN | RESPIRATION RATE: 18 BRPM | TEMPERATURE: 97.2 F

## 2019-03-05 DIAGNOSIS — Z79.01 LONG TERM CURRENT USE OF ANTICOAGULANT THERAPY: ICD-10-CM

## 2019-03-05 DIAGNOSIS — Z51.81 ENCOUNTER FOR THERAPEUTIC DRUG MONITORING: ICD-10-CM

## 2019-03-05 DIAGNOSIS — I48.20 CHRONIC ATRIAL FIBRILLATION (H): Primary | ICD-10-CM

## 2019-03-05 LAB
INR PPP: 2.76 (ref 0.9–1.1)
INR PPP: 2.76 (ref 0.9–1.1)

## 2019-03-05 PROCEDURE — 99308 SBSQ NF CARE LOW MDM 20: CPT | Performed by: NURSE PRACTITIONER

## 2019-03-05 ASSESSMENT — MIFFLIN-ST. JEOR: SCORE: 1427.37

## 2019-03-05 NOTE — PROGRESS NOTES
[facility]    [place] Medical Record Number:  [id number]  Place of Service where encounter took place:  [facility] ([facility]) [[id number]]    HPI:    Earl Sanchez is a 89 year old  (9/17/1926), who is being seen today for an episodic care visit at the above location.   HPI information obtained from: facility chart records, facility staff and patient report. Today's concern is INR/Coumadin management for A. Fib    Bleeding Signs/Symptoms:  None  Thromboembolic Signs/Symptoms:  None    Medication Changes:  No  Dietary Changes:  No  Activity Changes: No  Bacterial/Viral Infection:  No    Missed Coumadin Doses:  None    On ASA: Yes - 81 mg po q day    Other Concerns:  No     OBJECTIVE:    INR Today:  2.76  Current Dose:  3 mg by mouth at bedtime every Mon, Wed, Fri, Sat, Sun and 2.5 mg by mouth at bedtime every Tue, Thu    ASSESSMENT:    ICD-10-CM    1. Chronic atrial fibrillation (H) I48.2    2. Encounter for therapeutic drug monitoring Z51.81    3. Long term current use of anticoagulant therapy Z79.01        Therapeutic INR for goal of 2-3    PLAN:    New Dose: No Change      Next INR: 3 weeks        Electronically signed by:  ANGEL Corrigan CNP

## 2019-03-08 ENCOUNTER — CLINICAL UPDATE (OUTPATIENT)
Dept: PHARMACY | Facility: CLINIC | Age: 84
End: 2019-03-08

## 2019-03-08 NOTE — PROGRESS NOTES
"This patient's medication list and chart were reviewed as part of the service provided by [facility].    Assessment/Recommendations:  1. (Afib/CAD):  Pt on both ASA and Warfarin.  CHEST guidelines recommend the following:  \"For patients with AF and stable coronary artery disease (eg, no acute coronary syndrome  349 within the previous year) and who choose oral anticoagulation, we suggest OAC with either a  350 NOAC or adjusted-dose VKA therapy alone (target international normalized ratio [INR] range,  351 2.0-3.0) rather than the combination of OAC and aspirin (Weak recommendation, low quality  352 evidence)\"    May consider d'c ASA and continue with Warfarin alone.  If ASA dc'd, please check INR sooner than originally planned as may impact INR.    2. (Dementia):  Pt on Donepezil 5mg daily.  Unsure if this is offering any benefit, and may consider d'c and monitor for decline in cognition or function/restart within 4 weeks if decline noted.  Donepezil may contribute to gi adverse effects, difficulty sleeping.  3. (BPH):  As noted previously, Doxazosin may contribute to orthostasis, edema, SOB, dizziness, falls.  Pt also using for HTN control.  If experiencing these adverse effects, may benefit from switch to Tamsulosin 0.4mg and monitor BPs.  BP goal <150/90mmHg reasonable.      Christen Xie, Pharm.D.,[facility]  Board Certified Geriatric Pharmacist  Medication Therapy Management Pharmacist  [phone]          "

## 2019-03-14 ENCOUNTER — APPOINTMENT (OUTPATIENT)
Dept: CT IMAGING | Facility: CLINIC | Age: 84
End: 2019-03-14
Attending: EMERGENCY MEDICINE
Payer: COMMERCIAL

## 2019-03-14 ENCOUNTER — HOSPITAL ENCOUNTER (EMERGENCY)
Facility: CLINIC | Age: 84
Discharge: HOME | End: 2019-03-14
Attending: EMERGENCY MEDICINE | Admitting: EMERGENCY MEDICINE
Payer: COMMERCIAL

## 2019-03-14 ENCOUNTER — NURSING HOME VISIT (OUTPATIENT)
Dept: GERIATRICS | Facility: CLINIC | Age: 84
End: 2019-03-14
Payer: COMMERCIAL

## 2019-03-14 VITALS
WEIGHT: 174 LBS | RESPIRATION RATE: 18 BRPM | OXYGEN SATURATION: 95 % | BODY MASS INDEX: 25.77 KG/M2 | SYSTOLIC BLOOD PRESSURE: 150 MMHG | HEIGHT: 69 IN | TEMPERATURE: 97.7 F | HEART RATE: 72 BPM | DIASTOLIC BLOOD PRESSURE: 89 MMHG

## 2019-03-14 VITALS
TEMPERATURE: 98 F | OXYGEN SATURATION: 95 % | SYSTOLIC BLOOD PRESSURE: 153 MMHG | HEART RATE: 62 BPM | DIASTOLIC BLOOD PRESSURE: 82 MMHG

## 2019-03-14 DIAGNOSIS — R41.82 ALTERED MENTAL STATUS, UNSPECIFIED ALTERED MENTAL STATUS TYPE: ICD-10-CM

## 2019-03-14 DIAGNOSIS — R41.89 COGNITIVE CHANGE: Primary | ICD-10-CM

## 2019-03-14 LAB
ALBUMIN SERPL-MCNC: 3.2 G/DL (ref 3.4–5)
ALP SERPL-CCNC: 81 U/L (ref 40–150)
ALT SERPL W P-5'-P-CCNC: 12 U/L (ref 0–70)
ANION GAP SERPL CALCULATED.3IONS-SCNC: 4 MMOL/L (ref 3–14)
AST SERPL W P-5'-P-CCNC: 17 U/L (ref 0–45)
BASOPHILS # BLD AUTO: 0.1 10E9/L (ref 0–0.2)
BASOPHILS NFR BLD AUTO: 0.7 %
BILIRUB SERPL-MCNC: 0.5 MG/DL (ref 0.2–1.3)
BUN SERPL-MCNC: 20 MG/DL (ref 7–30)
CALCIUM SERPL-MCNC: 8.6 MG/DL (ref 8.5–10.1)
CHLORIDE SERPL-SCNC: 108 MMOL/L (ref 94–109)
CO2 SERPL-SCNC: 28 MMOL/L (ref 20–32)
CREAT SERPL-MCNC: 1.14 MG/DL (ref 0.66–1.25)
DIFFERENTIAL METHOD BLD: ABNORMAL
EOSINOPHIL # BLD AUTO: 0.2 10E9/L (ref 0–0.7)
EOSINOPHIL NFR BLD AUTO: 3 %
ERYTHROCYTE [DISTWIDTH] IN BLOOD BY AUTOMATED COUNT: 14.3 % (ref 10–15)
GFR SERPL CREATININE-BSD FRML MDRD: 55 ML/MIN/{1.73_M2}
GLUCOSE SERPL-MCNC: 86 MG/DL (ref 70–99)
HCT VFR BLD AUTO: 36.2 % (ref 40–53)
HGB BLD-MCNC: 11.9 G/DL (ref 13.3–17.7)
IMM GRANULOCYTES # BLD: 0 10E9/L (ref 0–0.4)
IMM GRANULOCYTES NFR BLD: 0.3 %
INR PPP: 2.6 (ref 0.86–1.14)
LYMPHOCYTES # BLD AUTO: 2.4 10E9/L (ref 0.8–5.3)
LYMPHOCYTES NFR BLD AUTO: 34.7 %
MCH RBC QN AUTO: 30.8 PG (ref 26.5–33)
MCHC RBC AUTO-ENTMCNC: 32.9 G/DL (ref 31.5–36.5)
MCV RBC AUTO: 94 FL (ref 78–100)
MONOCYTES # BLD AUTO: 0.8 10E9/L (ref 0–1.3)
MONOCYTES NFR BLD AUTO: 10.9 %
NEUTROPHILS # BLD AUTO: 3.5 10E9/L (ref 1.6–8.3)
NEUTROPHILS NFR BLD AUTO: 50.4 %
NRBC # BLD AUTO: 0 10*3/UL
NRBC BLD AUTO-RTO: 0 /100
PLATELET # BLD AUTO: 175 10E9/L (ref 150–450)
POTASSIUM SERPL-SCNC: 4.2 MMOL/L (ref 3.4–5.3)
PROT SERPL-MCNC: 7.2 G/DL (ref 6.8–8.8)
RBC # BLD AUTO: 3.86 10E12/L (ref 4.4–5.9)
SODIUM SERPL-SCNC: 140 MMOL/L (ref 133–144)
WBC # BLD AUTO: 7 10E9/L (ref 4–11)

## 2019-03-14 PROCEDURE — 85025 COMPLETE CBC W/AUTO DIFF WBC: CPT | Performed by: EMERGENCY MEDICINE

## 2019-03-14 PROCEDURE — 99309 SBSQ NF CARE MODERATE MDM 30: CPT | Performed by: NURSE PRACTITIONER

## 2019-03-14 PROCEDURE — 70450 CT HEAD/BRAIN W/O DYE: CPT

## 2019-03-14 PROCEDURE — 80053 COMPREHEN METABOLIC PANEL: CPT | Performed by: EMERGENCY MEDICINE

## 2019-03-14 PROCEDURE — 99284 EMERGENCY DEPT VISIT MOD MDM: CPT | Mod: 25

## 2019-03-14 PROCEDURE — 85610 PROTHROMBIN TIME: CPT | Performed by: EMERGENCY MEDICINE

## 2019-03-14 ASSESSMENT — MIFFLIN-ST. JEOR: SCORE: 1429.64

## 2019-03-14 NOTE — ED NOTES
Pt brought to CT with writer and ERT. Upon compleation of CT, writer attempted to awaken patient by calling name. PT suddenly looks at writer and correct pronunciation of name the returns to former front forward stare. MD made of change. Pt transported back to room.

## 2019-03-14 NOTE — ED NOTES
Pt arrived via EMS for evaluation of altered mental status. Facility reported that at 0205 pt expressed a headache for which staff went to get medication. Upon return pt had become unresponsive. Pt would look forward, but not respond to questions from staff. EMS called at the point. EMS upon arrival were unable to obtain response for patient. Pt alert, but starting forward upon arrival to ED. Pt not responding to commands. MD at bedside.

## 2019-03-14 NOTE — ED AVS SNAPSHOT
Emergency Department  [address] [facility] [address]  Phone:  [phone]  Fax:  [phone]                                    Earl Sanchez   MRN: [id number]    Department:   Emergency Department   Date of Visit:  3/14/2019           After Visit Summary Signature Page    I have received my discharge instructions, and my questions have been answered. I have discussed any challenges I see with this plan with the nurse or doctor.    ..........................................................................................................................................  Patient/Patient Representative Signature      ..........................................................................................................................................  Patient Representative Print Name and Relationship to Patient    ..................................................               ................................................  Date                                   Time    ..........................................................................................................................................  Reviewed by Signature/Title    ...................................................              ..............................................  Date                                               Time          22EPIC Rev 08/18

## 2019-03-14 NOTE — ED NOTES
Bed: AMB2  Expected date:   Expected time:   Means of arrival:   Comments:  Brandon Delgado) -   Brandon Mancilla) - 834-1  Brandon Turner - 741-1

## 2019-03-14 NOTE — ED PROVIDER NOTES
History     Chief Complaint:  Altered Mental Status     HPI   Earl Sanchez is a 89 year old male with a history of acute MI, atrial fibrillation on coumadin, and dementia who presents with altered mental status. The patient had a sudden change of mental status at 0205 this morning. EMS states the nurse came in to see the patient who was complaining of a headache. The nurse left to get some Asprin and when the nurse returned the patient was unresponsive. The patient was responsive to painful stimuli but is nonverbal. At baseline the patient is verbal. The patient is on Coumadin with a history of Atrial fibrillation, hypertension, and dementia. The last blood pressure was 153/86. The patient was on 2 L of oxygen but was 96% on room air.  Pulses were 65-70. Blood sugar was 93.  The patient's eyes were equal and reactive. The patient lives at [facility]. EMS notes the patient may have had some facial droop on the left side but he states that has resolved and he is unaware if this is baseline for the patient. The patient is DNR/DNI.     Allergies:  No known allergies     Medications:    Tylenol  Zyloprim  Asprin   Aricept  Cardura  Ergocal  Levothyroxine  Senna  Pravachol  Coumadin    Past Medical History:    Abdominal aortic aneurysm   Acute MI   Atrial fibrillation   benign prostatic hyperplasia  CAD   Chronic kidney disease   Dementia  Duodenal ulcer with hemorrhage   Gait apraxia   Gastritis   Gastro-oesophageal reflux disease   Heart failure    Hyperlipidemia   Hypertension   Osteoarthritis   Renal disease   Thyroid disease     Past Surgical History:    Ankle surgery  coronary artery bypass  Heart catheterization   Hernia repair  phacoemulsification clear cornea with toric x2    Family History:    Mother: hypertension  Maternal grandmother: hypertension    Social History:  Smoking Status: Never Smoker  Smokeless Tobacco: Never Used  Alcohol Use: Positive  Marital Status:        Review  of Systems   Unable to perform ROS: Mental status change     Physical Exam     Patient Vitals for the past 24 hrs:   BP Temp Temp src Pulse Heart Rate Resp SpO2   03/14/19 0445 -- -- -- -- -- -- 95 %   03/14/19 0430 153/82 98  F (36.7  C) Oral 62 56 (!) 0 97 %   03/14/19 0400 -- -- -- -- 62 13 97 %   03/14/19 0345 138/72 -- -- 57 53 13 96 %   03/14/19 0330 141/76 -- -- 60 52 9 95 %   03/14/19 0328 151/76 -- -- 51 64 9 97 %     Physical Exam  Physical Exam   Nursing note and vitals reviewed.  General: Appears well-developed and well-nourished. Eyes are open but is not following commands   Head: No signs of trauma.   Mouth/Throat: Oropharynx is clear and moist.   Eyes: Conjunctivae are normal. Pupils are equal, round, and reactive to light.   Neck: Normal range of motion. No nuchal rigidity.   Cardiovascular: Normal rate and regular rhythm.    Respiratory: Effort normal and breath sounds normal. No respiratory distress.   Abdominal: Soft. There is no tenderness. There is no guarding.   Musculoskeletal: Normal range of motion. no edema.   Neurological:   PERRLA, EOMI grossly,, strength in upper/lower extremities normal and symmetrical.   Sensation appears normal. Speech normal once he began to talk while in CT   Skin: Skin is warm and dry. Surgical scar in the middle of anterior chest  Psychiatric: normal mood and affect. behavior is normal.   Emergency Department Course   Imaging:  Radiology findings were communicated with the patient who voiced understanding of the findings.    Head CT  No acute abnormality.   Reading per radiology    Laboratory:  Laboratory findings were communicated with the patient who voiced understanding of the findings.    CBC: WBC 7.0, HGB 11.9(L),   CMP: GFR estimate 55(L), albumin 3.2(L) o/w WNL (Creatinine 1.14)  INR: 2.60(H)    Emergency Department Course:    0307  The patient arrived with EMS.     0310  IV was inserted and blood was drawn for laboratory testing, results  above.    0311 I spoke with the patient but he was nonverbal and was not following commands.     0327 The patient was sent for a CT Head while in the emergency department, results above.     0329 Nurse reports that the patient is now responsive. He corrected pronunciation of his name and asked for a blanket. Son arrived and states this is not far off from the patient's baseline.     0416 The patient is speaking in complete sentences and is following commands. I personally reviewed the laboratory and imaging results with the patient and son and answered all related questions prior to discharge.    Impression & Plan      Medical Decision Making:  The patient presents to the ED after an alteration in his consciousness. Initially it appeared that he may be having a stroke, but by the time he arrived in the ED was moving both extremities and he had normal facial symmetry. The patient would not respond upon arrival, so a CT scan was ordered to evaluate for the possibility of intercerebral hemorrhage given the fact that he is on Coumadin. Shortly thereafter, while in CT, the patient began to converse with the nurses. He was pleasant and appropriate. Later when his son arrived, the patient was said to be near or at baseline according to his son. No signs of a systemic infection or significant electrolyte disturbance that could explain his symptoms. The patient was observed in the ED but continued to maintain his normal level of consciousness according to his son. The son agreed that the patient could be transferred back to his care facility for further evaluation and treatment.       Diagnosis:    ICD-10-CM    1. Altered mental status, unspecified altered mental status type R41.82       Disposition:   The patient is discharged to home.    Discharge Medications:  No medication.     Scribe Disclosure:  Anitra ANDUJAR, am serving as a scribe at 3:14 AM on 3/14/2019 to document services personally performed by Pako Mendez MD  based on my observations and the provider's statements to me.   EMERGENCY DEPARTMENT       JoinPako prescott MD  03/14/19 4512

## 2019-03-14 NOTE — PROGRESS NOTES
[place] [facility]  PRIMARY CARE PROVIDER AND CLINIC:  Alvarado Florian MD, 0 [facility] / [facility] [place] [address]  Chief Complaint   Patient presents with     Hospital F/U     [place] Medical Record Number:  [id number]  Place of Service where encounter took place:  [facility] ([facility]) [[id number]]    Earl Sanchez  is a 89 year old  (9/17/1926), admitted to the above facility from  [facility]. Hospital stay 3/14/2019 through 3/14/2019..  Admitted to this facility for  rehab, medical management and nursing care.  2  HPI:    HPI information obtained from: facility chart records, facility staff and patient report.   Resident found unresponsive in room this AM by nursing at [facility]. Complained of headache prior to episode and sent to ED for further evaluation.   Was observed to have altered level of consciousness throughout stay but perked up and appeared to be at baseline. CT and labs were neg and resident [place] [facility] [place].     During my exam, resident was found eating lunch and conversing with table mates. Reports he feels normal but does not remember any part of the morning before waking in the ED. Denies Headache or neuro changes. Is anticoagulated with Warfarin and last INR was 2.76 (goal range of 2-3).     CODE STATUS/ADVANCE DIRECTIVES DISCUSSION:   DNR only  Patient's living condition: lives in a residential care facility  ALLERGIES: No known allergies  PAST MEDICAL HISTORY:  has a past medical history of Abdominal aortic aneurysm (H), Acute MI (H) (1981, 2007), Atrial fibrillation (H), BPH (benign prostatic hyperplasia), CAD (coronary artery disease), Chronic kidney disease, Duodenal ulcer with hemorrhage, Gait apraxia, Gastritis, Gastro-oesophageal reflux disease, Heart failure (H), Hyperlipidaemia LDL goal <100, Hypertension goal BP (blood pressure) < 140/90, Osteoarthritis, Renal disease, and Thyroid disease. He also has  no past medical history of Malignant hyperthermia or PONV (postoperative nausea and vomiting).  PAST SURGICAL HISTORY:   has a past surgical history that includes hernia repair, inguinal rt/lt; Ankle surgery; Phacoemulsification clear cornea with toric intraocular lens implant (4/10/2014); Phacoemulsification clear cornea with toric intraocular lens implant (4/22/2014); coronary artery bypass (2007); and Heart Cath Left heart cath (12/10/07).  FAMILY HISTORY: family history includes Hypertension in his maternal grandmother and mother.  SOCIAL HISTORY:   reports that  has never smoked. he has never used smokeless tobacco. He reports that he drinks alcohol. He reports that he does not use drugs.    Post Discharge Medication Reconciliation Status: discharge medications reconciled, continue medications without change    Current Outpatient Medications   Medication Sig Dispense Refill     acetaminophen (TYLENOL) 500 MG tablet Take 1,000 mg by mouth every 6 hours as needed for mild pain        allopurinol (ZYLOPRIM) 100 MG tablet Take 100 mg by mouth 2 times daily       aspirin (ASPIRIN LOW DOSE) 81 MG tablet Take 81 mg by mouth daily        donepezil (ARICEPT) 5 MG tablet Take 5 mg by mouth At Bedtime       doxazosin (CARDURA) 4 MG tablet Take 1 tablet (4 mg) by mouth At Bedtime 90 tablet 3     ERGOCALCIFEROL PO Take 50,000 Units by mouth every morning Wednesday        LEVOTHYROXINE SODIUM PO Take 75 mcg by mouth daily        nitroglycerin (NITROSTAT) 0.4 MG SL tablet Place 1 tablet (0.4 mg) under the tongue every 5 minutes as needed for chest pain 25 tablet 0     phenylephrine-shark liver oil-mineral oil-petrolatum (PREPARATION H) 0.25-14-74.9 % rectal ointment Place 1 applicator rectally 4 times daily as needed for hemorrhoids for Hemorrhoids       polyethylene glycol 3350 POWD Take 17 g by mouth daily       pravastatin (PRAVACHOL) 40 MG tablet TAKE 1 TABLET DAILY 90 tablet 1     SENNA-docusate sodium (SENNA S) 8.6-50  "MG tablet Take 1 tablet by mouth 2 times daily 180 tablet 3     triamcinolone (KENALOG) 0.025 % cream Apply topically every 12 hours as needed to rash to the affected area prn, apply tin layer to the rash at the LE.       Warfarin Sodium (COUMADIN PO) As directed, per INR       Sennosides-Docusate Sodium (SENNA-DOCUSATE SODIUM) 8.6-50 MG TABS Take 1 tablet by mouth 2 times daily          ROS:  4 point ROS including Respiratory, CV, GI and , other than that noted in the HPI,  is negative    Vitals:  /89   Pulse 72   Temp 97.7  F (36.5  C)   Resp 18   Ht 1.753 m (5' 9\")   Wt 78.9 kg (174 lb)   SpO2 95%   BMI 25.70 kg/m    Exam:  GENERAL APPEARANCE:  Alert, in no distress  RESP:  respiratory effort and palpation of chest normal, lungs clear to auscultation   CV:  Palpation and auscultation of heart done , regular rate and rhythm, no murmur, rub, or gallop  M/S:   Gait and station normal  Digits and nails normal  SKIN:  Inspection of skin and subcutaneous tissue baseline, Palpation of skin and subcutaneous tissue baseline  NEURO:   Cranial nerves 2-12 are normal tested and grossly at patient's baseline  PSYCH:  oriented X 3, affect and mood normal    Lab/Diagnostic data:  Labs done in SNF are in [place] EPIC. Please refer to them using [employer]/Care Everywhere. and Recent labs in EPIC reviewed by me today.     ASSESSMENT/PLAN:  Current [place] scheduled appointments:     (R43.02) Cognitive change  (primary encounter diagnosis)  Resident had acute onset of alteration of consciousness. Appears at baseline at this time.   -Check BMP, CBC and INR next lab day.   -Continue to monitor closely and notify NP with changes.         Orders written by provider at facility      Electronically signed by:  ANGEL Corrigan CNP  [facility]                   "

## 2019-03-18 ENCOUNTER — RECORDS - HEALTHEAST (OUTPATIENT)
Dept: LAB | Facility: CLINIC | Age: 84
End: 2019-03-18

## 2019-03-19 ENCOUNTER — AMBULATORY - HEALTHEAST (OUTPATIENT)
Dept: OTHER | Facility: CLINIC | Age: 84
End: 2019-03-19

## 2019-03-19 ENCOUNTER — TRANSFERRED RECORDS (OUTPATIENT)
Dept: HEALTH INFORMATION MANAGEMENT | Facility: CLINIC | Age: 84
End: 2019-03-19

## 2019-03-19 ENCOUNTER — DOCUMENTATION ONLY (OUTPATIENT)
Dept: OTHER | Facility: CLINIC | Age: 84
End: 2019-03-19

## 2019-03-19 LAB
ANION GAP SERPL CALCULATED.3IONS-SCNC: 6 MMOL/L (ref 5–18)
ANION GAP SERPL CALCULATED.3IONS-SCNC: 6 MMOL/L (ref 5–18)
BUN SERPL-MCNC: 20 MG/DL (ref 8–28)
BUN SERPL-MCNC: 20 MG/DL (ref 8–28)
CALCIUM SERPL-MCNC: 9.3 MG/DL (ref 8.5–10.5)
CALCIUM SERPL-MCNC: 9.3 MG/DL (ref 8.5–10.5)
CHLORIDE BLD-SCNC: 104 MMOL/L (ref 98–107)
CO2 SERPL-SCNC: 28 MMOL/L (ref 22–31)
CREAT SERPL-MCNC: 1.09 MG/DL (ref 0.7–1.3)
CREAT SERPL-MCNC: 1.09 MG/DL (ref 0.7–1.3)
ERYTHROCYTE [DISTWIDTH] IN BLOOD BY AUTOMATED COUNT: 14.5 % (ref 11–14.5)
ERYTHROCYTE [DISTWIDTH] IN BLOOD BY AUTOMATED COUNT: 14.5 % (ref 15–14.5)
GFR SERPL CREATININE-BSD FRML MDRD: >60 ML/MIN/1.73M2
GFR SERPL CREATININE-BSD FRML MDRD: >60 ML/MIN/1.73M2
GLUCOSE BLD-MCNC: 69 MG/DL (ref 70–125)
GLUCOSE SERPL-MCNC: 69 MG/DL (ref 70–125)
HCT VFR BLD AUTO: 39.9 % (ref 40–54)
HCT VFR BLD AUTO: 39.9 % (ref 40–54)
HEMOGLOBIN: 12.5 G/DL (ref 14–18)
HGB BLD-MCNC: 12.6 G/DL (ref 14–18)
MCH RBC QN AUTO: 31.1 PG (ref 27–34)
MCH RBC QN AUTO: 31.1 PG (ref 27–34)
MCHC RBC AUTO-ENTMCNC: 31.6 G/DL (ref 32–36)
MCHC RBC AUTO-ENTMCNC: 31.6 G/DL (ref 32–36)
MCV RBC AUTO: 99 FL (ref 80–100)
MCV RBC AUTO: 99 FL (ref 80–100)
PLATELET # BLD AUTO: 195 THOU/UL (ref 140–440)
PLATELET # BLD AUTO: 195 THOU/UL (ref 140–440)
PMV BLD AUTO: 10.3 FL (ref 8.5–12.5)
POTASSIUM BLD-SCNC: 4 MMOL/L (ref 3.5–5)
RBC # BLD AUTO: 4.05 MILL/UL (ref 4.4–6.2)
RBC # BLD AUTO: 4.05 MILL/UL (ref 4.4–6.2)
SODIUM SERPL-SCNC: 138 MMOL/L (ref 136–145)
WBC # BLD AUTO: 7.3 THOU/UL (ref 4–11)
WBC: 7.3 THOU/UL (ref 4–11)

## 2019-03-26 ENCOUNTER — RECORDS - HEALTHEAST (OUTPATIENT)
Dept: LAB | Facility: CLINIC | Age: 84
End: 2019-03-26

## 2019-03-26 ENCOUNTER — NURSING HOME VISIT (OUTPATIENT)
Dept: GERIATRICS | Facility: CLINIC | Age: 84
End: 2019-03-26
Payer: COMMERCIAL

## 2019-03-26 ENCOUNTER — TRANSFERRED RECORDS (OUTPATIENT)
Dept: HEALTH INFORMATION MANAGEMENT | Facility: CLINIC | Age: 84
End: 2019-03-26

## 2019-03-26 VITALS
OXYGEN SATURATION: 96 % | BODY MASS INDEX: 25.25 KG/M2 | TEMPERATURE: 97.6 F | HEART RATE: 70 BPM | RESPIRATION RATE: 18 BRPM | WEIGHT: 171 LBS | DIASTOLIC BLOOD PRESSURE: 78 MMHG | SYSTOLIC BLOOD PRESSURE: 146 MMHG

## 2019-03-26 DIAGNOSIS — I48.20 CHRONIC ATRIAL FIBRILLATION (H): Primary | ICD-10-CM

## 2019-03-26 DIAGNOSIS — Z79.01 LONG TERM CURRENT USE OF ANTICOAGULANT THERAPY: ICD-10-CM

## 2019-03-26 DIAGNOSIS — Z51.81 ENCOUNTER FOR THERAPEUTIC DRUG MONITORING: ICD-10-CM

## 2019-03-26 LAB — INR PPP: 2.69 (ref 0.9–1.1)

## 2019-03-26 PROCEDURE — 99308 SBSQ NF CARE LOW MDM 20: CPT | Performed by: NURSE PRACTITIONER

## 2019-03-26 NOTE — PROGRESS NOTES
[facility]    [place] Medical Record Number:  [id number]  Place of Service where encounter took place:  [facility] ([facility]) [[id number]]    HPI:    Earl Sanchze is a 89 year old  (9/17/1926), who is being seen today for an episodic care visit at the above location.   HPI information obtained from: facility chart records, facility staff and patient report. Today's concern is INR/Coumadin management for A. Fib    Bleeding Signs/Symptoms:  None  Thromboembolic Signs/Symptoms:  None    Medication Changes:  No  Dietary Changes:  No  Activity Changes: No  Bacterial/Viral Infection:  No    Missed Coumadin Doses:  None    On ASA: Yes - 81 mg po q day    Other Concerns:  No    OBJECTIVE:    INR Today:  2.69      ASSESSMENT:    ICD-10-CM    1. Chronic atrial fibrillation (H) I48.2    2. Encounter for therapeutic drug monitoring Z51.81    3. Long term current use of anticoagulant therapy Z79.01        Therapeutic INR for goal of 2-3    PLAN:    New Dose: No Change      Next INR: 4/23/19        Electronically signed by:  Christiana Osuna NP

## 2019-03-28 VITALS
RESPIRATION RATE: 18 BRPM | OXYGEN SATURATION: 96 % | HEIGHT: 69 IN | TEMPERATURE: 97.6 F | DIASTOLIC BLOOD PRESSURE: 78 MMHG | SYSTOLIC BLOOD PRESSURE: 146 MMHG | HEART RATE: 70 BPM | BODY MASS INDEX: 25.33 KG/M2 | WEIGHT: 171 LBS

## 2019-03-28 ASSESSMENT — MIFFLIN-ST. JEOR: SCORE: 1416.03

## 2019-03-29 ENCOUNTER — TRANSFERRED RECORDS (OUTPATIENT)
Dept: HEALTH INFORMATION MANAGEMENT | Facility: CLINIC | Age: 84
End: 2019-03-29

## 2019-03-29 ENCOUNTER — RECORDS - HEALTHEAST (OUTPATIENT)
Dept: LAB | Facility: CLINIC | Age: 84
End: 2019-03-29

## 2019-03-29 ENCOUNTER — NURSING HOME VISIT (OUTPATIENT)
Dept: GERIATRICS | Facility: CLINIC | Age: 84
End: 2019-03-29
Payer: COMMERCIAL

## 2019-03-29 DIAGNOSIS — R05.9 COUGH: Primary | ICD-10-CM

## 2019-03-29 DIAGNOSIS — I10 ESSENTIAL HYPERTENSION: ICD-10-CM

## 2019-03-29 DIAGNOSIS — E03.8 TSH (THYROID-STIMULATING HORMONE DEFICIENCY): ICD-10-CM

## 2019-03-29 LAB
BASOPHILS # BLD AUTO: 0 THOU/UL (ref 0–0.2)
BASOPHILS NFR BLD AUTO: 1 % (ref 0–2)
DIFFERENTIAL: ABNORMAL
EOSINOPHIL # BLD AUTO: 0.2 THOU/UL (ref 0–0.4)
EOSINOPHIL NFR BLD AUTO: 2 % (ref 0–6)
ERYTHROCYTE [DISTWIDTH] IN BLOOD BY AUTOMATED COUNT: 14.7 % (ref 11–14.5)
ERYTHROCYTE [DISTWIDTH] IN BLOOD BY AUTOMATED COUNT: 14.7 % (ref 11–14.5)
FLUAV AG SPEC QL IA: NORMAL
FLUBV AG SPEC QL IA: NORMAL
HCT VFR BLD AUTO: 35.9 % (ref 40–54)
HCT VFR BLD AUTO: 35.9 % (ref 40–54)
HEMOGLOBIN: 11.4 G/DL (ref 14–18)
HGB BLD-MCNC: 11.4 G/DL (ref 14–18)
LYMPHOCYTES # BLD AUTO: 1.2 THOU/UL (ref 0.8–4.4)
LYMPHOCYTES NFR BLD AUTO: 15 % (ref 20–40)
MCH RBC QN AUTO: 30.8 PG (ref 27–34)
MCH RBC QN AUTO: 30.8 PG (ref 27–34)
MCHC RBC AUTO-ENTMCNC: 31.8 G/DL (ref 32–36)
MCHC RBC AUTO-ENTMCNC: 31.8 G/DL (ref 32–36)
MCV RBC AUTO: 97 FL (ref 80–100)
MCV RBC AUTO: 97 FL (ref 80–100)
MONOCYTES # BLD AUTO: 1.3 THOU/UL (ref 0–0.9)
MONOCYTES NFR BLD AUTO: 16 % (ref 2–10)
NEUTROPHILS # BLD AUTO: 5.7 THOU/UL (ref 2–7.7)
NEUTROPHILS NFR BLD AUTO: 68 % (ref 50–70)
PLATELET # BLD AUTO: 161 THOU/UL (ref 140–440)
PLATELET # BLD AUTO: 161 THOU/UL (ref 140–440)
PMV BLD AUTO: 10.3 FL (ref 8.5–12.5)
RBC # BLD AUTO: 3.7 MILL/UL (ref 4.4–6.2)
RBC # BLD AUTO: 3.7 MILL/UL (ref 4.4–6.2)
WBC # BLD AUTO: 8.5 THOU/UL (ref 4–11)
WBC: 8.5 THOU/UL (ref 4–11)

## 2019-03-29 PROCEDURE — 99309 SBSQ NF CARE MODERATE MDM 30: CPT | Performed by: NURSE PRACTITIONER

## 2019-03-29 NOTE — LETTER
3/29/2019        RE: Earl Sanchez  [facility]  [address]  [facility] [address]        [place]  Chief Complaint   Patient presents with     penitentiary Regulatory     [place] Medical Record Number:  [id number]  Place of Service where encounter took place:  [facility] ([facility]) [[id number]]      HPI:    Earl Sanchez  is 89 year old (9/17/1926), who is being seen today for a federally mandated E/M visit.  HPI information obtained from: facility chart records, facility staff and patient report.     Today's concerns are:    ICD-10-CM    1. Cough R05    2. Essential hypertension I10    3. 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 Reports he is not feeling well and has had a fever a cough x1-2 days. Taking acetaminophen for the temperature and reports it is working well. Denies pain, nausea, vomiting. No further complaints today.     Per nursing facility documentation, most blood pressure less than goal 150/90. Currently not taking antihypertensive agents.     ALLERGIES:No known allergies  PAST MEDICAL HISTORY:   has a past medical history of Abdominal aortic aneurysm (H), Acute MI (H) (1981, 2007), Atrial fibrillation (H), BPH (benign prostatic hyperplasia), CAD (coronary artery disease), Chronic kidney disease, Duodenal ulcer with hemorrhage, Gait apraxia, Gastritis, Gastro-oesophageal reflux disease, Heart failure (H), Hyperlipidaemia LDL goal <100, Hypertension goal BP (blood pressure) < 140/90, Osteoarthritis, Renal disease, and Thyroid disease. He also has no past medical history of Malignant hyperthermia or PONV (postoperative nausea and vomiting).  PAST SURGICAL HISTORY:   has a past surgical history that includes hernia repair, inguinal rt/lt; Ankle surgery; Phacoemulsification clear cornea with toric intraocular lens implant (4/10/2014); Phacoemulsification clear  cornea with toric intraocular lens implant (4/22/2014); coronary artery bypass (2007); and Heart Cath Left heart cath (12/10/07).  FAMILY HISTORY: family history includes Hypertension in his maternal grandmother and mother.  SOCIAL HISTORY:  reports that  has never smoked. he has never used smokeless tobacco. He reports that he drinks alcohol. He reports that he does not use drugs.    MEDICATIONS:  Current Outpatient Medications   Medication Sig Dispense Refill     acetaminophen (TYLENOL) 500 MG tablet Take 1,000 mg by mouth every 6 hours as needed for mild pain        allopurinol (ZYLOPRIM) 100 MG tablet Take 100 mg by mouth 2 times daily       aspirin (ASPIRIN LOW DOSE) 81 MG tablet Take 81 mg by mouth daily        donepezil (ARICEPT) 5 MG tablet Take 5 mg by mouth At Bedtime       doxazosin (CARDURA) 4 MG tablet Take 1 tablet (4 mg) by mouth At Bedtime 90 tablet 3     ERGOCALCIFEROL PO Take 50,000 Units by mouth every morning Wednesday        LEVOTHYROXINE SODIUM PO Take 75 mcg by mouth daily        nitroglycerin (NITROSTAT) 0.4 MG SL tablet Place 1 tablet (0.4 mg) under the tongue every 5 minutes as needed for chest pain 25 tablet 0     phenylephrine-shark liver oil-mineral oil-petrolatum (PREPARATION H) 0.25-14-74.9 % rectal ointment Place 1 applicator rectally 4 times daily as needed for hemorrhoids for Hemorrhoids       polyethylene glycol 3350 POWD Take 17 g by mouth daily       pravastatin (PRAVACHOL) 40 MG tablet TAKE 1 TABLET DAILY 90 tablet 1     Sennosides-Docusate Sodium (SENNA-DOCUSATE SODIUM) 8.6-50 MG TABS Take 1 tablet by mouth 2 times daily        triamcinolone (KENALOG) 0.025 % cream Apply topically every 12 hours as needed to rash to the affected area prn, apply tin layer to the rash at the LE.       Warfarin Sodium (COUMADIN PO) As directed, per INR         Case Management:  I have reviewed the care plan and MDS and do agree with the plan. Patient's desire to return to the community is not  "present. Information reviewed:  Medications, vital signs, orders, and nursing notes.    ROS:  4 point ROS including Respiratory, CV, GI and , other than that noted in the HPI,  is negative    Vitals:  /78   Pulse 70   Temp 97.6  F (36.4  C)   Resp 18   Ht 1.753 m (5' 9\")   Wt 77.6 kg (171 lb)   SpO2 96%   BMI 25.25 kg/m     Body mass index is 25.25 kg/m .  Exam:  GENERAL APPEARANCE:  Alert, in no distress  RESP:  respiratory effort and palpation of chest normal, cough, decreased lung sounds in all lung lobes   CV:  Palpation and auscultation of heart done , regular rate and rhythm, no murmur, rub, or gallop  M/S:   Gait and station normal  Digits and nails normal  SKIN:  Inspection of skin and subcutaneous tissue baseline, Palpation of skin and subcutaneous tissue baseline  NEURO:   Cranial nerves 2-12 are normal tested and grossly at patient's baseline  PSYCH:  oriented X 3, affect and mood normal         Lab/Diagnostic data:   Labs done in SNF are in [facility]. Please refer to them using [employer]/Care Everywhere. and Recent labs in [facility] reviewed by me today.     ASSESSMENT/PLAN  (R05) Cough  (primary encounter diagnosis)  Acute onset of productive cough. Resident is febrile.   -Continue acetaminophen as ordered.   -STAT chest x ray   -STAT influenza swab  -STAT CBC with diff   -Tessalon pearls   -Notify NP with results.     (I10) Essential hypertension  Chronic, blood pressure managed off antihypertensive agents. Keep SBP> 130 mmHg and DBP > 65 mmHg (levels below these increase mortality as shown by standard studies and observations).       (E03.8) TSH (thyroid-stimulating hormone deficiency)  Lab Results   Component Value Date    TSH 2.06 06/26/2018     TSH next lab day to ensure stability. Continue current levothyroxine dose at this time.           Electronically signed by:  ANGEL Corrigan [facility]         Sincerely,        Christiana Osuna NP    "

## 2019-03-29 NOTE — PROGRESS NOTES
[place] [facility]  Chief Complaint   Patient presents with     senior living Regulatory     [place] Medical Record Number:  [id number]  Place of Service where encounter took place:  [facility] ([facility]) [[id number]]      HPI:    Earl Sanchez  is 89 year old (9/17/1926), who is being seen today for a federally mandated E/M visit.  HPI information obtained from: facility chart records, facility staff and patient report.     Today's concerns are:    ICD-10-CM    1. Cough R05    2. Essential hypertension I10    3. 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 Reports he is not feeling well and has had a fever a cough x1-2 days. Taking acetaminophen for the temperature and reports it is working well. Denies pain, nausea, vomiting. No further complaints today.     Per nursing facility documentation, most blood pressure less than goal 150/90. Currently not taking antihypertensive agents.     ALLERGIES:No known allergies  PAST MEDICAL HISTORY:   has a past medical history of Abdominal aortic aneurysm (H), Acute MI (H) (1981, 2007), Atrial fibrillation (H), BPH (benign prostatic hyperplasia), CAD (coronary artery disease), Chronic kidney disease, Duodenal ulcer with hemorrhage, Gait apraxia, Gastritis, Gastro-oesophageal reflux disease, Heart failure (H), Hyperlipidaemia LDL goal <100, Hypertension goal BP (blood pressure) < 140/90, Osteoarthritis, Renal disease, and Thyroid disease. He also has no past medical history of Malignant hyperthermia or PONV (postoperative nausea and vomiting).  PAST SURGICAL HISTORY:   has a past surgical history that includes hernia repair, inguinal rt/lt; Ankle surgery; Phacoemulsification clear cornea with toric intraocular lens implant (4/10/2014); Phacoemulsification clear cornea with toric intraocular lens implant (4/22/2014); coronary artery bypass (2007); and Heart Cath Left heart cath  (12/10/07).  FAMILY HISTORY: family history includes Hypertension in his maternal grandmother and mother.  SOCIAL HISTORY:  reports that  has never smoked. he has never used smokeless tobacco. He reports that he drinks alcohol. He reports that he does not use drugs.    MEDICATIONS:  Current Outpatient Medications   Medication Sig Dispense Refill     acetaminophen (TYLENOL) 500 MG tablet Take 1,000 mg by mouth every 6 hours as needed for mild pain        allopurinol (ZYLOPRIM) 100 MG tablet Take 100 mg by mouth 2 times daily       aspirin (ASPIRIN LOW DOSE) 81 MG tablet Take 81 mg by mouth daily        donepezil (ARICEPT) 5 MG tablet Take 5 mg by mouth At Bedtime       doxazosin (CARDURA) 4 MG tablet Take 1 tablet (4 mg) by mouth At Bedtime 90 tablet 3     ERGOCALCIFEROL PO Take 50,000 Units by mouth every morning Wednesday        LEVOTHYROXINE SODIUM PO Take 75 mcg by mouth daily        nitroglycerin (NITROSTAT) 0.4 MG SL tablet Place 1 tablet (0.4 mg) under the tongue every 5 minutes as needed for chest pain 25 tablet 0     phenylephrine-shark liver oil-mineral oil-petrolatum (PREPARATION H) 0.25-14-74.9 % rectal ointment Place 1 applicator rectally 4 times daily as needed for hemorrhoids for Hemorrhoids       polyethylene glycol 3350 POWD Take 17 g by mouth daily       pravastatin (PRAVACHOL) 40 MG tablet TAKE 1 TABLET DAILY 90 tablet 1     Sennosides-Docusate Sodium (SENNA-DOCUSATE SODIUM) 8.6-50 MG TABS Take 1 tablet by mouth 2 times daily        triamcinolone (KENALOG) 0.025 % cream Apply topically every 12 hours as needed to rash to the affected area prn, apply tin layer to the rash at the LE.       Warfarin Sodium (COUMADIN PO) As directed, per INR         Case Management:  I have reviewed the care plan and MDS and do agree with the plan. Patient's desire to return to the community is not present. Information reviewed:  Medications, vital signs, orders, and nursing notes.    ROS:  4 point ROS including  "Respiratory, CV, GI and , other than that noted in the HPI,  is negative    Vitals:  /78   Pulse 70   Temp 97.6  F (36.4  C)   Resp 18   Ht 1.753 m (5' 9\")   Wt 77.6 kg (171 lb)   SpO2 96%   BMI 25.25 kg/m    Body mass index is 25.25 kg/m .  Exam:  GENERAL APPEARANCE:  Alert, in no distress  RESP:  respiratory effort and palpation of chest normal, cough, decreased lung sounds in all lung lobes   CV:  Palpation and auscultation of heart done , regular rate and rhythm, no murmur, rub, or gallop  M/S:   Gait and station normal  Digits and nails normal  SKIN:  Inspection of skin and subcutaneous tissue baseline, Palpation of skin and subcutaneous tissue baseline  NEURO:   Cranial nerves 2-12 are normal tested and grossly at patient's baseline  PSYCH:  oriented X 3, affect and mood normal         Lab/Diagnostic data:   Labs done in SNF are in [facility]. Please refer to them using [employer]/Care Everywhere. and Recent labs in [facility] reviewed by me today.     ASSESSMENT/PLAN  (R05) Cough  (primary encounter diagnosis)  Acute onset of productive cough. Resident is febrile.   -Continue acetaminophen as ordered.   -STAT chest x ray   -STAT influenza swab  -STAT CBC with diff   -Tessalon pearls   -Notify NP with results.     (I10) Essential hypertension  Chronic, blood pressure managed off antihypertensive agents. Keep SBP> 130 mmHg and DBP > 65 mmHg (levels below these increase mortality as shown by standard studies and observations).       (E03.8) TSH (thyroid-stimulating hormone deficiency)  Lab Results   Component Value Date    TSH 2.06 06/26/2018     TSH next lab day to ensure stability. Continue current levothyroxine dose at this time.           Electronically signed by:  ANGEL Corrigan [facility]     "

## 2019-03-30 ENCOUNTER — TRANSFERRED RECORDS (OUTPATIENT)
Dept: HEALTH INFORMATION MANAGEMENT | Facility: CLINIC | Age: 84
End: 2019-03-30

## 2019-03-30 ENCOUNTER — TELEPHONE (OUTPATIENT)
Dept: GERIATRICS | Facility: CLINIC | Age: 84
End: 2019-03-30

## 2019-03-30 NOTE — TELEPHONE ENCOUNTER
[facility] TELEPHONE ENCOUNTER    Chief Complaint   Patient presents with     Cough       Earl Sanchez is a 89 year old  (9/17/1926),Nurse called today to report: productive cough x 2 days now with low grade fever as well, with new onset of congestion bilateral lobes.  CBC and Influenza A&B swab negative yesterday.  VITAL SIGNS 146/78, 99.2, 71, 18, 93% on RA.      ASSESSMENT/PLAN  Likely viral URI but now with fever and increased chest congestion concerning for pneumonia.     CXR today stat to rule out pneumonia    ANGEL Panda CNP

## 2019-04-03 ENCOUNTER — AMBULATORY - HEALTHEAST (OUTPATIENT)
Dept: OTHER | Facility: CLINIC | Age: 84
End: 2019-04-03

## 2019-04-03 ENCOUNTER — DOCUMENTATION ONLY (OUTPATIENT)
Dept: OTHER | Facility: CLINIC | Age: 84
End: 2019-04-03

## 2019-04-12 ENCOUNTER — NURSING HOME VISIT (OUTPATIENT)
Dept: GERIATRICS | Facility: CLINIC | Age: 84
End: 2019-04-12
Payer: COMMERCIAL

## 2019-04-12 VITALS
TEMPERATURE: 97.9 F | RESPIRATION RATE: 18 BRPM | WEIGHT: 167 LBS | BODY MASS INDEX: 24.73 KG/M2 | SYSTOLIC BLOOD PRESSURE: 140 MMHG | DIASTOLIC BLOOD PRESSURE: 74 MMHG | OXYGEN SATURATION: 96 % | HEART RATE: 76 BPM | HEIGHT: 69 IN

## 2019-04-12 DIAGNOSIS — G30.8 ALZHEIMER'S DISEASE OF OTHER ONSET WITH BEHAVIORAL DISTURBANCE: Primary | ICD-10-CM

## 2019-04-12 DIAGNOSIS — F02.818 ALZHEIMER'S DISEASE OF OTHER ONSET WITH BEHAVIORAL DISTURBANCE: Primary | ICD-10-CM

## 2019-04-12 PROCEDURE — 99308 SBSQ NF CARE LOW MDM 20: CPT | Performed by: NURSE PRACTITIONER

## 2019-04-12 RX ORDER — CITALOPRAM HYDROBROMIDE 10 MG/1
10 TABLET ORAL DAILY
COMMUNITY
End: 2021-01-01 | Stop reason: DRUGHIGH

## 2019-04-12 RX ORDER — BENZONATATE 100 MG/1
100 CAPSULE ORAL 3 TIMES DAILY PRN
COMMUNITY
End: 2019-09-15

## 2019-04-12 ASSESSMENT — MIFFLIN-ST. JEOR: SCORE: 1397.89

## 2019-04-12 NOTE — PROGRESS NOTES
[place] [facility]  [place] Medical Record Number:  [id number]  Place of Service where encounter took place:  No question data found.  Chief Complaint   Patient presents with     Nursing Home Acute       HPI:    Earl Sanchez  is a 89 year old (9/17/1926), who is being seen today for an episodic care visit.  HPI information obtained from: facility chart records, facility staff and patient report.     Today's concern is:   Diagnosis Comments   1. 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 At times difficult to perform daily cares. Reports his mood is baseline.            Past Medical and Surgical History reviewed in Epic today.    MEDICATIONS:  Current Outpatient Medications   Medication Sig Dispense Refill     acetaminophen (TYLENOL) 500 MG tablet Take 1,000 mg by mouth every 6 hours as needed for mild pain        allopurinol (ZYLOPRIM) 100 MG tablet Take 100 mg by mouth 2 times daily       aspirin (ASPIRIN LOW DOSE) 81 MG tablet Take 81 mg by mouth daily        benzonatate (TESSALON) 100 MG capsule Take 100 mg by mouth 3 times daily as needed for cough       citalopram (CELEXA) 10 MG tablet Take 10 mg by mouth daily       donepezil (ARICEPT) 5 MG tablet Take 5 mg by mouth At Bedtime       doxazosin (CARDURA) 4 MG tablet Take 1 tablet (4 mg) by mouth At Bedtime 90 tablet 3     ERGOCALCIFEROL PO Take 50,000 Units by mouth every morning Wednesday        LEVOTHYROXINE SODIUM PO Take 75 mcg by mouth daily        nitroglycerin (NITROSTAT) 0.4 MG SL tablet Place 1 tablet (0.4 mg) under the tongue every 5 minutes as needed for chest pain 25 tablet 0     phenylephrine-shark liver oil-mineral oil-petrolatum (PREPARATION H) 0.25-14-74.9 % rectal ointment Place 1 applicator rectally 4 times daily as needed for hemorrhoids for Hemorrhoids       polyethylene glycol 3350 POWD Take 17 g by mouth daily        "pravastatin (PRAVACHOL) 40 MG tablet TAKE 1 TABLET DAILY 90 tablet 1     Sennosides-Docusate Sodium (SENNA-DOCUSATE SODIUM) 8.6-50 MG TABS Take 1 tablet by mouth 2 times daily        triamcinolone (KENALOG) 0.025 % cream Apply topically every 12 hours as needed to rash to the affected area prn, apply tin layer to the rash at the LE.       Warfarin Sodium (COUMADIN PO) As directed, per INR           REVIEW OF SYSTEMS:  Unobtainable secondary to cognitive impairment.  and 4 point ROS including Respiratory, CV, GI and , other than that noted in the HPI,  is negative    Objective:  /74   Pulse 76   Temp 97.9  F (36.6  C)   Resp 18   Ht 1.753 m (5' 9\")   Wt 75.8 kg (167 lb)   SpO2 96%   BMI 24.66 kg/m    Exam:  GENERAL APPEARANCE:  Alert, in no distress  ENT:  Mouth and posterior oropharynx normal, moist mucous membranes  RESP:  respiratory effort and palpation of chest normal, lungs clear to auscultation , no respiratory distress  CV:  Palpation and auscultation of heart done , regular rate and rhythm, no murmur, rub, or gallop  M/S:   Gait and station normal  Digits and nails normal  SKIN:  Inspection of skin and subcutaneous tissue baseline, Palpation of skin and subcutaneous tissue baseline  NEURO:   Cranial nerves 2-12 are normal tested and grossly at patient's baseline  PSYCH:  affect and mood normal    Labs:   Labs done in SNF are in [facility]. Please refer to them using [facility]/Care Everywhere. and Recent labs in [facility] reviewed by me today.     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   -Trial dose of celexa 10 mg/day. Left message for Jami regarding medication changes.     Electronically signed by:  ANGEL Corrigan CNP  [facility]         "

## 2019-04-23 ENCOUNTER — RECORDS - HEALTHEAST (OUTPATIENT)
Dept: LAB | Facility: CLINIC | Age: 84
End: 2019-04-23

## 2019-04-23 ENCOUNTER — NURSING HOME VISIT (OUTPATIENT)
Dept: GERIATRICS | Facility: CLINIC | Age: 84
End: 2019-04-23
Payer: COMMERCIAL

## 2019-04-23 ENCOUNTER — TRANSFERRED RECORDS (OUTPATIENT)
Dept: HEALTH INFORMATION MANAGEMENT | Facility: CLINIC | Age: 84
End: 2019-04-23

## 2019-04-23 VITALS
HEART RATE: 64 BPM | SYSTOLIC BLOOD PRESSURE: 150 MMHG | WEIGHT: 169 LBS | HEIGHT: 69 IN | OXYGEN SATURATION: 95 % | RESPIRATION RATE: 18 BRPM | TEMPERATURE: 98 F | BODY MASS INDEX: 25.03 KG/M2 | DIASTOLIC BLOOD PRESSURE: 78 MMHG

## 2019-04-23 DIAGNOSIS — Z51.81 ENCOUNTER FOR THERAPEUTIC DRUG MONITORING: ICD-10-CM

## 2019-04-23 DIAGNOSIS — Z79.01 LONG TERM CURRENT USE OF ANTICOAGULANT THERAPY: ICD-10-CM

## 2019-04-23 DIAGNOSIS — I48.20 CHRONIC ATRIAL FIBRILLATION (H): Primary | ICD-10-CM

## 2019-04-23 LAB
INR PPP: 2.54 (ref 0.9–1.1)
INR PPP: 2.54 (ref 0.9–1.1)

## 2019-04-23 PROCEDURE — 99308 SBSQ NF CARE LOW MDM 20: CPT | Performed by: NURSE PRACTITIONER

## 2019-04-23 RX ORDER — CITALOPRAM HYDROBROMIDE 10 MG/1
10 TABLET ORAL DAILY
Start: 2019-04-23 | End: 2019-05-11

## 2019-04-23 ASSESSMENT — MIFFLIN-ST. JEOR: SCORE: 1406.96

## 2019-04-23 NOTE — PROGRESS NOTES
"[facility]  [facility] Record Number:  [id number]  Place of Service where encounter took place: [facility] ([facility]) [[id number]]    HPI:    Earl Sanchez is a 89 year old  (9/17/1926), who is being seen today for an episodic care visit at the above location.   HPI information obtained from: facility chart records, facility staff and patient report. Today's concern is INR/Coumadin management for A. Fib    ROS/Subjective:  Bleeding Signs/Symptoms:  None  Thromboembolic Signs/Symptoms:  None  Medication Changes:  No  Dietary Changes:  No  Activity Changes: No  Bacterial/Viral Infection:  No  Missed Coumadin Doses:  None  On ASA: Yes - 81 mg po q day  Other Concerns:  No    OBJECTIVE:  /78   Pulse 64   Temp 98  F (36.7  C)   Resp 18   Ht 1.753 m (5' 9\")   Wt 76.7 kg (169 lb)   SpO2 95%   BMI 24.96 kg/m    Last INR: 2.59  on 3/26/19  INR Today:  2.54  Current Dose:  Warfarin 3 mg M W, F, sat, Sun and warfarin 2.5 mg all other days.   INR Flow sheet at SNF:    ASSESSMENT:    ICD-10-CM    1. Chronic atrial fibrillation (H) I48.2    2. Encounter for therapeutic drug monitoring Z51.81    3. Long term current use of anticoagulant therapy Z79.01        Therapeutic INR for goal of 2-3    PLAN:   Orders written by provider at facility  New Dose: No Change    Next INR: 1 month      Electronically signed by:  ANGEL Corrigan CNP  [facility]     "

## 2019-04-23 NOTE — LETTER
"    4/23/2019        RE: Earl Sanchez  [facility]  [address]  [facility] [address]        [facility]  [facility] Record Number:  [id number]  Place of Service where encounter took place: [facility] ([facility]) [[id number]]    HPI:    Earl Sanchez is a 89 year old  (9/17/1926), who is being seen today for an episodic care visit at the above location.   HPI information obtained from: facility chart records, facility staff and patient report. Today's concern is INR/Coumadin management for A. Fib    ROS/Subjective:  Bleeding Signs/Symptoms:  None  Thromboembolic Signs/Symptoms:  None  Medication Changes:  No  Dietary Changes:  No  Activity Changes: No  Bacterial/Viral Infection:  No  Missed Coumadin Doses:  None  On ASA: Yes - 81 mg po q day  Other Concerns:  No    OBJECTIVE:  /78   Pulse 64   Temp 98  F (36.7  C)   Resp 18   Ht 1.753 m (5' 9\")   Wt 76.7 kg (169 lb)   SpO2 95%   BMI 24.96 kg/m     Last INR: 2.59  on 3/26/19  INR Today:  2.54  Current Dose:  Warfarin 3 mg M W, F, sat, Sun and warfarin 2.5 mg all other days.   INR Flow sheet at [facility]:    ASSESSMENT:    ICD-10-CM    1. Chronic atrial fibrillation (H) I48.2    2. Encounter for therapeutic drug monitoring Z51.81    3. Long term current use of anticoagulant therapy Z79.01        Therapeutic INR for goal of 2-3    PLAN:   Orders written by provider at facility  New Dose: No Change    Next INR: 1 month      Electronically signed by:  ANGEL Corrigan CNP  [facility]         Sincerely,        Christiana Osuna NP    "

## 2019-05-10 ENCOUNTER — NURSING HOME VISIT (OUTPATIENT)
Dept: GERIATRICS | Facility: CLINIC | Age: 84
End: 2019-05-10
Payer: COMMERCIAL

## 2019-05-10 VITALS
BODY MASS INDEX: 25.4 KG/M2 | OXYGEN SATURATION: 95 % | DIASTOLIC BLOOD PRESSURE: 78 MMHG | SYSTOLIC BLOOD PRESSURE: 149 MMHG | WEIGHT: 171.5 LBS | RESPIRATION RATE: 18 BRPM | HEIGHT: 69 IN | TEMPERATURE: 97.9 F | HEART RATE: 68 BPM

## 2019-05-10 DIAGNOSIS — I48.20 CHRONIC ATRIAL FIBRILLATION (H): ICD-10-CM

## 2019-05-10 DIAGNOSIS — R40.4 UNRESPONSIVE EPISODE: Primary | ICD-10-CM

## 2019-05-10 DIAGNOSIS — G30.8 ALZHEIMER'S DISEASE OF OTHER ONSET WITH BEHAVIORAL DISTURBANCE: ICD-10-CM

## 2019-05-10 DIAGNOSIS — F02.818 ALZHEIMER'S DISEASE OF OTHER ONSET WITH BEHAVIORAL DISTURBANCE: ICD-10-CM

## 2019-05-10 DIAGNOSIS — F43.23 ADJUSTMENT DISORDER WITH MIXED ANXIETY AND DEPRESSED MOOD: ICD-10-CM

## 2019-05-10 DIAGNOSIS — R27.0 ATAXIA: ICD-10-CM

## 2019-05-10 DIAGNOSIS — I10 ESSENTIAL HYPERTENSION: ICD-10-CM

## 2019-05-10 PROCEDURE — 99309 SBSQ NF CARE MODERATE MDM 30: CPT | Performed by: INTERNAL MEDICINE

## 2019-05-10 ASSESSMENT — MIFFLIN-ST. JEOR: SCORE: 1418.3

## 2019-05-10 NOTE — LETTER
5/10/2019        RE: Earl Sanchez  [facility]  [address]  [facility] [address]        Earl Sanchez is a 89 year old male seen May 10, 2019 at [facility] where he has resided for one year (admit 2/2018) seen to follow up ataxia and atrial fib.   Pt had an ED visit in March for an episode of unresponsiveness that persisted in the ED.   He had head CT, then awoke and responded to questions.   Workup unremarkable and he returned to [facility].    Does not remember this event.     Patient is seen in his room, resting abed.   States he feels fine, denies any current pain or other symptoms.    Nursing staff reports he can be verbally abusive and resistant to cares at times, with depressed mood.   Citalopram recently started for this.     Patient had a hospitalization in January 2018 for encephalopathy with gait disturbance.  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    He was in [facility] for a month, improved, but needed more support than AL, so transitioned to LT for permanent placement.  Continues to need assist for transfers bed to .      Patient has had atrial fib for many years, anticoagulated with warfarin.   Also treated for BPH, CAD and hypothyroidism, which have been stable.        Past Medical History:   Diagnosis Date     Abdominal aortic aneurysm (H)     per 2/12/15 abdominal US, mild aneurysm measuring [id number]     Acute MI 1981, 2007     Atrial fibrillation (H)      BPH (benign prostatic hyperplasia)      CAD (coronary artery disease)     CABG 2007, f/b cardio, Dr. 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    , lived at the [facility] with services until 1/2018.      Has 3 children.     Review Of Systems  Limited, but negative other than above.   Weight is stable      SLUMS 13/30   CPT 4.4   BIMS 7/15    EXAM:  Up to WC, NAD  /78   Pulse 68   Temp 97.9  F (36.6  C)   Resp 18   Ht 1.753 m (5' 9\")   Wt 77.8 kg (171 lb 8 oz)   SpO2 95%   BMI 25.33 kg/m      Neck supple without adenopathy  Lungs clear bilaterally with good air movement.   Heart irreg irreg s1s2, 1/6 HSM  Abd soft, NT, no distention, +BS  Ext without edema.   Neuro: no tremor or stiffness, +STML   Psych: affect irritable.     Last Comprehensive Metabolic Panel:  Sodium   Date Value Ref Range Status   03/14/2019 140 133 - 144 mmol/L Final     Potassium   Date Value Ref Range Status   03/14/2019 4.2 3.4 - 5.3 mmol/L Final     Chloride   Date Value Ref Range Status   03/14/2019 108 94 - 109 mmol/L Final     Carbon Dioxide   Date Value Ref Range Status   03/14/2019 28 20 - 32 mmol/L Final     Anion Gap   Date Value Ref Range Status   03/19/2019 6 5 - 18 mmol/L Final     Glucose   Date Value Ref Range Status   03/19/2019 69 (A) 70 - 125 mg/dL Final     Urea Nitrogen   Date Value Ref Range Status   03/19/2019 20 8 - 28 mg/dL Final     Creatinine   Date Value Ref Range Status   03/19/2019 1.09 0.70 - 1.30 mg/dL Final     GFR Estimate   Date Value Ref Range Status   03/19/2019 >60 >60 ml/min/1.73m2 Final     Calcium   Date Value Ref Range Status   03/19/2019 9.3 8.5 - 10.5 mg/dL Final     Lab Results   Component Value Date    WBC 8.5 03/29/2019      HGB 11.4 03/29/2019      MCV 97 03/29/2019       03/29/2019        CT SCAN OF THE HEAD WITHOUT CONTRAST  3/14/2019 3:27 AM   HISTORY: Altered level of consciousness, unexplained  COMPARISON: 1/17/2018.  FINDINGS: There is generalized atrophy of the brain. There is low attenuation in the white " matter of the cerebral hemispheres consistent  with sequelae of small vessel ischemic disease. There is no evidence of intracranial hemorrhage, mass, acute infarct or anomaly.   The visualized portions of the sinuses and mastoids appear normal.  There is no evidence of trauma.                                                          IMPRESSION: No acute abnormality.       IMP/PLAN:  (R41.89) Unresponsive episode    Comment: prolonged  Plan: no clear etiology on ED workup, and has not recurred.    If any further syncope would look to discontinuation of donepezil.       (I12.9,  N18.3) Benign hypertension with chronic kidney disease, stage III    Comment:    BP Readings from Last 3 Encounters:   05/10/19 149/78   04/23/19 150/78   04/12/19 140/74    Plan: remains on doxazosin for bp control and BPH.   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    Plan:   Neurology follow up with Dr Nell Saldana    (G30.1,  F02.80) Late onset Alzheimer's disease without behavioral disturbance  Comment: gradual decline in cognition, low functional status.  Plan: LTC support for meds, meals, activity.        (F43.23) Adjustment disorder with mixed anxiety and depressed mood  Comment: has worsened with cognitive decline     Plan: continue citalopram, follow        (I48.2) Chronic atrial fibrillation (H)  Comment: not requiring rate slowing meds   Pulse Readings from Last 4 Encounters:   05/10/19 68   04/23/19 64   04/12/19 76   03/28/19 70      Plan: warfarin per INR, goal 2-2.5 given fall risk.    (I25.10) Coronary artery disease involving native coronary artery of native heart without angina pectoris  Comment: h/o CABG 2007  Plan: daily ASA, statin for secondary prevention.   NTG prn    (N40.0) Benign prostatic hyperplasia, unspecified whether lower urinary tract symptoms present  Comment: longstanding  Plan: continue doxazosin.       (E03.9) Hypothyroidism,  unspecified type  Comment:   TSH   Date Value Ref Range Status   06/26/2018 2.06 0.30 - 5.00 uIU/mL Final   Plan: same dose levothyroxine, yearly TSH       (E55.9) Vitamin D deficiency  Comment: replaced   Plan: change vit D to 2000 units/day       Patty Gamble MD         Sincerely,        Patty Gamble MD

## 2019-05-11 NOTE — PROGRESS NOTES
Earl Sanchez is a 89 year old male seen May 10, 2019 at [facility] where he has resided for one year (admit 2/2018) seen to follow up ataxia and atrial fib.   Pt had an ED visit in March for an episode of unresponsiveness that persisted in the ED.   He had head CT, then awoke and responded to questions.   Workup unremarkable and he returned to [facility].    Does not remember this event.     Patient is seen in his room, resting abed.   States he feels fine, denies any current pain or other symptoms.    Nursing staff reports he can be verbally abusive and resistant to cares at times, with depressed mood.   Citalopram recently started for this.     Patient had a hospitalization in January 2018 for encephalopathy with gait disturbance.  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    He was in [place] for a month, improved, but needed more support than AL, so transitioned to LTC for permanent placement.  Continues to need assist for transfers bed to .      Patient has had atrial fib for many years, anticoagulated with warfarin.   Also treated for BPH, CAD and hypothyroidism, which have been stable.        Past Medical History:   Diagnosis Date     Abdominal aortic aneurysm (H)     per 2/12/15 abdominal US, mild aneurysm measuring [id number]     Acute MI 1981, 2007     Atrial fibrillation (H)      BPH (benign prostatic hyperplasia)      CAD (coronary artery disease)     CABG 2007, f/b cardio, Dr. 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    , lived at the [place]  "[facility] with services until 1/2018.      Has 3 children.     Review Of Systems  Limited, but negative other than above.   Weight is stable      SLUMS 13/30   CPT 4.4   BIMS 7/15    EXAM:  Up to WC, NAD  /78   Pulse 68   Temp 97.9  F (36.6  C)   Resp 18   Ht 1.753 m (5' 9\")   Wt 77.8 kg (171 lb 8 oz)   SpO2 95%   BMI 25.33 kg/m     Neck supple without adenopathy  Lungs clear bilaterally with good air movement.   Heart irreg irreg s1s2, 1/6 HSM  Abd soft, NT, no distention, +BS  Ext without edema.   Neuro: no tremor or stiffness, +STML   Psych: affect irritable.     Last Comprehensive Metabolic Panel:  Sodium   Date Value Ref Range Status   03/14/2019 140 133 - 144 mmol/L Final     Potassium   Date Value Ref Range Status   03/14/2019 4.2 3.4 - 5.3 mmol/L Final     Chloride   Date Value Ref Range Status   03/14/2019 108 94 - 109 mmol/L Final     Carbon Dioxide   Date Value Ref Range Status   03/14/2019 28 20 - 32 mmol/L Final     Anion Gap   Date Value Ref Range Status   03/19/2019 6 5 - 18 mmol/L Final     Glucose   Date Value Ref Range Status   03/19/2019 69 (A) 70 - 125 mg/dL Final     Urea Nitrogen   Date Value Ref Range Status   03/19/2019 20 8 - 28 mg/dL Final     Creatinine   Date Value Ref Range Status   03/19/2019 1.09 0.70 - 1.30 mg/dL Final     GFR Estimate   Date Value Ref Range Status   03/19/2019 >60 >60 ml/min/1.73m2 Final     Calcium   Date Value Ref Range Status   03/19/2019 9.3 8.5 - 10.5 mg/dL Final     Lab Results   Component Value Date    WBC 8.5 03/29/2019      HGB 11.4 03/29/2019      MCV 97 03/29/2019       03/29/2019        CT SCAN OF THE HEAD WITHOUT CONTRAST  3/14/2019 3:27 AM   HISTORY: Altered level of consciousness, unexplained  COMPARISON: 1/17/2018.  FINDINGS: There is generalized atrophy of the brain. There is low attenuation in the white matter of the cerebral hemispheres consistent  with sequelae of small vessel ischemic disease. There is no evidence of intracranial " hemorrhage, mass, acute infarct or anomaly.   The visualized portions of the sinuses and mastoids appear normal.  There is no evidence of trauma.                                                          IMPRESSION: No acute abnormality.       IMP/PLAN:  (R41.89) Unresponsive episode    Comment: prolonged  Plan: no clear etiology on ED workup, and has not recurred.    If any further syncope would look to discontinuation of donepezil.       (I12.9,  N18.3) Benign hypertension with chronic kidney disease, stage III    Comment:    BP Readings from Last 3 Encounters:   05/10/19 149/78   04/23/19 150/78   04/12/19 140/74    Plan: remains on doxazosin for bp control and BPH.   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    Plan:   Neurology follow up with Dr Nell Saldana    (G30.1,  F02.80) Late onset Alzheimer's disease without behavioral disturbance  Comment: gradual decline in cognition, low functional status.  Plan: LTC support for meds, meals, activity.        (F43.23) Adjustment disorder with mixed anxiety and depressed mood  Comment: has worsened with cognitive decline     Plan: continue citalopram, follow        (I48.2) Chronic atrial fibrillation (H)  Comment: not requiring rate slowing meds   Pulse Readings from Last 4 Encounters:   05/10/19 68   04/23/19 64   04/12/19 76   03/28/19 70      Plan: warfarin per INR, goal 2-2.5 given fall risk.    (I25.10) Coronary artery disease involving native coronary artery of native heart without angina pectoris  Comment: h/o CABG 2007  Plan: daily ASA, statin for secondary prevention.   NTG prn    (N40.0) Benign prostatic hyperplasia, unspecified whether lower urinary tract symptoms present  Comment: longstanding  Plan: continue doxazosin.       (E03.9) Hypothyroidism, unspecified type  Comment:   TSH   Date Value Ref Range Status   06/26/2018 2.06 0.30 - 5.00 uIU/mL Final   Plan: same dose  levothyroxine, yearly TSH       (E55.9) Vitamin D deficiency  Comment: replaced   Plan: change vit D to 2000 units/day       Patty Gamble MD

## 2019-05-22 ENCOUNTER — RECORDS - HEALTHEAST (OUTPATIENT)
Dept: LAB | Facility: CLINIC | Age: 84
End: 2019-05-22

## 2019-05-23 ENCOUNTER — TRANSFERRED RECORDS (OUTPATIENT)
Dept: HEALTH INFORMATION MANAGEMENT | Facility: CLINIC | Age: 84
End: 2019-05-23

## 2019-05-23 ENCOUNTER — NURSING HOME VISIT (OUTPATIENT)
Dept: GERIATRICS | Facility: CLINIC | Age: 84
End: 2019-05-23
Payer: COMMERCIAL

## 2019-05-23 VITALS
HEIGHT: 69 IN | BODY MASS INDEX: 25.77 KG/M2 | HEART RATE: 66 BPM | OXYGEN SATURATION: 96 % | TEMPERATURE: 98.1 F | WEIGHT: 174 LBS | RESPIRATION RATE: 17 BRPM | DIASTOLIC BLOOD PRESSURE: 73 MMHG | SYSTOLIC BLOOD PRESSURE: 141 MMHG

## 2019-05-23 DIAGNOSIS — I48.20 CHRONIC ATRIAL FIBRILLATION (H): Primary | ICD-10-CM

## 2019-05-23 DIAGNOSIS — Z79.01 LONG TERM CURRENT USE OF ANTICOAGULANT THERAPY: ICD-10-CM

## 2019-05-23 DIAGNOSIS — Z51.81 ENCOUNTER FOR THERAPEUTIC DRUG MONITORING: ICD-10-CM

## 2019-05-23 LAB
INR PPP: 2.03 (ref 0.9–1.1)
INR PPP: 2.03 (ref 0.9–1.1)

## 2019-05-23 PROCEDURE — 99308 SBSQ NF CARE LOW MDM 20: CPT | Performed by: NURSE PRACTITIONER

## 2019-05-23 ASSESSMENT — MIFFLIN-ST. JEOR: SCORE: 1429.64

## 2019-05-23 NOTE — PROGRESS NOTES
"[facility]  [facility] Record Number:  [id number]  Place of Service where encounter took place: [facility] ([facility]) [[id number]]    HPI:    Earl Sanchez is a 89 year old  (9/17/1926), who is being seen today for an episodic care visit at the above location.   HPI information obtained from: facility chart records, facility staff and patient report. Today's concern is INR/Coumadin management for A. Fib    ROS/Subjective:  Bleeding Signs/Symptoms:  None  Thromboembolic Signs/Symptoms:  None  Medication Changes:  No  Dietary Changes:  No  Activity Changes: No  Bacterial/Viral Infection:  No  Missed Coumadin Doses:  None  On ASA: 81 mg  Other Concerns:  No    OBJECTIVE:  /73   Pulse 66   Temp 98.1  F (36.7  C)   Resp 17   Ht 1.753 m (5' 9\")   Wt 78.9 kg (174 lb)   SpO2 96%   BMI 25.70 kg/m    INR Today: 2.03  INR Flow sheet at SNF:    ASSESSMENT:    ICD-10-CM    1. Chronic atrial fibrillation (H) I48.2    2. Long term current use of anticoagulant therapy Z79.01    3. Encounter for therapeutic drug monitoring Z51.81      Therapeutic INR for goal of 2-3    PLAN:   Orders written by provider at facility  New Dose: No Change    Next INR: 1 month      Electronically signed by:  ANGEL Corrigan CNP  [facility]     "

## 2019-05-28 NOTE — PROGRESS NOTES
ANTICOAGULATION FOLLOW-UP CLINIC VISIT    Patient Name:  Earl Sanchez  Date:  9/26/2017  Contact Type:  Face to Face    SUBJECTIVE:        OBJECTIVE    INR Protime   Date Value Ref Range Status   09/26/2017 2.6 (A) 0.86 - 1.14 Final       ASSESSMENT / PLAN  No question data found.  Anticoagulation Summary as of 9/26/2017     INR goal 2.0-3.0   Today's INR 2.6   Maintenance plan 2 mg (2 mg x 1) on Tue, Thu, Sat; 4 mg (2 mg x 2) all other days   Full instructions 2 mg on Tue, Thu, Sat; 4 mg all other days   Weekly total 22 mg   No change documented Jinny Holguin RN   Plan last modified Brandy Miller RN (8/28/2017)   Next INR check 10/24/2017   Priority INR   Target end date Indefinite    Indications   Long-term (current) use of anticoagulants [Z79.01] [Z79.01]  Atrial fibrillation (H) [I48.91]         Anticoagulation Episode Summary     INR check location     Preferred lab     Send INR reminders to  [facility]    Comments TAKES WARFARIN IN THE MORNING        Anticoagulation Care Providers     Provider Role Specialty Phone number    Alvarado Florian MD Responsible Family Practice [phone]            See the Encounter Report to view Anticoagulation Flowsheet and Dosing Calendar (Go to Encounters tab in chart review, and find the Anticoagulation Therapy Visit)    Patient INR at goal will continue to take 22 mg weekly dose.  And follow up in 4 weeks.    Jinny Gama RN                /3 children - A&W

## 2019-06-06 ENCOUNTER — NURSING HOME VISIT (OUTPATIENT)
Dept: GERIATRICS | Facility: CLINIC | Age: 84
End: 2019-06-06
Payer: COMMERCIAL

## 2019-06-06 VITALS
RESPIRATION RATE: 18 BRPM | OXYGEN SATURATION: 95 % | WEIGHT: 173 LBS | HEART RATE: 73 BPM | SYSTOLIC BLOOD PRESSURE: 142 MMHG | DIASTOLIC BLOOD PRESSURE: 83 MMHG | BODY MASS INDEX: 25.62 KG/M2 | TEMPERATURE: 97.4 F | HEIGHT: 69 IN

## 2019-06-06 DIAGNOSIS — Z79.01 LONG TERM CURRENT USE OF ANTICOAGULANT THERAPY: ICD-10-CM

## 2019-06-06 DIAGNOSIS — I25.10 CORONARY ARTERY DISEASE INVOLVING NATIVE CORONARY ARTERY OF NATIVE HEART WITHOUT ANGINA PECTORIS: ICD-10-CM

## 2019-06-06 DIAGNOSIS — E03.4 HYPOTHYROIDISM DUE TO ACQUIRED ATROPHY OF THYROID: ICD-10-CM

## 2019-06-06 DIAGNOSIS — L30.8 OTHER ECZEMA: ICD-10-CM

## 2019-06-06 DIAGNOSIS — N18.30 CKD (CHRONIC KIDNEY DISEASE) STAGE 3, GFR 30-59 ML/MIN (H): ICD-10-CM

## 2019-06-06 DIAGNOSIS — I48.20 CHRONIC ATRIAL FIBRILLATION (H): ICD-10-CM

## 2019-06-06 DIAGNOSIS — Z13.31 SCREENING FOR DEPRESSION: ICD-10-CM

## 2019-06-06 DIAGNOSIS — N18.30 BENIGN HYPERTENSION WITH CHRONIC KIDNEY DISEASE, STAGE III (H): ICD-10-CM

## 2019-06-06 DIAGNOSIS — I12.9 BENIGN HYPERTENSION WITH CHRONIC KIDNEY DISEASE, STAGE III (H): ICD-10-CM

## 2019-06-06 DIAGNOSIS — R27.0 ATAXIA: Primary | ICD-10-CM

## 2019-06-06 DIAGNOSIS — R41.89 COGNITIVE IMPAIRMENT: ICD-10-CM

## 2019-06-06 PROCEDURE — 99318 ZZC ANNUAL NURSING FAC ASSESSMNT, STABLE: CPT | Performed by: NURSE PRACTITIONER

## 2019-06-06 ASSESSMENT — MIFFLIN-ST. JEOR: SCORE: 1425.1

## 2019-06-06 NOTE — LETTER
6/6/2019        RE: Earl Sanchez  [facility]  [address]  [facility] [address]        [facility]  Chief Complaint   Patient presents with     Annual Comprehensive Nursing Home     [facility] Record Number:  [id number]  Place of Service where encounter took place:  [facility] ([facility]) [[id number]]    HPI:    Earl Sanchez  is a 89 year old  (9/17/1926), who is being seen today for an annual comprehensive visit. HPI information obtained from: facility chart records, facility staff, patient report and [facility] chart review.      Today's concerns are:  Ataxia  Progressive decline in gait, functional status and cognition with possible Parkinsonian features and/or dementia.Uses yaz lift for transfers. Wheelchair bound.      Chronic atrial fibrillation (H)  Long term current use of anticoagulant therapy  Pulse Readings from Last 4 Encounters:   06/06/19 73   05/23/19 66   05/10/19 68   04/23/19 64   Anticoagulated per INR.     Coronary artery disease involving native coronary artery of native heart without angina pectoris  Remains asymptomatic. History of CABG in 2007.    Benign hypertension with chronic kidney disease, stage III (H)  BP Readings from Last 3 Encounters:   06/06/19 142/83   05/23/19 141/73   05/10/19 149/78   Denies CP, SOB or lightheadedness.     CKD (chronic kidney disease) stage 3, GFR 30-59 ml/min (H)  See labs    Other eczema  No complaints per resident or documentation. Has personal history.     Cognitive impairment  Alert and oriented but very forgetful. Last BIMS 13/15. Weight stable.    Screening for depression  PHQ-9 03/17. Recently started on low dose celexa 2/2 some behaviors toward staff. Reports his mood is baseline and he feels ok.       ALLERGIES: No known allergies  PAST MEDICAL HISTORY:  has a past medical history of Abdominal aortic aneurysm (H), Acute MI (H) (1981, 2007), Atrial fibrillation  (H), BPH (benign prostatic hyperplasia), CAD (coronary artery disease), Chronic kidney disease, Duodenal ulcer with hemorrhage, Gait apraxia, Gastritis, Gastro-oesophageal reflux disease, Heart failure (H), Hyperlipidaemia LDL goal <100, Hypertension goal BP (blood pressure) < 140/90, Osteoarthritis, Renal disease, and Thyroid disease. He also has no past medical history of Malignant hyperthermia or PONV (postoperative nausea and vomiting).  PAST SURGICAL HISTORY:  has a past surgical history that includes hernia repair, inguinal rt/lt; Ankle surgery; Phacoemulsification clear cornea with toric intraocular lens implant (4/10/2014); Phacoemulsification clear cornea with toric intraocular lens implant (4/22/2014); coronary artery bypass (2007); and Heart Cath Left heart cath (12/10/07).  IMMUNIZATIONS:  Immunization History   Administered Date(s) Administered     Influenza (High Dose) 3 valent vaccine 10/27/2014, 10/08/2015, 10/06/2016, 10/10/2017, 10/09/2018     Pneumo Conj 13-V (2010&after) 03/08/2018     TDAP Vaccine (Adacel) 08/21/2012     Above immunizations pulled from [facility]. [facility] and facility records also reconciled. Outstanding information sent to  to update [facility].  Future immunizations are not needed at this point as all recommended immunizations are up to date.     Current Outpatient Medications   Medication Sig Dispense Refill     acetaminophen (TYLENOL) 500 MG tablet Take 1,000 mg by mouth every 6 hours as needed for mild pain        allopurinol (ZYLOPRIM) 100 MG tablet Take 100 mg by mouth 2 times daily       aspirin (ASPIRIN LOW DOSE) 81 MG tablet Take 81 mg by mouth daily        benzonatate (TESSALON) 100 MG capsule Take 100 mg by mouth 3 times daily as needed for cough       citalopram (CELEXA) 10 MG tablet Take 10 mg by mouth daily       donepezil (ARICEPT) 5 MG tablet Take 5 mg by mouth At Bedtime       doxazosin (CARDURA) 4 MG tablet Take 1 tablet (4 mg) by mouth At  Bedtime 90 tablet 3     ERGOCALCIFEROL PO Take 50,000 Units by mouth every morning Wednesday        LEVOTHYROXINE SODIUM PO Take 75 mcg by mouth daily        nitroglycerin (NITROSTAT) 0.4 MG SL tablet Place 1 tablet (0.4 mg) under the tongue every 5 minutes as needed for chest pain 25 tablet 0     phenylephrine-shark liver oil-mineral oil-petrolatum (PREPARATION H) 0.25-14-74.9 % rectal ointment Place 1 applicator rectally 4 times daily as needed for hemorrhoids for Hemorrhoids       polyethylene glycol 3350 POWD Take 17 g by mouth daily       pravastatin (PRAVACHOL) 40 MG tablet TAKE 1 TABLET DAILY 90 tablet 1     Sennosides-Docusate Sodium (SENNA-DOCUSATE SODIUM) 8.6-50 MG TABS Take 1 tablet by mouth 2 times daily        triamcinolone (KENALOG) 0.025 % cream Apply topically every 12 hours as needed to rash to the affected area prn, apply tin layer to the rash at the LE.       Warfarin Sodium (COUMADIN PO) As directed, per INR         Case Management:  I have reviewed the facility/SNF care plan/MDS, including the falls risk, nutrition and pain screening. I also reviewed the current immunizations, and preventive care. .Future cancer screening is not clinically indicated secondary to age/goals of care Patient's desire to return to the community is not assessible due to cognitive impairment. Current Level of Care is appropriate.    Advance Directive Discussion:    I reviewed the current advanced directives as reflected in EPIC, the POLST and the facility chart, and verified the congruency of orders 06/6/19. I contacted the first party [employer] and left a message regarding the plan of Care.  I did not due to cognitive impairment review the advance directives with the resident.     Team Discussion:  I communicated with the appropriate disciplines involved with the Plan of Care:   Nursing  ,    and Dietitian    Patient's goal is unobtainable secondary to cognitive impairment and pain control and  "comfort.  Information reviewed:  Medications, vital signs, orders, and nursing notes.    ROS:  4 point ROS including Respiratory, CV, GI and , other than that noted in the HPI,  is negative    Vitals:  /83   Pulse 73   Temp 97.4  F (36.3  C)   Resp 18   Ht 1.753 m (5' 9\")   Wt 78.5 kg (173 lb)   SpO2 95%   BMI 25.55 kg/m    Body mass index is 25.55 kg/m .  Exam:  GENERAL APPEARANCE:  Alert, in no distress  ENT:  Mouth and posterior oropharynx normal, moist mucous membranes, [place]  RESP:  respiratory effort and palpation of chest normal, lungs clear to auscultation , no respiratory distress  CV:  Palpation and auscultation of heart done , regular rate and rhythm, no murmur, rub, or gallop  ABDOMEN:  normal bowel sounds, soft, nontender, no hepatosplenomegaly or other masses  M/S:   Gait and station normal  Digits and nails normal  SKIN:  Inspection of skin and subcutaneous tissue baseline, Palpation of skin and subcutaneous tissue baseline  NEURO:   Cranial nerves 2-12 are normal tested and grossly at patient's baseline  PSYCH:  memory impaired , affect and mood normal     Lab/Diagnostic data:   Labs done in SNF are in [place] [employer] [facility]. Please refer to them using [employer]/Care Everywhere. and Recent labs in [facility] reviewed by me today.     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   Plan: Continue LTC support with medication administration, meals, and safety. Neurology follow up as scheduled by family.      (I48.2) Chronic atrial fibrillation (H)  Comment: HR:76, not on rate controlling agent.  Last INR was 2.9 on 2/13/2018, stable on 2-4 mg daily.   Plan: INR next lab day. Warfarin per INR      (I25.10) Coronary artery disease involving native coronary artery of native heart without angina pectoris  Comment: History of CABG in 2007. No acute issues  Plan: continue ASA, " statin, prn nitro.      (I10) Hypertension goal BP (blood pressure) < 140/90  Comment: controlled with   BP Readings from Last 3 Encounters:   06/06/19 142/83   05/23/19 141/73   05/10/19 149/78     Plan: continue doxazosin.      (N18.3) CKD (chronic kidney disease) stage 3, GFR 30-59 ml/min  Comment: renal function at baseline  Plan:  Avoid nephrotoxins and renal dose when appropriate.      (R41.89) Cognitive impairment  Comment: cognition much improved from tcu admission. Cognitive scores indicate moderate deficits. Pleasant and cooperative.   Plan: supportive care. Chronic, progressive. Needs 24 hour skilled nursing care. HPI/ROS difficult to obtain with cognitive impairment. Expect further functional and cognitive decline. Expect weight loss. Continue 24 hour care. Monitor weight. Monitor functional status. Monitor for behavioral disturbances.     (L30.9) Eczema, unspecified type  Comment: he's had intermittent patchy red, itchy rash of both ankles and lower legs, as well as right forearm. Improved with triamcinolone.  Plan: continue triamcinolone prn.     (E03.4) Hypothyroidism   Comment:   TSH   Date Value Ref Range Status   06/26/2018 2.06 0.30 - 5.00 uIU/mL Final   Plan: Continue current dose of synthroid. Check TSH yearly and PRN, and keep level b/w 4-5 in elderly.        Orders written by provider at facility      Electronically signed by:  ANGEL Corrigan [facility]           Sincerely,        Christiana Osuna NP

## 2019-06-06 NOTE — PROGRESS NOTES
[facility]  Chief Complaint   Patient presents with     Annual Comprehensive Nursing Home     [facility] Medical Record Number:  [id number]  Place of Service where encounter took place:  [facility] ([facility] [[id number]]    HPI:    Earl Sanchez  is a 89 year old  (9/17/1926), who is being seen today for an annual comprehensive visit. HPI information obtained from: facility chart records, facility staff, patient report and [facility] chart review.      Today's concerns are:  Ataxia  Progressive decline in gait, functional status and cognition with possible Parkinsonian features and/or dementia.Uses yaz lift for transfers. Wheelchair bound.      Chronic atrial fibrillation (H)  Long term current use of anticoagulant therapy  Pulse Readings from Last 4 Encounters:   06/06/19 73   05/23/19 66   05/10/19 68   04/23/19 64   Anticoagulated per INR.     Coronary artery disease involving native coronary artery of native heart without angina pectoris  Remains asymptomatic. History of CABG in 2007.    Benign hypertension with chronic kidney disease, stage III (H)  BP Readings from Last 3 Encounters:   06/06/19 142/83   05/23/19 141/73   05/10/19 149/78   Denies CP, SOB or lightheadedness.     CKD (chronic kidney disease) stage 3, GFR 30-59 ml/min (H)  See labs    Other eczema  No complaints per resident or documentation. Has personal history.     Cognitive impairment  Alert and oriented but very forgetful. Last BIMS 13/15. Weight stable.    Screening for depression  PHQ-9 03/17. Recently started on low dose celexa 2/2 some behaviors toward staff. Reports his mood is baseline and he feels ok.       ALLERGIES: No known allergies  PAST MEDICAL HISTORY:  has a past medical history of Abdominal aortic aneurysm (H), Acute MI (H) (1981, 2007), Atrial fibrillation (H), BPH (benign prostatic hyperplasia), CAD (coronary artery disease), Chronic kidney disease, Duodenal ulcer with hemorrhage, Gait  apraxia, Gastritis, Gastro-oesophageal reflux disease, Heart failure (H), Hyperlipidaemia LDL goal <100, Hypertension goal BP (blood pressure) < 140/90, Osteoarthritis, Renal disease, and Thyroid disease. He also has no past medical history of Malignant hyperthermia or PONV (postoperative nausea and vomiting).  PAST SURGICAL HISTORY:  has a past surgical history that includes hernia repair, inguinal rt/lt; Ankle surgery; Phacoemulsification clear cornea with toric intraocular lens implant (4/10/2014); Phacoemulsification clear cornea with toric intraocular lens implant (4/22/2014); coronary artery bypass (2007); and Heart Cath Left heart cath (12/10/07).  IMMUNIZATIONS:  Immunization History   Administered Date(s) Administered     Influenza (High Dose) 3 valent vaccine 10/27/2014, 10/08/2015, 10/06/2016, 10/10/2017, 10/09/2018     Pneumo Conj 13-V (2010&after) 03/08/2018     TDAP Vaccine (Adacel) 08/21/2012     Above immunizations pulled from [facility]. [facility] and facility records also reconciled. Outstanding information sent to  to update [facility].  Future immunizations are not needed at this point as all recommended immunizations are up to date.     Current Outpatient Medications   Medication Sig Dispense Refill     acetaminophen (TYLENOL) 500 MG tablet Take 1,000 mg by mouth every 6 hours as needed for mild pain        allopurinol (ZYLOPRIM) 100 MG tablet Take 100 mg by mouth 2 times daily       aspirin (ASPIRIN LOW DOSE) 81 MG tablet Take 81 mg by mouth daily        benzonatate (TESSALON) 100 MG capsule Take 100 mg by mouth 3 times daily as needed for cough       citalopram (CELEXA) 10 MG tablet Take 10 mg by mouth daily       donepezil (ARICEPT) 5 MG tablet Take 5 mg by mouth At Bedtime       doxazosin (CARDURA) 4 MG tablet Take 1 tablet (4 mg) by mouth At Bedtime 90 tablet 3     ERGOCALCIFEROL PO Take 50,000 Units by mouth every morning Wednesday        LEVOTHYROXINE SODIUM PO Take 75  mcg by mouth daily        nitroglycerin (NITROSTAT) 0.4 MG SL tablet Place 1 tablet (0.4 mg) under the tongue every 5 minutes as needed for chest pain 25 tablet 0     phenylephrine-shark liver oil-mineral oil-petrolatum (PREPARATION H) 0.25-14-74.9 % rectal ointment Place 1 applicator rectally 4 times daily as needed for hemorrhoids for Hemorrhoids       polyethylene glycol 3350 POWD Take 17 g by mouth daily       pravastatin (PRAVACHOL) 40 MG tablet TAKE 1 TABLET DAILY 90 tablet 1     Sennosides-Docusate Sodium (SENNA-DOCUSATE SODIUM) 8.6-50 MG TABS Take 1 tablet by mouth 2 times daily        triamcinolone (KENALOG) 0.025 % cream Apply topically every 12 hours as needed to rash to the affected area prn, apply tin layer to the rash at the LE.       Warfarin Sodium (COUMADIN PO) As directed, per INR         Case Management:  I have reviewed the facility/SNF care plan/MDS, including the falls risk, nutrition and pain screening. I also reviewed the current immunizations, and preventive care. .Future cancer screening is not clinically indicated secondary to age/goals of care Patient's desire to return to the community is not assessible due to cognitive impairment. Current Level of Care is appropriate.    Advance Directive Discussion:    I reviewed the current advanced directives as reflected in EPIC, the POLST and the facility chart, and verified the congruency of orders 06/6/19. I contacted the first party [employer] and left a message regarding the plan of Care.  I did not due to cognitive impairment review the advance directives with the resident.     Team Discussion:  I communicated with the appropriate disciplines involved with the Plan of Care:   Nursing  ,    and Dietitian    Patient's goal is unobtainable secondary to cognitive impairment and pain control and comfort.  Information reviewed:  Medications, vital signs, orders, and nursing notes.    ROS:  4 point ROS including Respiratory, CV, GI and ,  "other than that noted in the HPI,  is negative    Vitals:  /83   Pulse 73   Temp 97.4  F (36.3  C)   Resp 18   Ht 1.753 m (5' 9\")   Wt 78.5 kg (173 lb)   SpO2 95%   BMI 25.55 kg/m   Body mass index is 25.55 kg/m .  Exam:  GENERAL APPEARANCE:  Alert, in no distress  ENT:  Mouth and posterior oropharynx normal, moist mucous membranes, [place]  RESP:  respiratory effort and palpation of chest normal, lungs clear to auscultation , no respiratory distress  CV:  Palpation and auscultation of heart done , regular rate and rhythm, no murmur, rub, or gallop  ABDOMEN:  normal bowel sounds, soft, nontender, no hepatosplenomegaly or other masses  M/S:   Gait and station normal  Digits and nails normal  SKIN:  Inspection of skin and subcutaneous tissue baseline, Palpation of skin and subcutaneous tissue baseline  NEURO:   Cranial nerves 2-12 are normal tested and grossly at patient's baseline  PSYCH:  memory impaired , affect and mood normal     Lab/Diagnostic data:   Labs done in SNF are in [facility]. Please refer to them using [facility]/Care Everywhere. and Recent labs in [facility] reviewed by me today.     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   Plan: Continue LTC support with medication administration, meals, and safety. Neurology follow up as scheduled by family.      (I48.2) Chronic atrial fibrillation (H)  Comment: HR:76, not on rate controlling agent.  Last INR was 2.9 on 2/13/2018, stable on 2-4 mg daily.   Plan: INR next lab day. Warfarin per INR      (I25.10) Coronary artery disease involving native coronary artery of native heart without angina pectoris  Comment: History of CABG in 2007. No acute issues  Plan: continue ASA, statin, prn nitro.      (I10) Hypertension goal BP (blood pressure) < 140/90  Comment: controlled with   BP Readings from Last 3 Encounters: "   06/06/19 142/83   05/23/19 141/73   05/10/19 149/78     Plan: continue doxazosin.      (N18.3) CKD (chronic kidney disease) stage 3, GFR 30-59 ml/min  Comment: renal function at baseline  Plan:  Avoid nephrotoxins and renal dose when appropriate.      (R41.89) Cognitive impairment  Comment: cognition much improved from tcu admission. Cognitive scores indicate moderate deficits. Pleasant and cooperative.   Plan: supportive care. Chronic, progressive. Needs 24 hour skilled nursing care. HPI/ROS difficult to obtain with cognitive impairment. Expect further functional and cognitive decline. Expect weight loss. Continue 24 hour care. Monitor weight. Monitor functional status. Monitor for behavioral disturbances.     (L30.9) Eczema, unspecified type  Comment: he's had intermittent patchy red, itchy rash of both ankles and lower legs, as well as right forearm. Improved with triamcinolone.  Plan: continue triamcinolone prn.     (E03.4) Hypothyroidism   Comment:   TSH   Date Value Ref Range Status   06/26/2018 2.06 0.30 - 5.00 uIU/mL Final   Plan: Continue current dose of synthroid. Check TSH yearly and PRN, and keep level b/w 4-5 in elderly.        Orders written by provider at facility      Electronically signed by:  ANGEL Corrigan CNP  [facility]

## 2019-06-19 ENCOUNTER — RECORDS - HEALTHEAST (OUTPATIENT)
Dept: LAB | Facility: CLINIC | Age: 84
End: 2019-06-19

## 2019-06-20 ENCOUNTER — TRANSFERRED RECORDS (OUTPATIENT)
Dept: HEALTH INFORMATION MANAGEMENT | Facility: CLINIC | Age: 84
End: 2019-06-20

## 2019-06-20 ENCOUNTER — TELEPHONE (OUTPATIENT)
Dept: GERIATRICS | Facility: CLINIC | Age: 84
End: 2019-06-20

## 2019-06-20 LAB
INR PPP: 2.3 (ref 0.9–1.1)
INR PPP: 2.3 (ref 0.9–1.1)

## 2019-06-20 NOTE — TELEPHONE ENCOUNTER
[facility] TELEPHONE ENCOUNTER    Chief Complaint   Patient presents with     INR RESULTS       Earl Sanchez is a 89 year old  (9/17/1926),Nurse called today to report: INR 2.30    ASSESSMENT/PLAN  Therapeutic INR of 2.3 for atrial fibrillation goal of 2-3.    Continue with current coumadin dose of 2.5 mg on T/TH and 3 mg all other days.     Recheck INR in one month    ANGEL Hoyos CNP

## 2019-07-11 ENCOUNTER — NURSING HOME VISIT (OUTPATIENT)
Dept: GERIATRICS | Facility: CLINIC | Age: 84
End: 2019-07-11
Payer: COMMERCIAL

## 2019-07-11 VITALS
HEIGHT: 69 IN | OXYGEN SATURATION: 95 % | TEMPERATURE: 97.4 F | WEIGHT: 176 LBS | SYSTOLIC BLOOD PRESSURE: 135 MMHG | DIASTOLIC BLOOD PRESSURE: 67 MMHG | BODY MASS INDEX: 26.07 KG/M2 | RESPIRATION RATE: 18 BRPM | HEART RATE: 63 BPM

## 2019-07-11 DIAGNOSIS — I10 ESSENTIAL HYPERTENSION: ICD-10-CM

## 2019-07-11 DIAGNOSIS — E03.8 TSH (THYROID-STIMULATING HORMONE DEFICIENCY): Primary | ICD-10-CM

## 2019-07-11 DIAGNOSIS — E55.9 VITAMIN D DEFICIENCY: ICD-10-CM

## 2019-07-11 PROCEDURE — 99309 SBSQ NF CARE MODERATE MDM 30: CPT | Performed by: NURSE PRACTITIONER

## 2019-07-11 ASSESSMENT — MIFFLIN-ST. JEOR: SCORE: 1438.71

## 2019-07-11 NOTE — PROGRESS NOTES
[place] [facility]  Chief Complaint   Patient presents with     care home Regulatory     [place] Medical Record Number:  [id number]  Place of Service where encounter took place:  [facility] ([facility]) [[id number]]    HPI:    Earl Sanchez  is 89 year old (9/17/1926), who is being seen today for a federally mandated E/M visit.  HPI information obtained from: facility chart records, facility staff, patient report and [facility] chart review.     Today's concerns are:   Diagnosis Comments   1. TSH (thyroid-stimulating hormone deficiency)  TSH   Date Value Ref Range Status   06/26/2018 2.06 0.30 - 5.00 uIU/mL Final   Currently taking levothyroxine 75 mcg/day.      2. Essential hypertension  BP Readings from Last 3 Encounters:   07/11/19 135/67   06/06/19 142/83   05/23/19 141/73   Denies CP, SOB or lightheadedness.      3. Vitamin D deficiency  25 OH Vit D2   Date Value Ref Range Status   08/15/2017 <5 ug/L Final     25 OH Vit D3   Date Value Ref Range Status   08/15/2017 20 ug/L Final     25 OH Vit D total   Date Value Ref Range Status   08/15/2017 <25 20 - 75 ug/L Final                      ALLERGIES:No known allergies  PAST MEDICAL HISTORY:   has a past medical history of Abdominal aortic aneurysm (H), Acute MI (H) (1981, 2007), Atrial fibrillation (H), BPH (benign prostatic hyperplasia), CAD (coronary artery disease), Chronic kidney disease, Duodenal ulcer with hemorrhage, Gait apraxia, Gastritis, Gastro-oesophageal reflux disease, Heart failure (H), Hyperlipidaemia LDL goal <100, Hypertension goal BP (blood pressure) < 140/90, Osteoarthritis, Renal disease, and Thyroid disease. He also has no past medical history of Malignant hyperthermia or PONV (postoperative nausea and vomiting).  PAST SURGICAL HISTORY:   has a past surgical history that includes hernia repair, inguinal rt/lt; Ankle surgery; Phacoemulsification clear cornea with toric intraocular lens implant (4/10/2014);  Phacoemulsification clear cornea with toric intraocular lens implant (4/22/2014); coronary artery bypass (2007); and Heart Cath Left heart cath (12/10/07).  FAMILY HISTORY: family history includes Hypertension in his maternal grandmother and mother.  SOCIAL HISTORY:  reports that he has never smoked. He has never used smokeless tobacco. He reports that he drinks alcohol. He reports that he does not use drugs.    MEDICATIONS:  Current Outpatient Medications   Medication Sig Dispense Refill     acetaminophen (TYLENOL) 500 MG tablet Take 1,000 mg by mouth every 6 hours as needed for mild pain        allopurinol (ZYLOPRIM) 100 MG tablet Take 100 mg by mouth 2 times daily       aspirin (ASPIRIN LOW DOSE) 81 MG tablet Take 81 mg by mouth daily        benzonatate (TESSALON) 100 MG capsule Take 100 mg by mouth 3 times daily as needed for cough       citalopram (CELEXA) 10 MG tablet Take 10 mg by mouth daily       donepezil (ARICEPT) 5 MG tablet Take 5 mg by mouth At Bedtime       doxazosin (CARDURA) 4 MG tablet Take 1 tablet (4 mg) by mouth At Bedtime 90 tablet 3     ERGOCALCIFEROL PO Take 50,000 Units by mouth every morning Wednesday        LEVOTHYROXINE SODIUM PO Take 75 mcg by mouth daily        nitroglycerin (NITROSTAT) 0.4 MG SL tablet Place 1 tablet (0.4 mg) under the tongue every 5 minutes as needed for chest pain 25 tablet 0     phenylephrine-shark liver oil-mineral oil-petrolatum (PREPARATION H) 0.25-14-74.9 % rectal ointment Place 1 applicator rectally 4 times daily as needed for hemorrhoids for Hemorrhoids       polyethylene glycol 3350 POWD Take 17 g by mouth daily       pravastatin (PRAVACHOL) 40 MG tablet TAKE 1 TABLET DAILY 90 tablet 1     Sennosides-Docusate Sodium (SENNA-DOCUSATE SODIUM) 8.6-50 MG TABS Take 1 tablet by mouth 2 times daily        triamcinolone (KENALOG) 0.025 % cream Apply topically every 12 hours as needed to rash to the affected area prn, apply tin layer to the rash at the LE.        "Warfarin Sodium (COUMADIN PO) As directed, per INR         Case Management:  I have reviewed the care plan and MDS and do agree with the plan. Patient's desire to return to the community is not present. Information reviewed:  Medications, vital signs, orders, and nursing notes.    ROS:  4 point ROS including Respiratory, CV, GI and , other than that noted in the HPI,  is negative    Vitals:  /67   Pulse 63   Temp 97.4  F (36.3  C)   Resp 18   Ht 1.753 m (5' 9\")   Wt 79.8 kg (176 lb)   SpO2 95%   BMI 25.99 kg/m    Body mass index is 25.99 kg/m .  Exam:  GENERAL APPEARANCE:  Alert  ENT:  Mouth and posterior oropharynx normal, moist mucous membranes, [place]  RESP:  respiratory effort and palpation of chest normal, lungs clear to auscultation , no respiratory distress  CV:  Palpation and auscultation of heart done , regular rate and rhythm, no murmur, rub, or gallop  M/S:   Gait and station normal  Digits and nails normal  SKIN:  Inspection of skin and subcutaneous tissue baseline, Palpation of skin and subcutaneous tissue baseline  NEURO:   Cranial nerves 2-12 are normal tested and grossly at patient's baseline  PSYCH:  insight and judgement impaired, memory impaired , affect and mood normal    Lab/Diagnostic data:   Labs done in SNF are in [facility]. Please refer to them using [employer]/Care Everywhere. and Recent labs in [facility] reviewed by me today.     ASSESSMENT/PLAN  (E03.8) TSH (thyroid-stimulating hormone deficiency)  (primary encounter diagnosis)  Comment:   TSH   Date Value Ref Range Status   06/26/2018 2.06 0.30 - 5.00 uIU/mL Final   Plan: Check TSH next lab day, and keep level b/w 4-5 in elderly.  Continue levothyroxine 75 mcg/day     (I10) Essential hypertension  Comment:   BP Readings from Last 3 Encounters:   07/11/19 135/67   06/06/19 142/83   05/23/19 141/73   Blood pressure managed on current medications and at goal <150/90.   Plan: No changes at this time and adjustments as clinically " indicated. Keep SBP> 130 mmHg and DBP > 65 mmHg (levels below these increase mortality as shown by standard studies and observations).     (E55.9) Vitamin D deficiency  Comment: History of vitamin D deficiency. Currently taking ergocalciferol 5000 units every Wednesday.   Plan: Vitamin D level next lab day.     Orders written by provider at facility  1.) TSH and vitamin D level next lab day.     Orders faxed to [facility] 7/15/19 at 2120.     Electronically signed by:  ANGEL Corrigan [facility]

## 2019-07-11 NOTE — LETTER
7/11/2019        RE: Earl Sanchez  [facility]  [address]  [facility] [address]        [place] [facility]  Chief Complaint   Patient presents with     correction Regulatory     [place] Medical Record Number:  [id number]  Place of Service where encounter took place:  [facility] ([facility]) [[id number]]    HPI:    Earl Sanchez  is 89 year old (9/17/1926), who is being seen today for a federally mandated E/M visit.  HPI information obtained from: facility chart records, facility staff, patient report and [facility] chart review.     Today's concerns are:   Diagnosis Comments   1. TSH (thyroid-stimulating hormone deficiency)  TSH   Date Value Ref Range Status   06/26/2018 2.06 0.30 - 5.00 uIU/mL Final   Currently taking levothyroxine 75 mcg/day.      2. Essential hypertension  BP Readings from Last 3 Encounters:   07/11/19 135/67   06/06/19 142/83   05/23/19 141/73   Denies CP, SOB or lightheadedness.      3. Vitamin D deficiency  25 OH Vit D2   Date Value Ref Range Status   08/15/2017 <5 ug/L Final     25 OH Vit D3   Date Value Ref Range Status   08/15/2017 20 ug/L Final     25 OH Vit D total   Date Value Ref Range Status   08/15/2017 <25 20 - 75 ug/L Final                      ALLERGIES:No known allergies  PAST MEDICAL HISTORY:   has a past medical history of Abdominal aortic aneurysm (H), Acute MI (H) (1981, 2007), Atrial fibrillation (H), BPH (benign prostatic hyperplasia), CAD (coronary artery disease), Chronic kidney disease, Duodenal ulcer with hemorrhage, Gait apraxia, Gastritis, Gastro-oesophageal reflux disease, Heart failure (H), Hyperlipidaemia LDL goal <100, Hypertension goal BP (blood pressure) < 140/90, Osteoarthritis, Renal disease, and Thyroid disease. He also has no past medical history of Malignant hyperthermia or PONV (postoperative nausea and vomiting).  PAST SURGICAL HISTORY:   has a past surgical history that includes  hernia repair, inguinal rt/lt; Ankle surgery; Phacoemulsification clear cornea with toric intraocular lens implant (4/10/2014); Phacoemulsification clear cornea with toric intraocular lens implant (4/22/2014); coronary artery bypass (2007); and Heart Cath Left heart cath (12/10/07).  FAMILY HISTORY: family history includes Hypertension in his maternal grandmother and mother.  SOCIAL HISTORY:  reports that he has never smoked. He has never used smokeless tobacco. He reports that he drinks alcohol. He reports that he does not use drugs.    MEDICATIONS:  Current Outpatient Medications   Medication Sig Dispense Refill     acetaminophen (TYLENOL) 500 MG tablet Take 1,000 mg by mouth every 6 hours as needed for mild pain        allopurinol (ZYLOPRIM) 100 MG tablet Take 100 mg by mouth 2 times daily       aspirin (ASPIRIN LOW DOSE) 81 MG tablet Take 81 mg by mouth daily        benzonatate (TESSALON) 100 MG capsule Take 100 mg by mouth 3 times daily as needed for cough       citalopram (CELEXA) 10 MG tablet Take 10 mg by mouth daily       donepezil (ARICEPT) 5 MG tablet Take 5 mg by mouth At Bedtime       doxazosin (CARDURA) 4 MG tablet Take 1 tablet (4 mg) by mouth At Bedtime 90 tablet 3     ERGOCALCIFEROL PO Take 50,000 Units by mouth every morning Wednesday        LEVOTHYROXINE SODIUM PO Take 75 mcg by mouth daily        nitroglycerin (NITROSTAT) 0.4 MG SL tablet Place 1 tablet (0.4 mg) under the tongue every 5 minutes as needed for chest pain 25 tablet 0     phenylephrine-shark liver oil-mineral oil-petrolatum (PREPARATION H) 0.25-14-74.9 % rectal ointment Place 1 applicator rectally 4 times daily as needed for hemorrhoids for Hemorrhoids       polyethylene glycol 3350 POWD Take 17 g by mouth daily       pravastatin (PRAVACHOL) 40 MG tablet TAKE 1 TABLET DAILY 90 tablet 1     Sennosides-Docusate Sodium (SENNA-DOCUSATE SODIUM) 8.6-50 MG TABS Take 1 tablet by mouth 2 times daily        triamcinolone (KENALOG) 0.025 %  "cream Apply topically every 12 hours as needed to rash to the affected area prn, apply tin layer to the rash at the LE.       Warfarin Sodium (COUMADIN PO) As directed, per INR         Case Management:  I have reviewed the care plan and MDS and do agree with the plan. Patient's desire to return to the community is not present. Information reviewed:  Medications, vital signs, orders, and nursing notes.    ROS:  4 point ROS including Respiratory, CV, GI and , other than that noted in the HPI,  is negative    Vitals:  /67   Pulse 63   Temp 97.4  F (36.3  C)   Resp 18   Ht 1.753 m (5' 9\")   Wt 79.8 kg (176 lb)   SpO2 95%   BMI 25.99 kg/m     Body mass index is 25.99 kg/m .  Exam:  GENERAL APPEARANCE:  Alert  ENT:  Mouth and posterior oropharynx normal, moist mucous membranes, [place]  RESP:  respiratory effort and palpation of chest normal, lungs clear to auscultation , no respiratory distress  CV:  Palpation and auscultation of heart done , regular rate and rhythm, no murmur, rub, or gallop  M/S:   Gait and station normal  Digits and nails normal  SKIN:  Inspection of skin and subcutaneous tissue baseline, Palpation of skin and subcutaneous tissue baseline  NEURO:   Cranial nerves 2-12 are normal tested and grossly at patient's baseline  PSYCH:  insight and judgement impaired, memory impaired , affect and mood normal    Lab/Diagnostic data:   Labs done in SNF are in [facility]. Please refer to them using [employer]/Care Everywhere. and Recent labs in [facility] reviewed by me today.     ASSESSMENT/PLAN  (E03.8) TSH (thyroid-stimulating hormone deficiency)  (primary encounter diagnosis)  Comment:   TSH   Date Value Ref Range Status   06/26/2018 2.06 0.30 - 5.00 uIU/mL Final   Plan: Check TSH next lab day, and keep level b/w 4-5 in elderly.  Continue levothyroxine 75 mcg/day     (I10) Essential hypertension  Comment:   BP Readings from Last 3 Encounters:   07/11/19 135/67   06/06/19 142/83   05/23/19 141/73   Blood " pressure managed on current medications and at goal <150/90.   Plan: No changes at this time and adjustments as clinically indicated. Keep SBP> 130 mmHg and DBP > 65 mmHg (levels below these increase mortality as shown by standard studies and observations).     (E55.9) Vitamin D deficiency  Comment: History of vitamin D deficiency. Currently taking ergocalciferol 5000 units every Wednesday.   Plan: Vitamin D level next lab day.     Orders written by provider at facility  1.) TSH and vitamin D level next lab day.     Orders faxed to [facility] 7/15/19 at 2120.     Electronically signed by:  ANGEL Corrigan [facility]           Sincerely,        Christiana Osuna NP

## 2019-07-17 ENCOUNTER — RECORDS - HEALTHEAST (OUTPATIENT)
Dept: LAB | Facility: CLINIC | Age: 84
End: 2019-07-17

## 2019-07-17 ENCOUNTER — TRANSFERRED RECORDS (OUTPATIENT)
Dept: HEALTH INFORMATION MANAGEMENT | Facility: CLINIC | Age: 84
End: 2019-07-17

## 2019-07-17 LAB
TSH SERPL DL<=0.005 MIU/L-ACNC: 5.49 UIU/ML (ref 0.3–5)
TSH SERPL-ACNC: 5.49 UIU/ML (ref 0.3–5)

## 2019-07-18 ENCOUNTER — TRANSFERRED RECORDS (OUTPATIENT)
Dept: HEALTH INFORMATION MANAGEMENT | Facility: CLINIC | Age: 84
End: 2019-07-18

## 2019-07-18 LAB
25(OH)D3 SERPL-MCNC: 44.9 NG/ML (ref 30–80)
INR PPP: 2.4 (ref 0.9–1.1)
INR PPP: 2.4 (ref 0.9–1.1)

## 2019-08-14 ENCOUNTER — RECORDS - HEALTHEAST (OUTPATIENT)
Dept: LAB | Facility: CLINIC | Age: 84
End: 2019-08-14

## 2019-08-15 ENCOUNTER — NURSING HOME VISIT (OUTPATIENT)
Dept: GERIATRICS | Facility: CLINIC | Age: 84
End: 2019-08-15
Payer: COMMERCIAL

## 2019-08-15 VITALS
OXYGEN SATURATION: 96 % | SYSTOLIC BLOOD PRESSURE: 144 MMHG | RESPIRATION RATE: 18 BRPM | WEIGHT: 184.6 LBS | HEIGHT: 69 IN | DIASTOLIC BLOOD PRESSURE: 78 MMHG | HEART RATE: 84 BPM | BODY MASS INDEX: 27.34 KG/M2 | TEMPERATURE: 97.2 F

## 2019-08-15 DIAGNOSIS — I48.20 CHRONIC ATRIAL FIBRILLATION (H): Primary | ICD-10-CM

## 2019-08-15 DIAGNOSIS — Z79.01 LONG TERM CURRENT USE OF ANTICOAGULANT THERAPY: ICD-10-CM

## 2019-08-15 LAB — INR PPP: 2.8 (ref 0.9–1.1)

## 2019-08-15 PROCEDURE — 99308 SBSQ NF CARE LOW MDM 20: CPT | Performed by: NURSE PRACTITIONER

## 2019-08-15 ASSESSMENT — MIFFLIN-ST. JEOR: SCORE: 1477.72

## 2019-08-15 NOTE — LETTER
"    8/15/2019        RE: Earl Sanchez  [facility]  [address]  [facility] [address]        [place] [facility]  [place] Medical Record Number:  [id number]  Place of Service where encounter took place: [facility] ([facility]) [[id number]]    HPI:    Earl Sanchez is a 89 year old  (9/17/1926), who is being seen today for an episodic care visit at the above location.   HPI information obtained from: facility chart records, facility staff and patient report. Today's concern is INR/Coumadin management for A. Fib    ROS/Subjective:  Bleeding Signs/Symptoms:  None  Thromboembolic Signs/Symptoms:  None  Medication Changes:  No  Dietary Changes:  No  Activity Changes: No  Bacterial/Viral Infection:  No  Missed Coumadin Doses:  None  On ASA: Yes - 81 mg po q day  Other Concerns:  No    OBJECTIVE:  BP (!) 144/78   Pulse 84   Temp 97.2  F (36.2  C)   Resp 18   Ht 1.753 m (5' 9\")   Wt 83.7 kg (184 lb 9.6 oz)   SpO2 96%   BMI 27.26 kg/m     INR Today:  2.80  Current Dose:  Warfarin 3 mg MWFSAT/SUN and Warfarin 2.5 mg all other days.    INR Flow sheet at [facility]:    ASSESSMENT:    ICD-10-CM    1. Chronic atrial fibrillation (H) I48.2    2. Long term current use of anticoagulant therapy Z79.01        Therapeutic INR for goal of 2-3    PLAN:   Orders written by provider at facility  New Dose: No Change    Next INR: 1 month      Electronically signed by:  ANGEL Corrigan CNP  [place] [facility]         Sincerely,        Christiana Osuna NP    "

## 2019-08-15 NOTE — PROGRESS NOTES
"[place] [facility]  [place] Medical Record Number:  [id number]  Place of Service where encounter took place: [facility] ([facility]) [[id number]]    HPI:    Earl Sanchez is a 89 year old  (9/17/1926), who is being seen today for an episodic care visit at the above location.   HPI information obtained from: facility chart records, facility staff and patient report. Today's concern is INR/Coumadin management for A. Fib    ROS/Subjective:  Bleeding Signs/Symptoms:  None  Thromboembolic Signs/Symptoms:  None  Medication Changes:  No  Dietary Changes:  No  Activity Changes: No  Bacterial/Viral Infection:  No  Missed Coumadin Doses:  None  On ASA: Yes - 81 mg po q day  Other Concerns:  No    OBJECTIVE:  BP (!) 144/78   Pulse 84   Temp 97.2  F (36.2  C)   Resp 18   Ht 1.753 m (5' 9\")   Wt 83.7 kg (184 lb 9.6 oz)   SpO2 96%   BMI 27.26 kg/m    INR Today:  2.80  Current Dose:  Warfarin 3 mg MWFSAT/SUN and Warfarin 2.5 mg all other days.    INR Flow sheet at SNF:    ASSESSMENT:    ICD-10-CM    1. Chronic atrial fibrillation (H) I48.2    2. Long term current use of anticoagulant therapy Z79.01        Therapeutic INR for goal of 2-3    PLAN:   Orders written by provider at facility  New Dose: No Change    Next INR: 1 month      Electronically signed by:  ANGEL Corrigan CNP  [place] [facility]     "

## 2019-09-06 ENCOUNTER — NURSING HOME VISIT (OUTPATIENT)
Dept: GERIATRICS | Facility: CLINIC | Age: 84
End: 2019-09-06
Payer: COMMERCIAL

## 2019-09-06 VITALS
TEMPERATURE: 98.1 F | OXYGEN SATURATION: 96 % | HEART RATE: 66 BPM | DIASTOLIC BLOOD PRESSURE: 86 MMHG | WEIGHT: 185.1 LBS | RESPIRATION RATE: 16 BRPM | HEIGHT: 69 IN | SYSTOLIC BLOOD PRESSURE: 140 MMHG | BODY MASS INDEX: 27.42 KG/M2

## 2019-09-06 DIAGNOSIS — I12.9 BENIGN HYPERTENSION WITH CHRONIC KIDNEY DISEASE, STAGE III (H): ICD-10-CM

## 2019-09-06 DIAGNOSIS — F32.0 CURRENT MILD EPISODE OF MAJOR DEPRESSIVE DISORDER WITHOUT PRIOR EPISODE (H): Primary | ICD-10-CM

## 2019-09-06 DIAGNOSIS — N18.30 BENIGN HYPERTENSION WITH CHRONIC KIDNEY DISEASE, STAGE III (H): ICD-10-CM

## 2019-09-06 DIAGNOSIS — N40.0 BENIGN NON-NODULAR PROSTATIC HYPERPLASIA WITHOUT LOWER URINARY TRACT SYMPTOMS: ICD-10-CM

## 2019-09-06 DIAGNOSIS — E03.8 TSH (THYROID-STIMULATING HORMONE DEFICIENCY): ICD-10-CM

## 2019-09-06 PROCEDURE — 99309 SBSQ NF CARE MODERATE MDM 30: CPT | Performed by: NURSE PRACTITIONER

## 2019-09-06 ASSESSMENT — MIFFLIN-ST. JEOR: SCORE: 1479.99

## 2019-09-06 NOTE — LETTER
9/6/2019        RE: Earl Sanchez  [facility]  [address]  [facility] [address]        [place] [facility]  [place] Medical Record Number:  [id number]  Place of Service where encounter took place:  [facility] ([facility]) [[id number]]  Chief Complaint   Patient presents with     Nursing Home Acute       HPI:    Earl Sanchez  is a 89 year old (9/17/1926), who is being seen today for an episodic care visit.  HPI information obtained from: facility chart records, facility staff, patient report and [facility] chart review.     Today's concern is:  Current mild episode of major depressive disorder without prior episode (H)  Recently placed on Celexa 10 mg/day. Resident had episodes of outbursts at staff. Some improvement has been seen. Family reports he is up more and talking with table mates during meals. Possibly more sleepy in evening and confused at night. Last PHQ-9 02/27.     Benign hypertension with chronic kidney disease, stage III (H)  BP Readings from Last 3 Encounters:   09/06/19 (!) 140/86   08/15/19 (!) 144/78   07/11/19 135/67   denies CP, SOB or lightheadedness.       TSH (thyroid-stimulating hormone deficiency)  TSH   Date Value Ref Range Status   07/17/2019 5.49 (A) 0.30 - 5.00 uIU/mL Final       Past Medical and Surgical History reviewed in Epic today.    MEDICATIONS:  Current Outpatient Medications   Medication Sig Dispense Refill     acetaminophen (TYLENOL) 500 MG tablet Take 1,000 mg by mouth every 6 hours as needed for mild pain        allopurinol (ZYLOPRIM) 100 MG tablet Take 100 mg by mouth 2 times daily       aspirin (ASPIRIN LOW DOSE) 81 MG tablet Take 81 mg by mouth daily        citalopram (CELEXA) 10 MG tablet Take 10 mg by mouth daily       donepezil (ARICEPT) 5 MG tablet Take 5 mg by mouth At Bedtime       doxazosin (CARDURA) 4 MG tablet Take 1 tablet (4 mg) by mouth At Bedtime 90 tablet 3     ERGOCALCIFEROL PO Take  "50,000 Units by mouth every morning Wednesday        LEVOTHYROXINE SODIUM PO Take 75 mcg by mouth daily        nitroglycerin (NITROSTAT) 0.4 MG SL tablet Place 1 tablet (0.4 mg) under the tongue every 5 minutes as needed for chest pain 25 tablet 0     phenylephrine-shark liver oil-mineral oil-petrolatum (PREPARATION H) 0.25-14-74.9 % rectal ointment Place 1 applicator rectally 4 times daily as needed for hemorrhoids for Hemorrhoids       polyethylene glycol 3350 POWD Take 17 g by mouth daily       pravastatin (PRAVACHOL) 40 MG tablet TAKE 1 TABLET DAILY 90 tablet 1     Sennosides-Docusate Sodium (SENNA-DOCUSATE SODIUM) 8.6-50 MG TABS Take 1 tablet by mouth 2 times daily        triamcinolone (KENALOG) 0.025 % cream Apply topically every 12 hours as needed to rash to the affected area prn, apply tin layer to the rash at the LE.       Warfarin Sodium (COUMADIN PO) As directed, per INR       benzonatate (TESSALON) 100 MG capsule Take 100 mg by mouth 3 times daily as needed for cough           REVIEW OF SYSTEMS:  4 point ROS including Respiratory, CV, GI and , other than that noted in the HPI,  is negative    Objective:  BP (!) 140/86   Pulse 66   Temp 98.1  F (36.7  C)   Resp 16   Ht 1.753 m (5' 9\")   Wt 84 kg (185 lb 1.6 oz)   SpO2 96%   BMI 27.33 kg/m     Exam:  GENERAL APPEARANCE:  Alert  ENT:  Mouth and posterior oropharynx normal, moist mucous membranes, normal hearing acuity  RESP:  respiratory effort and palpation of chest normal, lungs clear to auscultation   CV:  Palpation and auscultation of heart done , regular rate and rhythm, no murmur, rub, or gallop  M/S:   Gait and station normal  Digits and nails normal  SKIN:  Inspection of skin and subcutaneous tissue baseline, Palpation of skin and subcutaneous tissue baseline  NEURO:   Cranial nerves 2-12 are normal tested and grossly at patient's baseline  PSYCH:  oriented X 3    Labs:   Labs done in SNF are in [place] [facility]. Please refer to them using " EPIC/Care Everywhere. and Recent labs in EPIC reviewed by me today.     ASSESSMENT/PLAN:  (F32.0) Current mild episode of major depressive disorder without prior episode (H)  (primary encounter diagnosis)  Comment: Mood and behavior improved since celexa 10 mg/day  Plan: No change, continue to monitor and notify NP with changes.   -BMP next lab day    (I12.9,  N18.3) Benign hypertension with chronic kidney disease, stage III (H)  Comment:   BP Readings from Last 3 Encounters:   09/06/19 (!) 140/86   08/15/19 (!) 144/78   07/11/19 135/67   Plan: Intermittent readings >150/90.  Increase Cardura 8 mg/HS. Monitor BP and notify NP with changes.     (E03.8) TSH (thyroid-stimulating hormone deficiency)  Comment: Elevated TSH, not managed on current medications.   TSH   Date Value Ref Range Status   07/17/2019 5.49 (A) 0.30 - 5.00 uIU/mL Final   Plan:   1.) Increase synthroid 88 mcg/day. Redraw TSH 10/15/19.     Orders written by provider at facility  See above     Electronically signed by:  ANGEL Corrigan [facility]             Sincerely,        Christiana Osuna NP

## 2019-09-06 NOTE — PROGRESS NOTES
[place] [facility]  [place] Medical Record Number:  [id number]  Place of Service where encounter took place:  [facility] ([facility]GS) [[id number]]  Chief Complaint   Patient presents with     Nursing Home Acute       HPI:    Earl Sanchez  is a 89 year old (9/17/1926), who is being seen today for an episodic care visit.  HPI information obtained from: facility chart records, facility staff, patient report and [facility] chart review.     Today's concern is:  Current mild episode of major depressive disorder without prior episode (H)  Recently placed on Celexa 10 mg/day. Resident had episodes of outbursts at staff. Some improvement has been seen. Family reports he is up more and talking with table mates during meals. Possibly more sleepy in evening and confused at night. Last PHQ-9 02/27.     Benign hypertension with chronic kidney disease, stage III (H)  BP Readings from Last 3 Encounters:   09/06/19 (!) 140/86   08/15/19 (!) 144/78   07/11/19 135/67   denies CP, SOB or lightheadedness.       TSH (thyroid-stimulating hormone deficiency)  TSH   Date Value Ref Range Status   07/17/2019 5.49 (A) 0.30 - 5.00 uIU/mL Final       Past Medical and Surgical History reviewed in Epic today.    MEDICATIONS:  Current Outpatient Medications   Medication Sig Dispense Refill     acetaminophen (TYLENOL) 500 MG tablet Take 1,000 mg by mouth every 6 hours as needed for mild pain        allopurinol (ZYLOPRIM) 100 MG tablet Take 100 mg by mouth 2 times daily       aspirin (ASPIRIN LOW DOSE) 81 MG tablet Take 81 mg by mouth daily        citalopram (CELEXA) 10 MG tablet Take 10 mg by mouth daily       donepezil (ARICEPT) 5 MG tablet Take 5 mg by mouth At Bedtime       doxazosin (CARDURA) 4 MG tablet Take 1 tablet (4 mg) by mouth At Bedtime 90 tablet 3     ERGOCALCIFEROL PO Take 50,000 Units by mouth every morning Wednesday        LEVOTHYROXINE SODIUM PO Take 75 mcg by mouth daily        nitroglycerin  "(NITROSTAT) 0.4 MG SL tablet Place 1 tablet (0.4 mg) under the tongue every 5 minutes as needed for chest pain 25 tablet 0     phenylephrine-shark liver oil-mineral oil-petrolatum (PREPARATION H) 0.25-14-74.9 % rectal ointment Place 1 applicator rectally 4 times daily as needed for hemorrhoids for Hemorrhoids       polyethylene glycol 3350 POWD Take 17 g by mouth daily       pravastatin (PRAVACHOL) 40 MG tablet TAKE 1 TABLET DAILY 90 tablet 1     Sennosides-Docusate Sodium (SENNA-DOCUSATE SODIUM) 8.6-50 MG TABS Take 1 tablet by mouth 2 times daily        triamcinolone (KENALOG) 0.025 % cream Apply topically every 12 hours as needed to rash to the affected area prn, apply tin layer to the rash at the LE.       Warfarin Sodium (COUMADIN PO) As directed, per INR       benzonatate (TESSALON) 100 MG capsule Take 100 mg by mouth 3 times daily as needed for cough           REVIEW OF SYSTEMS:  4 point ROS including Respiratory, CV, GI and , other than that noted in the HPI,  is negative    Objective:  BP (!) 140/86   Pulse 66   Temp 98.1  F (36.7  C)   Resp 16   Ht 1.753 m (5' 9\")   Wt 84 kg (185 lb 1.6 oz)   SpO2 96%   BMI 27.33 kg/m    Exam:  GENERAL APPEARANCE:  Alert  ENT:  Mouth and posterior oropharynx normal, moist mucous membranes, normal hearing acuity  RESP:  respiratory effort and palpation of chest normal, lungs clear to auscultation   CV:  Palpation and auscultation of heart done , regular rate and rhythm, no murmur, rub, or gallop  M/S:   Gait and station normal  Digits and nails normal  SKIN:  Inspection of skin and subcutaneous tissue baseline, Palpation of skin and subcutaneous tissue baseline  NEURO:   Cranial nerves 2-12 are normal tested and grossly at patient's baseline  PSYCH:  oriented X 3    Labs:   Labs done in SNF are in [facility]. Please refer to them using [email or web address]/Care Everywhere. and Recent labs in EPIC reviewed by me today.     ASSESSMENT/PLAN:  (F32.0) Current mild episode of major " depressive disorder without prior episode (H)  (primary encounter diagnosis)  Comment: Mood and behavior improved since celexa 10 mg/day  Plan: No change, continue to monitor and notify NP with changes.   -BMP next lab day    (I12.9,  N18.3) Benign hypertension with chronic kidney disease, stage III (H)  Comment:   BP Readings from Last 3 Encounters:   09/06/19 (!) 140/86   08/15/19 (!) 144/78   07/11/19 135/67   Plan: Intermittent readings >150/90.  Increase Cardura 8 mg/HS. Monitor BP and notify NP with changes.     (E03.8) TSH (thyroid-stimulating hormone deficiency)  Comment: Elevated TSH, not managed on current medications.   TSH   Date Value Ref Range Status   07/17/2019 5.49 (A) 0.30 - 5.00 uIU/mL Final   Plan:   1.) Increase synthroid 88 mcg/day. Redraw TSH 10/15/19.     Orders written by provider at facility  See above     Electronically signed by:  ANGEL Corrigan CNP  [facility]

## 2019-09-13 ENCOUNTER — RECORDS - HEALTHEAST (OUTPATIENT)
Dept: LAB | Facility: CLINIC | Age: 84
End: 2019-09-13

## 2019-09-15 RX ORDER — DOXAZOSIN 4 MG/1
8 TABLET ORAL AT BEDTIME
Qty: 90 TABLET | Refills: 3
Start: 2019-09-15 | End: 2021-01-01

## 2019-09-16 ENCOUNTER — TRANSFERRED RECORDS (OUTPATIENT)
Dept: HEALTH INFORMATION MANAGEMENT | Facility: CLINIC | Age: 84
End: 2019-09-16

## 2019-09-16 ENCOUNTER — NURSING HOME VISIT (OUTPATIENT)
Dept: GERIATRICS | Facility: CLINIC | Age: 84
End: 2019-09-16
Payer: COMMERCIAL

## 2019-09-16 VITALS
HEIGHT: 69 IN | TEMPERATURE: 98.1 F | BODY MASS INDEX: 25.77 KG/M2 | RESPIRATION RATE: 17 BRPM | WEIGHT: 174 LBS | DIASTOLIC BLOOD PRESSURE: 86 MMHG | SYSTOLIC BLOOD PRESSURE: 161 MMHG | OXYGEN SATURATION: 97 % | HEART RATE: 63 BPM

## 2019-09-16 DIAGNOSIS — R41.0 CONFUSION: Primary | ICD-10-CM

## 2019-09-16 DIAGNOSIS — M62.81 GENERALIZED MUSCLE WEAKNESS: ICD-10-CM

## 2019-09-16 DIAGNOSIS — I10 ESSENTIAL HYPERTENSION: ICD-10-CM

## 2019-09-16 LAB
INR PPP: 2.88 (ref 0.9–1.1)
INR PPP: 2.88 (ref 0.9–1.1)

## 2019-09-16 PROCEDURE — 99309 SBSQ NF CARE MODERATE MDM 30: CPT | Performed by: NURSE PRACTITIONER

## 2019-09-16 ASSESSMENT — MIFFLIN-ST. JEOR: SCORE: 1429.64

## 2019-09-16 NOTE — PROGRESS NOTES
[place] [facility]  [place] Medical Record Number:  [id number]  Place of Service where encounter took place:  [facility] ([facility]GS) [[id number]]  Chief Complaint   Patient presents with     RECHECK       HPI:    Earl Sanchez  is a 89 year old (9/17/1926), who is being seen today for an episodic care visit.  HPI information obtained from: facility chart records, facility staff and patient report.     Today's concern is:    ICD-10-CM    1. Confusion R41.0    2. Essential hypertension I10    3. Generalized muscle weakness M62.81      Family present over weekend. Concerned that resident was having increased bilateral arm weakness, left facial droop and impaired speech and balance. Paramedics arrived to take Pieter to ER for evaluation. Resident reported he was fine and refused to go. Vital signs at time: 142/85, HR 63.Temp 97.4    Today resident reports he is at baseline. No concerns. Denies CP, SOB or lightheadedness. Does not think he had TIA.     Past Medical and Surgical History reviewed in Epic today.    MEDICATIONS:  Current Outpatient Medications   Medication Sig Dispense Refill     acetaminophen (TYLENOL) 500 MG tablet Take 1,000 mg by mouth every 6 hours as needed for mild pain        allopurinol (ZYLOPRIM) 100 MG tablet Take 100 mg by mouth 2 times daily       aspirin (ASPIRIN LOW DOSE) 81 MG tablet Take 81 mg by mouth daily        citalopram (CELEXA) 10 MG tablet Take 10 mg by mouth daily       donepezil (ARICEPT) 5 MG tablet Take 5 mg by mouth At Bedtime       doxazosin (CARDURA) 4 MG tablet Take 2 tablets (8 mg) by mouth At Bedtime 90 tablet 3     ERGOCALCIFEROL PO Take 50,000 Units by mouth every morning Wednesday        LEVOTHYROXINE SODIUM PO Take 88 mcg by mouth daily       nitroglycerin (NITROSTAT) 0.4 MG SL tablet Place 1 tablet (0.4 mg) under the tongue every 5 minutes as needed for chest pain 25 tablet 0     phenylephrine-shark liver oil-mineral oil-petrolatum (PREPARATION  "H) 0.25-14-74.9 % rectal ointment Place 1 applicator rectally 4 times daily as needed for hemorrhoids for Hemorrhoids       polyethylene glycol 3350 POWD Take 17 g by mouth daily       pravastatin (PRAVACHOL) 40 MG tablet TAKE 1 TABLET DAILY 90 tablet 1     Sennosides-Docusate Sodium (SENNA-DOCUSATE SODIUM) 8.6-50 MG TABS Take 1 tablet by mouth 2 times daily        triamcinolone (KENALOG) 0.025 % cream Apply topically every 12 hours as needed to rash to the affected area prn, apply tin layer to the rash at the LE.       Warfarin Sodium (COUMADIN PO) As directed, per INR           REVIEW OF SYSTEMS:  4 point ROS including Respiratory, CV, GI and , other than that noted in the HPI,  is negative    Objective:  BP (!) 161/86   Pulse 63   Temp 98.1  F (36.7  C)   Resp 17   Ht 1.753 m (5' 9\")   Wt 78.9 kg (174 lb)   SpO2 97%   BMI 25.70 kg/m    Exam:  GENERAL APPEARANCE:  Alert  ENT:  Mouth and posterior oropharynx normal, moist mucous membranes, normal hearing acuity  RESP:  respiratory effort and palpation of chest normal, lungs clear to auscultation , no respiratory distress  CV:  Palpation and auscultation of heart done , regular rate and rhythm, no murmur, rub, or gallop  M/S:   Gait and station normal  Digits and nails normal  SKIN:  Inspection of skin and subcutaneous tissue baseline, Palpation of skin and subcutaneous tissue baseline  NEURO:   Cranial nerves 2-12 are normal tested and grossly at patient's baseline  PSYCH:  memory impaired , affect and mood normal    Labs:   Labs done in SNF are in [facility]. Please refer to them using [employer]/Care Everywhere. and Recent labs in EPIC reviewed by me today.     ASSESSMENT/PLAN:  (R41.0) Confusion  (primary encounter diagnosis)  Comment: Increased confusion resolved.   Plan:Continue to monitor and notify NP with changes.     (I10) Essential hypertension  Comment: BP greater than Goal 150/90.   BP Readings from Last 3 Encounters:   09/20/19 (!) 155/89 "   09/16/19 (!) 161/86   09/06/19 (!) 140/86   Plan: Cardura increased on 9/6/19. No improvement seen.   -Start lisinopril 5 mg/day. Keep SBP> 130 mmHg and DBP > 65 mmHg (levels below these increase mortality as shown by standard studies and observations).     (M62.81) Generalized muscle weakness  Comment:Equal strength bilaterally. Resolved.   Plan: Continue to monitor and notify NP with changes.     Orders written by provider at facility  See above     Total time spent with patient visit at the skilled nursing facility was 25 min including patient visit, review of past records and phone call to patient contact. Greater than 50% of total time spent with counseling and coordinating care due to counseling patient/resident for 15 minutes on: medication changes/new medicatiosn for hypertension    Electronically signed by:  ANGEL Corrigan CNP  [facility]

## 2019-09-16 NOTE — LETTER
9/16/2019        RE: Earl Sanchez  [facility]  [address]  [facility] [address]        [place] [facility]  [place] Medical Record Number:  [id number]  Place of Service where encounter took place:  [facility] ([facility]) [[id number]]  Chief Complaint   Patient presents with     RECHECK       HPI:    Earl Sanchez  is a 89 year old (9/17/1926), who is being seen today for an episodic care visit.  HPI information obtained from: facility chart records, facility staff and patient report.     Today's concern is:    ICD-10-CM    1. Confusion R41.0    2. Essential hypertension I10    3. Generalized muscle weakness M62.81      Family present over weekend. Concerned that resident was having increased bilateral arm weakness, left facial droop and impaired speech and balance. Paramedics arrived to take Pieter to ER for evaluation. Resident reported he was fine and refused to go. Vital signs at time: 142/85, HR 63.Temp 97.4    Today resident reports he is at baseline. No concerns. Denies CP, SOB or lightheadedness. Does not think he had TIA.     Past Medical and Surgical History reviewed in Epic today.    MEDICATIONS:  Current Outpatient Medications   Medication Sig Dispense Refill     acetaminophen (TYLENOL) 500 MG tablet Take 1,000 mg by mouth every 6 hours as needed for mild pain        allopurinol (ZYLOPRIM) 100 MG tablet Take 100 mg by mouth 2 times daily       aspirin (ASPIRIN LOW DOSE) 81 MG tablet Take 81 mg by mouth daily        citalopram (CELEXA) 10 MG tablet Take 10 mg by mouth daily       donepezil (ARICEPT) 5 MG tablet Take 5 mg by mouth At Bedtime       doxazosin (CARDURA) 4 MG tablet Take 2 tablets (8 mg) by mouth At Bedtime 90 tablet 3     ERGOCALCIFEROL PO Take 50,000 Units by mouth every morning Wednesday        LEVOTHYROXINE SODIUM PO Take 88 mcg by mouth daily       nitroglycerin (NITROSTAT) 0.4 MG SL tablet Place 1 tablet (0.4 mg) under  "the tongue every 5 minutes as needed for chest pain 25 tablet 0     phenylephrine-shark liver oil-mineral oil-petrolatum (PREPARATION H) 0.25-14-74.9 % rectal ointment Place 1 applicator rectally 4 times daily as needed for hemorrhoids for Hemorrhoids       polyethylene glycol 3350 POWD Take 17 g by mouth daily       pravastatin (PRAVACHOL) 40 MG tablet TAKE 1 TABLET DAILY 90 tablet 1     Sennosides-Docusate Sodium (SENNA-DOCUSATE SODIUM) 8.6-50 MG TABS Take 1 tablet by mouth 2 times daily        triamcinolone (KENALOG) 0.025 % cream Apply topically every 12 hours as needed to rash to the affected area prn, apply tin layer to the rash at the LE.       Warfarin Sodium (COUMADIN PO) As directed, per INR           REVIEW OF SYSTEMS:  4 point ROS including Respiratory, CV, GI and , other than that noted in the HPI,  is negative    Objective:  BP (!) 161/86   Pulse 63   Temp 98.1  F (36.7  C)   Resp 17   Ht 1.753 m (5' 9\")   Wt 78.9 kg (174 lb)   SpO2 97%   BMI 25.70 kg/m     Exam:  GENERAL APPEARANCE:  Alert  ENT:  Mouth and posterior oropharynx normal, moist mucous membranes, normal hearing acuity  RESP:  respiratory effort and palpation of chest normal, lungs clear to auscultation , no respiratory distress  CV:  Palpation and auscultation of heart done , regular rate and rhythm, no murmur, rub, or gallop  M/S:   Gait and station normal  Digits and nails normal  SKIN:  Inspection of skin and subcutaneous tissue baseline, Palpation of skin and subcutaneous tissue baseline  NEURO:   Cranial nerves 2-12 are normal tested and grossly at patient's baseline  PSYCH:  memory impaired , affect and mood normal    Labs:   Labs done in SNF are in [place] EPIC. Please refer to them using [employer]/Care Everywhere. and Recent labs in EPIC reviewed by me today.     ASSESSMENT/PLAN:  (R41.0) Confusion  (primary encounter diagnosis)  Comment: Increased confusion resolved.   Plan:Continue to monitor and notify NP with changes. "     (I10) Essential hypertension  Comment: BP greater than Goal 150/90.   BP Readings from Last 3 Encounters:   09/20/19 (!) 155/89   09/16/19 (!) 161/86   09/06/19 (!) 140/86   Plan: Cardura increased on 9/6/19. No improvement seen.   -Start lisinopril 5 mg/day. Keep SBP> 130 mmHg and DBP > 65 mmHg (levels below these increase mortality as shown by standard studies and observations).     (M62.81) Generalized muscle weakness  Comment:Equal strength bilaterally. Resolved.   Plan: Continue to monitor and notify NP with changes.     Orders written by provider at facility  See above     Total time spent with patient visit at the skilled nursing facility was 25 min including patient visit, review of past records and phone call to patient contact. Greater than 50% of total time spent with counseling and coordinating care due to counseling patient/resident for 15 minutes on: medication changes/new medicatiosn for hypertension    Electronically signed by:  ANGEL Corrigan [facility]             Sincerely,        Christiana Osuna NP

## 2019-09-17 ENCOUNTER — TRANSFERRED RECORDS (OUTPATIENT)
Dept: HEALTH INFORMATION MANAGEMENT | Facility: CLINIC | Age: 84
End: 2019-09-17

## 2019-09-17 ENCOUNTER — RECORDS - HEALTHEAST (OUTPATIENT)
Dept: LAB | Facility: CLINIC | Age: 84
End: 2019-09-17

## 2019-09-17 LAB
ANION GAP SERPL CALCULATED.3IONS-SCNC: 5 MMOL/L (ref 5–18)
ANION GAP SERPL CALCULATED.3IONS-SCNC: 5 MMOL/L (ref 5–18)
BUN SERPL-MCNC: 20 MG/DL (ref 8–28)
BUN SERPL-MCNC: 20 MG/DL (ref 8–28)
CALCIUM SERPL-MCNC: 9 MG/DL (ref 8.5–10.5)
CALCIUM SERPL-MCNC: 9 MG/DL (ref 8.5–10.5)
CHLORIDE BLD-SCNC: 106 MMOL/L (ref 98–107)
CHLORIDE SERPLBLD-SCNC: 106 MMOL/L (ref 98–107)
CO2 SERPL-SCNC: 27 MMOL/L (ref 22–31)
CO2 SERPL-SCNC: 27 MMOL/L (ref 22–31)
CREAT SERPL-MCNC: 1.07 MG/DL (ref 0.7–1.3)
CREAT SERPL-MCNC: 1.07 MG/DL (ref 0.7–1.3)
GFR SERPL CREATININE-BSD FRML MDRD: >60 ML/MIN/1.73M2
GFR SERPL CREATININE-BSD FRML MDRD: >60 ML/MIN/1.73M2
GLUCOSE BLD-MCNC: 64 MG/DL (ref 70–125)
GLUCOSE SERPL-MCNC: 64 MG/DL (ref 70–125)
POTASSIUM BLD-SCNC: 4.2 MMOL/L (ref 3.5–5)
POTASSIUM SERPL-SCNC: 4.2 MMOL/L (ref 3.5–5)
SODIUM SERPL-SCNC: 138 MMOL/L (ref 136–145)
SODIUM SERPL-SCNC: 138 MMOL/L (ref 136–145)

## 2019-09-20 ENCOUNTER — NURSING HOME VISIT (OUTPATIENT)
Dept: GERIATRICS | Facility: CLINIC | Age: 84
End: 2019-09-20
Payer: COMMERCIAL

## 2019-09-20 VITALS
WEIGHT: 176.2 LBS | DIASTOLIC BLOOD PRESSURE: 89 MMHG | RESPIRATION RATE: 18 BRPM | TEMPERATURE: 98.2 F | SYSTOLIC BLOOD PRESSURE: 155 MMHG | HEART RATE: 64 BPM | BODY MASS INDEX: 26.1 KG/M2 | HEIGHT: 69 IN | OXYGEN SATURATION: 96 %

## 2019-09-20 DIAGNOSIS — N40.0 BENIGN NON-NODULAR PROSTATIC HYPERPLASIA WITHOUT LOWER URINARY TRACT SYMPTOMS: ICD-10-CM

## 2019-09-20 DIAGNOSIS — R40.4 UNRESPONSIVE EPISODE: Primary | ICD-10-CM

## 2019-09-20 DIAGNOSIS — F43.23 ADJUSTMENT DISORDER WITH MIXED ANXIETY AND DEPRESSED MOOD: ICD-10-CM

## 2019-09-20 DIAGNOSIS — I48.20 CHRONIC ATRIAL FIBRILLATION (H): ICD-10-CM

## 2019-09-20 DIAGNOSIS — I25.10 CORONARY ARTERY DISEASE INVOLVING NATIVE CORONARY ARTERY OF NATIVE HEART WITHOUT ANGINA PECTORIS: ICD-10-CM

## 2019-09-20 DIAGNOSIS — N18.30 BENIGN HYPERTENSION WITH CHRONIC KIDNEY DISEASE, STAGE III (H): ICD-10-CM

## 2019-09-20 DIAGNOSIS — I12.9 BENIGN HYPERTENSION WITH CHRONIC KIDNEY DISEASE, STAGE III (H): ICD-10-CM

## 2019-09-20 DIAGNOSIS — E03.9 HYPOTHYROIDISM, UNSPECIFIED TYPE: ICD-10-CM

## 2019-09-20 PROCEDURE — 99309 SBSQ NF CARE MODERATE MDM 30: CPT | Performed by: INTERNAL MEDICINE

## 2019-09-20 ASSESSMENT — MIFFLIN-ST. JEOR: SCORE: 1434.62

## 2019-09-20 NOTE — LETTER
9/20/2019        RE: Earl Sanchez  [facility]  [address]  [facility] [address]        Earl Sanchez is a 89 year old male seen September 20, 2019 at [facility] where he has resided for one and a half years (admit 2/2018) seen to follow up unresponsive episodes.    This past weekend he had left facial droop, dysarthria and bilateral arm weakness.   His family was present and 911 was called.   However, when EMTs arrived patient refused transport to ED.   Symptoms resolved on their own.    Today he is seen in his room, resting abed.   Somewhat evasive, states he is fine and denies any recurrent symptoms.      Pt had an ED visit in March 2019 for an episode of unresponsiveness that persisted in the ED.   He had head CT, then awoke and responded to questions.   Workup unremarkable and he returned to [facility].        Nursing staff reports he can be verbally abusive and resistant to cares at times, with depressed mood.   Citalopram recently started for this and has been helpful.  Out of his room more and conversing with his tablemates.        Patient had a hospitalization in January 2018 for encephalopathy with gait disturbance.  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    He was in [facility] for a month, improved, but needed more support than AL, so transitioned to LTC for permanent placement.  Continues to need assist for transfers bed to .      Patient has had atrial fib for many years, anticoagulated with warfarin.   Also treated for BPH, CAD and hypothyroidism, which have been stable.       Past Medical History:   Diagnosis Date     Abdominal aortic aneurysm (H)     per 2/12/15 abdominal US, mild aneurysm measuring [id number]     Acute MI 1981, 2007     Atrial fibrillation (H)      BPH (benign prostatic hyperplasia)      CAD (coronary artery disease)     CABG 2007, f/b cardio, Dr. 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    , [facility] at [facility] with services until 1/2018.      Has 3 children.     Review Of Systems  Limited, but negative other than above.   Weight is down about 10# in past couple of months.        SLUMS 13/30   CPT 4.4   BIMS 7/15    EXAM:  Resting, NAD  BP (!) 155/89   Pulse 64   Temp 98.2  F (36.8  C)   Resp 18   Ht 1.753 m (5' 9\")   Wt 79.9 kg (176 lb 3.2 oz)   SpO2 96%   BMI 26.02 kg/m      Neck supple without adenopathy  Lungs clear bilaterally with good air movement.   Heart irreg irreg s1s2, 1/6 HSM  Abd soft, NT, no distention, +BS  Ext without edema.   Neuro: no tremor or stiffness, +STML   Psych: affect irritable.     Last Comprehensive Metabolic Panel:  Sodium   Date Value Ref Range Status   09/17/2019 138 136 - 145 mmol/L Final     Potassium   Date Value Ref Range Status   09/17/2019 4.2 3.5 - 5.0 mmol/L Final     Chloride   Date Value Ref Range Status   09/17/2019 106 98 - 107 mmol/L Final     Carbon Dioxide   Date Value Ref Range Status   09/17/2019 27 22 - 31 mmol/L Final     Anion Gap   Date Value Ref Range Status   09/17/2019 5 5 - 18 mmol/L Final     Glucose   Date Value Ref Range Status   09/17/2019 64 (A) 70 - 125 mg/dL Final     Urea Nitrogen   Date Value Ref Range Status   09/17/2019 20 8 - 28 mg/dL Final     Creatinine   Date Value Ref Range Status   09/17/2019 1.07 0.70 - 1.30 mg/dL Final     GFR Estimate   Date Value Ref Range Status   09/17/2019 >60 >60 ml/min/1.73m2 Final     Calcium   Date Value Ref Range Status   09/17/2019 9.0 8.5 - 10.5 mg/dL Final        CT " SCAN OF THE HEAD WITHOUT CONTRAST  3/14/2019 3:27 AM   HISTORY: Altered level of consciousness, unexplained  COMPARISON: 1/17/2018.  FINDINGS: There is generalized atrophy of the brain. There is low attenuation in the white matter of the cerebral hemispheres consistent  with sequelae of small vessel ischemic disease. There is no evidence of intracranial hemorrhage, mass, acute infarct or anomaly.                                                    IMPRESSION: No acute abnormality.       IMP/PLAN:  (R41.89) Unresponsive episode    Comment: recurrent, unclear etiology  Plan: Pt has declined ED transfer for this.   Continue to monitor in NH.      If any further syncope would look to discontinuation of donepezil as a possible culprit.       (I12.9,  N18.3) Benign hypertension with chronic kidney disease, stage III    Comment:    BP Readings from Last 3 Encounters:   09/30/19 (!) 162/91   09/20/19 (!) 155/89   09/16/19 (!) 161/86    Plan: remains on doxazosin for bp control and BPH.  Also now on lisinopril 5 mg/day, may need to increase dose to 10 mg/day        (R27.0) Ataxia    Comment: progressive decline in ambulation, high fall risk, now WC bound.    Plan:   Neurology follow up with Dr Nell Saldana    (G30.1,  F02.80) Late onset Alzheimer's disease without behavioral disturbance  Comment: gradual decline in cognition, low functional status.  Plan: LTC support for meds, meals, activity.    Not likely that donepezil is adding any benefit at this time, and may be causing side effects.   Would look to discontinuation if family amenable.        (F43.23) Adjustment disorder with mixed anxiety and depressed mood  Comment: has worsened with cognitive decline     Plan: continue citalopram which has been helpful       (I48.2) Chronic atrial fibrillation (H)  Comment: not requiring rate slowing meds   Pulse Readings from Last 4 Encounters:   09/30/19 77   09/20/19 64   09/16/19 63   09/06/19 66      Plan: warfarin per  INR, goal 2-2.5 given fall risk.    (I25.10) Coronary artery disease involving native coronary artery of native heart without angina pectoris  Comment: h/o CABG 2007  Plan: daily ASA, statin for secondary prevention.   NTG prn    (N40.0) Benign prostatic hyperplasia, unspecified whether lower urinary tract symptoms present  Comment: longstanding  Plan: continue doxazosin.       (E03.9) Hypothyroidism, unspecified type  Comment:   TSH   Date Value Ref Range Status   07/17/2019 5.49 (A) 0.30 - 5.00 uIU/mL Final   Plan: same dose levothyroxine, yearly TSH       (E55.9) Vitamin D deficiency  Comment: replaced   Plan: change vit D to 2000 units/day       Patty Gamble MD         Sincerely,        Patty Gamble MD

## 2019-09-24 RX ORDER — LISINOPRIL 5 MG/1
5 TABLET ORAL DAILY
Start: 2019-09-24 | End: 2019-11-30

## 2019-09-30 ENCOUNTER — NURSING HOME VISIT (OUTPATIENT)
Dept: GERIATRICS | Facility: CLINIC | Age: 84
End: 2019-09-30
Payer: COMMERCIAL

## 2019-09-30 ENCOUNTER — RECORDS - HEALTHEAST (OUTPATIENT)
Dept: LAB | Facility: CLINIC | Age: 84
End: 2019-09-30

## 2019-09-30 VITALS
OXYGEN SATURATION: 94 % | DIASTOLIC BLOOD PRESSURE: 91 MMHG | HEART RATE: 77 BPM | RESPIRATION RATE: 18 BRPM | SYSTOLIC BLOOD PRESSURE: 162 MMHG | TEMPERATURE: 98.2 F | HEIGHT: 69 IN | WEIGHT: 177 LBS | BODY MASS INDEX: 26.22 KG/M2

## 2019-09-30 DIAGNOSIS — I48.20 CHRONIC ATRIAL FIBRILLATION (H): Primary | ICD-10-CM

## 2019-09-30 DIAGNOSIS — Z79.01 LONG TERM CURRENT USE OF ANTICOAGULANT THERAPY: ICD-10-CM

## 2019-09-30 DIAGNOSIS — Z51.81 ENCOUNTER FOR THERAPEUTIC DRUG MONITORING: ICD-10-CM

## 2019-09-30 LAB
INR PPP: 2.7 (ref 0.9–1.1)
INR PPP: 2.7 (ref 0.9–1.1)

## 2019-09-30 PROCEDURE — 99308 SBSQ NF CARE LOW MDM 20: CPT | Performed by: NURSE PRACTITIONER

## 2019-09-30 ASSESSMENT — MIFFLIN-ST. JEOR: SCORE: 1438.25

## 2019-09-30 NOTE — PROGRESS NOTES
"[place] [facility]  [place] Medical Record Number:  [id number]  Place of Service where encounter took place: [facility] (FGS) [[id number]]    HPI:    Earl Sanchez is a 89 year old  (9/17/1926), who is being seen today for an episodic care visit at the above location.   HPI information obtained from: facility chart records, facility staff, patient report and [facility] chart review. Today's concern is INR/Coumadin management for A. Fib    ROS/Subjective:  Bleeding Signs/Symptoms:  None  Thromboembolic Signs/Symptoms:  None  Medication Changes:  No  Dietary Changes:  No  Activity Changes: No  Bacterial/Viral Infection:  No  Missed Coumadin Doses:  None  On ASA: Yes - 81 mg po q day  Other Concerns:  No    OBJECTIVE:  BP (!) 162/91   Pulse 77   Temp 98.2  F (36.8  C)   Resp 18   Ht 1.753 m (5' 9\")   Wt 80.3 kg (177 lb)   SpO2 94%   BMI 26.14 kg/m    INR Today:  2.42  Current Dose:  Warfarin 2.5 mg on t/th and warfarin 3 mg all other days   INR Flow sheet at SNF:    ASSESSMENT:    ICD-10-CM    1. Chronic atrial fibrillation I48.2    2. Long term current use of anticoagulant therapy Z79.01    3. Encounter for therapeutic drug monitoring Z51.81      PLAN:   Orders written by provider at facility  New Dose: No Change    Next INR: 1 month      Electronically signed by:  ANGEL Corrigan CNP  [place] [facility]       "

## 2019-10-01 PROBLEM — R40.4 UNRESPONSIVE EPISODE: Status: ACTIVE | Noted: 2019-10-01

## 2019-10-02 NOTE — PROGRESS NOTES
Earl Sanchez is a 89 year old male seen September 20, 2019 at [facility] where he has resided for one and a half years (admit 2/2018) seen to follow up unresponsive episodes.    This past weekend he had left facial droop, dysarthria and bilateral arm weakness.   His family was present and 911 was called.   However, when EMTs arrived patient refused transport to ED.   Symptoms resolved on their own.    Today he is seen in his room, resting abed.   Somewhat evasive, states he is fine and denies any recurrent symptoms.      Pt had an ED visit in March 2019 for an episode of unresponsiveness that persisted in the ED.   He had head CT, then awoke and responded to questions.   Workup unremarkable and he returned to [facility].        Nursing staff reports he can be verbally abusive and resistant to cares at times, with depressed mood.   Citalopram recently started for this and has been helpful.  Out of his room more and conversing with his tablemates.        Patient had a hospitalization in January 2018 for encephalopathy with gait disturbance.  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    He was in [facility] for a month, improved, but needed more support than AL, so transitioned to LT for permanent placement.  Continues to need assist for transfers bed to .      Patient has had atrial fib for many years, anticoagulated with warfarin.   Also treated for BPH, CAD and hypothyroidism, which have been stable.       Past Medical History:   Diagnosis Date     Abdominal aortic aneurysm (H)     per 2/12/15 abdominal US, mild aneurysm measuring [id number]     Acute MI 1981, 2007     Atrial fibrillation (H)      BPH (benign prostatic hyperplasia)      CAD (coronary artery disease)     CABG 2007, f/b cardio, Dr. 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    , lived at the [facility] with services until 1/2018.      Has 3 children.     Review Of Systems  Limited, but negative other than above.   Weight is down about 10# in past couple of months.        SLUMS 13/30   CPT 4.4   BIMS 7/15    EXAM:  Resting, NAD  BP (!) 155/89   Pulse 64   Temp 98.2  F (36.8  C)   Resp 18   Ht 1.753 m (5' 9\")   Wt 79.9 kg (176 lb 3.2 oz)   SpO2 96%   BMI 26.02 kg/m     Neck supple without adenopathy  Lungs clear bilaterally with good air movement.   Heart irreg irreg s1s2, 1/6 HSM  Abd soft, NT, no distention, +BS  Ext without edema.   Neuro: no tremor or stiffness, +STML   Psych: affect irritable.     Last Comprehensive Metabolic Panel:  Sodium   Date Value Ref Range Status   09/17/2019 138 136 - 145 mmol/L Final     Potassium   Date Value Ref Range Status   09/17/2019 4.2 3.5 - 5.0 mmol/L Final     Chloride   Date Value Ref Range Status   09/17/2019 106 98 - 107 mmol/L Final     Carbon Dioxide   Date Value Ref Range Status   09/17/2019 27 22 - 31 mmol/L Final     Anion Gap   Date Value Ref Range Status   09/17/2019 5 5 - 18 mmol/L Final     Glucose   Date Value Ref Range Status   09/17/2019 64 (A) 70 - 125 mg/dL Final     Urea Nitrogen   Date Value Ref Range Status   09/17/2019 20 8 - 28 mg/dL Final     Creatinine   Date Value Ref Range Status   09/17/2019 1.07 0.70 - 1.30 mg/dL Final     GFR Estimate   Date Value Ref Range Status   09/17/2019 >60 >60 ml/min/1.73m2 Final     Calcium   Date Value Ref Range Status   09/17/2019 9.0 8.5 - 10.5 mg/dL Final        CT SCAN OF THE HEAD WITHOUT CONTRAST  3/14/2019 3:27 AM   HISTORY: Altered level of consciousness, unexplained  COMPARISON: " 1/17/2018.  FINDINGS: There is generalized atrophy of the brain. There is low attenuation in the white matter of the cerebral hemispheres consistent  with sequelae of small vessel ischemic disease. There is no evidence of intracranial hemorrhage, mass, acute infarct or anomaly.                                                    IMPRESSION: No acute abnormality.       IMP/PLAN:  (R41.89) Unresponsive episode    Comment: recurrent, unclear etiology  Plan: Pt has declined ED transfer for this.   Continue to monitor in NH.      If any further syncope would look to discontinuation of donepezil as a possible culprit.       (I12.9,  N18.3) Benign hypertension with chronic kidney disease, stage III    Comment:    BP Readings from Last 3 Encounters:   09/30/19 (!) 162/91   09/20/19 (!) 155/89   09/16/19 (!) 161/86    Plan: remains on doxazosin for bp control and BPH.  Also now on lisinopril 5 mg/day, may need to increase dose to 10 mg/day        (R27.0) Ataxia    Comment: progressive decline in ambulation, high fall risk, now WC bound.    Plan:   Neurology follow up with Dr Nell Saldana    (G30.1,  F02.80) Late onset Alzheimer's disease without behavioral disturbance  Comment: gradual decline in cognition, low functional status.  Plan: LTC support for meds, meals, activity.    Not likely that donepezil is adding any benefit at this time, and may be causing side effects.   Would look to discontinuation if family amenable.        (F43.23) Adjustment disorder with mixed anxiety and depressed mood  Comment: has worsened with cognitive decline     Plan: continue citalopram which has been helpful       (I48.2) Chronic atrial fibrillation (H)  Comment: not requiring rate slowing meds   Pulse Readings from Last 4 Encounters:   09/30/19 77   09/20/19 64   09/16/19 63   09/06/19 66      Plan: warfarin per INR, goal 2-2.5 given fall risk.    (I25.10) Coronary artery disease involving native coronary artery of native heart  without angina pectoris  Comment: h/o CABG 2007  Plan: daily ASA, statin for secondary prevention.   NTG prn    (N40.0) Benign prostatic hyperplasia, unspecified whether lower urinary tract symptoms present  Comment: longstanding  Plan: continue doxazosin.       (E03.9) Hypothyroidism, unspecified type  Comment:   TSH   Date Value Ref Range Status   07/17/2019 5.49 (A) 0.30 - 5.00 uIU/mL Final   Plan: same dose levothyroxine, yearly TSH       (E55.9) Vitamin D deficiency  Comment: replaced   Plan: change vit D to 2000 units/day       Patty Gamble MD

## 2019-10-14 ENCOUNTER — NURSING HOME VISIT (OUTPATIENT)
Dept: GERIATRICS | Facility: CLINIC | Age: 84
End: 2019-10-14
Payer: COMMERCIAL

## 2019-10-14 VITALS
SYSTOLIC BLOOD PRESSURE: 158 MMHG | OXYGEN SATURATION: 98 % | TEMPERATURE: 98.3 F | HEIGHT: 69 IN | WEIGHT: 188.2 LBS | HEART RATE: 67 BPM | RESPIRATION RATE: 18 BRPM | BODY MASS INDEX: 27.88 KG/M2 | DIASTOLIC BLOOD PRESSURE: 65 MMHG

## 2019-10-14 DIAGNOSIS — R63.5 WEIGHT GAIN: Primary | ICD-10-CM

## 2019-10-14 PROCEDURE — 99308 SBSQ NF CARE LOW MDM 20: CPT | Performed by: NURSE PRACTITIONER

## 2019-10-14 RX ORDER — FUROSEMIDE 20 MG/1
5 TABLET ORAL EVERY OTHER DAY
COMMUNITY
End: 2022-01-01

## 2019-10-14 ASSESSMENT — MIFFLIN-ST. JEOR: SCORE: 1489.05

## 2019-10-14 NOTE — PROGRESS NOTES
[place] [facility]  [place] Medical Record Number:  [id number]  Place of Service where encounter took place:  [facility] ([facility]) [[id number]]  Chief Complaint   Patient presents with     Nursing Home Acute       HPI:    Earl Sanchez  is a 89 year old (9/17/1926), who is being seen today for an episodic care visit.  HPI information obtained from: facility chart records, facility staff and patient report.     Today's concern is:   Weight gain  (primary encounter diagnosis)    Complaining of increased SOB with transfers (lift). Denies changes in oral intake. States edema in LE new.         Past Medical and Surgical History reviewed in Epic today.    MEDICATIONS:  Current Outpatient Medications   Medication Sig Dispense Refill     acetaminophen (TYLENOL) 500 MG tablet Take 1,000 mg by mouth every 6 hours as needed for mild pain        allopurinol (ZYLOPRIM) 100 MG tablet Take 100 mg by mouth 2 times daily       aspirin (ASPIRIN LOW DOSE) 81 MG tablet Take 81 mg by mouth daily        citalopram (CELEXA) 10 MG tablet Take 10 mg by mouth daily       donepezil (ARICEPT) 5 MG tablet Take 5 mg by mouth At Bedtime       doxazosin (CARDURA) 4 MG tablet Take 2 tablets (8 mg) by mouth At Bedtime 90 tablet 3     ERGOCALCIFEROL PO Take 50,000 Units by mouth every morning Wednesday        furosemide (LASIX) 10 mg TABS half-tab Take 10 mg by mouth daily       LEVOTHYROXINE SODIUM PO Take 88 mcg by mouth daily       nitroglycerin (NITROSTAT) 0.4 MG SL tablet Place 1 tablet (0.4 mg) under the tongue every 5 minutes as needed for chest pain 25 tablet 0     phenylephrine-shark liver oil-mineral oil-petrolatum (PREPARATION H) 0.25-14-74.9 % rectal ointment Place 1 applicator rectally 4 times daily as needed for hemorrhoids for Hemorrhoids       polyethylene glycol 3350 POWD Take 17 g by mouth daily       pravastatin (PRAVACHOL) 40 MG tablet TAKE 1 TABLET DAILY 90 tablet 1     Sennosides-Docusate Sodium  "(SENNA-DOCUSATE SODIUM) 8.6-50 MG TABS Take 1 tablet by mouth 2 times daily        triamcinolone (KENALOG) 0.025 % cream Apply topically every 12 hours as needed to rash to the affected area prn, apply tin layer to the rash at the LE.       Warfarin Sodium (COUMADIN PO) As directed, per INR       lisinopril (PRINIVIL/ZESTRIL) 5 MG tablet Take 1 tablet (5 mg) by mouth daily (Patient not taking: Reported on 10/14/2019)       REVIEW OF SYSTEMS:  4 point ROS including Respiratory, CV, GI and , other than that noted in the HPI,  is negative    Objective:  BP (!) 158/65   Pulse 67   Temp 98.3  F (36.8  C)   Resp 18   Ht 1.753 m (5' 9\")   Wt 85.4 kg (188 lb 3.2 oz)   SpO2 98%   BMI 27.79 kg/m    Exam:  GENERAL APPEARANCE:  Alert, in no distress  RESP:  respiratory effort and palpation of chest normal, lungs clear to auscultation   CV:  Palpation and auscultation of heart done , regular rate and rhythm, no murmur, rub, or gallop, peripheral edema 2+ in bilateral LE    Labs:   Labs done in SNF are in [facility]. Please refer to them using [employer]/Care Everywhere. and Recent labs in [facility] reviewed by me today.     ASSESSMENT/PLAN:  (R63.5) Weight gain  (primary encounter diagnosis)  Comment: New onset of weight gain. Previous endocrinae, CBC, BMP labs within normal limits.  S/p bypass with history of CHF. Last ECHO 2011 with EF 55-60%. Previously managed on lasix, discontinued 2018 per family report. No longer managed by cardiology   Plan:  --Lasix 10 mg/daily  --BMP next lab day   --Follow up on 10/22/19      Orders written by provider at facility  See above       Electronically signed by:  ANGEL Corrigan [facility]         "

## 2019-10-14 NOTE — LETTER
10/14/2019        RE: Earl Sanchez  [facility]  [address]  [facility] [address]        [place] [facility]  [place] Medical Record Number:  [id number]  Place of Service where encounter took place:  [facility] ([facility]) [[id number]]  Chief Complaint   Patient presents with     Nursing Home Acute       HPI:    Earl Sanchez  is a 89 year old (9/17/1926), who is being seen today for an episodic care visit.  HPI information obtained from: facility chart records, facility staff and patient report.     Today's concern is:   Weight gain  (primary encounter diagnosis)    Complaining of increased SOB with transfers (lift). Denies changes in oral intake. States edema in LE new.         Past Medical and Surgical History reviewed in Epic today.    MEDICATIONS:  Current Outpatient Medications   Medication Sig Dispense Refill     acetaminophen (TYLENOL) 500 MG tablet Take 1,000 mg by mouth every 6 hours as needed for mild pain        allopurinol (ZYLOPRIM) 100 MG tablet Take 100 mg by mouth 2 times daily       aspirin (ASPIRIN LOW DOSE) 81 MG tablet Take 81 mg by mouth daily        citalopram (CELEXA) 10 MG tablet Take 10 mg by mouth daily       donepezil (ARICEPT) 5 MG tablet Take 5 mg by mouth At Bedtime       doxazosin (CARDURA) 4 MG tablet Take 2 tablets (8 mg) by mouth At Bedtime 90 tablet 3     ERGOCALCIFEROL PO Take 50,000 Units by mouth every morning Wednesday        furosemide (LASIX) 10 mg TABS half-tab Take 10 mg by mouth daily       LEVOTHYROXINE SODIUM PO Take 88 mcg by mouth daily       nitroglycerin (NITROSTAT) 0.4 MG SL tablet Place 1 tablet (0.4 mg) under the tongue every 5 minutes as needed for chest pain 25 tablet 0     phenylephrine-shark liver oil-mineral oil-petrolatum (PREPARATION H) 0.25-14-74.9 % rectal ointment Place 1 applicator rectally 4 times daily as needed for hemorrhoids for Hemorrhoids       polyethylene glycol 3350 POWD  "Take 17 g by mouth daily       pravastatin (PRAVACHOL) 40 MG tablet TAKE 1 TABLET DAILY 90 tablet 1     Sennosides-Docusate Sodium (SENNA-DOCUSATE SODIUM) 8.6-50 MG TABS Take 1 tablet by mouth 2 times daily        triamcinolone (KENALOG) 0.025 % cream Apply topically every 12 hours as needed to rash to the affected area prn, apply tin layer to the rash at the LE.       Warfarin Sodium (COUMADIN PO) As directed, per INR       lisinopril (PRINIVIL/ZESTRIL) 5 MG tablet Take 1 tablet (5 mg) by mouth daily (Patient not taking: Reported on 10/14/2019)       REVIEW OF SYSTEMS:  4 point ROS including Respiratory, CV, GI and , other than that noted in the HPI,  is negative    Objective:  BP (!) 158/65   Pulse 67   Temp 98.3  F (36.8  C)   Resp 18   Ht 1.753 m (5' 9\")   Wt 85.4 kg (188 lb 3.2 oz)   SpO2 98%   BMI 27.79 kg/m     Exam:  GENERAL APPEARANCE:  Alert, in no distress  RESP:  respiratory effort and palpation of chest normal, lungs clear to auscultation   CV:  Palpation and auscultation of heart done , regular rate and rhythm, no murmur, rub, or gallop, peripheral edema 2+ in bilateral LE    Labs:   Labs done in SNF are in [facility]. Please refer to them using [employer]/Care Everywhere. and Recent labs in EPIC reviewed by me today.     ASSESSMENT/PLAN:  (R63.5) Weight gain  (primary encounter diagnosis)  Comment: New onset of weight gain. Previous endocrinae, CBC, BMP labs within normal limits.  S/p bypass with history of CHF. Last ECHO 2011 with EF 55-60%. Previously managed on lasix, discontinued 2018 per family report. No longer managed by cardiology   Plan:  --Lasix 10 mg/daily  --BMP next lab day   --Follow up on 10/22/19      Orders written by provider at facility  See above       Electronically signed by:  ANGEL Corrigan [facility]             Sincerely,        Christiana Osuna, NP    "

## 2019-10-16 ENCOUNTER — RECORDS - HEALTHEAST (OUTPATIENT)
Dept: LAB | Facility: CLINIC | Age: 84
End: 2019-10-16

## 2019-10-17 ENCOUNTER — TRANSFERRED RECORDS (OUTPATIENT)
Dept: HEALTH INFORMATION MANAGEMENT | Facility: CLINIC | Age: 84
End: 2019-10-17

## 2019-10-17 LAB
ANION GAP SERPL CALCULATED.3IONS-SCNC: 5 MMOL/L (ref 5–18)
ANION GAP SERPL CALCULATED.3IONS-SCNC: 5 MMOL/L (ref 5–18)
BUN SERPL-MCNC: 16 MG/DL (ref 8–28)
BUN SERPL-MCNC: 16 MG/DL (ref 8–28)
CALCIUM SERPL-MCNC: 9 MG/DL (ref 8.5–10.5)
CALCIUM SERPL-MCNC: 9 MG/DL (ref 8.5–10.5)
CHLORIDE BLD-SCNC: 103 MMOL/L (ref 98–107)
CHLORIDE SERPLBLD-SCNC: 103 MMOL/L (ref 98–107)
CO2 SERPL-SCNC: 26 MMOL/L (ref 22–31)
CO2 SERPL-SCNC: 26 MMOL/L (ref 22–31)
CREAT SERPL-MCNC: 1.05 MG/DL (ref 0.7–1.3)
CREAT SERPL-MCNC: 1.05 MG/DL (ref 0.7–1.3)
GFR SERPL CREATININE-BSD FRML MDRD: >60 ML/MIN/1.73M2
GFR SERPL CREATININE-BSD FRML MDRD: >60 ML/MIN/1.73M2
GLUCOSE BLD-MCNC: 72 MG/DL (ref 70–125)
GLUCOSE SERPL-MCNC: 72 MG/DL (ref 70–125)
POTASSIUM BLD-SCNC: 4 MMOL/L (ref 3.5–5)
POTASSIUM SERPL-SCNC: 4 MMOL/L (ref 3.5–5)
SODIUM SERPL-SCNC: 134 MMOL/L (ref 136–145)
SODIUM SERPL-SCNC: 134 MMOL/L (ref 136–145)

## 2019-10-22 ENCOUNTER — NURSING HOME VISIT (OUTPATIENT)
Dept: GERIATRICS | Facility: CLINIC | Age: 84
End: 2019-10-22
Payer: COMMERCIAL

## 2019-10-22 VITALS
TEMPERATURE: 98.2 F | SYSTOLIC BLOOD PRESSURE: 158 MMHG | BODY MASS INDEX: 27.85 KG/M2 | RESPIRATION RATE: 18 BRPM | HEART RATE: 66 BPM | HEIGHT: 69 IN | OXYGEN SATURATION: 95 % | WEIGHT: 188 LBS | DIASTOLIC BLOOD PRESSURE: 88 MMHG

## 2019-10-22 DIAGNOSIS — I25.10 CORONARY ARTERY DISEASE INVOLVING NATIVE CORONARY ARTERY OF NATIVE HEART WITHOUT ANGINA PECTORIS: ICD-10-CM

## 2019-10-22 DIAGNOSIS — I87.303 STASIS EDEMA OF BOTH LOWER EXTREMITIES: ICD-10-CM

## 2019-10-22 DIAGNOSIS — R63.5 WEIGHT GAIN: ICD-10-CM

## 2019-10-22 DIAGNOSIS — I48.20 CHRONIC ATRIAL FIBRILLATION (H): ICD-10-CM

## 2019-10-22 DIAGNOSIS — I50.22 CHRONIC SYSTOLIC CONGESTIVE HEART FAILURE (H): Primary | ICD-10-CM

## 2019-10-22 DIAGNOSIS — R21 RASH: ICD-10-CM

## 2019-10-22 PROCEDURE — 99310 SBSQ NF CARE HIGH MDM 45: CPT | Performed by: NURSE PRACTITIONER

## 2019-10-22 ASSESSMENT — MIFFLIN-ST. JEOR: SCORE: 1488.14

## 2019-10-22 NOTE — LETTER
10/22/2019        RE: Earl Sanchez  [facility]  [address]  [facility] [address]        [place] [facility]  [place] Medical Record Number:  [id number]  Place of Service where encounter took place:  [facility] ([facility]) [[id number]]  Chief Complaint   Patient presents with     Nursing Home Acute       HPI:    Earl Sanchez  is a 89 year old (9/17/1926), who is being seen today for an episodic care visit.  HPI information obtained from: facility chart records, facility staff, patient report and [facility] chart review.     Today's concern is:    ICD-10-CM    1. Chronic systolic congestive heart failure (H) I50.22    2. Weight gain R63.5    3. Rash R21    4. Stasis edema of both lower extremities I87.303    5. Chronic atrial fibrillation I48.20    6. Coronary artery disease involving native coronary artery of native heart without angina pectoris I25.10      Resident has had increase weight gain over previous several weeks with SOB and stasis edema to bilateral LE. Was started on lasix 10 mg/day with some improvement in s/sx but remains largely SOB with any activity.   --Edema bilateral LE with redness noted but no open areas   --Denies pain  --Denies night time cough  --No longer followed by cardiology. A-fib managed with warfarin.     Pulse Readings from Last 4 Encounters:   10/29/19 60   10/28/19 66   10/22/19 66   10/14/19 67     BP Readings from Last 3 Encounters:   10/29/19 (!) 146/81   10/28/19 (!) 158/88   10/22/19 (!) 158/88       Past Medical and Surgical History reviewed in Epic today.    MEDICATIONS:  Current Outpatient Medications   Medication Sig Dispense Refill     acetaminophen (TYLENOL) 500 MG tablet Take 1,000 mg by mouth every 6 hours as needed for mild pain        allopurinol (ZYLOPRIM) 100 MG tablet Take 100 mg by mouth 2 times daily       aspirin (ASPIRIN LOW DOSE) 81 MG tablet Take 81 mg by mouth daily        citalopram  "(CELEXA) 10 MG tablet Take 10 mg by mouth daily       donepezil (ARICEPT) 5 MG tablet Take 5 mg by mouth At Bedtime       doxazosin (CARDURA) 4 MG tablet Take 2 tablets (8 mg) by mouth At Bedtime 90 tablet 3     ERGOCALCIFEROL PO Take 50,000 Units by mouth every morning Wednesday        furosemide (LASIX) 10 mg TABS half-tab Take 10 mg by mouth daily       LEVOTHYROXINE SODIUM PO Take 88 mcg by mouth daily       nitroglycerin (NITROSTAT) 0.4 MG SL tablet Place 1 tablet (0.4 mg) under the tongue every 5 minutes as needed for chest pain 25 tablet 0     phenylephrine-shark liver oil-mineral oil-petrolatum (PREPARATION H) 0.25-14-74.9 % rectal ointment Place 1 applicator rectally 4 times daily as needed for hemorrhoids for Hemorrhoids       polyethylene glycol 3350 POWD Take 17 g by mouth daily       pravastatin (PRAVACHOL) 40 MG tablet TAKE 1 TABLET DAILY 90 tablet 1     Sennosides-Docusate Sodium (SENNA-DOCUSATE SODIUM) 8.6-50 MG TABS Take 1 tablet by mouth 2 times daily        triamcinolone (KENALOG) 0.025 % cream Apply topically every 12 hours as needed to rash to the affected area prn, apply tin layer to the rash at the LE.       Warfarin Sodium (COUMADIN PO) As directed, per INR       divalproex sodium delayed-release (DEPAKOTE SPRINKLE) 125 MG DR capsule Take 125 mg by mouth daily       lisinopril (PRINIVIL/ZESTRIL) 5 MG tablet Take 1 tablet (5 mg) by mouth daily (Patient not taking: Reported on 10/14/2019)       spironolactone (ALDACTONE) 25 MG tablet Take 25 mg by mouth daily         REVIEW OF SYSTEMS:  Limited secondary to cognitive impairment but today pt reports he is feeling at his baseline      Objective:  BP (!) 158/88   Pulse 66   Temp 98.2  F (36.8  C)   Resp 18   Ht 1.753 m (5' 9\")   Wt 85.3 kg (188 lb)   SpO2 95%   BMI 27.76 kg/m     Exam:  GENERAL APPEARANCE:  Alert  ENT:  Mouth and posterior oropharynx normal, moist mucous membranes, [place]  RESP:  respiratory effort and palpation of chest " normal, lungs clear to auscultation , no respiratory distress  CV:  Palpation and auscultation of heart done , regular rate and rhythm, no murmur, rub, or gallop, peripheral edema 2+ in bilateral LE  SKIN:  redness noted bilatearl LE  NEURO:   Cranial nerves 2-12 are normal tested and grossly at patient's baseline  PSYCH:  memory impaired , affect and mood normal    Labs:   Labs done in SNF are in [place] EPIC. Please refer to them using [employer]/Care Everywhere. and Recent labs in EPIC reviewed by me today.      Last Comprehensive Metabolic Panel:  Sodium   Date Value Ref Range Status   10/17/2019 134 (L) 136 - 145 mmol/L Final     Potassium   Date Value Ref Range Status   10/17/2019 4.0 3.5 - 5.0 mmol/L Final     Chloride   Date Value Ref Range Status   10/17/2019 103 98 - 107 mmol/L Final     Carbon Dioxide   Date Value Ref Range Status   10/17/2019 26 22 - 31 mmol/L Final     Anion Gap   Date Value Ref Range Status   10/17/2019 5 5 - 18 mmol/L Final     Glucose   Date Value Ref Range Status   10/17/2019 72 70 - 125 mg/dL Final     Urea Nitrogen   Date Value Ref Range Status   10/17/2019 16 8 - 28 mg/dL Final     Creatinine   Date Value Ref Range Status   10/17/2019 1.05 0.70 - 1.30 mg/dL Final     GFR Estimate   Date Value Ref Range Status   10/17/2019 >60 >60 ml/min/1.73m2 Final     Calcium   Date Value Ref Range Status   10/17/2019 9.0 8.5 - 10.5 mg/dL Final     Lab Results   Component Value Date    WBC 8.5 03/29/2019     Lab Results   Component Value Date    RBC 3.70 03/29/2019     Lab Results   Component Value Date    HGB 11.4 03/29/2019     Lab Results   Component Value Date    HCT 35.9 03/29/2019     Lab Results   Component Value Date    MCV 97 03/29/2019     Lab Results   Component Value Date    MCH 30.8 03/29/2019     Lab Results   Component Value Date    MCHC 31.8 03/29/2019     Lab Results   Component Value Date    RDW 14.7 03/29/2019     Lab Results   Component Value Date     03/29/2019          ASSESSMENT/PLAN:  (I50.22) Chronic systolic congestive heart failure (H)  (primary encounter diagnosis)  (R63.5) Weight gain  (I25.10) Coronary artery disease involving native coronary artery of native heart without angina pectoris  Comment: Chronic history of CHF, lasix discontinued d/t pressures prior to admission to [facility]. Now having increased SOB and weight gain.   Plan:   1.) Add spironolactone 25 mg/day. Continue lasix 10 mg/day. Call provider if greater than 5 pound weight gain from previous weight. Monitor for SOB, increasing lower extremity edema, wheezing, and change in activity tolerance.   2.) ECHO- evaluate heart function   3.) BMP next lab day to ensure stability of kidney function given new medications     (R21) Rash  (I87.303) Stasis edema of both lower extremities  Comment: New onset of rash 2/2 stasis edema. No open areas at this time.   Plan:   1.) Encourage resident to elevate LE at rest  2.) Increase diuretics as seen above   3.) Hydrocortisone 1% cream to bilateral LE BID until rash resolves     (I48.20) Chronic atrial fibrillation  Comment: Warfarin per INR  Plan: Next INR 10/25/19    Orders written by provider at facility  See above    Electronically signed by:  ANGEL Corrigan [facility]             Sincerely,        Christiana Osuna NP

## 2019-10-22 NOTE — PROGRESS NOTES
[facility]  [place] Medical Record Number:  [id number]  Place of Service where encounter took place:  [facility] ([facility]) [[id number]]  Chief Complaint   Patient presents with     Nursing Home Acute       HPI:    Earl Sanchez  is a 89 year old (9/17/1926), who is being seen today for an episodic care visit.  HPI information obtained from: facility chart records, facility staff, patient report and [facility] chart review.     Today's concern is:    ICD-10-CM    1. Chronic systolic congestive heart failure (H) I50.22    2. Weight gain R63.5    3. Rash R21    4. Stasis edema of both lower extremities I87.303    5. Chronic atrial fibrillation I48.20    6. Coronary artery disease involving native coronary artery of native heart without angina pectoris I25.10      Resident has had increase weight gain over previous several weeks with SOB and stasis edema to bilateral LE. Was started on lasix 10 mg/day with some improvement in s/sx but remains largely SOB with any activity.   --Edema bilateral LE with redness noted but no open areas   --Denies pain  --Denies night time cough  --No longer followed by cardiology. A-fib managed with warfarin.     Pulse Readings from Last 4 Encounters:   10/29/19 60   10/28/19 66   10/22/19 66   10/14/19 67     BP Readings from Last 3 Encounters:   10/29/19 (!) 146/81   10/28/19 (!) 158/88   10/22/19 (!) 158/88       Past Medical and Surgical History reviewed in Epic today.    MEDICATIONS:  Current Outpatient Medications   Medication Sig Dispense Refill     acetaminophen (TYLENOL) 500 MG tablet Take 1,000 mg by mouth every 6 hours as needed for mild pain        allopurinol (ZYLOPRIM) 100 MG tablet Take 100 mg by mouth 2 times daily       aspirin (ASPIRIN LOW DOSE) 81 MG tablet Take 81 mg by mouth daily        citalopram (CELEXA) 10 MG tablet Take 10 mg by mouth daily       donepezil (ARICEPT) 5 MG tablet Take 5 mg by mouth At Bedtime       doxazosin  "(CARDURA) 4 MG tablet Take 2 tablets (8 mg) by mouth At Bedtime 90 tablet 3     ERGOCALCIFEROL PO Take 50,000 Units by mouth every morning Wednesday        furosemide (LASIX) 10 mg TABS half-tab Take 10 mg by mouth daily       LEVOTHYROXINE SODIUM PO Take 88 mcg by mouth daily       nitroglycerin (NITROSTAT) 0.4 MG SL tablet Place 1 tablet (0.4 mg) under the tongue every 5 minutes as needed for chest pain 25 tablet 0     phenylephrine-shark liver oil-mineral oil-petrolatum (PREPARATION H) 0.25-14-74.9 % rectal ointment Place 1 applicator rectally 4 times daily as needed for hemorrhoids for Hemorrhoids       polyethylene glycol 3350 POWD Take 17 g by mouth daily       pravastatin (PRAVACHOL) 40 MG tablet TAKE 1 TABLET DAILY 90 tablet 1     Sennosides-Docusate Sodium (SENNA-DOCUSATE SODIUM) 8.6-50 MG TABS Take 1 tablet by mouth 2 times daily        triamcinolone (KENALOG) 0.025 % cream Apply topically every 12 hours as needed to rash to the affected area prn, apply tin layer to the rash at the LE.       Warfarin Sodium (COUMADIN PO) As directed, per INR       divalproex sodium delayed-release (DEPAKOTE SPRINKLE) 125 MG DR capsule Take 125 mg by mouth daily       lisinopril (PRINIVIL/ZESTRIL) 5 MG tablet Take 1 tablet (5 mg) by mouth daily (Patient not taking: Reported on 10/14/2019)       spironolactone (ALDACTONE) 25 MG tablet Take 25 mg by mouth daily         REVIEW OF SYSTEMS:  Limited secondary to cognitive impairment but today pt reports he is feeling at his baseline      Objective:  BP (!) 158/88   Pulse 66   Temp 98.2  F (36.8  C)   Resp 18   Ht 1.753 m (5' 9\")   Wt 85.3 kg (188 lb)   SpO2 95%   BMI 27.76 kg/m    Exam:  GENERAL APPEARANCE:  Alert  ENT:  Mouth and posterior oropharynx normal, moist mucous membranes, [place]  RESP:  respiratory effort and palpation of chest normal, lungs clear to auscultation , no respiratory distress  CV:  Palpation and auscultation of heart done , regular rate and rhythm, no " murmur, rub, or gallop, peripheral edema 2+ in bilateral LE  SKIN:  redness noted bilatearl LE  NEURO:   Cranial nerves 2-12 are normal tested and grossly at patient's baseline  PSYCH:  memory impaired , affect and mood normal    Labs:   Labs done in SNF are in [place] EPIC. Please refer to them using EPIC/Care Everywhere. and Recent labs in EPIC reviewed by me today.      Last Comprehensive Metabolic Panel:  Sodium   Date Value Ref Range Status   10/17/2019 134 (L) 136 - 145 mmol/L Final     Potassium   Date Value Ref Range Status   10/17/2019 4.0 3.5 - 5.0 mmol/L Final     Chloride   Date Value Ref Range Status   10/17/2019 103 98 - 107 mmol/L Final     Carbon Dioxide   Date Value Ref Range Status   10/17/2019 26 22 - 31 mmol/L Final     Anion Gap   Date Value Ref Range Status   10/17/2019 5 5 - 18 mmol/L Final     Glucose   Date Value Ref Range Status   10/17/2019 72 70 - 125 mg/dL Final     Urea Nitrogen   Date Value Ref Range Status   10/17/2019 16 8 - 28 mg/dL Final     Creatinine   Date Value Ref Range Status   10/17/2019 1.05 0.70 - 1.30 mg/dL Final     GFR Estimate   Date Value Ref Range Status   10/17/2019 >60 >60 ml/min/1.73m2 Final     Calcium   Date Value Ref Range Status   10/17/2019 9.0 8.5 - 10.5 mg/dL Final     Lab Results   Component Value Date    WBC 8.5 03/29/2019     Lab Results   Component Value Date    RBC 3.70 03/29/2019     Lab Results   Component Value Date    HGB 11.4 03/29/2019     Lab Results   Component Value Date    HCT 35.9 03/29/2019     Lab Results   Component Value Date    MCV 97 03/29/2019     Lab Results   Component Value Date    MCH 30.8 03/29/2019     Lab Results   Component Value Date    MCHC 31.8 03/29/2019     Lab Results   Component Value Date    RDW 14.7 03/29/2019     Lab Results   Component Value Date     03/29/2019         ASSESSMENT/PLAN:  (I50.22) Chronic systolic congestive heart failure (H)  (primary encounter diagnosis)  (R63.5) Weight gain  (I25.10)  Coronary artery disease involving native coronary artery of native heart without angina pectoris  Comment: Chronic history of CHF, lasix discontinued d/t pressures prior to admission to [facility]. Now having increased SOB and weight gain.   Plan:   1.) Add spironolactone 25 mg/day. Continue lasix 10 mg/day. Call provider if greater than 5 pound weight gain from previous weight. Monitor for SOB, increasing lower extremity edema, wheezing, and change in activity tolerance.   2.) ECHO- evaluate heart function   3.) BMP next lab day to ensure stability of kidney function given new medications     (R21) Rash  (I87.303) Stasis edema of both lower extremities  Comment: New onset of rash 2/2 stasis edema. No open areas at this time.   Plan:   1.) Encourage resident to elevate LE at rest  2.) Increase diuretics as seen above   3.) Hydrocortisone 1% cream to bilateral LE BID until rash resolves     (I48.20) Chronic atrial fibrillation  Comment: Warfarin per INR  Plan: Next INR 10/25/19    Orders written by provider at facility  See above    Electronically signed by:  ANGEL Corrigan CNP  [facility]

## 2019-10-28 ENCOUNTER — RECORDS - HEALTHEAST (OUTPATIENT)
Dept: LAB | Facility: CLINIC | Age: 84
End: 2019-10-28

## 2019-10-28 ENCOUNTER — NURSING HOME VISIT (OUTPATIENT)
Dept: GERIATRICS | Facility: CLINIC | Age: 84
End: 2019-10-28
Payer: COMMERCIAL

## 2019-10-28 VITALS
TEMPERATURE: 98.2 F | HEIGHT: 69 IN | DIASTOLIC BLOOD PRESSURE: 88 MMHG | SYSTOLIC BLOOD PRESSURE: 158 MMHG | HEART RATE: 66 BPM | RESPIRATION RATE: 18 BRPM | BODY MASS INDEX: 27.15 KG/M2 | WEIGHT: 183.3 LBS | OXYGEN SATURATION: 95 %

## 2019-10-28 DIAGNOSIS — Z53.9 ERRONEOUS ENCOUNTER--DISREGARD: Primary | ICD-10-CM

## 2019-10-28 RX ORDER — DIVALPROEX SODIUM 125 MG/1
125 CAPSULE, COATED PELLETS ORAL DAILY
COMMUNITY
End: 2019-11-30

## 2019-10-28 RX ORDER — SPIRONOLACTONE 25 MG/1
12.5 TABLET ORAL DAILY
COMMUNITY
End: 2022-01-01

## 2019-10-28 ASSESSMENT — MIFFLIN-ST. JEOR: SCORE: 1466.82

## 2019-10-29 ENCOUNTER — NURSING HOME VISIT (OUTPATIENT)
Dept: GERIATRICS | Facility: CLINIC | Age: 84
End: 2019-10-29
Payer: COMMERCIAL

## 2019-10-29 ENCOUNTER — TRANSFERRED RECORDS (OUTPATIENT)
Dept: HEALTH INFORMATION MANAGEMENT | Facility: CLINIC | Age: 84
End: 2019-10-29

## 2019-10-29 VITALS
OXYGEN SATURATION: 95 % | TEMPERATURE: 97.6 F | RESPIRATION RATE: 18 BRPM | WEIGHT: 183.3 LBS | HEIGHT: 69 IN | SYSTOLIC BLOOD PRESSURE: 146 MMHG | DIASTOLIC BLOOD PRESSURE: 81 MMHG | HEART RATE: 60 BPM | BODY MASS INDEX: 27.15 KG/M2

## 2019-10-29 DIAGNOSIS — I48.20 CHRONIC ATRIAL FIBRILLATION (H): Primary | ICD-10-CM

## 2019-10-29 DIAGNOSIS — Z79.01 LONG TERM CURRENT USE OF ANTICOAGULANT THERAPY: ICD-10-CM

## 2019-10-29 LAB
ALBUMIN SERPL-MCNC: 3.2 G/DL (ref 3.5–5)
ALBUMIN SERPL-MCNC: 3.2 G/DL (ref 3.5–5)
ALP SERPL-CCNC: 85 U/L (ref 45–120)
ALP SERPL-CCNC: 85 U/L (ref 45–120)
ALT SERPL W P-5'-P-CCNC: <9 U/L (ref 0–45)
ALT SERPL-CCNC: <9 U/L (ref 0–45)
AST SERPL W P-5'-P-CCNC: 17 U/L (ref 0–40)
AST SERPL-CCNC: 17 U/L (ref 0–40)
BILIRUB DIRECT SERPL-MCNC: 0.3 MG/DL
BILIRUB SERPL-MCNC: 0.7 MG/DL (ref 0–1)
BILIRUB SERPL-MCNC: 0.7 MG/DL (ref 0–1)
BILIRUBIN DIRECT: 0.3 MG/DL
INR PPP: 3.29 (ref 0.9–1.1)
INR PPP: 3.29 (ref 0.9–1.1)
PROT SERPL-MCNC: 7.1 G/DL (ref 6–8)
PROT SERPL-MCNC: 7.1 G/DL (ref 6–8)

## 2019-10-29 PROCEDURE — 99308 SBSQ NF CARE LOW MDM 20: CPT | Performed by: NURSE PRACTITIONER

## 2019-10-29 ASSESSMENT — MIFFLIN-ST. JEOR: SCORE: 1466.82

## 2019-10-29 NOTE — LETTER
"    10/29/2019        RE: Earl Sanchez  [facility]  [address]  [facility] [address]        [facility]  [place] Medical Record Number:  [id number]  Place of Service where encounter took place: [facility] ([facility]) [[id number]]    HPI:    Earl Sanchez is a 89 year old  (9/17/1926), who is being seen today for an episodic care visit at the above location.   HPI information obtained from: facility chart records, facility staff, patient report and [place] Epic chart review. Today's concern is INR/Coumadin management for A. Fib    ROS/Subjective:  Bleeding Signs/Symptoms:  None  Thromboembolic Signs/Symptoms:  None  Medication Changes:  No  Dietary Changes:  No  Activity Changes: No  Bacterial/Viral Infection:  No  Missed Coumadin Doses:  None  On ASA: Yes - 81 mg po q day  Other Concerns:  No    OBJECTIVE:  BP (!) 146/81   Pulse 60   Temp 97.6  F (36.4  C)   Resp 18   Ht 1.753 m (5' 9\")   Wt 83.1 kg (183 lb 4.8 oz)   SpO2 95%   BMI 27.07 kg/m     Last INR:  2.70 on 9/30/19  INR Today:  3.29  INR Flow sheet at [facility]:    ASSESSMENT:    ICD-10-CM    1. Chronic atrial fibrillation I48.20    2. Long term current use of anticoagulant therapy Z79.01        Supratherapeutic INR for goal of 2-3    PLAN:   Orders written by provider at facility  New Dose: hold warfarin x2 days     Next INR: 10/31/19      Electronically signed by:  ANGEL Corrigan CNP  [facility]         Sincerely,        Christiana Osuna NP    "

## 2019-10-29 NOTE — PROGRESS NOTES
"[facility]  [place] Medical Record Number:  [id number]  Place of Service where encounter took place: [facility] ([facility]) [[id number]]    HPI:    Earl Sanchez is a 89 year old  (9/17/1926), who is being seen today for an episodic care visit at the above location.   HPI information obtained from: facility chart records, facility staff, patient report and [facility] chart review. Today's concern is INR/Coumadin management for A. Fib    ROS/Subjective:  Bleeding Signs/Symptoms:  None  Thromboembolic Signs/Symptoms:  None  Medication Changes:  No  Dietary Changes:  No  Activity Changes: No  Bacterial/Viral Infection:  No  Missed Coumadin Doses:  None  On ASA: Yes - 81 mg po q day  Other Concerns:  No    OBJECTIVE:  BP (!) 146/81   Pulse 60   Temp 97.6  F (36.4  C)   Resp 18   Ht 1.753 m (5' 9\")   Wt 83.1 kg (183 lb 4.8 oz)   SpO2 95%   BMI 27.07 kg/m    Last INR:  2.70 on 9/30/19  INR Today:  3.29  INR Flow sheet at [facility]:    ASSESSMENT:    ICD-10-CM    1. Chronic atrial fibrillation I48.20    2. Long term current use of anticoagulant therapy Z79.01        Supratherapeutic INR for goal of 2-3    PLAN:   Orders written by provider at facility  New Dose: hold warfarin x2 days     Next INR: 10/31/19      Electronically signed by:  ANGEL Corrigan CNP  [facility]     "

## 2019-10-31 ENCOUNTER — NURSING HOME VISIT (OUTPATIENT)
Dept: GERIATRICS | Facility: CLINIC | Age: 84
End: 2019-10-31
Payer: COMMERCIAL

## 2019-10-31 ENCOUNTER — TRANSFERRED RECORDS (OUTPATIENT)
Dept: HEALTH INFORMATION MANAGEMENT | Facility: CLINIC | Age: 84
End: 2019-10-31

## 2019-10-31 ENCOUNTER — RECORDS - HEALTHEAST (OUTPATIENT)
Dept: LAB | Facility: CLINIC | Age: 84
End: 2019-10-31

## 2019-10-31 VITALS
TEMPERATURE: 97.6 F | SYSTOLIC BLOOD PRESSURE: 146 MMHG | BODY MASS INDEX: 26.69 KG/M2 | WEIGHT: 180.2 LBS | RESPIRATION RATE: 18 BRPM | DIASTOLIC BLOOD PRESSURE: 81 MMHG | OXYGEN SATURATION: 95 % | HEIGHT: 69 IN | HEART RATE: 60 BPM

## 2019-10-31 DIAGNOSIS — I48.20 CHRONIC ATRIAL FIBRILLATION (H): Primary | ICD-10-CM

## 2019-10-31 DIAGNOSIS — Z79.01 LONG TERM CURRENT USE OF ANTICOAGULANT THERAPY: ICD-10-CM

## 2019-10-31 LAB
INR PPP: 2.42 (ref 0.9–1.1)
INR PPP: 2.42 (ref 0.9–1.1)

## 2019-10-31 PROCEDURE — 99308 SBSQ NF CARE LOW MDM 20: CPT | Performed by: NURSE PRACTITIONER

## 2019-10-31 ASSESSMENT — MIFFLIN-ST. JEOR: SCORE: 1452.76

## 2019-10-31 NOTE — PROGRESS NOTES
"[facility]  [place] Medical Record Number:  [id number]  Place of Service where encounter took place: [facility] ([facility]) [[id number]]    HPI:    Earl Sanchez is a 89 year old  (9/17/1926), who is being seen today for an episodic care visit at the above location.   HPI information obtained from: facility chart records, facility staff and patient report. Today's concern is INR/Coumadin management for A. Fib    ROS/Subjective:  Bleeding Signs/Symptoms:  None  Thromboembolic Signs/Symptoms:  None  Medication Changes:  No  Dietary Changes:  No  Activity Changes: No  Bacterial/Viral Infection:  No  Missed Coumadin Doses:  None  On ASA: Yes - 81 mg po q day  Other Concerns:  No    OBJECTIVE:  BP (!) 146/81   Pulse 60   Temp 97.6  F (36.4  C)   Resp 18   Ht 1.753 m (5' 9\")   Wt 81.7 kg (180 lb 3.2 oz)   SpO2 95%   BMI 26.61 kg/m    Last INR: 2.70 on 9/30/2019  INR Today: 3.29  Current Dose:  Warfarin 2.5 mg Tuesday and Thursday and warfarin 3 mg all other days  INR Flow sheet at SNF:    ASSESSMENT:    ICD-10-CM    1. Chronic atrial fibrillation I48.20    2. Long term current use of anticoagulant therapy Z79.01        Supratherapeutic INR for goal of 2-3    PLAN:   Orders written by provider at facility  New Dose: hold warfarin     Next INR: 2 days       Electronically signed by:  ANGEL Corrigan CNP  [facility]     "

## 2019-10-31 NOTE — LETTER
"    10/31/2019        RE: Earl Sanchez  [facility]  [address]  [facility] [address]        [facility]  [place] Medical Record Number:  [id number]  Place of Service where encounter took place: [facility] ([facility]) [[id number]]    HPI:    Earl Sanchez is a 89 year old  (9/17/1926), who is being seen today for an episodic care visit at the above location.   HPI information obtained from: facility chart records, facility staff and patient report. Today's concern is INR/Coumadin management for A. Fib    ROS/Subjective:  Bleeding Signs/Symptoms:  None  Thromboembolic Signs/Symptoms:  None  Medication Changes:  No  Dietary Changes:  No  Activity Changes: No  Bacterial/Viral Infection:  No  Missed Coumadin Doses:  None  On ASA: Yes - 81 mg po q day  Other Concerns:  No    OBJECTIVE:  BP (!) 146/81   Pulse 60   Temp 97.6  F (36.4  C)   Resp 18   Ht 1.753 m (5' 9\")   Wt 81.7 kg (180 lb 3.2 oz)   SpO2 95%   BMI 26.61 kg/m     Last INR: 2.70 on 9/30/2019  INR Today: 3.29  Current Dose:  Warfarin 2.5 mg Tuesday and Thursday and warfarin 3 mg all other days  INR Flow sheet at [facility]:    ASSESSMENT:    ICD-10-CM    1. Chronic atrial fibrillation I48.20    2. Long term current use of anticoagulant therapy Z79.01        Supratherapeutic INR for goal of 2-3    PLAN:   Orders written by provider at facility  New Dose: hold warfarin     Next INR: 2 days       Electronically signed by:  ANGEL Corrigan CNP  [facility]         Sincerely,        Christiana Osuna NP    "

## 2019-11-15 ENCOUNTER — NURSING HOME VISIT (OUTPATIENT)
Dept: GERIATRICS | Facility: CLINIC | Age: 84
End: 2019-11-15
Payer: COMMERCIAL

## 2019-11-15 VITALS
RESPIRATION RATE: 18 BRPM | WEIGHT: 175.3 LBS | DIASTOLIC BLOOD PRESSURE: 70 MMHG | HEART RATE: 64 BPM | BODY MASS INDEX: 25.96 KG/M2 | OXYGEN SATURATION: 94 % | TEMPERATURE: 96.4 F | HEIGHT: 69 IN | SYSTOLIC BLOOD PRESSURE: 144 MMHG

## 2019-11-15 DIAGNOSIS — N18.30 BENIGN HYPERTENSION WITH CHRONIC KIDNEY DISEASE, STAGE III (H): Primary | ICD-10-CM

## 2019-11-15 DIAGNOSIS — I12.9 BENIGN HYPERTENSION WITH CHRONIC KIDNEY DISEASE, STAGE III (H): Primary | ICD-10-CM

## 2019-11-15 DIAGNOSIS — E03.9 HYPOTHYROIDISM, UNSPECIFIED TYPE: ICD-10-CM

## 2019-11-15 DIAGNOSIS — F43.23 ADJUSTMENT DISORDER WITH MIXED ANXIETY AND DEPRESSED MOOD: ICD-10-CM

## 2019-11-15 PROCEDURE — 99309 SBSQ NF CARE MODERATE MDM 30: CPT | Performed by: NURSE PRACTITIONER

## 2019-11-15 ASSESSMENT — MIFFLIN-ST. JEOR: SCORE: 1430.54

## 2019-11-15 NOTE — LETTER
11/15/2019        RE: Earl Sanchez  [facility]  [address]  [facility] [address]        [facility]  Chief Complaint   Patient presents with     halfway Regulatory     [place] Medical Record Number:  [id number]  Place of Service where encounter took place:  [facility] ([facility]) [[id number]]    HPI:    Earl Sanchez  is 89 year old (9/17/1926), who is being seen today for a federally mandated E/M visit.  HPI information obtained from: facility chart records, facility staff, patient report, [facility] chart review and Care Everywhere [facility] chart review. Today's concerns are:   Diagnosis Comments   1. Benign hypertension with chronic kidney disease, stage III (H)  Denies CP, SOB or lightheadedness.   BP Readings from Last 3 Encounters:   11/15/19 (!) 144/70   10/31/19 (!) 146/81   10/29/19 (!) 146/81   Currently taking lasix 10 mg/day     2. Adjustment disorder with mixed anxiety and depressed mood  Often resistant to cares. Taking Celexa 10 mg/day. Refusing depakote sprinkles. Last PHQ-9 03/27.    3. Hypothyroidism, unspecified type  TSH   Date Value Ref Range Status   07/17/2019 5.49 (A) 0.30 - 5.00 uIU/mL Final   Currently taking levothyroxine 88 mcg/day.            ALLERGIES:No known allergies  PAST MEDICAL HISTORY:   has a past medical history of Abdominal aortic aneurysm (H), Acute MI (H) (1981, 2007), Atrial fibrillation (H), BPH (benign prostatic hyperplasia), CAD (coronary artery disease), Chronic kidney disease, Duodenal ulcer with hemorrhage, Gait apraxia, Gastritis, Gastro-oesophageal reflux disease, Heart failure (H), Hyperlipidaemia LDL goal <100, Hypertension goal BP (blood pressure) < 140/90, Osteoarthritis, Renal disease, and Thyroid disease. He also has no past medical history of Malignant hyperthermia or PONV (postoperative nausea and vomiting).  PAST SURGICAL HISTORY:   has a past surgical history that includes  hernia repair, inguinal rt/lt; Ankle surgery; Phacoemulsification clear cornea with toric intraocular lens implant (4/10/2014); Phacoemulsification clear cornea with toric intraocular lens implant (4/22/2014); coronary artery bypass (2007); and Heart Cath Left heart cath (12/10/07).  FAMILY HISTORY: family history includes Hypertension in his maternal grandmother and mother.  SOCIAL HISTORY:  reports that he has never smoked. He has never used smokeless tobacco. He reports current alcohol use. He reports that he does not use drugs.    MEDICATIONS:  Current Outpatient Medications   Medication Sig Dispense Refill     acetaminophen (TYLENOL) 500 MG tablet Take 1,000 mg by mouth every 6 hours as needed for mild pain        allopurinol (ZYLOPRIM) 100 MG tablet Take 100 mg by mouth 2 times daily       aspirin (ASPIRIN LOW DOSE) 81 MG tablet Take 81 mg by mouth daily        citalopram (CELEXA) 10 MG tablet Take 10 mg by mouth daily       divalproex sodium delayed-release (DEPAKOTE SPRINKLE) 125 MG DR capsule Take 125 mg by mouth daily       donepezil (ARICEPT) 5 MG tablet Take 5 mg by mouth At Bedtime       doxazosin (CARDURA) 4 MG tablet Take 2 tablets (8 mg) by mouth At Bedtime 90 tablet 3     ERGOCALCIFEROL PO Take 50,000 Units by mouth every morning Wednesday        furosemide (LASIX) 10 mg TABS half-tab Take 10 mg by mouth daily       LEVOTHYROXINE SODIUM PO Take 88 mcg by mouth daily       nitroglycerin (NITROSTAT) 0.4 MG SL tablet Place 1 tablet (0.4 mg) under the tongue every 5 minutes as needed for chest pain 25 tablet 0     phenylephrine-shark liver oil-mineral oil-petrolatum (PREPARATION H) 0.25-14-74.9 % rectal ointment Place 1 applicator rectally 4 times daily as needed for hemorrhoids for Hemorrhoids       polyethylene glycol 3350 POWD Take 17 g by mouth daily       pravastatin (PRAVACHOL) 40 MG tablet TAKE 1 TABLET DAILY 90 tablet 1     Sennosides-Docusate Sodium (SENNA-DOCUSATE SODIUM) 8.6-50 MG TABS Take 1  "tablet by mouth 2 times daily        spironolactone (ALDACTONE) 25 MG tablet Take 25 mg by mouth daily       triamcinolone (KENALOG) 0.025 % cream Apply topically every 12 hours as needed to rash to the affected area prn, apply tin layer to the rash at the LE.       Warfarin Sodium (COUMADIN PO) As directed, per INR       lisinopril (PRINIVIL/ZESTRIL) 5 MG tablet Take 1 tablet (5 mg) by mouth daily (Patient not taking: Reported on 10/14/2019)         Case Management:  I have reviewed the care plan and MDS and do agree with the plan. Patient's desire to return to the community is present, but is not able due to care needs . Information reviewed:  Medications, vital signs, orders, and nursing notes.    ROS:  4 point ROS including Respiratory, CV, GI and , other than that noted in the HPI,  is negative    Vitals:  BP (!) 144/70   Pulse 64   Temp 96.4  F (35.8  C)   Resp 18   Ht 1.753 m (5' 9\")   Wt 79.5 kg (175 lb 4.8 oz)   SpO2 94%   BMI 25.89 kg/m     Body mass index is 25.89 kg/m .  Exam:  GENERAL APPEARANCE:  Alert  ENT:  Mouth and posterior oropharynx normal, moist mucous membranes, [place]  RESP:  respiratory effort and palpation of chest normal, lungs clear to auscultation , no respiratory distress  CV:  Palpation and auscultation of heart done   SKIN:  Inspection of skin and subcutaneous tissue baseline, Palpation of skin and subcutaneous tissue baseline  NEURO:   Cranial nerves 2-12 are normal tested and grossly at patient's baseline  PSYCH:  insight and judgement impaired, memory impaired , affect and mood normal    Lab/Diagnostic data:   Labs done in SNF are in [facility]. Please refer to them using [place]/Care Everywhere. and Recent labs in [facility] reviewed by me today.     ASSESSMENT/PLAN  (I12.9,  N18.3) Benign hypertension with chronic kidney disease, stage III (H)  (primary encounter diagnosis)  Comment: BP at goal 150/90  Plan: Continue POC without change.     (F43.23) Adjustment disorder with mixed " anxiety and depressed mood  Comment: Remains resistant to some cares, gets upset easily with staff.   Plan:   -Discontinue depakote- resident will not take  -Increase celexa 20 mg/day    (E03.9) Hypothyroidism, unspecified type  Comment:   TSH   Date Value Ref Range Status   07/17/2019 5.49 (A) 0.30 - 5.00 uIU/mL Final   Plan: Continue dose of synthroid.   -recheck TSH next lab day.   -remains asymptomatic     Orders written by provider at facility  See above     Electronically signed by:  ANGEL Corrigan [facility]         Sincerely,        Christiana Osuna, NP

## 2019-11-15 NOTE — PROGRESS NOTES
[place] [facility]  Chief Complaint   Patient presents with     halfway Regulatory     [place] Medical Record Number:  [id number]  Place of Service where encounter took place:  [facility] ([facility]) [[id number]]    HPI:    Earl Sanchez  is 89 year old (9/17/1926), who is being seen today for a federally mandated E/M visit.  HPI information obtained from: facility chart records, facility staff, patient report, [facility] chart review and Care Everywhere [facility] chart review. Today's concerns are:   Diagnosis Comments   1. Benign hypertension with chronic kidney disease, stage III (H)  Denies CP, SOB or lightheadedness.   BP Readings from Last 3 Encounters:   11/15/19 (!) 144/70   10/31/19 (!) 146/81   10/29/19 (!) 146/81   Currently taking lasix 10 mg/day     2. Adjustment disorder with mixed anxiety and depressed mood  Often resistant to cares. Taking Celexa 10 mg/day. Refusing depakote sprinkles. Last PHQ-9 03/27.    3. Hypothyroidism, unspecified type  TSH   Date Value Ref Range Status   07/17/2019 5.49 (A) 0.30 - 5.00 uIU/mL Final   Currently taking levothyroxine 88 mcg/day.            ALLERGIES:No known allergies  PAST MEDICAL HISTORY:   has a past medical history of Abdominal aortic aneurysm (H), Acute MI (H) (1981, 2007), Atrial fibrillation (H), BPH (benign prostatic hyperplasia), CAD (coronary artery disease), Chronic kidney disease, Duodenal ulcer with hemorrhage, Gait apraxia, Gastritis, Gastro-oesophageal reflux disease, Heart failure (H), Hyperlipidaemia LDL goal <100, Hypertension goal BP (blood pressure) < 140/90, Osteoarthritis, Renal disease, and Thyroid disease. He also has no past medical history of Malignant hyperthermia or PONV (postoperative nausea and vomiting).  PAST SURGICAL HISTORY:   has a past surgical history that includes hernia repair, inguinal rt/lt; Ankle surgery; Phacoemulsification clear cornea with toric intraocular lens implant (4/10/2014);  Phacoemulsification clear cornea with toric intraocular lens implant (4/22/2014); coronary artery bypass (2007); and Heart Cath Left heart cath (12/10/07).  FAMILY HISTORY: family history includes Hypertension in his maternal grandmother and mother.  SOCIAL HISTORY:  reports that he has never smoked. He has never used smokeless tobacco. He reports current alcohol use. He reports that he does not use drugs.    MEDICATIONS:  Current Outpatient Medications   Medication Sig Dispense Refill     acetaminophen (TYLENOL) 500 MG tablet Take 1,000 mg by mouth every 6 hours as needed for mild pain        allopurinol (ZYLOPRIM) 100 MG tablet Take 100 mg by mouth 2 times daily       aspirin (ASPIRIN LOW DOSE) 81 MG tablet Take 81 mg by mouth daily        citalopram (CELEXA) 10 MG tablet Take 10 mg by mouth daily       divalproex sodium delayed-release (DEPAKOTE SPRINKLE) 125 MG DR capsule Take 125 mg by mouth daily       donepezil (ARICEPT) 5 MG tablet Take 5 mg by mouth At Bedtime       doxazosin (CARDURA) 4 MG tablet Take 2 tablets (8 mg) by mouth At Bedtime 90 tablet 3     ERGOCALCIFEROL PO Take 50,000 Units by mouth every morning Wednesday        furosemide (LASIX) 10 mg TABS half-tab Take 10 mg by mouth daily       LEVOTHYROXINE SODIUM PO Take 88 mcg by mouth daily       nitroglycerin (NITROSTAT) 0.4 MG SL tablet Place 1 tablet (0.4 mg) under the tongue every 5 minutes as needed for chest pain 25 tablet 0     phenylephrine-shark liver oil-mineral oil-petrolatum (PREPARATION H) 0.25-14-74.9 % rectal ointment Place 1 applicator rectally 4 times daily as needed for hemorrhoids for Hemorrhoids       polyethylene glycol 3350 POWD Take 17 g by mouth daily       pravastatin (PRAVACHOL) 40 MG tablet TAKE 1 TABLET DAILY 90 tablet 1     Sennosides-Docusate Sodium (SENNA-DOCUSATE SODIUM) 8.6-50 MG TABS Take 1 tablet by mouth 2 times daily        spironolactone (ALDACTONE) 25 MG tablet Take 25 mg by mouth daily       triamcinolone  "(KENALOG) 0.025 % cream Apply topically every 12 hours as needed to rash to the affected area prn, apply tin layer to the rash at the LE.       Warfarin Sodium (COUMADIN PO) As directed, per INR       lisinopril (PRINIVIL/ZESTRIL) 5 MG tablet Take 1 tablet (5 mg) by mouth daily (Patient not taking: Reported on 10/14/2019)         Case Management:  I have reviewed the care plan and MDS and do agree with the plan. Patient's desire to return to the community is present, but is not able due to care needs . Information reviewed:  Medications, vital signs, orders, and nursing notes.    ROS:  4 point ROS including Respiratory, CV, GI and , other than that noted in the HPI,  is negative    Vitals:  BP (!) 144/70   Pulse 64   Temp 96.4  F (35.8  C)   Resp 18   Ht 1.753 m (5' 9\")   Wt 79.5 kg (175 lb 4.8 oz)   SpO2 94%   BMI 25.89 kg/m    Body mass index is 25.89 kg/m .  Exam:  GENERAL APPEARANCE:  Alert  ENT:  Mouth and posterior oropharynx normal, moist mucous membranes, [place]  RESP:  respiratory effort and palpation of chest normal, lungs clear to auscultation , no respiratory distress  CV:  Palpation and auscultation of heart done   SKIN:  Inspection of skin and subcutaneous tissue baseline, Palpation of skin and subcutaneous tissue baseline  NEURO:   Cranial nerves 2-12 are normal tested and grossly at patient's baseline  PSYCH:  insight and judgement impaired, memory impaired , affect and mood normal    Lab/Diagnostic data:   Labs done in SNF are in [facility]. Please refer to them using [employer]/Care Everywhere. and Recent labs in [facility] reviewed by me today.     ASSESSMENT/PLAN  (I12.9,  N18.3) Benign hypertension with chronic kidney disease, stage III (H)  (primary encounter diagnosis)  Comment: BP at goal 150/90  Plan: Continue POC without change.     (F43.23) Adjustment disorder with mixed anxiety and depressed mood  Comment: Remains resistant to some cares, gets upset easily with staff.   Plan:   -Discontinue " depakote- resident will not take  -Increase celexa 20 mg/day    (E03.9) Hypothyroidism, unspecified type  Comment:   TSH   Date Value Ref Range Status   07/17/2019 5.49 (A) 0.30 - 5.00 uIU/mL Final   Plan: Continue dose of synthroid.   -recheck TSH next lab day.   -remains asymptomatic     Orders written by provider at facility  See above     Electronically signed by:  ANGEL Corrigan [facility]

## 2019-11-19 ENCOUNTER — RECORDS - HEALTHEAST (OUTPATIENT)
Dept: LAB | Facility: CLINIC | Age: 84
End: 2019-11-19

## 2019-11-19 ENCOUNTER — TRANSFERRED RECORDS (OUTPATIENT)
Dept: HEALTH INFORMATION MANAGEMENT | Facility: CLINIC | Age: 84
End: 2019-11-19

## 2019-11-19 LAB
TSH SERPL DL<=0.005 MIU/L-ACNC: 4.87 UIU/ML (ref 0.3–5)
TSH SERPL-ACNC: 4.87 UIU/ML (ref 0.3–5)

## 2019-11-26 ENCOUNTER — TRANSFERRED RECORDS (OUTPATIENT)
Dept: HEALTH INFORMATION MANAGEMENT | Facility: CLINIC | Age: 84
End: 2019-11-26

## 2019-11-26 ENCOUNTER — RECORDS - HEALTHEAST (OUTPATIENT)
Dept: LAB | Facility: CLINIC | Age: 84
End: 2019-11-26

## 2019-11-26 LAB
INR PPP: 2.64 (ref 0.9–1.1)
INR PPP: 2.64 (ref 0.9–1.1)

## 2019-12-02 ASSESSMENT — MIFFLIN-ST. JEOR: SCORE: 1437.79

## 2019-12-03 ENCOUNTER — NURSING HOME VISIT (OUTPATIENT)
Dept: GERIATRICS | Facility: CLINIC | Age: 84
End: 2019-12-03
Payer: COMMERCIAL

## 2019-12-03 VITALS
HEART RATE: 65 BPM | SYSTOLIC BLOOD PRESSURE: 115 MMHG | DIASTOLIC BLOOD PRESSURE: 66 MMHG | RESPIRATION RATE: 18 BRPM | TEMPERATURE: 97.1 F | BODY MASS INDEX: 26.2 KG/M2 | OXYGEN SATURATION: 97 % | WEIGHT: 176.9 LBS | HEIGHT: 69 IN

## 2019-12-03 DIAGNOSIS — I12.9 BENIGN HYPERTENSION WITH CHRONIC KIDNEY DISEASE, STAGE III (H): ICD-10-CM

## 2019-12-03 DIAGNOSIS — F43.23 ADJUSTMENT DISORDER WITH MIXED ANXIETY AND DEPRESSED MOOD: Primary | ICD-10-CM

## 2019-12-03 DIAGNOSIS — N18.30 BENIGN HYPERTENSION WITH CHRONIC KIDNEY DISEASE, STAGE III (H): ICD-10-CM

## 2019-12-03 DIAGNOSIS — Z79.01 LONG TERM CURRENT USE OF ANTICOAGULANT THERAPY: ICD-10-CM

## 2019-12-03 DIAGNOSIS — I48.20 CHRONIC ATRIAL FIBRILLATION (H): ICD-10-CM

## 2019-12-03 DIAGNOSIS — E03.9 HYPOTHYROIDISM, UNSPECIFIED TYPE: ICD-10-CM

## 2019-12-03 PROCEDURE — 99309 SBSQ NF CARE MODERATE MDM 30: CPT | Performed by: NURSE PRACTITIONER

## 2019-12-03 RX ORDER — DIVALPROEX SODIUM 125 MG/1
125 TABLET, DELAYED RELEASE ORAL DAILY
COMMUNITY
End: 2019-12-12

## 2019-12-03 NOTE — PROGRESS NOTES
[place] [facility]  [place] Medical Record Number:  [id number]  Place of Service where encounter took place:  [facility] ([facility]) [[id number]]  Chief Complaint   Patient presents with     RECHECK       HPI:    Earl Sanchez  is a 89 year old (9/17/1926), who is being seen today for an episodic care visit.  HPI information obtained from: facility chart records, facility staff and patient report.     Today's concern is:  Adjustment disorder with mixed anxiety and depressed mood  Continues to have outbursts with staff and family. Refusing depakote. Family requested medication be discontinued. Reports his mood is fine.     Hypothyroidism, unspecified type  TSH   Date Value Ref Range Status   07/17/2019 5.49 (A) 0.30 - 5.00 uIU/mL Final   Synthroid increased on 10/11/19. Symptomatic.     Benign hypertension with chronic kidney disease, stage III (H)  See labs   BP Readings from Last 3 Encounters:   12/02/19 115/66   11/15/19 (!) 144/70   10/31/19 (!) 146/81     Chronic atrial fibrillation  Long term current use of anticoagulant therapy  Anticoagulated with warfarin per INR. Current dose 3 mg Tuesday/Thursday and 2.5 mg all other days.   Pulse Readings from Last 4 Encounters:   12/02/19 65   11/15/19 64   10/31/19 60   10/29/19 60   Also on ASA 81 mg/day. Denies CP, SOB or palpation.       Past Medical and Surgical History reviewed in Epic today.    MEDICATIONS:  Current Outpatient Medications   Medication Sig Dispense Refill     acetaminophen (TYLENOL) 500 MG tablet Take 1,000 mg by mouth every 6 hours as needed for mild pain        allopurinol (ZYLOPRIM) 100 MG tablet Take 100 mg by mouth 2 times daily       aspirin (ASPIRIN LOW DOSE) 81 MG tablet Take 81 mg by mouth daily        citalopram (CELEXA) 10 MG tablet Take 20 mg by mouth daily       donepezil (ARICEPT) 5 MG tablet Take 5 mg by mouth At Bedtime       doxazosin (CARDURA) 4 MG tablet Take 2 tablets (8 mg) by mouth At Bedtime 90  tablet 3     ERGOCALCIFEROL PO Take 50,000 Units by mouth every morning Wednesday        furosemide (LASIX) 10 mg TABS half-tab Take 10 mg by mouth daily       LEVOTHYROXINE SODIUM PO Take 88 mcg by mouth daily       nitroglycerin (NITROSTAT) 0.4 MG SL tablet Place 1 tablet (0.4 mg) under the tongue every 5 minutes as needed for chest pain 25 tablet 0     phenylephrine-shark liver oil-mineral oil-petrolatum (PREPARATION H) 0.25-14-74.9 % rectal ointment Place 1 applicator rectally 4 times daily as needed for hemorrhoids for Hemorrhoids       polyethylene glycol 3350 POWD Take 17 g by mouth daily       pravastatin (PRAVACHOL) 40 MG tablet TAKE 1 TABLET DAILY 90 tablet 1     Sennosides-Docusate Sodium (SENNA-DOCUSATE SODIUM) 8.6-50 MG TABS Take 1 tablet by mouth 2 times daily        spironolactone (ALDACTONE) 25 MG tablet Take 25 mg by mouth daily       triamcinolone (KENALOG) 0.025 % cream Apply topically every 12 hours as needed to rash to the affected area prn, apply tin layer to the rash at the LE.       Warfarin Sodium (COUMADIN PO) As directed, per INR           REVIEW OF SYSTEMS:  4 point ROS including Respiratory, CV, GI and , other than that noted in the HPI,  is negative    Objective:  There were no vitals taken for this visit.  Exam:  GENERAL APPEARANCE:  Alert, in no distress  ENT:  Mouth and posterior oropharynx normal, moist mucous membranes, [place]  RESP:  respiratory effort and palpation of chest normal, lungs clear to auscultation , no respiratory distress  CV:  Palpation and auscultation of heart done , regular rate and rhythm, no murmur, rub, or gallop, peripheral edema 1+ in bilateral LE  M/S:   Gait and station normal  Digits and nails normal  SKIN:  Inspection of skin and subcutaneous tissue baseline, Palpation of skin and subcutaneous tissue baseline  NEURO:   Cranial nerves 2-12 are normal tested and grossly at patient's baseline  PSYCH:  memory impaired , affect and mood normal    Labs:   Labs  done in SNF are in [facility]. Please refer to them using EPIC/Care Everywhere. and Recent labs in EPIC reviewed by me today.     ASSESSMENT/PLAN:  (F43.23) Adjustment disorder with mixed anxiety and depressed mood  (primary encounter diagnosis)  Comment: Mood and behavior unchanged. Patient refusing Depakote.   Plan:   -Discontinue depakote   -Increase celexa 20 mg/day  - Monitor and notify NP with changes     (E03.9) Hypothyroidism, unspecified type  Comment: TSH elevated, synthroid recently increased.   Plan:   -TSH next lab day    (I12.9,  N18.3) Benign hypertension with chronic kidney disease, stage III (H)  Comment: Chronic, stable on current medications.   Plan: no change. Keep SBP> 130 mmHg and DBP > 65 mmHg (levels below these increase mortality as shown by standard studies and observations).   -Avoid nephrotoxic medication and renal dose when available.     (I48.20) Chronic atrial fibrillation  Comment: warfarin per INR. ASA 81 mg/day  Plan:  -Continue current dose of warfarin. Continue ASA as ordered.  -INR 12/19/19.     (Z79.01) Long term current use of anticoagulant therapy  Comment: no change. Warfarin per INR  Plan: INR 12/19/19    Orders written by provider at facility  TSH next lab day  Discontinue Depakote  Increase Celexa 20 mg/day      Electronically signed by:  ANGEL Corrigan CNP  [facility]

## 2019-12-03 NOTE — LETTER
12/3/2019        RE: Earl Sanchez  [facility]  [address]  [facility] [address]        [place] [facility]  [place] Medical Record Number:  [id number]  Place of Service where encounter took place:  [facility] ([facility]) [[id number]]  Chief Complaint   Patient presents with     RECHECK       HPI:    Earl Sanchez  is a 89 year old (9/17/1926), who is being seen today for an episodic care visit.  HPI information obtained from: facility chart records, facility staff and patient report.     Today's concern is:  Adjustment disorder with mixed anxiety and depressed mood  Continues to have outbursts with staff and family. Refusing depakote. Family requested medication be discontinued. Reports his mood is fine.     Hypothyroidism, unspecified type  TSH   Date Value Ref Range Status   07/17/2019 5.49 (A) 0.30 - 5.00 uIU/mL Final   Synthroid increased on 10/11/19. Symptomatic.     Benign hypertension with chronic kidney disease, stage III (H)  See labs   BP Readings from Last 3 Encounters:   12/02/19 115/66   11/15/19 (!) 144/70   10/31/19 (!) 146/81     Chronic atrial fibrillation  Long term current use of anticoagulant therapy  Anticoagulated with warfarin per INR. Current dose 3 mg Tuesday/Thursday and 2.5 mg all other days.   Pulse Readings from Last 4 Encounters:   12/02/19 65   11/15/19 64   10/31/19 60   10/29/19 60   Also on ASA 81 mg/day. Denies CP, SOB or palpation.       Past Medical and Surgical History reviewed in Epic today.    MEDICATIONS:  Current Outpatient Medications   Medication Sig Dispense Refill     acetaminophen (TYLENOL) 500 MG tablet Take 1,000 mg by mouth every 6 hours as needed for mild pain        allopurinol (ZYLOPRIM) 100 MG tablet Take 100 mg by mouth 2 times daily       aspirin (ASPIRIN LOW DOSE) 81 MG tablet Take 81 mg by mouth daily        citalopram (CELEXA) 10 MG tablet Take 20 mg by mouth daily       donepezil  (ARICEPT) 5 MG tablet Take 5 mg by mouth At Bedtime       doxazosin (CARDURA) 4 MG tablet Take 2 tablets (8 mg) by mouth At Bedtime 90 tablet 3     ERGOCALCIFEROL PO Take 50,000 Units by mouth every morning Wednesday        furosemide (LASIX) 10 mg TABS half-tab Take 10 mg by mouth daily       LEVOTHYROXINE SODIUM PO Take 88 mcg by mouth daily       nitroglycerin (NITROSTAT) 0.4 MG SL tablet Place 1 tablet (0.4 mg) under the tongue every 5 minutes as needed for chest pain 25 tablet 0     phenylephrine-shark liver oil-mineral oil-petrolatum (PREPARATION H) 0.25-14-74.9 % rectal ointment Place 1 applicator rectally 4 times daily as needed for hemorrhoids for Hemorrhoids       polyethylene glycol 3350 POWD Take 17 g by mouth daily       pravastatin (PRAVACHOL) 40 MG tablet TAKE 1 TABLET DAILY 90 tablet 1     Sennosides-Docusate Sodium (SENNA-DOCUSATE SODIUM) 8.6-50 MG TABS Take 1 tablet by mouth 2 times daily        spironolactone (ALDACTONE) 25 MG tablet Take 25 mg by mouth daily       triamcinolone (KENALOG) 0.025 % cream Apply topically every 12 hours as needed to rash to the affected area prn, apply tin layer to the rash at the LE.       Warfarin Sodium (COUMADIN PO) As directed, per INR           REVIEW OF SYSTEMS:  4 point ROS including Respiratory, CV, GI and , other than that noted in the HPI,  is negative    Objective:  There were no vitals taken for this visit.  Exam:  GENERAL APPEARANCE:  Alert, in no distress  ENT:  Mouth and posterior oropharynx normal, moist mucous membranes, [place]  RESP:  respiratory effort and palpation of chest normal, lungs clear to auscultation , no respiratory distress  CV:  Palpation and auscultation of heart done , regular rate and rhythm, no murmur, rub, or gallop, peripheral edema 1+ in bilateral LE  M/S:   Gait and station normal  Digits and nails normal  SKIN:  Inspection of skin and subcutaneous tissue baseline, Palpation of skin and subcutaneous tissue baseline  NEURO:    Cranial nerves 2-12 are normal tested and grossly at patient's baseline  PSYCH:  memory impaired , affect and mood normal    Labs:   Labs done in SNF are in [facility]. Please refer to them using EPIC/Care Everywhere. and Recent labs in EPIC reviewed by me today.     ASSESSMENT/PLAN:  (F43.23) Adjustment disorder with mixed anxiety and depressed mood  (primary encounter diagnosis)  Comment: Mood and behavior unchanged. Patient refusing Depakote.   Plan:   -Discontinue depakote   -Increase celexa 20 mg/day  - Monitor and notify NP with changes     (E03.9) Hypothyroidism, unspecified type  Comment: TSH elevated, synthroid recently increased.   Plan:   -TSH next lab day    (I12.9,  N18.3) Benign hypertension with chronic kidney disease, stage III (H)  Comment: Chronic, stable on current medications.   Plan: no change. Keep SBP> 130 mmHg and DBP > 65 mmHg (levels below these increase mortality as shown by standard studies and observations).   -Avoid nephrotoxic medication and renal dose when available.     (I48.20) Chronic atrial fibrillation  Comment: warfarin per INR. ASA 81 mg/day  Plan:  -Continue current dose of warfarin. Continue ASA as ordered.  -INR 12/19/19.     (Z79.01) Long term current use of anticoagulant therapy  Comment: no change. Warfarin per INR  Plan: INR 12/19/19    Orders written by provider at facility  TSH next lab day  Discontinue Depakote  Increase Celexa 20 mg/day      Electronically signed by:  ANGEL Corrigan CNP  [facility]         Sincerely,        Christiana Osuna NP

## 2019-12-18 ENCOUNTER — RECORDS - HEALTHEAST (OUTPATIENT)
Dept: LAB | Facility: CLINIC | Age: 84
End: 2019-12-18

## 2019-12-19 ENCOUNTER — TRANSFERRED RECORDS (OUTPATIENT)
Dept: HEALTH INFORMATION MANAGEMENT | Facility: CLINIC | Age: 84
End: 2019-12-19

## 2019-12-19 ENCOUNTER — NURSING HOME VISIT (OUTPATIENT)
Dept: GERIATRICS | Facility: CLINIC | Age: 84
End: 2019-12-19
Payer: COMMERCIAL

## 2019-12-19 VITALS
HEART RATE: 72 BPM | HEIGHT: 69 IN | SYSTOLIC BLOOD PRESSURE: 125 MMHG | RESPIRATION RATE: 18 BRPM | BODY MASS INDEX: 26.07 KG/M2 | WEIGHT: 176 LBS | OXYGEN SATURATION: 98 % | TEMPERATURE: 98.2 F | DIASTOLIC BLOOD PRESSURE: 65 MMHG

## 2019-12-19 DIAGNOSIS — I48.20 CHRONIC ATRIAL FIBRILLATION (H): Primary | ICD-10-CM

## 2019-12-19 DIAGNOSIS — Z79.01 LONG TERM CURRENT USE OF ANTICOAGULANT THERAPY: ICD-10-CM

## 2019-12-19 LAB
INR PPP: 3.13 (ref 0.9–1.1)
INR PPP: 3.13 (ref 0.9–1.1)

## 2019-12-19 PROCEDURE — 99308 SBSQ NF CARE LOW MDM 20: CPT | Performed by: NURSE PRACTITIONER

## 2019-12-19 ASSESSMENT — MIFFLIN-ST. JEOR: SCORE: 1433.71

## 2019-12-19 NOTE — PROGRESS NOTES
"[place] [facility]  [place] Medical Record Number:  [id number]  Place of Service where encounter took place: [facility] ([facility]) [[id number]]    HPI:    Earl Sanchez is a 89 year old  (9/17/1926), who is being seen today for an episodic care visit at the above location.   HPI information obtained from: facility chart records, facility staff and patient report. Today's concern is INR/Coumadin management for A. Fib    ROS/Subjective:  Bleeding Signs/Symptoms:  None  Thromboembolic Signs/Symptoms:  None  Medication Changes:  No  Dietary Changes:  No  Activity Changes: No  Bacterial/Viral Infection:  No  Missed Coumadin Doses:  None  On ASA: Yes - 81 mg po q day  Other Concerns:  No    OBJECTIVE:  /65   Pulse 72   Temp 98.2  F (36.8  C)   Resp 18   Ht 1.753 m (5' 9\")   Wt 79.8 kg (176 lb)   SpO2 98%   BMI 25.99 kg/m    Last INR: 2.42  INR Today:  3.13  Current Dose:  Warfarin 3 mg/day    ASSESSMENT:    ICD-10-CM    1. Chronic atrial fibrillation I48.20    2. Long term current use of anticoagulant therapy Z79.01        Supratherapeutic INR for goal of 2-3    PLAN:   Hold Warfarin, redraw tomorrow IN     Next INR: tomorrow      Electronically signed by:  ANGEL Corrigan CNP  [facility]     "

## 2019-12-20 ENCOUNTER — TRANSFERRED RECORDS (OUTPATIENT)
Dept: HEALTH INFORMATION MANAGEMENT | Facility: CLINIC | Age: 84
End: 2019-12-20

## 2019-12-20 ENCOUNTER — RECORDS - HEALTHEAST (OUTPATIENT)
Dept: LAB | Facility: CLINIC | Age: 84
End: 2019-12-20

## 2019-12-20 ENCOUNTER — NURSING HOME VISIT (OUTPATIENT)
Dept: GERIATRICS | Facility: CLINIC | Age: 84
End: 2019-12-20
Payer: COMMERCIAL

## 2019-12-20 VITALS
RESPIRATION RATE: 18 BRPM | HEIGHT: 69 IN | DIASTOLIC BLOOD PRESSURE: 65 MMHG | BODY MASS INDEX: 26.07 KG/M2 | WEIGHT: 176 LBS | SYSTOLIC BLOOD PRESSURE: 125 MMHG | TEMPERATURE: 98.2 F | OXYGEN SATURATION: 98 % | HEART RATE: 72 BPM

## 2019-12-20 DIAGNOSIS — Z53.9 ERRONEOUS ENCOUNTER--DISREGARD: Primary | ICD-10-CM

## 2019-12-20 LAB
INR PPP: 2.39 (ref 0.9–1.1)
INR PPP: 2.39 (ref 0.9–1.1)

## 2019-12-20 ASSESSMENT — MIFFLIN-ST. JEOR: SCORE: 1433.71

## 2019-12-20 NOTE — PROGRESS NOTES
Resident out with family.   ANGEL Corrigan [facility]     This encounter was opened in error. Please disregard.

## 2019-12-23 ENCOUNTER — TRANSFERRED RECORDS (OUTPATIENT)
Dept: HEALTH INFORMATION MANAGEMENT | Facility: CLINIC | Age: 84
End: 2019-12-23

## 2019-12-23 ENCOUNTER — RECORDS - HEALTHEAST (OUTPATIENT)
Dept: LAB | Facility: CLINIC | Age: 84
End: 2019-12-23

## 2019-12-23 LAB
INR PPP: 1.71 (ref 0.9–1.1)
INR PPP: 1.71 (ref 0.9–1.1)

## 2019-12-26 ENCOUNTER — RECORDS - HEALTHEAST (OUTPATIENT)
Dept: LAB | Facility: CLINIC | Age: 84
End: 2019-12-26

## 2019-12-26 ENCOUNTER — TELEPHONE (OUTPATIENT)
Dept: GERIATRICS | Facility: CLINIC | Age: 84
End: 2019-12-26

## 2019-12-26 LAB — INR PPP: 2.02 (ref 0.9–1.1)

## 2019-12-26 NOTE — TELEPHONE ENCOUNTER
Called re: INR 2.02    INR 1.71 on 12/23 and 2.39 prior to that. He's been warfarin 2.5 mg Tu/Th and 3 mg all other days since August, per the nurse.     Plan:  - warfarin 2.5 mg today (Thurs) and 3 mg daily daily 12/27-12/29. INR 12/30      Evelyne Lin MD

## 2019-12-30 ENCOUNTER — TRANSFERRED RECORDS (OUTPATIENT)
Dept: HEALTH INFORMATION MANAGEMENT | Facility: CLINIC | Age: 84
End: 2019-12-30

## 2019-12-30 ENCOUNTER — TELEPHONE (OUTPATIENT)
Dept: GERIATRICS | Facility: CLINIC | Age: 84
End: 2019-12-30

## 2019-12-30 ENCOUNTER — RECORDS - HEALTHEAST (OUTPATIENT)
Dept: LAB | Facility: CLINIC | Age: 84
End: 2019-12-30

## 2019-12-30 LAB
INR PPP: 2.45 (ref 0.9–1.1)
INR PPP: 2.45 (ref 0.9–1.1)

## 2019-12-31 NOTE — TELEPHONE ENCOUNTER
INR 2.45    Same dose:  Warfarin 2.5 mg T/Th and 3 mg all other days.     Recheck INR 2 weeks     Patty Gamble MD

## 2020-01-09 ENCOUNTER — NURSING HOME VISIT (OUTPATIENT)
Dept: GERIATRICS | Facility: CLINIC | Age: 85
End: 2020-01-09
Payer: COMMERCIAL

## 2020-01-09 VITALS
HEART RATE: 66 BPM | OXYGEN SATURATION: 96 % | WEIGHT: 176.6 LBS | BODY MASS INDEX: 26.16 KG/M2 | DIASTOLIC BLOOD PRESSURE: 68 MMHG | HEIGHT: 69 IN | RESPIRATION RATE: 18 BRPM | TEMPERATURE: 96.8 F | SYSTOLIC BLOOD PRESSURE: 108 MMHG

## 2020-01-09 DIAGNOSIS — N18.30 BENIGN HYPERTENSION WITH CHRONIC KIDNEY DISEASE, STAGE III (H): Primary | ICD-10-CM

## 2020-01-09 DIAGNOSIS — I25.10 CORONARY ARTERY DISEASE INVOLVING NATIVE CORONARY ARTERY OF NATIVE HEART WITHOUT ANGINA PECTORIS: ICD-10-CM

## 2020-01-09 DIAGNOSIS — F02.818 LATE ONSET ALZHEIMER'S DISEASE WITH BEHAVIORAL DISTURBANCE (H): ICD-10-CM

## 2020-01-09 DIAGNOSIS — F43.23 ADJUSTMENT DISORDER WITH MIXED ANXIETY AND DEPRESSED MOOD: ICD-10-CM

## 2020-01-09 DIAGNOSIS — I12.9 BENIGN HYPERTENSION WITH CHRONIC KIDNEY DISEASE, STAGE III (H): Primary | ICD-10-CM

## 2020-01-09 DIAGNOSIS — G30.1 LATE ONSET ALZHEIMER'S DISEASE WITH BEHAVIORAL DISTURBANCE (H): ICD-10-CM

## 2020-01-09 DIAGNOSIS — I48.20 CHRONIC ATRIAL FIBRILLATION (H): ICD-10-CM

## 2020-01-09 PROCEDURE — 99309 SBSQ NF CARE MODERATE MDM 30: CPT | Performed by: INTERNAL MEDICINE

## 2020-01-09 ASSESSMENT — MIFFLIN-ST. JEOR: SCORE: 1436.43

## 2020-01-09 NOTE — LETTER
"    1/9/2020        RE: Earl Sanchez  [facility]  [address]  [facility] [address]        Earl Sanchez is a 89 year old male seen January 9, 2020 at [facility] where he has resided for 2 years (admit 2/2018) seen to follow up dementia with behavioral symptoms.    Pt is seen in his room, resting abed after lunch.   He states \"everything is okay\" and denies pain or other symptoms.   Quiet and somewhat guarded today, but nursing staff reports outbursts with family and staff, verbally abusive, and can be resistant to cares.   He has declined medications for this.      He also has had unresponsive episodes, left facial droop, dysarthria and bilateral arm weakness.   His family was present and 911 was called.   However, when EMTs arrived patient refused transport to ED.   Symptoms resolved on their own.  Pt also had an ED visit in March 2019 for an episode of unresponsiveness that persisted in the ED.   He had head CT, then awoke and responded to questions.   Workup unremarkable and he returned to [facility].     Patient had a hospitalization in January 2018 for encephalopathy with gait disturbance.  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    He was in [facility] for a month, improved, but needed more support than AL, so transitioned to LTC for permanent placement.  Continues to need assist for transfers bed to .      Patient has had atrial fib for many years, anticoagulated with warfarin.   Also treated for BPH, CAD and hypothyroidism, which have been stable.       Past Medical History:   Diagnosis Date     Abdominal aortic aneurysm (H)     per 2/12/15 abdominal US, mild aneurysm measuring [id number]     Acute MI 1981, 2007     Atrial fibrillation (H)      BPH (benign prostatic hyperplasia)      CAD (coronary artery disease)     CABG 2007, f/b cardio, Dr. 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    , lived at the [facility] with services until 1/2018.      Has 3 children.     Review Of Systems  Limited, but negative other than above.   Weight is down about 10# in past couple of months.        SLUMS 13/30   CPT 4.4   BIMS 7/15    EXAM:  Resting, NAD  /68   Pulse 66   Temp 96.8  F (36  C)   Resp 18   Ht 1.753 m (5' 9\")   Wt 80.1 kg (176 lb 9.6 oz)   SpO2 96%   BMI 26.08 kg/m      Neck supple without adenopathy  Lungs clear bilaterally with good air movement.   Heart irreg irreg s1s2, 1/6 HSM  Abd soft, NT, no distention, +BS  Ext without edema.   Neuro: no tremor or stiffness, +STML   Psych: affect irritable.     Last Comprehensive Metabolic Panel:  Sodium   Date Value Ref Range Status   10/17/2019 134 (L) 136 - 145 mmol/L Final     Potassium   Date Value Ref Range Status   10/17/2019 4.0 3.5 - 5.0 mmol/L Final     Chloride   Date Value Ref Range Status   10/17/2019 103 98 - 107 mmol/L Final     Carbon Dioxide   Date Value Ref Range Status   10/17/2019 26 22 - 31 mmol/L Final     Anion Gap   Date Value Ref Range Status   10/17/2019 5 5 - 18 mmol/L Final     Glucose   Date Value Ref Range Status   10/17/2019 72 70 - 125 mg/dL Final     Urea Nitrogen   Date Value Ref Range Status   10/17/2019 16 8 - 28 mg/dL Final     Creatinine   Date Value Ref Range Status   10/17/2019 1.05 0.70 - 1.30 mg/dL Final     GFR Estimate   Date Value Ref Range Status   10/17/2019 >60 >60 ml/min/1.73m2 Final     Calcium   Date Value Ref Range Status   10/17/2019 9.0 8.5 - 10.5 mg/dL Final     Lab Results   Component Value Date    AST 17 " 10/29/2019     Lab Results   Component Value Date    ALBUMIN 3.2 10/29/2019     Lab Results   Component Value Date    PROTTOTAL 7.1 10/29/2019      Lab Results   Component Value Date    ALKPHOS 85 10/29/2019         IMP/PLAN:  (R41.89) Unresponsive episode    Comment: recurrent, unclear etiology  Plan: Pt has declined ED transfer for this.   Continue to monitor in NH.      If any further syncope would look to discontinuation of donepezil as a likely culprit.       (I12.9,  N18.3) Benign hypertension with chronic kidney disease, stage III (H)    Comment:    BP Readings from Last 3 Encounters:   01/09/20 108/68   12/20/19 125/65   12/19/19 125/65    Plan: remains on doxazosin for bp control and BPH, and spironolactone 25 mg/day    (G30.1,  F02.81) Late onset Alzheimer's disease with behavioral disturbance (H)  Comment: gradual decline in cognition, low functional status.  Plan: LTC support for meds, meals, activity.    Not likely that donepezil is adding any benefit at this time, and may be causing side effects especially syncope and agitation.   Would look to discontinuation if family amenable.        (F43.23) Adjustment disorder with mixed anxiety and depressed mood  Comment: has worsened with cognitive decline     Plan: continue citalopram which has been helpful       (I48.2) Chronic atrial fibrillation (H)  Comment: not requiring rate slowing meds   Pulse Readings from Last 4 Encounters:   01/09/20 66   12/20/19 72   12/19/19 72   12/02/19 65      Plan: warfarin per INR, goal 2-2.5 given fall risk.    (I25.10) Coronary artery disease involving native coronary artery of native heart without angina pectoris  Comment: h/o CABG 2007  Plan: daily ASA, statin for secondary prevention.   NTG prn    (N40.0) Benign prostatic hyperplasia, unspecified whether lower urinary tract symptoms present  Comment: longstanding  Plan: continue doxazosin.       (E03.9) Hypothyroidism, unspecified type  Comment: TSH 11/19/19: 4.87     Plan: same dose levothyroxine, yearly TSH       (E55.9) Vitamin D deficiency  Comment: replaced   Plan: change vit D to 2000 units/day       Patty Gamble MD         Sincerely,        Patty Gamble MD

## 2020-01-11 NOTE — PROGRESS NOTES
"Earl Sanchez is a 89 year old male seen January 9, 2020 at [facility] where he has resided for 2 years (admit 2/2018) seen to follow up dementia with behavioral symptoms.    Pt is seen in his room, resting abed after lunch.   He states \"everything is okay\" and denies pain or other symptoms.   Quiet and somewhat guarded today, but nursing staff reports outbursts with family and staff, verbally abusive, and can be resistant to cares.   He has declined medications for this.      He also has had unresponsive episodes, left facial droop, dysarthria and bilateral arm weakness.   His family was present and 911 was called.   However, when EMTs arrived patient refused transport to ED.   Symptoms resolved on their own.  Pt also had an ED visit in March 2019 for an episode of unresponsiveness that persisted in the ED.   He had head CT, then awoke and responded to questions.   Workup unremarkable and he returned to [facility].     Patient had a hospitalization in January 2018 for encephalopathy with gait disturbance.  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    He was in [facility] for a month, improved, but needed more support than AL, so transitioned to LT for permanent placement.  Continues to need assist for transfers bed to .      Patient has had atrial fib for many years, anticoagulated with warfarin.   Also treated for BPH, CAD and hypothyroidism, which have been stable.       Past Medical History:   Diagnosis Date     Abdominal aortic aneurysm (H)     per 2/12/15 abdominal US, mild aneurysm measuring [id number]     Acute MI 1981, 2007     Atrial fibrillation (H)      BPH (benign prostatic hyperplasia)      CAD (coronary artery disease)     CABG 2007, f/b cardio, Dr. 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    , lived at the [facility] with services until 1/2018.      Has 3 children.     Review Of Systems  Limited, but negative other than above.   Weight is down about 10# in past couple of months.        SLUMS 13/30   CPT 4.4   BIMS 7/15    EXAM:  Resting, NAD  /68   Pulse 66   Temp 96.8  F (36  C)   Resp 18   Ht 1.753 m (5' 9\")   Wt 80.1 kg (176 lb 9.6 oz)   SpO2 96%   BMI 26.08 kg/m     Neck supple without adenopathy  Lungs clear bilaterally with good air movement.   Heart irreg irreg s1s2, 1/6 HSM  Abd soft, NT, no distention, +BS  Ext without edema.   Neuro: no tremor or stiffness, +STML   Psych: affect irritable.     Last Comprehensive Metabolic Panel:  Sodium   Date Value Ref Range Status   10/17/2019 134 (L) 136 - 145 mmol/L Final     Potassium   Date Value Ref Range Status   10/17/2019 4.0 3.5 - 5.0 mmol/L Final     Chloride   Date Value Ref Range Status   10/17/2019 103 98 - 107 mmol/L Final     Carbon Dioxide   Date Value Ref Range Status   10/17/2019 26 22 - 31 mmol/L Final     Anion Gap   Date Value Ref Range Status   10/17/2019 5 5 - 18 mmol/L Final     Glucose   Date Value Ref Range Status   10/17/2019 72 70 - 125 mg/dL Final     Urea Nitrogen   Date Value Ref Range Status   10/17/2019 16 8 - 28 mg/dL Final     Creatinine   Date Value Ref Range Status   10/17/2019 1.05 0.70 - 1.30 mg/dL Final     GFR Estimate   Date Value Ref Range Status   10/17/2019 >60 >60 ml/min/1.73m2 Final     Calcium   Date Value Ref Range Status   10/17/2019 9.0 8.5 - 10.5 mg/dL Final     Lab Results   Component Value Date    AST 17 10/29/2019     Lab Results   Component Value Date    ALBUMIN 3.2 10/29/2019     Lab Results   Component Value Date    PROTTOTAL 7.1 " 10/29/2019      Lab Results   Component Value Date    ALKPHOS 85 10/29/2019         IMP/PLAN:  (R41.89) Unresponsive episode    Comment: recurrent, unclear etiology  Plan: Pt has declined ED transfer for this.   Continue to monitor in NH.      If any further syncope would look to discontinuation of donepezil as a likely culprit.       (I12.9,  N18.3) Benign hypertension with chronic kidney disease, stage III (H)    Comment:    BP Readings from Last 3 Encounters:   01/09/20 108/68   12/20/19 125/65   12/19/19 125/65    Plan: remains on doxazosin for bp control and BPH, and spironolactone 25 mg/day    (G30.1,  F02.81) Late onset Alzheimer's disease with behavioral disturbance (H)  Comment: gradual decline in cognition, low functional status.  Plan: LTC support for meds, meals, activity.    Not likely that donepezil is adding any benefit at this time, and may be causing side effects especially syncope and agitation.   Would look to discontinuation if family amenable.        (F43.23) Adjustment disorder with mixed anxiety and depressed mood  Comment: has worsened with cognitive decline     Plan: continue citalopram which has been helpful       (I48.2) Chronic atrial fibrillation (H)  Comment: not requiring rate slowing meds   Pulse Readings from Last 4 Encounters:   01/09/20 66   12/20/19 72   12/19/19 72   12/02/19 65      Plan: warfarin per INR, goal 2-2.5 given fall risk.    (I25.10) Coronary artery disease involving native coronary artery of native heart without angina pectoris  Comment: h/o CABG 2007  Plan: daily ASA, statin for secondary prevention.   NTG prn    (N40.0) Benign prostatic hyperplasia, unspecified whether lower urinary tract symptoms present  Comment: longstanding  Plan: continue doxazosin.       (E03.9) Hypothyroidism, unspecified type  Comment: TSH 11/19/19: 4.87    Plan: same dose levothyroxine, yearly TSH       (E55.9) Vitamin D deficiency  Comment: replaced   Plan: change vit D to 2000 units/day        Patty Gamble MD

## 2020-01-13 ENCOUNTER — TRANSFERRED RECORDS (OUTPATIENT)
Dept: HEALTH INFORMATION MANAGEMENT | Facility: CLINIC | Age: 85
End: 2020-01-13

## 2020-01-13 ENCOUNTER — TELEPHONE (OUTPATIENT)
Dept: GERIATRICS | Facility: CLINIC | Age: 85
End: 2020-01-13

## 2020-01-13 ENCOUNTER — RECORDS - HEALTHEAST (OUTPATIENT)
Dept: LAB | Facility: CLINIC | Age: 85
End: 2020-01-13

## 2020-01-13 LAB
INR PPP: 2.91 (ref 0.9–1.1)
INR PPP: 2.91 (ref 0.9–1.1)

## 2020-01-20 ENCOUNTER — RECORDS - HEALTHEAST (OUTPATIENT)
Dept: LAB | Facility: CLINIC | Age: 85
End: 2020-01-20

## 2020-01-21 ENCOUNTER — TRANSFERRED RECORDS (OUTPATIENT)
Dept: HEALTH INFORMATION MANAGEMENT | Facility: CLINIC | Age: 85
End: 2020-01-21

## 2020-01-21 LAB
INR PPP: 3.04 (ref 0.9–1.1)
INR PPP: 3.04 (ref 0.9–1.1)

## 2020-01-22 ENCOUNTER — RECORDS - HEALTHEAST (OUTPATIENT)
Dept: LAB | Facility: CLINIC | Age: 85
End: 2020-01-22

## 2020-01-22 ENCOUNTER — TRANSFERRED RECORDS (OUTPATIENT)
Dept: HEALTH INFORMATION MANAGEMENT | Facility: CLINIC | Age: 85
End: 2020-01-22

## 2020-01-22 LAB
INR PPP: 3.04 (ref 0.9–1.1)
INR PPP: 3.04 (ref 0.9–1.1)

## 2020-01-23 ENCOUNTER — TRANSFERRED RECORDS (OUTPATIENT)
Dept: HEALTH INFORMATION MANAGEMENT | Facility: CLINIC | Age: 85
End: 2020-01-23

## 2020-01-23 ENCOUNTER — NURSING HOME VISIT (OUTPATIENT)
Dept: GERIATRICS | Facility: CLINIC | Age: 85
End: 2020-01-23
Payer: COMMERCIAL

## 2020-01-23 ENCOUNTER — RECORDS - HEALTHEAST (OUTPATIENT)
Dept: LAB | Facility: CLINIC | Age: 85
End: 2020-01-23

## 2020-01-23 VITALS
BODY MASS INDEX: 26.07 KG/M2 | WEIGHT: 176 LBS | TEMPERATURE: 97.4 F | DIASTOLIC BLOOD PRESSURE: 64 MMHG | HEART RATE: 68 BPM | SYSTOLIC BLOOD PRESSURE: 122 MMHG | OXYGEN SATURATION: 97 % | HEIGHT: 69 IN | RESPIRATION RATE: 18 BRPM

## 2020-01-23 DIAGNOSIS — I48.20 CHRONIC ATRIAL FIBRILLATION (H): Primary | ICD-10-CM

## 2020-01-23 DIAGNOSIS — Z79.01 LONG TERM CURRENT USE OF ANTICOAGULANT THERAPY: ICD-10-CM

## 2020-01-23 LAB
INR PPP: 2.07 (ref 0.9–1.1)
INR PPP: 2.07 (ref 0.9–1.1)

## 2020-01-23 PROCEDURE — 99308 SBSQ NF CARE LOW MDM 20: CPT | Performed by: NURSE PRACTITIONER

## 2020-01-23 ASSESSMENT — MIFFLIN-ST. JEOR: SCORE: 1433.71

## 2020-01-23 NOTE — PROGRESS NOTES
"[place] [facility]  [place] Medical Record Number:  [id number]  Place of Service where encounter took place: [facility] ([facility]) [[id number]]    HPI:    Earl Sanchez is a 89 year old  (9/17/1926), who is being seen today for an episodic care visit at the above location.   HPI information obtained from: facility chart records, facility staff and patient report. Today's concern is INR/Coumadin management for A. Fib    ROS/Subjective:  Bleeding Signs/Symptoms:  None  Thromboembolic Signs/Symptoms:  None  Medication Changes:  No  Dietary Changes:  No  Activity Changes: No  Bacterial/Viral Infection:  No  Missed Coumadin Doses:  None  On ASA: Yes - 81 mg po q day  Other Concerns:  No    OBJECTIVE:  /64   Pulse 68   Temp 97.4  F (36.3  C)   Resp 18   Ht 1.753 m (5' 9\")   Wt 79.8 kg (176 lb)   SpO2 97%   BMI 25.99 kg/m    Last INR: 2.91 on 1/13/2020  INR Today:  2.07  Current Dose:  2 mg/day    ASSESSMENT:    ICD-10-CM    1. Chronic atrial fibrillation I48.20    2. Long term current use of anticoagulant therapy Z79.01        Therapeutic INR for goal of 2-3    PLAN:   Orders written by provider at facility  New Dose: No Change    Next INR: 1/30/2020      Electronically signed by:  ANGEL Corrigan CNP  [facility]     "

## 2020-01-29 ENCOUNTER — RECORDS - HEALTHEAST (OUTPATIENT)
Dept: LAB | Facility: CLINIC | Age: 85
End: 2020-01-29

## 2020-01-30 ENCOUNTER — TRANSFERRED RECORDS (OUTPATIENT)
Dept: HEALTH INFORMATION MANAGEMENT | Facility: CLINIC | Age: 85
End: 2020-01-30

## 2020-01-30 ENCOUNTER — NURSING HOME VISIT (OUTPATIENT)
Dept: GERIATRICS | Facility: CLINIC | Age: 85
End: 2020-01-30
Payer: COMMERCIAL

## 2020-01-30 VITALS
BODY MASS INDEX: 26.22 KG/M2 | OXYGEN SATURATION: 95 % | HEART RATE: 73 BPM | WEIGHT: 177 LBS | SYSTOLIC BLOOD PRESSURE: 117 MMHG | DIASTOLIC BLOOD PRESSURE: 65 MMHG | RESPIRATION RATE: 18 BRPM | HEIGHT: 69 IN | TEMPERATURE: 97.1 F

## 2020-01-30 DIAGNOSIS — Z79.01 LONG TERM CURRENT USE OF ANTICOAGULANT THERAPY: ICD-10-CM

## 2020-01-30 DIAGNOSIS — I48.20 CHRONIC ATRIAL FIBRILLATION (H): Primary | ICD-10-CM

## 2020-01-30 LAB
INR PPP: 1.67 (ref 0.9–1.1)
INR PPP: 1.67 (ref 0.9–1.1)

## 2020-01-30 PROCEDURE — 99308 SBSQ NF CARE LOW MDM 20: CPT | Performed by: NURSE PRACTITIONER

## 2020-01-30 ASSESSMENT — MIFFLIN-ST. JEOR: SCORE: 1438.25

## 2020-01-30 NOTE — PROGRESS NOTES
"[place] [facility]  [place] Medical Record Number:  [id number]  Place of Service where encounter took place: [facility] ([facility]) [[id number]]    HPI:    Earl Sanchez is a 89 year old  (9/17/1926), who is being seen today for an episodic care visit at the above location.   HPI information obtained from: facility chart records, facility staff, patient report and [facility] chart review. Today's concern is INR/Coumadin management for A. Fib    ROS/Subjective:  Bleeding Signs/Symptoms:  None  Thromboembolic Signs/Symptoms:  None  Medication Changes:  No  Dietary Changes:  No  Activity Changes: No  Bacterial/Viral Infection:  No  Missed Coumadin Doses:  None  On ASA: Yes - 81 mg po q day  Other Concerns:  No    OBJECTIVE:  /65   Pulse 73   Temp 97.1  F (36.2  C)   Resp 18   Ht 1.753 m (5' 9\")   Wt 80.3 kg (177 lb)   SpO2 95%   BMI 26.14 kg/m    Last INR: 2.07 on 1/23/2020  INR Today:  1.67  Current Dose:  Warfarin 2 mg/day      ASSESSMENT:    ICD-10-CM    1. Chronic atrial fibrillation I48.20    2. Long term current use of anticoagulant therapy Z79.01        Subtherapeutic INR for goal of 2-3    PLAN:   Orders written by provider at facility  New Dose: warfarin 3 mg/day    Next INR:  1 week    Electronically signed by:  ANGEL Corrigan CNP  [facility]     "

## 2020-02-05 ENCOUNTER — RECORDS - HEALTHEAST (OUTPATIENT)
Dept: LAB | Facility: CLINIC | Age: 85
End: 2020-02-05

## 2020-02-06 ENCOUNTER — TRANSFERRED RECORDS (OUTPATIENT)
Dept: HEALTH INFORMATION MANAGEMENT | Facility: CLINIC | Age: 85
End: 2020-02-06

## 2020-02-06 ENCOUNTER — NURSING HOME VISIT (OUTPATIENT)
Dept: GERIATRICS | Facility: CLINIC | Age: 85
End: 2020-02-06
Payer: COMMERCIAL

## 2020-02-06 VITALS
SYSTOLIC BLOOD PRESSURE: 113 MMHG | DIASTOLIC BLOOD PRESSURE: 68 MMHG | RESPIRATION RATE: 16 BRPM | OXYGEN SATURATION: 96 % | BODY MASS INDEX: 26.07 KG/M2 | WEIGHT: 176 LBS | HEIGHT: 69 IN | TEMPERATURE: 97.8 F | HEART RATE: 69 BPM

## 2020-02-06 DIAGNOSIS — I48.20 CHRONIC ATRIAL FIBRILLATION (H): Primary | ICD-10-CM

## 2020-02-06 DIAGNOSIS — Z79.01 LONG TERM CURRENT USE OF ANTICOAGULANT THERAPY: ICD-10-CM

## 2020-02-06 LAB
INR PPP: 2.62 (ref 0.9–1.1)
INR PPP: 2.62 (ref 0.9–1.1)

## 2020-02-06 PROCEDURE — 99308 SBSQ NF CARE LOW MDM 20: CPT | Performed by: NURSE PRACTITIONER

## 2020-02-06 ASSESSMENT — MIFFLIN-ST. JEOR: SCORE: 1433.71

## 2020-02-06 NOTE — LETTER
"    2/6/2020        RE: Earl Sanchez  [facility]  [address]  [facility] [address]        [place] [facility]  [place] [facility] Record Number:  [id number]  Place of Service where encounter took place: [facility] ([facility]) [[id number]]    HPI:    Earl Sanchez is a 89 year old  (9/17/1926), who is being seen today for an episodic care visit at the above location.   HPI information obtained from: facility chart records, facility staff and patient report. Today's concern is INR/Coumadin management for A. Fib    ROS/Subjective:  Bleeding Signs/Symptoms:  None  Thromboembolic Signs/Symptoms:  None  Medication Changes:  No  Dietary Changes:  No  Activity Changes: No  Bacterial/Viral Infection:  No  Missed Coumadin Doses:  None  On ASA: Yes - 81 mg po q day  Other Concerns:  No    OBJECTIVE:  /68   Pulse 69   Temp 97.8  F (36.6  C)   Resp 16   Ht 1.753 m (5' 9\")   Wt 79.8 kg (176 lb)   SpO2 96%   BMI 25.99 kg/m     Last INR: 1.67 on 1/30/2020  INR Today:  2.07  Current Dose:  Warfarin 3 mg/day   INR Flow sheet at SNF:    ASSESSMENT:    ICD-10-CM    1. Chronic atrial fibrillation I48.20    2. Long term current use of anticoagulant therapy Z79.01        Therapeutic INR for goal of 2-3    PLAN:   Orders written by provider at facility  New Dose: No Change    Next INR: 1 month      Electronically signed by:  ANGEL Corrigan CNP  [place] [facility]         Sincerely,        Christiana Osuna NP    "

## 2020-02-14 ENCOUNTER — CLINICAL UPDATE (OUTPATIENT)
Dept: PHARMACY | Facility: CLINIC | Age: 85
End: 2020-02-14
Payer: COMMERCIAL

## 2020-02-14 DIAGNOSIS — N40.0 BENIGN PROSTATIC HYPERPLASIA, UNSPECIFIED WHETHER LOWER URINARY TRACT SYMPTOMS PRESENT: ICD-10-CM

## 2020-02-14 DIAGNOSIS — E03.4 HYPOTHYROIDISM DUE TO ACQUIRED ATROPHY OF THYROID: ICD-10-CM

## 2020-02-14 DIAGNOSIS — I48.91 ATRIAL FIBRILLATION, UNSPECIFIED TYPE (H): ICD-10-CM

## 2020-02-14 DIAGNOSIS — N18.30 BENIGN HYPERTENSION WITH CHRONIC KIDNEY DISEASE, STAGE III (H): ICD-10-CM

## 2020-02-14 DIAGNOSIS — F03.91 DEMENTIA WITH BEHAVIORAL DISTURBANCE, UNSPECIFIED DEMENTIA TYPE: ICD-10-CM

## 2020-02-14 DIAGNOSIS — I12.9 BENIGN HYPERTENSION WITH CHRONIC KIDNEY DISEASE, STAGE III (H): ICD-10-CM

## 2020-02-14 DIAGNOSIS — I25.10 CORONARY ARTERY DISEASE INVOLVING NATIVE CORONARY ARTERY OF NATIVE HEART WITHOUT ANGINA PECTORIS: Primary | ICD-10-CM

## 2020-02-14 DIAGNOSIS — E78.5 HYPERLIPIDEMIA WITH TARGET LDL LESS THAN 100: ICD-10-CM

## 2020-02-14 PROCEDURE — 99207 ZZC NO CHARGE LOS: CPT | Performed by: PHARMACIST

## 2020-02-14 NOTE — PROGRESS NOTES
This patient's medication list and chart were reviewed as part of the service provided by [facility].    Assessment/Recommendations:  1. (Dementia):  Agree with MD that Donepezil may not be offering much benefit, and may contribute to syncopal episodes.  May also prolong QTc interval, and this risk increased in combo with Citalopram.  May be of benefit to consider d'c Donepezil.  2. (HTN):  BP goal <150/90mmHg.  Now on Spironolactone in addition to Furosemide and Doxazosin (also being used for BPH).  Doxazosin may contribute to orthostasis, edema, SOB, dizziness, falls, etc. May benefit from reduction in Doxazosin to 4mg daily.  Noted on chart review that previous increase from 4 to 8mg daily was not helpful per NP note, however, this increased dose may increase risk of side effects.    If pt does have any of these noted side effects, would recommend switching Doxazosin to Tamsulosin for BPH, and monitor for change in BPs.   I do not see a BMP in Epic since the addition of Spironolactone.  Please check BMP (appears Lisinopriil has been dc'd since as well).  3. (Afib/CAD):  On both ASA and Warfarin due to h/o Afib, CAD, and noted concern for possible cerebellar stroke in 2018.  Continue to monitor for signs/sx of bleeding/bruising, and Hgb as combo therapy places pt at increased risk of bleed (serotonin specific reuptake inhibitor in combo with these also increases risk of bleed).  If Hgb trends downward, may require PPI for gi protection. (if PPI initiated, follow-up INR in a week due to potential for PPI to increase INR).  Last CBC 3/29/19.      Please ensure Epic med list indicates accurately which dose vitamin D patient is on.  Med list indicates 50,000u weekly, which is high dose, but NP note indicates 5000u weekly.  Please verify and reconcile.  Last vitamin D level 7/2019 WNLs.    Christen Xie, Pharm.D.,BCGP  Board Certified Geriatric Pharmacist  Medication Therapy Management  Pharmacist  [phone]

## 2020-03-03 ENCOUNTER — RECORDS - HEALTHEAST (OUTPATIENT)
Dept: LAB | Facility: CLINIC | Age: 85
End: 2020-03-03

## 2020-03-03 LAB
ANION GAP SERPL CALCULATED.3IONS-SCNC: 10 MMOL/L (ref 5–18)
BUN SERPL-MCNC: 43 MG/DL (ref 8–28)
CALCIUM SERPL-MCNC: 9.2 MG/DL (ref 8.5–10.5)
CHLORIDE BLD-SCNC: 102 MMOL/L (ref 98–107)
CO2 SERPL-SCNC: 23 MMOL/L (ref 22–31)
CREAT SERPL-MCNC: 2.18 MG/DL (ref 0.7–1.3)
ERYTHROCYTE [DISTWIDTH] IN BLOOD BY AUTOMATED COUNT: 15.2 % (ref 11–14.5)
GFR SERPL CREATININE-BSD FRML MDRD: 28 ML/MIN/1.73M2
GLUCOSE BLD-MCNC: 71 MG/DL (ref 70–125)
HCT VFR BLD AUTO: 37.8 % (ref 40–54)
HGB BLD-MCNC: 11.8 G/DL (ref 14–18)
MCH RBC QN AUTO: 31.9 PG (ref 27–34)
MCHC RBC AUTO-ENTMCNC: 31.2 G/DL (ref 32–36)
MCV RBC AUTO: 102 FL (ref 80–100)
PLATELET # BLD AUTO: 208 THOU/UL (ref 140–440)
PMV BLD AUTO: 10.2 FL (ref 8.5–12.5)
POTASSIUM BLD-SCNC: 6 MMOL/L (ref 3.5–5)
RBC # BLD AUTO: 3.7 MILL/UL (ref 4.4–6.2)
SODIUM SERPL-SCNC: 135 MMOL/L (ref 136–145)
WBC: 10.1 THOU/UL (ref 4–11)

## 2020-03-04 ENCOUNTER — RECORDS - HEALTHEAST (OUTPATIENT)
Dept: LAB | Facility: CLINIC | Age: 85
End: 2020-03-04

## 2020-03-04 LAB — BNP SERPL-MCNC: 135 PG/ML (ref 0–93)

## 2020-03-05 ENCOUNTER — TELEPHONE (OUTPATIENT)
Dept: GERIATRICS | Facility: CLINIC | Age: 85
End: 2020-03-05

## 2020-03-05 ENCOUNTER — NURSING HOME VISIT (OUTPATIENT)
Dept: GERIATRICS | Facility: CLINIC | Age: 85
End: 2020-03-05
Payer: COMMERCIAL

## 2020-03-05 ENCOUNTER — RECORDS - HEALTHEAST (OUTPATIENT)
Dept: LAB | Facility: CLINIC | Age: 85
End: 2020-03-05

## 2020-03-05 VITALS
WEIGHT: 178 LBS | RESPIRATION RATE: 16 BRPM | BODY MASS INDEX: 26.36 KG/M2 | TEMPERATURE: 97.1 F | OXYGEN SATURATION: 95 % | DIASTOLIC BLOOD PRESSURE: 51 MMHG | SYSTOLIC BLOOD PRESSURE: 96 MMHG | HEIGHT: 69 IN | HEART RATE: 74 BPM

## 2020-03-05 DIAGNOSIS — N18.30 BENIGN HYPERTENSION WITH CHRONIC KIDNEY DISEASE, STAGE III (H): Primary | ICD-10-CM

## 2020-03-05 DIAGNOSIS — I50.22 CHRONIC SYSTOLIC CONGESTIVE HEART FAILURE (H): ICD-10-CM

## 2020-03-05 DIAGNOSIS — E03.9 HYPOTHYROIDISM, UNSPECIFIED TYPE: ICD-10-CM

## 2020-03-05 DIAGNOSIS — I12.9 BENIGN HYPERTENSION WITH CHRONIC KIDNEY DISEASE, STAGE III (H): Primary | ICD-10-CM

## 2020-03-05 DIAGNOSIS — E55.9 VITAMIN D DEFICIENCY: ICD-10-CM

## 2020-03-05 LAB
ANION GAP SERPL CALCULATED.3IONS-SCNC: 8 MMOL/L (ref 5–18)
BUN SERPL-MCNC: 49 MG/DL (ref 8–28)
CALCIUM SERPL-MCNC: 9.5 MG/DL (ref 8.5–10.5)
CHLORIDE BLD-SCNC: 101 MMOL/L (ref 98–107)
CO2 SERPL-SCNC: 25 MMOL/L (ref 22–31)
CREAT SERPL-MCNC: 2.05 MG/DL (ref 0.7–1.3)
GFR SERPL CREATININE-BSD FRML MDRD: 30 ML/MIN/1.73M2
GLUCOSE BLD-MCNC: 104 MG/DL (ref 70–125)
INR PPP: 3.66 (ref 0.9–1.1)
POTASSIUM BLD-SCNC: 5.3 MMOL/L (ref 3.5–5)
SODIUM SERPL-SCNC: 134 MMOL/L (ref 136–145)

## 2020-03-05 PROCEDURE — 99309 SBSQ NF CARE MODERATE MDM 30: CPT | Performed by: NURSE PRACTITIONER

## 2020-03-05 ASSESSMENT — MIFFLIN-ST. JEOR: SCORE: 1442.78

## 2020-03-05 NOTE — PROGRESS NOTES
[place] [facility]  Chief Complaint   Patient presents with     correction Regulatory     [place] Medical Record Number:  [id number]  Place of Service where encounter took place:  [facility] ([facility]) [[id number]]    HPI:    Earl Sanchez  is 89 year old (9/17/1926), who is being seen today for a federally mandated E/M visit.  HPI information obtained from: facility chart records, facility staff, patient report and [facility] chart review. Today's concerns are:  Benign hypertension with chronic kidney disease, stage III (H)  BP Readings from Last 3 Encounters:   03/05/20 96/51   02/06/20 113/68   01/30/20 117/65   Denies CP, SOB or lightheadedness.     Hypothyroidism, unspecified type  TSH   Date Value Ref Range Status   11/19/2019 4.87 0.30 - 5.00 uIU/mL Final   Currently taking levothyroxine 88 mcg/day. Remains asymptotic.      Vitamin D deficiency  See labs    Chronic systolic congestive heart failure (H)  Wt Readings from Last 4 Encounters:   03/05/20 80.7 kg (178 lb)   02/06/20 79.8 kg (176 lb)   01/30/20 80.3 kg (177 lb)   01/23/20 79.8 kg (176 lb)   Currently taking lasix 10 mg/day and spironolactone 25 mg/day. Denies SOB with exertion.       ALLERGIES:No known allergies  PAST MEDICAL HISTORY:   has a past medical history of Abdominal aortic aneurysm (H), Acute MI (H) (1981, 2007), Atrial fibrillation (H), BPH (benign prostatic hyperplasia), CAD (coronary artery disease), Chronic kidney disease, Duodenal ulcer with hemorrhage, Gait apraxia, Gastritis, Gastro-oesophageal reflux disease, Heart failure (H), Hyperlipidaemia LDL goal <100, Hypertension goal BP (blood pressure) < 140/90, Osteoarthritis, Renal disease, and Thyroid disease. He also has no past medical history of Malignant hyperthermia or PONV (postoperative nausea and vomiting).  PAST SURGICAL HISTORY:   has a past surgical history that includes hernia repair, inguinal rt/lt; Ankle surgery; Phacoemulsification clear  cornea with toric intraocular lens implant (4/10/2014); Phacoemulsification clear cornea with toric intraocular lens implant (4/22/2014); coronary artery bypass (2007); and Heart Cath Left heart cath (12/10/07).  FAMILY HISTORY: family history includes Hypertension in his maternal grandmother and mother.  SOCIAL HISTORY:  reports that he has never smoked. He has never used smokeless tobacco. He reports current alcohol use. He reports that he does not use drugs.    MEDICATIONS:  Current Outpatient Medications   Medication Sig Dispense Refill     acetaminophen (TYLENOL) 500 MG tablet Take 1,000 mg by mouth every 6 hours as needed for mild pain        allopurinol (ZYLOPRIM) 100 MG tablet Take 100 mg by mouth 2 times daily       aspirin (ASPIRIN LOW DOSE) 81 MG tablet Take 81 mg by mouth daily        citalopram (CELEXA) 10 MG tablet Take 20 mg by mouth daily       donepezil (ARICEPT) 5 MG tablet Take 5 mg by mouth At Bedtime       doxazosin (CARDURA) 4 MG tablet Take 2 tablets (8 mg) by mouth At Bedtime 90 tablet 3     ERGOCALCIFEROL PO Take 50,000 Units by mouth every morning Wednesday        furosemide (LASIX) 10 mg TABS half-tab Take 10 mg by mouth daily       LEVOTHYROXINE SODIUM PO Take 88 mcg by mouth daily       nitroglycerin (NITROSTAT) 0.4 MG SL tablet Place 1 tablet (0.4 mg) under the tongue every 5 minutes as needed for chest pain 25 tablet 0     phenylephrine-shark liver oil-mineral oil-petrolatum (PREPARATION H) 0.25-14-74.9 % rectal ointment Place 1 applicator rectally 4 times daily as needed for hemorrhoids for Hemorrhoids       polyethylene glycol 3350 POWD Take 17 g by mouth daily       pravastatin (PRAVACHOL) 40 MG tablet TAKE 1 TABLET DAILY 90 tablet 1     Sennosides-Docusate Sodium (SENNA-DOCUSATE SODIUM) 8.6-50 MG TABS Take 1 tablet by mouth 2 times daily        spironolactone (ALDACTONE) 25 MG tablet Take 25 mg by mouth daily       triamcinolone (KENALOG) 0.025 % cream Apply topically every 12  "hours as needed to rash to the affected area prn, apply tin layer to the rash at the LE.       Warfarin Sodium (COUMADIN PO) As directed, per INR         Case Management:  I have reviewed the care plan and MDS and do agree with the plan. Patient's desire to return to the community is not present. Information reviewed:  Medications, vital signs, orders, and nursing notes.    ROS:  Unobtainable secondary to cognitive impairment.     Vitals:  BP 96/51   Pulse 74   Temp 97.1  F (36.2  C)   Resp 16   Ht 1.753 m (5' 9\")   Wt 80.7 kg (178 lb)   SpO2 95%   BMI 26.29 kg/m    Body mass index is 26.29 kg/m .  Exam:  GENERAL APPEARANCE:  Alert, in no distress  ENT:  Mouth and posterior oropharynx normal, moist mucous membranes, [place]  RESP:  respiratory effort and palpation of chest normal, lungs clear to auscultation   CV:  Palpation and auscultation of heart done , regular rate and rhythm, no murmur, rub, or gallop  M/S:   Gait and station normal  Digits and nails normal  SKIN:  Inspection of skin and subcutaneous tissue baseline, Palpation of skin and subcutaneous tissue baseline  NEURO:   Cranial nerves 2-12 are normal tested and grossly at patient's baseline  PSYCH:  memory impaired , affect and mood normal    Lab/Diagnostic data:   Labs done in SNF are in [facility]. Please refer to them using [employer]Care Everywhere. and Recent labs in [facility] reviewed by me today.     ASSESSMENT/PLAN  (I12.9,  N18.3) Benign hypertension with chronic kidney disease, stage III (H)  (primary encounter diagnosis)  Comment: Chronic, blood pressure less than goal 150/90 on current regimen.   Plan:   -No changes at this time and adjustments as clinically indicated. Keep SBP> 130 mmHg and DBP > 65 mmHg (levels below these increase mortality as shown by standard studies and observations).     (E03.9) Hypothyroidism, unspecified type  Comment:   TSH   Date Value Ref Range Status   11/19/2019 4.87 0.30 - 5.00 uIU/mL Final   Plan: No changes at " this time. Check TSH q6 months and PRN, and keep level b/w 4-5 in elderly.      (E55.9) Vitamin D deficiency  Comment: No longer on supplement   Plan:  -Vitamin d level next lab day.     (I50.22) Chronic systolic congestive heart failure (H)  Comment: Chronic, fluid level stable.   Plan:   -Continue current POC without changes. Call provider if greater than 5 pound weight gain from previous weight. Monitor for SOB, increasing lower extremity edema, wheezing, and change in activity tolerance.   -Continue lasix and spironolactone at current doses.   -BMP next lab day.      Orders:  See new orders above.     Electronically signed by:  ANGEL Corrigan CNP  [facility]

## 2020-03-05 NOTE — LETTER
3/5/2020        RE: Earl Sanchez  [facility]  [address]  [facility] [address]        [place] [facility]  Chief Complaint   Patient presents with     intermediate Regulatory     [place] Medical Record Number:  [id number]  Place of Service where encounter took place:  [facility] ([facility]) [[id number]]    HPI:    Earl Sanchez  is 89 year old (9/17/1926), who is being seen today for a federally mandated E/M visit.  HPI information obtained from: facility chart records, facility staff, patient report and [facility] chart review. Today's concerns are:  Benign hypertension with chronic kidney disease, stage III (H)  BP Readings from Last 3 Encounters:   03/05/20 96/51   02/06/20 113/68   01/30/20 117/65   Denies CP, SOB or lightheadedness.     Hypothyroidism, unspecified type  TSH   Date Value Ref Range Status   11/19/2019 4.87 0.30 - 5.00 uIU/mL Final   Currently taking levothyroxine 88 mcg/day. Remains asymptotic.      Vitamin D deficiency  See labs    Chronic systolic congestive heart failure (H)  Wt Readings from Last 4 Encounters:   03/05/20 80.7 kg (178 lb)   02/06/20 79.8 kg (176 lb)   01/30/20 80.3 kg (177 lb)   01/23/20 79.8 kg (176 lb)   Currently taking lasix 10 mg/day and spironolactone 25 mg/day. Denies SOB with exertion.       ALLERGIES:No known allergies  PAST MEDICAL HISTORY:   has a past medical history of Abdominal aortic aneurysm (H), Acute MI (H) (1981, 2007), Atrial fibrillation (H), BPH (benign prostatic hyperplasia), CAD (coronary artery disease), Chronic kidney disease, Duodenal ulcer with hemorrhage, Gait apraxia, Gastritis, Gastro-oesophageal reflux disease, Heart failure (H), Hyperlipidaemia LDL goal <100, Hypertension goal BP (blood pressure) < 140/90, Osteoarthritis, Renal disease, and Thyroid disease. He also has no past medical history of Malignant hyperthermia or PONV (postoperative nausea and vomiting).  PAST  SURGICAL HISTORY:   has a past surgical history that includes hernia repair, inguinal rt/lt; Ankle surgery; Phacoemulsification clear cornea with toric intraocular lens implant (4/10/2014); Phacoemulsification clear cornea with toric intraocular lens implant (4/22/2014); coronary artery bypass (2007); and Heart Cath Left heart cath (12/10/07).  FAMILY HISTORY: family history includes Hypertension in his maternal grandmother and mother.  SOCIAL HISTORY:  reports that he has never smoked. He has never used smokeless tobacco. He reports current alcohol use. He reports that he does not use drugs.    MEDICATIONS:  Current Outpatient Medications   Medication Sig Dispense Refill     acetaminophen (TYLENOL) 500 MG tablet Take 1,000 mg by mouth every 6 hours as needed for mild pain        allopurinol (ZYLOPRIM) 100 MG tablet Take 100 mg by mouth 2 times daily       aspirin (ASPIRIN LOW DOSE) 81 MG tablet Take 81 mg by mouth daily        citalopram (CELEXA) 10 MG tablet Take 20 mg by mouth daily       donepezil (ARICEPT) 5 MG tablet Take 5 mg by mouth At Bedtime       doxazosin (CARDURA) 4 MG tablet Take 2 tablets (8 mg) by mouth At Bedtime 90 tablet 3     ERGOCALCIFEROL PO Take 50,000 Units by mouth every morning Wednesday        furosemide (LASIX) 10 mg TABS half-tab Take 10 mg by mouth daily       LEVOTHYROXINE SODIUM PO Take 88 mcg by mouth daily       nitroglycerin (NITROSTAT) 0.4 MG SL tablet Place 1 tablet (0.4 mg) under the tongue every 5 minutes as needed for chest pain 25 tablet 0     phenylephrine-shark liver oil-mineral oil-petrolatum (PREPARATION H) 0.25-14-74.9 % rectal ointment Place 1 applicator rectally 4 times daily as needed for hemorrhoids for Hemorrhoids       polyethylene glycol 3350 POWD Take 17 g by mouth daily       pravastatin (PRAVACHOL) 40 MG tablet TAKE 1 TABLET DAILY 90 tablet 1     Sennosides-Docusate Sodium (SENNA-DOCUSATE SODIUM) 8.6-50 MG TABS Take 1 tablet by mouth 2 times daily         "spironolactone (ALDACTONE) 25 MG tablet Take 25 mg by mouth daily       triamcinolone (KENALOG) 0.025 % cream Apply topically every 12 hours as needed to rash to the affected area prn, apply tin layer to the rash at the LE.       Warfarin Sodium (COUMADIN PO) As directed, per INR         Case Management:  I have reviewed the care plan and MDS and do agree with the plan. Patient's desire to return to the community is not present. Information reviewed:  Medications, vital signs, orders, and nursing notes.    ROS:  Unobtainable secondary to cognitive impairment.     Vitals:  BP 96/51   Pulse 74   Temp 97.1  F (36.2  C)   Resp 16   Ht 1.753 m (5' 9\")   Wt 80.7 kg (178 lb)   SpO2 95%   BMI 26.29 kg/m    Body mass index is 26.29 kg/m .  Exam:  GENERAL APPEARANCE:  Alert, in no distress  ENT:  Mouth and posterior oropharynx normal, moist mucous membranes, [place]  RESP:  respiratory effort and palpation of chest normal, lungs clear to auscultation   CV:  Palpation and auscultation of heart done , regular rate and rhythm, no murmur, rub, or gallop  M/S:   Gait and station normal  Digits and nails normal  SKIN:  Inspection of skin and subcutaneous tissue baseline, Palpation of skin and subcutaneous tissue baseline  NEURO:   Cranial nerves 2-12 are normal tested and grossly at patient's baseline  PSYCH:  memory impaired , affect and mood normal    Lab/Diagnostic data:   Labs done in SNF are in [facility]. Please refer to them using [employer]/Care Everywhere. and Recent labs in [facility] reviewed by me today.     ASSESSMENT/PLAN  (I12.9,  N18.3) Benign hypertension with chronic kidney disease, stage III (H)  (primary encounter diagnosis)  Comment: Chronic, blood pressure less than goal 150/90 on current regimen.   Plan:   -No changes at this time and adjustments as clinically indicated. Keep SBP> 130 mmHg and DBP > 65 mmHg (levels below these increase mortality as shown by standard studies and observations).     (E03.9) " Hypothyroidism, unspecified type  Comment:   TSH   Date Value Ref Range Status   11/19/2019 4.87 0.30 - 5.00 uIU/mL Final   Plan: No changes at this time. Check TSH q6 months and PRN, and keep level b/w 4-5 in elderly.      (E55.9) Vitamin D deficiency  Comment: No longer on supplement   Plan:  -Vitamin d level next lab day.     (I50.22) Chronic systolic congestive heart failure (H)  Comment: Chronic, fluid level stable.   Plan:   -Continue current POC without changes. Call provider if greater than 5 pound weight gain from previous weight. Monitor for SOB, increasing lower extremity edema, wheezing, and change in activity tolerance.   -Continue lasix and spironolactone at current doses.   -BMP next lab day.      Orders:  See new orders above.     Electronically signed by:  ANGEL Corrigan [facility]         Sincerely,        Christiana Osuna NP

## 2020-03-06 ENCOUNTER — RECORDS - HEALTHEAST (OUTPATIENT)
Dept: LAB | Facility: CLINIC | Age: 85
End: 2020-03-06

## 2020-03-06 LAB
ANION GAP SERPL CALCULATED.3IONS-SCNC: 6 MMOL/L (ref 5–18)
BUN SERPL-MCNC: 46 MG/DL (ref 8–28)
CALCIUM SERPL-MCNC: 9.1 MG/DL (ref 8.5–10.5)
CHLORIDE BLD-SCNC: 102 MMOL/L (ref 98–107)
CO2 SERPL-SCNC: 26 MMOL/L (ref 22–31)
CREAT SERPL-MCNC: 1.81 MG/DL (ref 0.7–1.3)
GFR SERPL CREATININE-BSD FRML MDRD: 35 ML/MIN/1.73M2
GLUCOSE BLD-MCNC: 87 MG/DL (ref 70–125)
POTASSIUM BLD-SCNC: 5.6 MMOL/L (ref 3.5–5)
SODIUM SERPL-SCNC: 134 MMOL/L (ref 136–145)

## 2020-03-06 NOTE — TELEPHONE ENCOUNTER
[place] [facility] TELEPHONE ENCOUNTER    Reason for the call: BMP and INR    Earl Sanchez is a 89 year old  (9/17/1926),Nurse called today to report: BMP today reveals Na 135, K 5.3, Crtn 2.6, and INR 3.6     ASSESSMENT/PLAN      BMP on 03/03 with K 6.0 today 5.3, on Aldactone 25 mg. Hold until K is less than 5. Repeat BMP in am. Crtn and GFR slightly improved.     INR slightly elevated today (was 2.6 a month ago). Will  give 2 mg tonight and tomorrow, then back to regular dose 3 mg. Repeat INR in one week.    Domenico Recinos MD

## 2020-03-07 ENCOUNTER — RECORDS - HEALTHEAST (OUTPATIENT)
Dept: LAB | Facility: CLINIC | Age: 85
End: 2020-03-07

## 2020-03-07 LAB — POTASSIUM BLD-SCNC: 4.8 MMOL/L (ref 3.5–5)

## 2020-03-08 ENCOUNTER — RECORDS - HEALTHEAST (OUTPATIENT)
Dept: LAB | Facility: CLINIC | Age: 85
End: 2020-03-08

## 2020-03-08 LAB — POTASSIUM BLD-SCNC: 4.5 MMOL/L (ref 3.5–5)

## 2020-03-12 ENCOUNTER — TELEPHONE (OUTPATIENT)
Dept: GERIATRICS | Facility: CLINIC | Age: 85
End: 2020-03-12

## 2020-03-12 NOTE — TELEPHONE ENCOUNTER
INR missed today - received coumadin 3mg last night.    Orders:  3mg Coumadin tonight  INR tomorrow    Dr. Kait Molina, APRN, DNP, A/GNP-[facility]  [address] [place] [id number]     Cell: [phone]  Fax: [phone]  Email: [email or web address]

## 2020-03-13 ENCOUNTER — RECORDS - HEALTHEAST (OUTPATIENT)
Dept: LAB | Facility: CLINIC | Age: 85
End: 2020-03-13

## 2020-03-13 LAB — INR PPP: 2.68 (ref 0.9–1.1)

## 2020-03-25 ENCOUNTER — RECORDS - HEALTHEAST (OUTPATIENT)
Dept: LAB | Facility: CLINIC | Age: 85
End: 2020-03-25

## 2020-03-26 LAB
ANION GAP SERPL CALCULATED.3IONS-SCNC: 8 MMOL/L (ref 5–18)
BUN SERPL-MCNC: 29 MG/DL (ref 8–28)
CALCIUM SERPL-MCNC: 9 MG/DL (ref 8.5–10.5)
CHLORIDE BLD-SCNC: 102 MMOL/L (ref 98–107)
CO2 SERPL-SCNC: 26 MMOL/L (ref 22–31)
CREAT SERPL-MCNC: 1.56 MG/DL (ref 0.7–1.3)
ERYTHROCYTE [DISTWIDTH] IN BLOOD BY AUTOMATED COUNT: 14.4 % (ref 11–14.5)
GFR SERPL CREATININE-BSD FRML MDRD: 42 ML/MIN/1.73M2
GLUCOSE BLD-MCNC: 68 MG/DL (ref 70–125)
HCT VFR BLD AUTO: 35.6 % (ref 40–54)
HGB BLD-MCNC: 11.2 G/DL (ref 14–18)
MCH RBC QN AUTO: 32.9 PG (ref 27–34)
MCHC RBC AUTO-ENTMCNC: 31.5 G/DL (ref 32–36)
MCV RBC AUTO: 105 FL (ref 80–100)
PLATELET # BLD AUTO: 202 THOU/UL (ref 140–440)
PMV BLD AUTO: 10 FL (ref 8.5–12.5)
POTASSIUM BLD-SCNC: 4.9 MMOL/L (ref 3.5–5)
RBC # BLD AUTO: 3.4 MILL/UL (ref 4.4–6.2)
SODIUM SERPL-SCNC: 136 MMOL/L (ref 136–145)
WBC: 6.8 THOU/UL (ref 4–11)

## 2020-04-13 ENCOUNTER — RECORDS - HEALTHEAST (OUTPATIENT)
Dept: LAB | Facility: CLINIC | Age: 85
End: 2020-04-13

## 2020-04-14 ENCOUNTER — TELEPHONE (OUTPATIENT)
Dept: GERIATRICS | Facility: CLINIC | Age: 85
End: 2020-04-14

## 2020-04-14 LAB — INR PPP: 3.39 (ref 0.9–1.1)

## 2020-04-15 NOTE — TELEPHONE ENCOUNTER
[place] [facility] TELEPHONE ENCOUNTER    Chief Complaint   Patient presents with     INR RESULTS       Earl Sanchez is a 89 year old  (9/17/1926),Nurse called today to report: INR today 3.39 has been on 3 mg daily    ASSESSMENT/PLAN  Coumadin management    Coumadin 1 mg today then 3 mg daily    Recheck INR 4/21      Electronically signed by:   ANGEL Murillo CNP

## 2020-04-20 ENCOUNTER — RECORDS - HEALTHEAST (OUTPATIENT)
Dept: LAB | Facility: CLINIC | Age: 85
End: 2020-04-20

## 2020-04-21 LAB — INR PPP: 3.65 (ref 0.9–1.1)

## 2020-05-04 ENCOUNTER — RECORDS - HEALTHEAST (OUTPATIENT)
Dept: LAB | Facility: CLINIC | Age: 85
End: 2020-05-04

## 2020-05-05 ENCOUNTER — TELEPHONE (OUTPATIENT)
Dept: GERIATRICS | Facility: CLINIC | Age: 85
End: 2020-05-05

## 2020-05-05 LAB — INR PPP: 3.3 (ref 0.9–1.1)

## 2020-05-05 NOTE — TELEPHONE ENCOUNTER
INR result today is 3.2  Last INR was 3.6 a month ago and patient was continued on coumadin 2.5mg M-W-F and 3mg OOD  Order: decrease coumadin to 2.5mg QD and recheck INR in one week

## 2020-05-06 ENCOUNTER — VIRTUAL VISIT (OUTPATIENT)
Dept: GERIATRICS | Facility: CLINIC | Age: 85
End: 2020-05-06
Payer: COMMERCIAL

## 2020-05-06 VITALS
DIASTOLIC BLOOD PRESSURE: 68 MMHG | TEMPERATURE: 98.5 F | HEIGHT: 69 IN | SYSTOLIC BLOOD PRESSURE: 129 MMHG | OXYGEN SATURATION: 96 % | HEART RATE: 70 BPM | RESPIRATION RATE: 16 BRPM | BODY MASS INDEX: 25.8 KG/M2 | WEIGHT: 174.2 LBS

## 2020-05-06 DIAGNOSIS — I12.9 BENIGN HYPERTENSION WITH CHRONIC KIDNEY DISEASE, STAGE III (H): Primary | ICD-10-CM

## 2020-05-06 DIAGNOSIS — F02.818 LATE ONSET ALZHEIMER'S DISEASE WITH BEHAVIORAL DISTURBANCE (H): ICD-10-CM

## 2020-05-06 DIAGNOSIS — I48.20 CHRONIC ATRIAL FIBRILLATION (H): ICD-10-CM

## 2020-05-06 DIAGNOSIS — G30.1 LATE ONSET ALZHEIMER'S DISEASE WITH BEHAVIORAL DISTURBANCE (H): ICD-10-CM

## 2020-05-06 DIAGNOSIS — E03.9 HYPOTHYROIDISM, UNSPECIFIED TYPE: ICD-10-CM

## 2020-05-06 DIAGNOSIS — N18.30 BENIGN HYPERTENSION WITH CHRONIC KIDNEY DISEASE, STAGE III (H): Primary | ICD-10-CM

## 2020-05-06 DIAGNOSIS — I25.10 CORONARY ARTERY DISEASE INVOLVING NATIVE CORONARY ARTERY OF NATIVE HEART WITHOUT ANGINA PECTORIS: ICD-10-CM

## 2020-05-06 PROCEDURE — 99309 SBSQ NF CARE MODERATE MDM 30: CPT | Mod: 95 | Performed by: INTERNAL MEDICINE

## 2020-05-06 ASSESSMENT — MIFFLIN-ST. JEOR: SCORE: 1425.55

## 2020-05-06 NOTE — LETTER
"    5/6/2020        RE: Earl Sanchez  [facility]  [address]  [facility] [address]        [place] [facility] Regulatory   Earl Sanchez is being evaluated via a billable video visit due to the restrictions of the Covid-19 pandemic.   The patient has been notified of following:  \"This video visit will be conducted via a call between you and your provider. We have found that certain health care needs can be provided without the need for an in-person physical exam.  This service lets us provide the care you need with a video conversation. If during the course of the call the provider feels a video visit is not appropriate, you will not be charged for this service.\"   The provider has received verbal consent for a Video Visit from the patient or first contact? Yes  Patient or facility staff would like the video invitation sent by: N/A   Video Start Time: 1:15 pm  [place] Medical Record Number:  [id number]  Place of Location at the time of visit: [facility]   Chief Complaint   Patient presents with     [facility] Regulatory     HPI:  Earl Sanchez  is a 89 year old (9/17/1926), who is being seen today for an E-REG visit.  HPI information obtained from: facility staff, patient report and [facility] chart review.  He is seen May 6, 2020 [facility] where he has resided for 2 years (admit 2/2018)   He is in his room up to chair.   States he feels fine, denies pain, dyspnea or other symptoms.   Eats/sleeps well, \"everything is fine\"   No new concerns reported by nursing staff.      He has had unresponsive episodes, left facial droop, dysarthria and bilateral arm weakness.   His family was present and 911 was called.   However, when EMTs arrived patient refused transport to ED.   Symptoms resolved on their own.  Pt also had an ED visit in March 2019 for an episode of unresponsiveness that persisted in the ED.   He had head CT, then awoke and " responded to questions.   Workup unremarkable and he returned to [facility].     Patient had a hospitalization in January 2018 for encephalopathy with gait disturbance.  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    He was in [facility] for a month, improved, but needed more support than AL, so transitioned to LTC for permanent placement.  Continues to need assist for transfers bed to .      Patient has had atrial fib for many years, anticoagulated with warfarin.   Also treated for BPH, CAD and hypothyroidism, which have been stable.       Past Medical History:   Diagnosis Date     Abdominal aortic aneurysm (H)     per 2/12/15 abdominal US, mild aneurysm measuring [id number]     Acute MI 1981, 2007     Atrial fibrillation (H)      BPH (benign prostatic hyperplasia)      CAD (coronary artery disease)     CABG 2007, f/b cardio, Dr. 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    , lived at the [facility] with services until 1/2018.      Has 3 children.     MEDICATIONS:  Current Outpatient Medications   Medication Sig Dispense Refill     acetaminophen (TYLENOL) 500 MG tablet Take 1,000 mg by mouth every 6 hours as needed for mild pain        allopurinol (ZYLOPRIM) 100 MG tablet Take 100 mg by mouth 2 times daily       aspirin (ASPIRIN LOW DOSE) 81 MG tablet Take 81 mg by mouth daily        citalopram (CELEXA) 10 MG tablet Take 20 mg by mouth daily       donepezil (ARICEPT) 5 MG tablet Take 5 mg by mouth At Bedtime       doxazosin (CARDURA) 4 MG tablet Take 2 tablets (8 mg) by mouth  "At Bedtime 90 tablet 3     ERGOCALCIFEROL PO Take 50,000 Units by mouth every morning Wednesday        furosemide (LASIX) 10 mg TABS half-tab Take 10 mg by mouth daily       LEVOTHYROXINE SODIUM PO Take 88 mcg by mouth daily       nitroglycerin (NITROSTAT) 0.4 MG SL tablet Place 1 tablet (0.4 mg) under the tongue every 5 minutes as needed for chest pain 25 tablet 0     phenylephrine-shark liver oil-mineral oil-petrolatum (PREPARATION H) 0.25-14-74.9 % rectal ointment Place 1 applicator rectally 4 times daily as needed for hemorrhoids for Hemorrhoids       polyethylene glycol 3350 POWD Take 17 g by mouth daily       pravastatin (PRAVACHOL) 40 MG tablet TAKE 1 TABLET DAILY 90 tablet 1     Sennosides-Docusate Sodium (SENNA-DOCUSATE SODIUM) 8.6-50 MG TABS Take 1 tablet by mouth 2 times daily        spironolactone (ALDACTONE) 25 MG tablet Take 25 mg by mouth daily       triamcinolone (KENALOG) 0.025 % cream Apply topically every 12 hours as needed to rash to the affected area prn, apply tin layer to the rash at the LE.       Warfarin Sodium (COUMADIN PO) As directed, per INR       REVIEW OF SYSTEMS: 4 point ROS including Respiratory, CV, GI and , other than that noted in the HPI,  is negative   [facility] 13/30   CPT 4.4   BIMS 7/15  Wt Readings from Last 5 Encounters:   05/06/20 79 kg (174 lb 3.2 oz)   03/05/20 80.7 kg (178 lb)   02/06/20 79.8 kg (176 lb)   01/30/20 80.3 kg (177 lb)   01/23/20 79.8 kg (176 lb)      Objective: /68   Pulse 70   Temp 98.5  F (36.9  C)   Resp 16   Ht 1.753 m (5' 9\")   Wt 79 kg (174 lb 3.2 oz)   SpO2 96%   BMI 25.72 kg/m    Limited visit exam done given COVID-19 precautions.   GENERAL APPEARANCE:  Alert, in no distress   Pleasant and conversational, smiling.    Speech is normal.   No extremity edema    Labs: 3/26/2020       WBC  4.0 - 11.0 thou/uL  6.8     RBC  4.40 - 6.20 mill/uL  3.40Low       Hemoglobin  14.0 - 18.0 g/dL  11.2Low       Hematocrit  40.0 - 54.0 %  35.6Low     "   MCV  80 - 100 fL  105High       MCH  27.0 - 34.0 pg  32.9     MCHC  32.0 - 36.0 g/dL  31.5Low       RDW  11.0 - 14.5 %  14.4     Platelets  140 - 440 thou/uL  202     MPV  8.5 - 12.5 fL  10.0           Sodium  136 - 145 mmol/L  136     Potassium  3.5 - 5.0 mmol/L  4.9     Chloride  98 - 107 mmol/L  102     CO2  22 - 31 mmol/L  26     Anion Gap, Calculation  5 - 18 mmol/L  8     Glucose  70 - 125 mg/dL  68Low       Calcium  8.5 - 10.5 mg/dL  9.0     BUN  8 - 28 mg/dL  29High       Creatinine  0.70 - 1.30 mg/dL  1.56High       GFR MDRD Af Amer  >60 mL/min/1.73m2  51Low       GFR MDRD Non Af Amer  >60 mL/min/1.73m2  42Low            ASSESSMENT/PLAN:  (R41.89) Unresponsive episode    Comment: recurrent, unclear etiology  Plan: Pt has declined ED transfer for this.   Continue to monitor in NH.      If any further syncope would look to discontinuation of donepezil as a likely culprit.       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  Plan: LTC support for meds, meals, activity.    Not likely that donepezil is adding any benefit at this time, and may be causing side effects especially syncope and agitation.   Would look to discontinuation if family amenable.        (F43.23) Adjustment disorder with mixed anxiety and depressed mood  Comment: has worsened with cognitive decline     Plan: continue citalopram which has been helpful       (I48.2) Chronic atrial fibrillation (H)  Comment: not requiring rate slowing meds   Pulse Readings from Last 4 Encounters:   05/06/20 70   03/05/20 74   02/06/20 69   01/30/20 73       Plan: warfarin per INR, goal 2-2.5 given fall risk.   Follow hgb as he is on both ASA and warfarin.     (I25.10) Coronary artery disease involving native coronary artery of native heart without angina pectoris  Comment: h/o CABG 2007  Plan: daily ASA, statin for secondary prevention.   NTG prn    (N40.0) Benign prostatic hyperplasia, unspecified whether lower urinary tract symptoms present  Comment: longstanding  Plan: continue doxazosin, lower dose as above.       (E03.9) Hypothyroidism, unspecified type  Comment: TSH 11/19/19: 4.87    Plan: same dose levothyroxine, yearly TSH        Electronically signed by:  Patty Gamble MD     Video-Visit Details  Type of service:  Video Visit  Video End Time (time video stopped): 1:27 pm  Distant Location (provider location):  [place] [facility]           Sincerely,        Patty Gamble MD

## 2020-05-06 NOTE — PROGRESS NOTES
"[facility]   Earl Sanchez is being evaluated via a billable video visit due to the restrictions of the Covid-19 pandemic.   The patient has been notified of following:  \"This video visit will be conducted via a call between you and your provider. We have found that certain health care needs can be provided without the need for an in-person physical exam.  This service lets us provide the care you need with a video conversation. If during the course of the call the provider feels a video visit is not appropriate, you will not be charged for this service.\"   The provider has received verbal consent for a Video Visit from the patient or first contact? Yes  Patient or facility staff would like the video invitation sent by: N/A   Video Start Time: 1:15 pm  [place] Medical Record Number:  [id number]  Place of Location at the time of visit: [facility]   Chief Complaint   Patient presents with     [facility]     HPI:  Earl Sanchez  is a 89 year old (9/17/1926), who is being seen today for an E-REG visit.  HPI information obtained from: facility staff, patient report and [facility] chart review.  He is seen May 6, 2020 [facility] where he has resided for 2 years (admit 2/2018)   He is in his room up to chair.   States he feels fine, denies pain, dyspnea or other symptoms.   Eats/sleeps well, \"everything is fine\"   No new concerns reported by nursing staff.      He has had unresponsive episodes, left facial droop, dysarthria and bilateral arm weakness.   His family was present and 911 was called.   However, when EMTs arrived patient refused transport to ED.   Symptoms resolved on their own.  Pt also had an ED visit in March 2019 for an episode of unresponsiveness that persisted in the ED.   He had head CT, then awoke and responded to questions.   Workup unremarkable and he returned to [facility].     Patient had a hospitalization in January 2018 for " encephalopathy with gait disturbance.  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    He was in [facility] for a month, improved, but needed more support than AL, so transitioned to LTC for permanent placement.  Continues to need assist for transfers bed to .      Patient has had atrial fib for many years, anticoagulated with warfarin.   Also treated for BPH, CAD and hypothyroidism, which have been stable.       Past Medical History:   Diagnosis Date     Abdominal aortic aneurysm (H)     per 2/12/15 abdominal US, mild aneurysm measuring [id number]     Acute MI 1981, 2007     Atrial fibrillation (H)      BPH (benign prostatic hyperplasia)      CAD (coronary artery disease)     CABG 2007, f/b cardio, Dr. 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    , lived at the [facility] with services until 1/2018.      Has 3 children.     MEDICATIONS:  Current Outpatient Medications   Medication Sig Dispense Refill     acetaminophen (TYLENOL) 500 MG tablet Take 1,000 mg by mouth every 6 hours as needed for mild pain        allopurinol (ZYLOPRIM) 100 MG tablet Take 100 mg by mouth 2 times daily       aspirin (ASPIRIN LOW DOSE) 81 MG tablet Take 81 mg by mouth daily        citalopram (CELEXA) 10 MG tablet Take 20 mg by mouth daily       donepezil (ARICEPT) 5 MG tablet Take 5 mg by mouth At Bedtime       doxazosin (CARDURA) 4 MG tablet Take 2 tablets (8 mg) by mouth At Bedtime 90 tablet 3     ERGOCALCIFEROL PO Take 50,000 Units by mouth every morning Wednesday        furosemide (LASIX) 10 mg  "TABS half-tab Take 10 mg by mouth daily       LEVOTHYROXINE SODIUM PO Take 88 mcg by mouth daily       nitroglycerin (NITROSTAT) 0.4 MG SL tablet Place 1 tablet (0.4 mg) under the tongue every 5 minutes as needed for chest pain 25 tablet 0     phenylephrine-shark liver oil-mineral oil-petrolatum (PREPARATION H) 0.25-14-74.9 % rectal ointment Place 1 applicator rectally 4 times daily as needed for hemorrhoids for Hemorrhoids       polyethylene glycol 3350 POWD Take 17 g by mouth daily       pravastatin (PRAVACHOL) 40 MG tablet TAKE 1 TABLET DAILY 90 tablet 1     Sennosides-Docusate Sodium (SENNA-DOCUSATE SODIUM) 8.6-50 MG TABS Take 1 tablet by mouth 2 times daily        spironolactone (ALDACTONE) 25 MG tablet Take 25 mg by mouth daily       triamcinolone (KENALOG) 0.025 % cream Apply topically every 12 hours as needed to rash to the affected area prn, apply tin layer to the rash at the LE.       Warfarin Sodium (COUMADIN PO) As directed, per INR       REVIEW OF SYSTEMS: 4 point ROS including Respiratory, CV, GI and , other than that noted in the HPI,  is negative   SLUMS 13/30   CPT 4.4   BIMS 7/15  Wt Readings from Last 5 Encounters:   05/06/20 79 kg (174 lb 3.2 oz)   03/05/20 80.7 kg (178 lb)   02/06/20 79.8 kg (176 lb)   01/30/20 80.3 kg (177 lb)   01/23/20 79.8 kg (176 lb)      Objective: /68   Pulse 70   Temp 98.5  F (36.9  C)   Resp 16   Ht 1.753 m (5' 9\")   Wt 79 kg (174 lb 3.2 oz)   SpO2 96%   BMI 25.72 kg/m    Limited visit exam done given COVID-19 precautions.   GENERAL APPEARANCE:  Alert, in no distress   Pleasant and conversational, smiling.    Speech is normal.   No extremity edema    Labs: 3/26/2020       WBC  4.0 - 11.0 thou/uL  6.8     RBC  4.40 - 6.20 mill/uL  3.40Low       Hemoglobin  14.0 - 18.0 g/dL  11.2Low       Hematocrit  40.0 - 54.0 %  35.6Low       MCV  80 - 100 fL  105High       MCH  27.0 - 34.0 pg  32.9     MCHC  32.0 - 36.0 g/dL  31.5Low       RDW  11.0 - 14.5 %  14.4   "   Platelets  140 - 440 thou/uL  202     MPV  8.5 - 12.5 fL  10.0           Sodium  136 - 145 mmol/L  136     Potassium  3.5 - 5.0 mmol/L  4.9     Chloride  98 - 107 mmol/L  102     CO2  22 - 31 mmol/L  26     Anion Gap, Calculation  5 - 18 mmol/L  8     Glucose  70 - 125 mg/dL  68Low       Calcium  8.5 - 10.5 mg/dL  9.0     BUN  8 - 28 mg/dL  29High       Creatinine  0.70 - 1.30 mg/dL  1.56High       GFR MDRD Af Amer  >60 mL/min/1.73m2  51Low       GFR MDRD Non Af Amer  >60 mL/min/1.73m2  42Low            ASSESSMENT/PLAN:  (R41.89) Unresponsive episode    Comment: recurrent, unclear etiology  Plan: Pt has declined ED transfer for this.   Continue to monitor in NH.      If any further syncope would look to discontinuation of donepezil as a likely culprit.       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  Plan: LTC support for meds, meals, activity.    Not likely that donepezil is adding any benefit at this time, and may be causing side effects especially syncope and agitation.   Would look to discontinuation if family amenable.        (F43.23) Adjustment disorder with mixed anxiety and depressed mood  Comment: has worsened with cognitive decline     Plan: continue citalopram which has been helpful       (I48.2) Chronic atrial fibrillation (H)  Comment: not requiring rate slowing meds   Pulse Readings from Last 4 Encounters:   05/06/20 70   03/05/20 74   02/06/20 69   01/30/20 73      Plan: warfarin per INR, goal 2-2.5 given fall risk.   Follow hgb as he is on both ASA and warfarin.     (I25.10) Coronary  artery disease involving native coronary artery of native heart without angina pectoris  Comment: h/o CABG 2007  Plan: daily ASA, statin for secondary prevention.   NTG prn    (N40.0) Benign prostatic hyperplasia, unspecified whether lower urinary tract symptoms present  Comment: longstanding  Plan: continue doxazosin, lower dose as above.       (E03.9) Hypothyroidism, unspecified type  Comment: TSH 11/19/19: 4.87    Plan: same dose levothyroxine, yearly TSH        Electronically signed by:  Patty Gamble MD     Video-Visit Details  Type of service:  Video Visit  Video End Time (time video stopped): 1:27 pm  Distant Location (provider location):  [place] [facility]

## 2020-05-11 ENCOUNTER — RECORDS - HEALTHEAST (OUTPATIENT)
Dept: LAB | Facility: CLINIC | Age: 85
End: 2020-05-11

## 2020-05-12 LAB
ANION GAP SERPL CALCULATED.3IONS-SCNC: 7 MMOL/L (ref 5–18)
BUN SERPL-MCNC: 31 MG/DL (ref 8–28)
CALCIUM SERPL-MCNC: 9.7 MG/DL (ref 8.5–10.5)
CHLORIDE BLD-SCNC: 102 MMOL/L (ref 98–107)
CO2 SERPL-SCNC: 27 MMOL/L (ref 22–31)
CREAT SERPL-MCNC: 1.67 MG/DL (ref 0.7–1.3)
GFR SERPL CREATININE-BSD FRML MDRD: 39 ML/MIN/1.73M2
GLUCOSE BLD-MCNC: 83 MG/DL (ref 70–125)
INR PPP: 3.13 (ref 0.9–1.1)
POTASSIUM BLD-SCNC: 4.6 MMOL/L (ref 3.5–5)
SODIUM SERPL-SCNC: 136 MMOL/L (ref 136–145)

## 2020-05-19 ENCOUNTER — RECORDS - HEALTHEAST (OUTPATIENT)
Dept: LAB | Facility: CLINIC | Age: 85
End: 2020-05-19

## 2020-05-19 ENCOUNTER — TELEPHONE (OUTPATIENT)
Dept: GERIATRICS | Facility: CLINIC | Age: 85
End: 2020-05-19

## 2020-05-19 LAB — INR PPP: 2.67 (ref 0.9–1.1)

## 2020-05-19 NOTE — TELEPHONE ENCOUNTER
ON-CALL  GERIATRICS CROSS-COVERAGE NOTE:    Call from RN to notify INR result.  The patient is on warfarin alternating between 2 and 2.5 mg p.o. daily for atrial fibrillation.  Today's INR is 2.67.  Previously her INR was 3.13 on May 12.  His INR target is 2-2.5 in view of fall risk.    Plan:  Continue warfarin at 2 mg p.o. daily  Repeat INR on Friday, May 22    Noble Martinez MD

## 2020-05-21 ENCOUNTER — VIRTUAL VISIT (OUTPATIENT)
Dept: GERIATRICS | Facility: CLINIC | Age: 85
End: 2020-05-21
Payer: COMMERCIAL

## 2020-05-21 VITALS
BODY MASS INDEX: 25.65 KG/M2 | HEART RATE: 67 BPM | OXYGEN SATURATION: 97 % | TEMPERATURE: 97.6 F | WEIGHT: 173.2 LBS | RESPIRATION RATE: 18 BRPM | SYSTOLIC BLOOD PRESSURE: 118 MMHG | HEIGHT: 69 IN | DIASTOLIC BLOOD PRESSURE: 65 MMHG

## 2020-05-21 DIAGNOSIS — F02.818 LATE ONSET ALZHEIMER'S DISEASE WITH BEHAVIORAL DISTURBANCE (H): ICD-10-CM

## 2020-05-21 DIAGNOSIS — G30.1 LATE ONSET ALZHEIMER'S DISEASE WITH BEHAVIORAL DISTURBANCE (H): ICD-10-CM

## 2020-05-21 DIAGNOSIS — N18.30 BENIGN HYPERTENSION WITH CHRONIC KIDNEY DISEASE, STAGE III (H): Primary | ICD-10-CM

## 2020-05-21 DIAGNOSIS — I12.9 BENIGN HYPERTENSION WITH CHRONIC KIDNEY DISEASE, STAGE III (H): Primary | ICD-10-CM

## 2020-05-21 DIAGNOSIS — F32.0 CURRENT MILD EPISODE OF MAJOR DEPRESSIVE DISORDER WITHOUT PRIOR EPISODE (H): ICD-10-CM

## 2020-05-21 DIAGNOSIS — Z79.01 LONG TERM CURRENT USE OF ANTICOAGULANT THERAPY: ICD-10-CM

## 2020-05-21 DIAGNOSIS — I48.20 CHRONIC ATRIAL FIBRILLATION (H): ICD-10-CM

## 2020-05-21 DIAGNOSIS — I25.10 CORONARY ARTERY DISEASE INVOLVING NATIVE CORONARY ARTERY OF NATIVE HEART WITHOUT ANGINA PECTORIS: ICD-10-CM

## 2020-05-21 DIAGNOSIS — E03.9 HYPOTHYROIDISM, UNSPECIFIED TYPE: ICD-10-CM

## 2020-05-21 DIAGNOSIS — I50.22 CHRONIC SYSTOLIC CONGESTIVE HEART FAILURE (H): ICD-10-CM

## 2020-05-21 PROCEDURE — 99309 SBSQ NF CARE MODERATE MDM 30: CPT | Mod: 95 | Performed by: NURSE PRACTITIONER

## 2020-05-21 ASSESSMENT — MIFFLIN-ST. JEOR: SCORE: 1421.01

## 2020-05-21 NOTE — LETTER
"    5/21/2020        RE: [facility]  [address]  [facility] [address]        [facility] [facility]   Earl Sanchez is being evaluated via a billable video visit due to the restrictions of the Covid-19 pandemic.   The patient has been notified of following:  \"This video visit will be conducted via a call between you and your provider. We have found that certain health care needs can be provided without the need for an in-person physical exam.  This service lets us provide the care you need with a video conversation. If during the course of the call the provider feels a video visit is not appropriate, you will not be charged for this service.\"   The provider has received verbal consent for a Video Visit from the patient or first contact? Yes  Patient  or facility staff would like the video invitation sent by: Text to cell phone: RN  Video Start Time: 1103    [place] Medical Record Number:  [id number]  Place of Location at the time of visit: [facility]   Chief Complaint   Patient presents with     [facility]     HPI:  Earl Sanchez  is a 89 year old (9/17/1926), who is being seen today for a visit.  HPI information obtained from: facility chart records, facility staff, patient report and [facility] chart review. Today's concern is:  Benign hypertension with chronic kidney disease, stage III (H)  BP Readings from Last 3 Encounters:   05/21/20 118/65   05/06/20 129/68   03/05/20 96/51     Denies CP, SOB and chest pain. Currently taking spironolactone and lasix daily.     Chronic atrial fibrillation  Long term current use of anticoagulant therapy  Anticoagulated with warfarin. Not on rate controller 2/2 hypotension.   -Currently taking warfarin 2 mg/day with previous INR 2.67. Next INR scheduled for 6/9/2020. INR goal 2-3.     Pulse Readings from Last 4 Encounters:   05/21/20 67   05/06/20 70   03/05/20 74 "   02/06/20 69     Late onset Alzheimer's disease with behavioral disturbance (H)  Pleasant with writer upon today's visit. Continues to be short with staff and demonstrate some resistance to care. Started Celexa earlier this year and resident has shown some improvement. Also taking donepezil 5 mg/HS. Family would like to continue this medication at this time.     Hypothyroidism, unspecified type  TSH   Date Value Ref Range Status   11/19/2019 4.87 0.30 - 5.00 uIU/mL Final     Chronic systolic congestive heart failure (H)  Wt Readings from Last 4 Encounters:   05/21/20 78.6 kg (173 lb 3.2 oz)   05/06/20 79 kg (174 lb 3.2 oz)   03/05/20 80.7 kg (178 lb)   02/06/20 79.8 kg (176 lb)   Denies SOB or CP. Taking spironolactone and lasix daily. Edema improved from previous visit.     Coronary artery disease involving native coronary artery of native heart without angina pectoris  History of CABG 2007. No s/sx noted.     Current mild episode of major depressive disorder without prior episode (H)  Improved mood and behavior. Taking celexa daily.       Past Medical and Surgical History reviewed in Epic today.  MEDICATIONS:    Current Outpatient Medications   Medication Sig Dispense Refill     acetaminophen (TYLENOL) 500 MG tablet Take 1,000 mg by mouth every 6 hours as needed for mild pain        allopurinol (ZYLOPRIM) 100 MG tablet Take 100 mg by mouth 2 times daily       aspirin (ASPIRIN LOW DOSE) 81 MG tablet Take 81 mg by mouth daily        citalopram (CELEXA) 10 MG tablet Take 20 mg by mouth daily       donepezil (ARICEPT) 5 MG tablet Take 5 mg by mouth At Bedtime       doxazosin (CARDURA) 4 MG tablet Take 2 tablets (8 mg) by mouth At Bedtime 90 tablet 3     ERGOCALCIFEROL PO Take 50,000 Units by mouth every morning Wednesday        furosemide (LASIX) 10 mg TABS half-tab Take 10 mg by mouth daily       LEVOTHYROXINE SODIUM PO Take 88 mcg by mouth daily       nitroglycerin (NITROSTAT) 0.4 MG SL tablet Place 1 tablet (0.4  "mg) under the tongue every 5 minutes as needed for chest pain 25 tablet 0     phenylephrine-shark liver oil-mineral oil-petrolatum (PREPARATION H) 0.25-14-74.9 % rectal ointment Place 1 applicator rectally 4 times daily as needed for hemorrhoids for Hemorrhoids       polyethylene glycol 3350 POWD Take 17 g by mouth daily       pravastatin (PRAVACHOL) 40 MG tablet TAKE 1 TABLET DAILY 90 tablet 1     Sennosides-Docusate Sodium (SENNA-DOCUSATE SODIUM) 8.6-50 MG TABS Take 1 tablet by mouth 2 times daily        spironolactone (ALDACTONE) 25 MG tablet Take 25 mg by mouth daily       triamcinolone (KENALOG) 0.025 % cream Apply topically every 12 hours as needed to rash to the affected area prn, apply tin layer to the rash at the LE.       Warfarin Sodium (COUMADIN PO) As directed, per INR       REVIEW OF SYSTEMS: 4 point ROS including Respiratory, CV, GI and , other than that noted in the HPI,  is negative  Objective: /65   Pulse 67   Temp 97.6  F (36.4  C)   Resp 18   Ht 1.753 m (5' 9\")   Wt 78.6 kg (173 lb 3.2 oz)   SpO2 97%   BMI 25.58 kg/m    Limited visit exam done given COVID-19 precautions.   GENERAL APPEARANCE:  Alert  ENT:  [place]  RESP:  no respiratory distress  CV:  peripheral edema 1+ in bilateral LE  M/S:   Gait and station normal  SKIN:  Inspection of skin and subcutaneous tissue baseline  NEURO:   Cranial nerves 2-12 are normal tested and grossly at patient's baseline  PSYCH:  memory impaired , affect and mood normal  Labs:   Labs done in SNF are in [place] EPIC. Please refer to them using EPIC/Care Everywhere. and Recent labs in EPIC reviewed by me today.     ASSESSMENT/PLAN:  (I12.9,  N18.3) Benign hypertension with chronic kidney disease, stage III (H)  (primary encounter diagnosis)  Comment:Chronic, remains soft but resident remains asymptomatic.   Plan:   -Monitor and update NP. Consider dose reduction in lasix if become symptomatic. Continue without change. Keep SBP> 130 mmHg and DBP > 65 " mmHg (levels below these increase mortality as shown by standard studies and observations).     (I48.20) Chronic atrial fibrillation  (Z79.01) Long term current use of anticoagulant therapy  Comment: Anticoagulated with warfarin.   Plan:   -Continue warfarin 2 mg/day  -INR 6/8  -Goal INR 2-3    (G30.1,  F02.81) Late onset Alzheimer's disease with behavioral disturbance (H)  Comment: Progressive. Some behaviors noted but improved with celexa.   Plan:   -Continue POC without change at this time. No dose adjustment and will re-address stability in 6 months.     (E03.9) Hypothyroidism, unspecified type  Comment:   TSH   Date Value Ref Range Status   11/19/2019 4.87 0.30 - 5.00 uIU/mL Final   Plan: Check TSH yearly and PRN, and keep level b/w 4-5 in elderly.  Continue current dose of levothyroxine.     (I50.22) Chronic systolic congestive heart failure (H)  Comment: Chronic, resident hemodynamically stable.   Plan:   -Continue current dose of lasix and spironolactone.     (I25.10) Coronary artery disease involving native coronary artery of native heart without angina pectoris  Comment: s/p CABG 2007  Plan:   -Taking ASA and statin for secondary prevention  -NTG PRN    (F32.0) Current mild episode of major depressive disorder without prior episode (H)  Comment: Stable.   Plan:   -Continue Celexa at current dose.     The current medical regimen is effective; continue present plan and medications.      Case Management and Team Discussion:  I called and spoke with Nursing staff at the facility regarding the current Plan of Care. I have reviewed the facility/SNF care plan/MDS, including the falls risk, nutrition and pain screening. I also reviewed the current immunizations, and preventive care.Patient's desire to return to the community is present, but is not able due to care needs . Current Level of Care is appropriate at this time. The Plan of Care is appropriate at this time.     Advance Directive Discussion:    I reviewed  the current advanced directives as reflected in EPIC, the POLST and the facility chart, and verified the congruency of orders. I contacted the first party Son and discussed the plan of Care.  I did not due to cognitive impairment review the advance directives with the resident.       Electronically signed by:  ANGEL Corrigan CNP  [facility]     Video-Visit Details  Type of service:  Video Visit  Video End Time (time video stopped): 1118  Distant Location (provider location):  [place] [facility]           Sincerely,        Christiana Osuna, NP

## 2020-05-22 ENCOUNTER — RECORDS - HEALTHEAST (OUTPATIENT)
Dept: LAB | Facility: CLINIC | Age: 85
End: 2020-05-22

## 2020-05-22 LAB — INR PPP: 3.01 (ref 0.9–1.1)

## 2020-05-26 ENCOUNTER — RECORDS - HEALTHEAST (OUTPATIENT)
Dept: LAB | Facility: CLINIC | Age: 85
End: 2020-05-26

## 2020-05-26 LAB — INR PPP: 2.08 (ref 0.9–1.1)

## 2020-06-09 ENCOUNTER — RECORDS - HEALTHEAST (OUTPATIENT)
Dept: LAB | Facility: CLINIC | Age: 85
End: 2020-06-09

## 2020-06-09 LAB — INR PPP: 2.06 (ref 0.9–1.1)

## 2020-06-23 ENCOUNTER — TELEPHONE (OUTPATIENT)
Dept: GERIATRICS | Facility: CLINIC | Age: 85
End: 2020-06-23

## 2020-06-23 ENCOUNTER — RECORDS - HEALTHEAST (OUTPATIENT)
Dept: LAB | Facility: CLINIC | Age: 85
End: 2020-06-23

## 2020-06-23 LAB — INR PPP: 2.59 (ref 0.9–1.1)

## 2020-06-23 NOTE — TELEPHONE ENCOUNTER
INR today = 2.59 and on 2mg of coumadin daily    Orders:  Keep coumadin 2mg po daily  INR in 2 weeks    Electronically signed by Carmel Hernandez RN, CNP

## 2020-07-06 ENCOUNTER — RECORDS - HEALTHEAST (OUTPATIENT)
Dept: LAB | Facility: CLINIC | Age: 85
End: 2020-07-06

## 2020-07-07 ENCOUNTER — TELEPHONE (OUTPATIENT)
Dept: GERIATRICS | Facility: CLINIC | Age: 85
End: 2020-07-07

## 2020-07-07 LAB — INR PPP: 1.8 (ref 0.9–1.1)

## 2020-07-07 NOTE — TELEPHONE ENCOUNTER
Nurse called today reporting INR 1.8. Previous INR was 2.59 on 6/23/2020 and has been receiving coumadin 2 mg daily.   Goal INR 2-2.5 due to history of falls.     Plan: coumadin 2.5 mg tonight, then 2 mg daily. Recheck INR in 1 week.       Jaycob ORTIZ CNP

## 2020-07-13 ENCOUNTER — RECORDS - HEALTHEAST (OUTPATIENT)
Dept: LAB | Facility: CLINIC | Age: 85
End: 2020-07-13

## 2020-07-14 ENCOUNTER — TELEPHONE (OUTPATIENT)
Dept: GERIATRICS | Facility: CLINIC | Age: 85
End: 2020-07-14

## 2020-07-14 LAB — INR PPP: 2.07 (ref 0.9–1.1)

## 2020-07-14 NOTE — TELEPHONE ENCOUNTER
INR 2.7 today. Target range 2.0-2.5. on 2 mg every day.  Will start coumadin 1.5 mg m,w,f, 2 mg ROW, recheck INR 7/17.

## 2020-07-16 ENCOUNTER — RECORDS - HEALTHEAST (OUTPATIENT)
Dept: LAB | Facility: CLINIC | Age: 85
End: 2020-07-16

## 2020-07-17 LAB — INR PPP: 2.22 (ref 0.9–1.1)

## 2020-07-29 ENCOUNTER — RECORDS - HEALTHEAST (OUTPATIENT)
Dept: LAB | Facility: CLINIC | Age: 85
End: 2020-07-29

## 2020-07-30 ENCOUNTER — NURSING HOME VISIT (OUTPATIENT)
Dept: GERIATRICS | Facility: CLINIC | Age: 85
End: 2020-07-30
Payer: COMMERCIAL

## 2020-07-30 VITALS
WEIGHT: 173.1 LBS | DIASTOLIC BLOOD PRESSURE: 68 MMHG | TEMPERATURE: 97.3 F | OXYGEN SATURATION: 97 % | HEIGHT: 69 IN | BODY MASS INDEX: 25.64 KG/M2 | RESPIRATION RATE: 18 BRPM | SYSTOLIC BLOOD PRESSURE: 132 MMHG | HEART RATE: 65 BPM

## 2020-07-30 DIAGNOSIS — I50.22 CHRONIC SYSTOLIC CONGESTIVE HEART FAILURE (H): ICD-10-CM

## 2020-07-30 DIAGNOSIS — F02.818 LATE ONSET ALZHEIMER'S DISEASE WITH BEHAVIORAL DISTURBANCE (H): ICD-10-CM

## 2020-07-30 DIAGNOSIS — E03.9 HYPOTHYROIDISM, UNSPECIFIED TYPE: Primary | ICD-10-CM

## 2020-07-30 DIAGNOSIS — E55.9 VITAMIN D DEFICIENCY: ICD-10-CM

## 2020-07-30 DIAGNOSIS — G30.1 LATE ONSET ALZHEIMER'S DISEASE WITH BEHAVIORAL DISTURBANCE (H): ICD-10-CM

## 2020-07-30 LAB — INR PPP: 2.3 (ref 0.9–1.1)

## 2020-07-30 PROCEDURE — 99309 SBSQ NF CARE MODERATE MDM 30: CPT | Performed by: NURSE PRACTITIONER

## 2020-07-30 ASSESSMENT — MIFFLIN-ST. JEOR: SCORE: 1420.56

## 2020-07-30 NOTE — LETTER
7/30/2020        RE: Earl Sanchez  [facility]  [address]  [facility] [address]        [place] [facility]  Chief Complaint   Patient presents with     halfway Regulatory     [facility] Medical Record Number:  [id number]  Place of Service where encounter took place:  [facility] ([facility]) [[id number]]    HPI:    Earl Sanchez  is 89 year old (9/17/1926), who is being seen today for a federally mandated E/M visit.  HPI information obtained from: facility chart records, facility staff, patient report and [facility] chart review. 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 He enjoys classical music,  he also enjoys resting in bed.     Chronic systolic congestive heart failure (H)  Wt Readings from Last 4 Encounters:   07/30/20 78.5 kg (173 lb 1.6 oz)   05/21/20 78.6 kg (173 lb 3.2 oz)   05/06/20 79 kg (174 lb 3.2 oz)   03/05/20 80.7 kg (178 lb)   weight stable. Currently taking spironolactone and furosemide. Denies CP, SOB or lightheadedness.     Vitamin D deficiency  Last vitamin D level 16 in 2017.       ALLERGIES:No known allergies  PAST MEDICAL HISTORY:   has a past medical history of Abdominal aortic aneurysm (H), Acute MI (H) (1981, 2007), Atrial fibrillation (H), BPH (benign prostatic hyperplasia), CAD (coronary artery disease), Chronic kidney disease, Duodenal ulcer with hemorrhage, Gait apraxia, Gastritis, Gastro-oesophageal reflux disease, Heart failure (H), Hyperlipidaemia LDL goal <100, Hypertension goal BP (blood pressure) < 140/90, Osteoarthritis, Renal disease, and Thyroid disease. He also has no past medical history of Malignant hyperthermia or PONV (postoperative nausea and  vomiting).  PAST SURGICAL HISTORY:   has a past surgical history that includes hernia repair, inguinal rt/lt; Ankle surgery; Phacoemulsification clear cornea with toric intraocular lens implant (4/10/2014); Phacoemulsification clear cornea with toric intraocular lens implant (4/22/2014); coronary artery bypass (2007); and Heart Cath Left heart cath (12/10/07).  FAMILY HISTORY: family history includes Hypertension in his maternal grandmother and mother.  SOCIAL HISTORY:  reports that he has never smoked. He has never used smokeless tobacco. He reports current alcohol use. He reports that he does not use drugs.    MEDICATIONS:  Current Outpatient Medications   Medication Sig Dispense Refill     acetaminophen (TYLENOL) 500 MG tablet Take 1,000 mg by mouth every 6 hours as needed for mild pain        allopurinol (ZYLOPRIM) 100 MG tablet Take 100 mg by mouth 2 times daily       aspirin (ASPIRIN LOW DOSE) 81 MG tablet Take 81 mg by mouth daily        citalopram (CELEXA) 10 MG tablet Take 20 mg by mouth daily       donepezil (ARICEPT) 5 MG tablet Take 5 mg by mouth At Bedtime       doxazosin (CARDURA) 4 MG tablet Take 2 tablets (8 mg) by mouth At Bedtime 90 tablet 3     ERGOCALCIFEROL PO Take 50,000 Units by mouth every morning Wednesday        furosemide (LASIX) 10 mg TABS half-tab Take 10 mg by mouth daily       LEVOTHYROXINE SODIUM PO Take 88 mcg by mouth daily       nitroglycerin (NITROSTAT) 0.4 MG SL tablet Place 1 tablet (0.4 mg) under the tongue every 5 minutes as needed for chest pain 25 tablet 0     phenylephrine-shark liver oil-mineral oil-petrolatum (PREPARATION H) 0.25-14-74.9 % rectal ointment Place 1 applicator rectally 4 times daily as needed for hemorrhoids for Hemorrhoids       polyethylene glycol 3350 POWD Take 17 g by mouth daily       pravastatin (PRAVACHOL) 40 MG tablet TAKE 1 TABLET DAILY 90 tablet 1     Sennosides-Docusate Sodium (SENNA-DOCUSATE SODIUM) 8.6-50 MG TABS Take 1 tablet by mouth 2 times  "daily        spironolactone (ALDACTONE) 25 MG tablet Take 25 mg by mouth daily       triamcinolone (KENALOG) 0.025 % cream Apply topically every 12 hours as needed to rash to the affected area prn, apply tin layer to the rash at the LE.       Warfarin Sodium (COUMADIN PO) As directed, per INR       Case Management:  I have reviewed the care plan and MDS and do agree with the plan. Patient's desire to return to the community is present, but is not able due to care needs . Information reviewed:  Medications, vital signs, orders, and nursing notes.    ROS:  Unobtainable secondary to cognitive impairment.     Vitals:  /68   Pulse 65   Temp 97.3  F (36.3  C)   Resp 18   Ht 1.753 m (5' 9\")   Wt 78.5 kg (173 lb 1.6 oz)   SpO2 97%   BMI 25.56 kg/m    Body mass index is 25.56 kg/m .  Exam:  GENERAL APPEARANCE:  Alert, in no distress  ENT:  Mouth and posterior oropharynx normal, moist mucous membranes, [place]  RESP:  respiratory effort and palpation of chest normal, lungs clear to auscultation   CV:  Palpation and auscultation of heart done , regular rate and rhythm, no murmur, rub, or gallop  M/S:   Gait and station normal  Digits and nails normal  SKIN:  Inspection of skin and subcutaneous tissue baseline, Palpation of skin and subcutaneous tissue baseline  NEURO:   Cranial nerves 2-12 are normal tested and grossly at patient's baseline  PSYCH:  memory impaired , affect abnormal flat    Lab/Diagnostic data:   Labs done in SNF are in [facility]. Please refer to them using [employer] Everywhere. and Recent labs in [facility] reviewed by me today.     ASSESSMENT/PLAN  (E03.9) Hypothyroidism, unspecified type  (primary encounter diagnosis)  Comment:   TSH   Date Value Ref Range Status   11/19/2019 4.87 0.30 - 5.00 uIU/mL Final   Plan:   -Continue levothyroxine as ordered. 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 "   Plan:   -LTC support with medication administration, meals and safety.     (I50.22) Chronic systolic congestive heart failure (H)  Comment: Chronic, fluid level stable.   Plan:   Continue current POC without changes. Call provider if greater than 5 pound weight gain from previous weight. Monitor for SOB, increasing lower extremity edema, wheezing, and change in activity tolerance.     (E55.9) Vitamin D deficiency  Comment: Chronic, no supplement at this time  Plan:   -Vitamin D level next lab day     See new orders as above       Electronically signed by:  ANGEL Corrigan [facility]         Sincerely,        Christiana Osuna NP

## 2020-07-30 NOTE — PROGRESS NOTES
[place] [facility]  Chief Complaint   Patient presents with     long term Regulatory     [facility] Record Number:  [id number]  Place of Service where encounter took place:  [facility] ([facility]) [[id number]]    HPI:    Earl Sanchez  is 89 year old (9/17/1926), who is being seen today for a federally mandated E/M visit.  HPI information obtained from: facility chart records, facility staff, patient report and [facility] chart review. 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 He enjoys classical music,  he also enjoys resting in bed.     Chronic systolic congestive heart failure (H)  Wt Readings from Last 4 Encounters:   07/30/20 78.5 kg (173 lb 1.6 oz)   05/21/20 78.6 kg (173 lb 3.2 oz)   05/06/20 79 kg (174 lb 3.2 oz)   03/05/20 80.7 kg (178 lb)   weight stable. Currently taking spironolactone and furosemide. Denies CP, SOB or lightheadedness.     Vitamin D deficiency  Last vitamin D level 16 in 2017.       ALLERGIES:No known allergies  PAST MEDICAL HISTORY:   has a past medical history of Abdominal aortic aneurysm (H), Acute MI (H) (1981, 2007), Atrial fibrillation (H), BPH (benign prostatic hyperplasia), CAD (coronary artery disease), Chronic kidney disease, Duodenal ulcer with hemorrhage, Gait apraxia, Gastritis, Gastro-oesophageal reflux disease, Heart failure (H), Hyperlipidaemia LDL goal <100, Hypertension goal BP (blood pressure) < 140/90, Osteoarthritis, Renal disease, and Thyroid disease. He also has no past medical history of Malignant hyperthermia or PONV (postoperative nausea and vomiting).  PAST SURGICAL HISTORY:   has a past surgical history that includes hernia repair, inguinal rt/lt; Ankle surgery;  Phacoemulsification clear cornea with toric intraocular lens implant (4/10/2014); Phacoemulsification clear cornea with toric intraocular lens implant (4/22/2014); coronary artery bypass (2007); and Heart Cath Left heart cath (12/10/07).  FAMILY HISTORY: family history includes Hypertension in his maternal grandmother and mother.  SOCIAL HISTORY:  reports that he has never smoked. He has never used smokeless tobacco. He reports current alcohol use. He reports that he does not use drugs.    MEDICATIONS:  Current Outpatient Medications   Medication Sig Dispense Refill     acetaminophen (TYLENOL) 500 MG tablet Take 1,000 mg by mouth every 6 hours as needed for mild pain        allopurinol (ZYLOPRIM) 100 MG tablet Take 100 mg by mouth 2 times daily       aspirin (ASPIRIN LOW DOSE) 81 MG tablet Take 81 mg by mouth daily        citalopram (CELEXA) 10 MG tablet Take 20 mg by mouth daily       donepezil (ARICEPT) 5 MG tablet Take 5 mg by mouth At Bedtime       doxazosin (CARDURA) 4 MG tablet Take 2 tablets (8 mg) by mouth At Bedtime 90 tablet 3     ERGOCALCIFEROL PO Take 50,000 Units by mouth every morning Wednesday        furosemide (LASIX) 10 mg TABS half-tab Take 10 mg by mouth daily       LEVOTHYROXINE SODIUM PO Take 88 mcg by mouth daily       nitroglycerin (NITROSTAT) 0.4 MG SL tablet Place 1 tablet (0.4 mg) under the tongue every 5 minutes as needed for chest pain 25 tablet 0     phenylephrine-shark liver oil-mineral oil-petrolatum (PREPARATION H) 0.25-14-74.9 % rectal ointment Place 1 applicator rectally 4 times daily as needed for hemorrhoids for Hemorrhoids       polyethylene glycol 3350 POWD Take 17 g by mouth daily       pravastatin (PRAVACHOL) 40 MG tablet TAKE 1 TABLET DAILY 90 tablet 1     Sennosides-Docusate Sodium (SENNA-DOCUSATE SODIUM) 8.6-50 MG TABS Take 1 tablet by mouth 2 times daily        spironolactone (ALDACTONE) 25 MG tablet Take 25 mg by mouth daily       triamcinolone (KENALOG) 0.025 % cream  "Apply topically every 12 hours as needed to rash to the affected area prn, apply tin layer to the rash at the LE.       Warfarin Sodium (COUMADIN PO) As directed, per INR       Case Management:  I have reviewed the care plan and MDS and do agree with the plan. Patient's desire to return to the community is present, but is not able due to care needs . Information reviewed:  Medications, vital signs, orders, and nursing notes.    ROS:  Unobtainable secondary to cognitive impairment.     Vitals:  /68   Pulse 65   Temp 97.3  F (36.3  C)   Resp 18   Ht 1.753 m (5' 9\")   Wt 78.5 kg (173 lb 1.6 oz)   SpO2 97%   BMI 25.56 kg/m    Body mass index is 25.56 kg/m .  Exam:  GENERAL APPEARANCE:  Alert, in no distress  ENT:  Mouth and posterior oropharynx normal, moist mucous membranes, [facility]  RESP:  respiratory effort and palpation of chest normal, lungs clear to auscultation   CV:  Palpation and auscultation of heart done , regular rate and rhythm, no murmur, rub, or gallop  M/S:   Gait and station normal  Digits and nails normal  SKIN:  Inspection of skin and subcutaneous tissue baseline, Palpation of skin and subcutaneous tissue baseline  NEURO:   Cranial nerves 2-12 are normal tested and grossly at patient's baseline  PSYCH:  memory impaired , affect abnormal flat    Lab/Diagnostic data:   Labs done in SNF are in [facility]. Please refer to them using [employer]Care Everywhere. and Recent labs in [facility] reviewed by me today.     ASSESSMENT/PLAN  (E03.9) Hypothyroidism, unspecified type  (primary encounter diagnosis)  Comment:   TSH   Date Value Ref Range Status   11/19/2019 4.87 0.30 - 5.00 uIU/mL Final   Plan:   -Continue levothyroxine as ordered. 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   Plan:   -LTC support with medication administration, meals and safety.     (I50.22) Chronic systolic congestive heart " failure (H)  Comment: Chronic, fluid level stable.   Plan:   Continue current POC without changes. Call provider if greater than 5 pound weight gain from previous weight. Monitor for SOB, increasing lower extremity edema, wheezing, and change in activity tolerance.     (E55.9) Vitamin D deficiency  Comment: Chronic, no supplement at this time  Plan:   -Vitamin D level next lab day     See new orders as above       Electronically signed by:  ANGEL Corrigan [facility]

## 2020-08-12 ENCOUNTER — RECORDS - HEALTHEAST (OUTPATIENT)
Dept: LAB | Facility: CLINIC | Age: 85
End: 2020-08-12

## 2020-08-13 ENCOUNTER — TELEPHONE (OUTPATIENT)
Dept: GERIATRICS | Facility: CLINIC | Age: 85
End: 2020-08-13

## 2020-08-13 LAB
25(OH)D3 SERPL-MCNC: 68.3 NG/ML (ref 30–80)
ANION GAP SERPL CALCULATED.3IONS-SCNC: 8 MMOL/L (ref 5–18)
BASOPHILS # BLD AUTO: 0.1 THOU/UL (ref 0–0.2)
BASOPHILS NFR BLD AUTO: 1 % (ref 0–2)
BUN SERPL-MCNC: 31 MG/DL (ref 8–28)
CALCIUM SERPL-MCNC: 9.5 MG/DL (ref 8.5–10.5)
CHLORIDE BLD-SCNC: 102 MMOL/L (ref 98–107)
CO2 SERPL-SCNC: 25 MMOL/L (ref 22–31)
CREAT SERPL-MCNC: 1.71 MG/DL (ref 0.7–1.3)
EOSINOPHIL # BLD AUTO: 0.2 THOU/UL (ref 0–0.4)
EOSINOPHIL NFR BLD AUTO: 3 % (ref 0–6)
ERYTHROCYTE [DISTWIDTH] IN BLOOD BY AUTOMATED COUNT: 14.7 % (ref 11–14.5)
GFR SERPL CREATININE-BSD FRML MDRD: 38 ML/MIN/1.73M2
GLUCOSE BLD-MCNC: 80 MG/DL (ref 70–125)
HCT VFR BLD AUTO: 40.3 % (ref 40–54)
HGB BLD-MCNC: 12.3 G/DL (ref 14–18)
INR PPP: 2.21 (ref 0.9–1.1)
LYMPHOCYTES # BLD AUTO: 2.1 THOU/UL (ref 0.8–4.4)
LYMPHOCYTES NFR BLD AUTO: 30 % (ref 20–40)
MCH RBC QN AUTO: 31.4 PG (ref 27–34)
MCHC RBC AUTO-ENTMCNC: 30.5 G/DL (ref 32–36)
MCV RBC AUTO: 103 FL (ref 80–100)
MONOCYTES # BLD AUTO: 0.5 THOU/UL (ref 0–0.9)
MONOCYTES NFR BLD AUTO: 8 % (ref 2–10)
NEUTROPHILS # BLD AUTO: 4 THOU/UL (ref 2–7.7)
NEUTROPHILS NFR BLD AUTO: 58 % (ref 50–70)
PLATELET # BLD AUTO: 223 THOU/UL (ref 140–440)
PMV BLD AUTO: 10.2 FL (ref 8.5–12.5)
POTASSIUM BLD-SCNC: 5.1 MMOL/L (ref 3.5–5)
RBC # BLD AUTO: 3.92 MILL/UL (ref 4.4–6.2)
SODIUM SERPL-SCNC: 135 MMOL/L (ref 136–145)
TSH SERPL DL<=0.005 MIU/L-ACNC: 3.32 UIU/ML (ref 0.3–5)
WBC: 7 THOU/UL (ref 4–11)

## 2020-08-13 NOTE — TELEPHONE ENCOUNTER
ON-CALL  GERIATRICS CROSS-COVERAGE NOTE:    Call from RN to report INR results.  Patient is on warfarin 1.5 mg p.o. every Monday, Wednesday, Friday and 2 mg p.o. every Tuesday, Thursday, Saturday, and Sunday for atrial fibrillation.  Today's INR is 2.21.  Previously it was 2.3 on July 30.    Plan:  Continue warfarin as before  Repeat INR on August 20    Noble Martinez MD

## 2020-08-19 ENCOUNTER — RECORDS - HEALTHEAST (OUTPATIENT)
Dept: LAB | Facility: CLINIC | Age: 85
End: 2020-08-19

## 2020-08-20 LAB — INR PPP: 2.29 (ref 0.9–1.1)

## 2020-09-09 ENCOUNTER — RECORDS - HEALTHEAST (OUTPATIENT)
Dept: LAB | Facility: CLINIC | Age: 85
End: 2020-09-09

## 2020-09-10 ENCOUNTER — NURSING HOME VISIT (OUTPATIENT)
Dept: GERIATRICS | Facility: CLINIC | Age: 85
End: 2020-09-10
Payer: COMMERCIAL

## 2020-09-10 ENCOUNTER — TELEPHONE (OUTPATIENT)
Dept: GERIATRICS | Facility: CLINIC | Age: 85
End: 2020-09-10

## 2020-09-10 VITALS
TEMPERATURE: 98.1 F | HEART RATE: 72 BPM | RESPIRATION RATE: 20 BRPM | BODY MASS INDEX: 25.55 KG/M2 | SYSTOLIC BLOOD PRESSURE: 124 MMHG | HEIGHT: 69 IN | OXYGEN SATURATION: 96 % | WEIGHT: 172.5 LBS | DIASTOLIC BLOOD PRESSURE: 76 MMHG

## 2020-09-10 DIAGNOSIS — I48.20 CHRONIC ATRIAL FIBRILLATION (H): ICD-10-CM

## 2020-09-10 DIAGNOSIS — I12.9 BENIGN HYPERTENSION WITH CHRONIC KIDNEY DISEASE, STAGE III (H): Primary | ICD-10-CM

## 2020-09-10 DIAGNOSIS — G30.1 LATE ONSET ALZHEIMER'S DISEASE WITH BEHAVIORAL DISTURBANCE (H): ICD-10-CM

## 2020-09-10 DIAGNOSIS — E03.9 HYPOTHYROIDISM, UNSPECIFIED TYPE: ICD-10-CM

## 2020-09-10 DIAGNOSIS — F02.818 LATE ONSET ALZHEIMER'S DISEASE WITH BEHAVIORAL DISTURBANCE (H): ICD-10-CM

## 2020-09-10 DIAGNOSIS — F43.23 ADJUSTMENT DISORDER WITH MIXED ANXIETY AND DEPRESSED MOOD: ICD-10-CM

## 2020-09-10 DIAGNOSIS — E55.9 VITAMIN D DEFICIENCY: ICD-10-CM

## 2020-09-10 DIAGNOSIS — N18.30 BENIGN HYPERTENSION WITH CHRONIC KIDNEY DISEASE, STAGE III (H): Primary | ICD-10-CM

## 2020-09-10 LAB — INR PPP: 1.82 (ref 0.9–1.1)

## 2020-09-10 PROCEDURE — 99309 SBSQ NF CARE MODERATE MDM 30: CPT | Performed by: INTERNAL MEDICINE

## 2020-09-10 ASSESSMENT — MIFFLIN-ST. JEOR: SCORE: 1417.83

## 2020-09-10 NOTE — TELEPHONE ENCOUNTER
Called re: INR 1.82    Recent INR    7/17/20 -- 2.2  7/31/20 -- 2.2  8/20/20 -- 2.39    He has been on warfarin 1.5 mg MWF and 2 mg all other days.     Orders:  -- increase warfarin to 1.5 mg MW and 2 mg all other days (increased Fri dose from 1.5 --> 2 mg)  -- INR 9/21/20    Evelyne Lin MD

## 2020-09-10 NOTE — LETTER
"    9/10/2020        RE: Earl Sanchez  [facility]  [address]  [address]  [facility] [address]        Earl Sanchez is a 89 year old male seen September 10, 2020 at [facility] where he has resided for 2 and a half years (admit 2/2018) seen to follow up dementia with behavioral symptoms.      Pt is seen in his room up to .   He states \"I don't have any problems today,\" and denies pain or other symptoms.   Quiet and somewhat guarded today, but nursing staff reports outbursts with family and staff, verbally abusive, and can be resistant to cares.  Some improvement after addition of citalopram.     He also has had unresponsive episodes, left facial droop, dysarthria and bilateral arm weakness.   His family was present during one of these and 911 was called. However, when EMTs arrived patient refused transport to ED.   Symptoms resolved on their own.  Pt also had an ED visit in March 2019 for an episode of unresponsiveness that persisted in the ED.   He had head CT, then awoke and responded to questions.   Workup unremarkable and he returned to [facility].     Patient had a hospitalization in January 2018 for encephalopathy with gait disturbance.  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    He was in [facility] for a month, improved, but needed more support than AL, so transitioned to LTC for permanent placement.  Continues to need assist for transfers bed to .      Patient has had atrial fib for many years, anticoagulated with warfarin.   Also treated for BPH, CAD and hypothyroidism, which have been stable.       Past Medical History:   Diagnosis Date     Abdominal aortic aneurysm (H)     per 2/12/15 abdominal US, mild aneurysm measuring [id number]     Acute MI 1981, 2007     Atrial fibrillation (H)      BPH (benign prostatic hyperplasia)      CAD (coronary artery disease)     CABG 2007, f/b cardio, Dr. 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    , lived at the [facility] with services until 1/2018.      Has 3 children.     Review Of Systems  Limited, but negative other than above.         SLUMS 13/30   CPT 4.4   BIMS 11/15   Wt Readings from Last 5 Encounters:   09/10/20 78.2 kg (172 lb 8 oz)   07/30/20 78.5 kg (173 lb 1.6 oz)   05/21/20 78.6 kg (173 lb 3.2 oz)   05/06/20 79 kg (174 lb 3.2 oz)   03/05/20 80.7 kg (178 lb)      EXAM: NAD  /76   Pulse 72   Temp 98.1  F (36.7  C)   Resp 20   Ht 1.753 m (5' 9\")   Wt 78.2 kg (172 lb 8 oz)   SpO2 96%   BMI 25.47 kg/m     Neck supple without adenopathy  Lungs decreased BS, crackles LLL that clear with cough.   Heart irreg irreg s1s2, 1/6 HSM  Abd soft, NT, no distention, +BS  Ext without edema.   Neuro: no tremor or stiffness, +STML   Psych: affect slightly flat.     LAB 8/13/2020:        Sodium  136 - 145 mmol/L  135Low       Potassium  3.5 - 5.0 mmol/L  5.1High       Chloride  98 - 107 mmol/L  102     CO2  22 - 31 mmol/L  25     Anion Gap, Calculation  5 - 18 mmol/L  8     Glucose  70 - 125 mg/dL  80     Calcium  8.5 - 10.5 mg/dL  9.5     BUN  8 - 28 mg/dL  31High       Creatinine  0.70 - 1.30 mg/dL  1.71High       GFR MDRD Af Amer  >60 mL/min/1.73m2  46Low       GFR MDRD Non Af Amer  >60 mL/min/1.73m2  38Low        Vitamin D, Total (25-Hydroxy)  30.0 - 80.0 ng/mL  68.3      WBC  4.0 - 11.0 thou/uL  7.0     RBC  4.40 - 6.20 mill/uL  3.92Low       Hemoglobin  14.0 - 18.0 g/dL  12.3Low       Hematocrit  40.0 - 54.0 %  40.3     MCV  80 - 100 fL  103High       MCH  27.0 - 34.0 pg  31.4   "   MCHC  32.0 - 36.0 g/dL  30.5Low       RDW  11.0 - 14.5 %  14.7High       Platelets  140 - 440 thou/uL  223          IMP/PLAN:  (R41.89) Unresponsive episode    Comment: recurrent, unclear etiology  Plan: Pt has declined ED transfer for this.   Continue to monitor in NH.      If any further syncope would look to discontinuation of donepezil as a likely culprit.       (I12.9,  N18.3) Benign hypertension with chronic kidney disease, stage III (H)    Comment:  GFR 38  BP Readings from Last 3 Encounters:   09/10/20 124/76   07/30/20 132/68   05/21/20 118/65    Plan: remains on doxazosin for bp control and BPH, and spironolactone 25 mg/day.  With higher K+, would decrease spironolactone to 12.5 mg/day and follow BMP.       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  Plan: LTC support for meds, meals, activity.    Not likely that donepezil is adding any benefit at this time, and may be causing side effects especially syncope and agitation.       (F43.23) Adjustment disorder with mixed anxiety and depressed mood  Comment: has worsened with cognitive decline     Plan: continue citalopram 20 mg/day which has been helpful       (I48.2) Chronic atrial fibrillation (H)  Comment: not requiring rate slowing meds   Pulse Readings from Last 4 Encounters:   09/10/20 72   07/30/20 65   05/21/20 67   05/06/20 70      Plan: warfarin per INR, goal 2-2.5 given fall risk.    (I25.10) Coronary artery disease involving native coronary artery of native heart without angina pectoris  Comment: h/o CABG 2007  Plan: daily ASA, statin for secondary prevention.   NTG prn    (N40.0) Benign prostatic hyperplasia, unspecified whether lower urinary tract symptoms present  Comment: longstanding  Plan: continue doxazosin.       (E03.9) Hypothyroidism, unspecified type  Comment:    TSH  0.30 - 5.00 uIU/mL  3.32      Plan: levothyroxine 88 mcg/day, yearly  TSH       (E55.9) Vitamin D deficiency  Comment: replaced   Plan: vit D to 2000 units/day       Patty Gamble MD         Sincerely,        Patty Gamble MD

## 2020-09-12 NOTE — PROGRESS NOTES
"Earl Sanchez is a 89 year old male seen September 10, 2020 at [facility] where he has resided for 2 and a half years (admit 2/2018) seen to follow up dementia with behavioral symptoms.      Pt is seen in his room up to .   He states \"I don't have any problems today,\" and denies pain or other symptoms.   Quiet and somewhat guarded today, but nursing staff reports outbursts with family and staff, verbally abusive, and can be resistant to cares.  Some improvement after addition of citalopram.     He also has had unresponsive episodes, left facial droop, dysarthria and bilateral arm weakness.   His family was present during one of these and 911 was called. However, when EMTs arrived patient refused transport to ED.   Symptoms resolved on their own.  Pt also had an ED visit in March 2019 for an episode of unresponsiveness that persisted in the ED.   He had head CT, then awoke and responded to questions.   Workup unremarkable and he returned to [facility].     Patient had a hospitalization in January 2018 for encephalopathy with gait disturbance.  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    He was in [facility] for a month, improved, but needed more support than AL, so transitioned to LTC for permanent placement.  Continues to need assist for transfers bed to .      Patient has had atrial fib for many years, anticoagulated with warfarin.   Also treated for BPH, CAD and hypothyroidism, which have been stable.       Past Medical History:   Diagnosis Date     Abdominal aortic aneurysm (H)     per 2/12/15 abdominal US, mild aneurysm measuring [id number]     Acute MI 1981, 2007     Atrial fibrillation (H)      BPH (benign prostatic hyperplasia)      CAD (coronary artery disease)     CABG 2007, f/b cardio, Dr. 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    , lived at the [facility] with services until 1/2018.      Has 3 children.     Review Of Systems  Limited, but negative other than above.         SLUMS 13/30   CPT 4.4   BIMS 11/15   Wt Readings from Last 5 Encounters:   09/10/20 78.2 kg (172 lb 8 oz)   07/30/20 78.5 kg (173 lb 1.6 oz)   05/21/20 78.6 kg (173 lb 3.2 oz)   05/06/20 79 kg (174 lb 3.2 oz)   03/05/20 80.7 kg (178 lb)      EXAM: NAD  /76   Pulse 72   Temp 98.1  F (36.7  C)   Resp 20   Ht 1.753 m (5' 9\")   Wt 78.2 kg (172 lb 8 oz)   SpO2 96%   BMI 25.47 kg/m     Neck supple without adenopathy  Lungs decreased BS, crackles LLL that clear with cough.   Heart irreg irreg s1s2, 1/6 HSM  Abd soft, NT, no distention, +BS  Ext without edema.   Neuro: no tremor or stiffness, +STML   Psych: affect slightly flat.     LAB 8/13/2020:        Sodium  136 - 145 mmol/L  135Low       Potassium  3.5 - 5.0 mmol/L  5.1High       Chloride  98 - 107 mmol/L  102     CO2  22 - 31 mmol/L  25     Anion Gap, Calculation  5 - 18 mmol/L  8     Glucose  70 - 125 mg/dL  80     Calcium  8.5 - 10.5 mg/dL  9.5     BUN  8 - 28 mg/dL  31High       Creatinine  0.70 - 1.30 mg/dL  1.71High       GFR MDRD Af Amer  >60 mL/min/1.73m2  46Low       GFR MDRD Non Af Amer  >60 mL/min/1.73m2  38Low        Vitamin D, Total (25-Hydroxy)  30.0 - 80.0 ng/mL  68.3      WBC  4.0 - 11.0 thou/uL  7.0     RBC  4.40 - 6.20 mill/uL  3.92Low       Hemoglobin  14.0 - 18.0 g/dL  12.3Low       Hematocrit  40.0 - 54.0 %  40.3     MCV  80 - 100 fL  103High       MCH  27.0 - 34.0 pg  31.4     MCHC  32.0 - 36.0 g/dL  30.5Low       RDW  11.0 - 14.5 %  14.7High       Platelets  140 - 440 thou/uL  223  "         IMP/PLAN:  (R41.89) Unresponsive episode    Comment: recurrent, unclear etiology  Plan: Pt has declined ED transfer for this.   Continue to monitor in NH.      If any further syncope would look to discontinuation of donepezil as a likely culprit.       (I12.9,  N18.3) Benign hypertension with chronic kidney disease, stage III (H)    Comment:  GFR 38  BP Readings from Last 3 Encounters:   09/10/20 124/76   07/30/20 132/68   05/21/20 118/65    Plan: remains on doxazosin for bp control and BPH, and spironolactone 25 mg/day.  With higher K+, would decrease spironolactone to 12.5 mg/day and follow BMP.       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  Plan: LTC support for meds, meals, activity.    Not likely that donepezil is adding any benefit at this time, and may be causing side effects especially syncope and agitation.       (F43.23) Adjustment disorder with mixed anxiety and depressed mood  Comment: has worsened with cognitive decline     Plan: continue citalopram 20 mg/day which has been helpful       (I48.2) Chronic atrial fibrillation (H)  Comment: not requiring rate slowing meds   Pulse Readings from Last 4 Encounters:   09/10/20 72   07/30/20 65   05/21/20 67   05/06/20 70      Plan: warfarin per INR, goal 2-2.5 given fall risk.    (I25.10) Coronary artery disease involving native coronary artery of native heart without angina pectoris  Comment: h/o CABG 2007  Plan: daily ASA, statin for secondary prevention.   NTG prn    (N40.0) Benign prostatic hyperplasia, unspecified whether lower urinary tract symptoms present  Comment: longstanding  Plan: continue doxazosin.       (E03.9) Hypothyroidism, unspecified type  Comment:    TSH  0.30 - 5.00 uIU/mL  3.32      Plan: levothyroxine 88 mcg/day, yearly TSH       (E55.9) Vitamin D deficiency  Comment: replaced   Plan: vit D to 2000 units/day       Patty Gamble  MD

## 2020-09-20 ENCOUNTER — RECORDS - HEALTHEAST (OUTPATIENT)
Dept: LAB | Facility: CLINIC | Age: 85
End: 2020-09-20

## 2020-09-21 LAB — INR PPP: 2.16 (ref 0.9–1.1)

## 2020-09-24 ENCOUNTER — RECORDS - HEALTHEAST (OUTPATIENT)
Dept: LAB | Facility: CLINIC | Age: 85
End: 2020-09-24

## 2020-09-25 LAB — INR PPP: 1.94 (ref 0.9–1.1)

## 2020-10-21 ENCOUNTER — RECORDS - HEALTHEAST (OUTPATIENT)
Dept: LAB | Facility: CLINIC | Age: 85
End: 2020-10-21

## 2020-10-22 ENCOUNTER — TELEPHONE (OUTPATIENT)
Dept: GERIATRICS | Facility: CLINIC | Age: 85
End: 2020-10-22

## 2020-10-22 LAB — INR PPP: 1.7 (ref 0.9–1.1)

## 2020-10-22 NOTE — TELEPHONE ENCOUNTER
ON-CALL  GERIATRICS CROSS-COVERAGE NOTE:    Call from RN to report the result of INR which is 1.7. Patient is currently on warfarin 1.5 mg p.o. daily since September 21.  He is on warfarin for atrial fibrillation with a target range of target range of 2-2.5. Previous INR was 1.94 on September 25.    Plan:  I asked RN to give warfarin 2 mg p.o. tonight as well as tomorrow night then back to 1.5 mg p.o. daily until repeat INR on Tuesday , October 27.    Noble Martinez MD

## 2020-10-26 ENCOUNTER — RECORDS - HEALTHEAST (OUTPATIENT)
Dept: LAB | Facility: CLINIC | Age: 85
End: 2020-10-26

## 2020-10-27 ENCOUNTER — TELEPHONE (OUTPATIENT)
Dept: GERIATRICS | Facility: CLINIC | Age: 85
End: 2020-10-27

## 2020-10-27 LAB — INR PPP: 1.78 (ref 0.9–1.1)

## 2020-10-28 NOTE — TELEPHONE ENCOUNTER
Nurse called reporting INR 1.78 today.   Last INR 1.70 on 10/22/2020. Received 2 mg daily 10/22 and 10/23, then 1.5 mg daily.   Afib with goal INR 2-2.5 due to history of falls.     Order given for coumadin 1.5 mg every MW, 2 mg the other days of the week.   INR 11/3/2020.     Jaycob ORTIZ CNP

## 2020-11-02 ENCOUNTER — RECORDS - HEALTHEAST (OUTPATIENT)
Dept: LAB | Facility: CLINIC | Age: 85
End: 2020-11-02

## 2020-11-03 ENCOUNTER — TELEPHONE (OUTPATIENT)
Dept: GERIATRICS | Facility: CLINIC | Age: 85
End: 2020-11-03

## 2020-11-03 LAB — INR PPP: 1.86 (ref 0.9–1.1)

## 2020-11-03 NOTE — TELEPHONE ENCOUNTER
Nurse called reporting INR 1.86 today.   Last INR 1.78 on 10/27/2200 and coumadin was increased to 1.5 mg twice a week and 2 mg the other days.   Afib with goal INR 2-2.5 due to history of falls.     Order given to continue same dose coumadin and recheck INR in 2 weeks.       Jaycob ORTIZ CNP

## 2020-11-05 ASSESSMENT — MIFFLIN-ST. JEOR: SCORE: 1417.37

## 2020-11-06 ENCOUNTER — NURSING HOME VISIT (OUTPATIENT)
Dept: GERIATRICS | Facility: CLINIC | Age: 85
End: 2020-11-06
Payer: COMMERCIAL

## 2020-11-06 VITALS
DIASTOLIC BLOOD PRESSURE: 64 MMHG | BODY MASS INDEX: 25.7 KG/M2 | HEIGHT: 69 IN | WEIGHT: 173.5 LBS | SYSTOLIC BLOOD PRESSURE: 118 MMHG | RESPIRATION RATE: 16 BRPM | OXYGEN SATURATION: 95 % | TEMPERATURE: 97.9 F | HEART RATE: 66 BPM

## 2020-11-06 DIAGNOSIS — F02.818 LATE ONSET ALZHEIMER'S DISEASE WITH BEHAVIORAL DISTURBANCE (H): ICD-10-CM

## 2020-11-06 DIAGNOSIS — N18.30 BENIGN HYPERTENSION WITH CHRONIC KIDNEY DISEASE, STAGE III (H): Primary | ICD-10-CM

## 2020-11-06 DIAGNOSIS — F43.23 ADJUSTMENT DISORDER WITH MIXED ANXIETY AND DEPRESSED MOOD: ICD-10-CM

## 2020-11-06 DIAGNOSIS — G30.1 LATE ONSET ALZHEIMER'S DISEASE WITH BEHAVIORAL DISTURBANCE (H): ICD-10-CM

## 2020-11-06 DIAGNOSIS — E03.9 HYPOTHYROIDISM, UNSPECIFIED TYPE: ICD-10-CM

## 2020-11-06 DIAGNOSIS — I12.9 BENIGN HYPERTENSION WITH CHRONIC KIDNEY DISEASE, STAGE III (H): Primary | ICD-10-CM

## 2020-11-06 PROCEDURE — 99309 SBSQ NF CARE MODERATE MDM 30: CPT | Performed by: NURSE PRACTITIONER

## 2020-11-06 NOTE — LETTER
11/6/2020        RE: Earl Sanchez  [facility]  [address]  [facility] [address]        [place] [facility]  Chief Complaint   Patient presents with     half-way Regulatory     Wellness Visit     [place] Medical Record Number:  [id number]  Place of Service where encounter took place:  [facility] ([facility]) [[id number]]    HPI:    Earl Sanchez  is 89 year old (9/17/1926), who is being seen today for a federally mandated E/M visit.  HPI information obtained from: facility chart records, facility staff and patient report.     Today's concerns are:  Benign hypertension with chronic kidney disease, stage III  Blood pressure lower range of goal. Taking diuretics to manage chronic CHF and Doxazosin Mesylate . Denies CP, SOB or lightheadedness.     Late onset Alzheimer's disease with behavioral disturbance (H)  Adjustment disorder with mixed anxiety and depressed mood  BIMS 11/15  Per nursing facility documentation, resident has been pleasant with staff.   -Currently taking celexa 10 mg/day. Resident tolerated dose reduction.     Hypothyroidism, unspecified type  TSH   Date Value Ref Range Status   11/19/2019 4.87 0.30 - 5.00 uIU/mL Final       ALLERGIES:No known allergies  PAST MEDICAL HISTORY:   has a past medical history of Abdominal aortic aneurysm (H), Acute MI (H) (1981, 2007), Atrial fibrillation (H), BPH (benign prostatic hyperplasia), CAD (coronary artery disease), Chronic kidney disease, Duodenal ulcer with hemorrhage, Gait apraxia, Gastritis, Gastro-oesophageal reflux disease, Heart failure (H), Hyperlipidaemia LDL goal <100, Hypertension goal BP (blood pressure) < 140/90, Osteoarthritis, Renal disease, and Thyroid disease. He also has no past medical history of Malignant hyperthermia or PONV (postoperative nausea and vomiting).  PAST SURGICAL HISTORY:   has a past surgical history that includes hernia repair, inguinal rt/lt; Ankle  surgery; Phacoemulsification clear cornea with toric intraocular lens implant (4/10/2014); Phacoemulsification clear cornea with toric intraocular lens implant (4/22/2014); coronary artery bypass (2007); and Heart Cath Left heart cath (12/10/07).  FAMILY HISTORY: family history includes Hypertension in his maternal grandmother and mother.  SOCIAL HISTORY:  reports that he has never smoked. He has never used smokeless tobacco. He reports current alcohol use. He reports that he does not use drugs.    MEDICATIONS:  Current Outpatient Medications   Medication Sig Dispense Refill     acetaminophen (TYLENOL) 500 MG tablet Take 1,000 mg by mouth every 6 hours as needed for mild pain        allopurinol (ZYLOPRIM) 100 MG tablet Take 100 mg by mouth 2 times daily       aspirin (ASPIRIN LOW DOSE) 81 MG tablet Take 81 mg by mouth daily        citalopram (CELEXA) 10 MG tablet Take 20 mg by mouth daily       donepezil (ARICEPT) 5 MG tablet Take 5 mg by mouth At Bedtime       doxazosin (CARDURA) 4 MG tablet Take 2 tablets (8 mg) by mouth At Bedtime 90 tablet 3     ERGOCALCIFEROL PO Take 50,000 Units by mouth every morning Wednesday        furosemide (LASIX) 10 mg TABS half-tab Take 10 mg by mouth daily       LEVOTHYROXINE SODIUM PO Take 88 mcg by mouth daily       nitroglycerin (NITROSTAT) 0.4 MG SL tablet Place 1 tablet (0.4 mg) under the tongue every 5 minutes as needed for chest pain 25 tablet 0     phenylephrine-shark liver oil-mineral oil-petrolatum (PREPARATION H) 0.25-14-74.9 % rectal ointment Place 1 applicator rectally 4 times daily as needed for hemorrhoids for Hemorrhoids       polyethylene glycol 3350 POWD Take 17 g by mouth daily       pravastatin (PRAVACHOL) 40 MG tablet TAKE 1 TABLET DAILY 90 tablet 1     Sennosides-Docusate Sodium (SENNA-DOCUSATE SODIUM) 8.6-50 MG TABS Take 1 tablet by mouth 2 times daily        spironolactone (ALDACTONE) 25 MG tablet Take 25 mg by mouth daily       triamcinolone (KENALOG) 0.025 %  "cream Apply topically every 12 hours as needed to rash to the affected area prn, apply tin layer to the rash at the LE.       Warfarin Sodium (COUMADIN PO) As directed, per INR       Case Management:  I have reviewed the care plan and MDS and do agree with the plan. Patient's desire to return to the community is not assessible due to cognitive impairment. Information reviewed:  Medications, vital signs, orders, and nursing notes.    ROS:  4 point ROS including Respiratory, CV, GI and , other than that noted in the HPI,  is negative    Vitals:  /64   Pulse 66   Temp 97.9  F (36.6  C)   Resp 16   Ht 1.753 m (5' 9\")   Wt 78.7 kg (173 lb 8 oz)   SpO2 95%   BMI 25.62 kg/m    Body mass index is 25.62 kg/m .  Exam:  GENERAL APPEARANCE:  Alert  ENT:  Mouth and posterior oropharynx normal, moist mucous membranes, [place]  RESP:  respiratory effort and palpation of chest normal, lungs clear to auscultation   CV:  Palpation and auscultation of heart done , regular rate and rhythm, no murmur, rub, or gallop  M/S:   Gait and station normal  Digits and nails normal  SKIN:  Inspection of skin and subcutaneous tissue baseline, Palpation of skin and subcutaneous tissue baseline  NEURO:   Cranial nerves 2-12 are normal tested and grossly at patient's baseline  PSYCH:  insight and judgement impaired, memory impaired     Lab/Diagnostic data:   Labs done in SNF are in [facility]. Please refer to them using [employer]/Care Everywhere. and Recent labs in [facility] reviewed by me today.     ASSESSMENT/PLAN  (I12.9,  N18.30) Benign hypertension with chronic kidney disease, stage III  (primary encounter diagnosis)  Comment: Chronic, lower than desired BPs.   Plan:   -Decrease doxazosin 4 mg/HS  -Keep SBP> 130 mmHg and DBP > 65 mmHg (levels below these increase mortality as shown by standard studies and observations).     (G30.1,  F02.81) Late onset Alzheimer's disease with behavioral disturbance (H)  (F43.23) Adjustment disorder with " mixed anxiety and depressed mood  Comment: Chronic, progressive. Has tolerated recent dose adjustments celexa 10/2020.   Plan:   -Continue current medications. Would recommend discontinuing Aricept as disease progresses. Family has declined in past conversations.   -Expect further decline with progression of disease. Continue LTC assist with meals, medication administration, meals and safety.     (E03.9) Hypothyroidism, unspecified type  Comment:   TSH   Date Value Ref Range Status   11/19/2019 4.87 0.30 - 5.00 uIU/mL Final   Plan: Continue current dose of levothyroxine.  -TSH next lab day.       Orders:  Decrease doxazosin 4 mg/HS  TSH, CBC and BMP     Electronically signed by:  ANGEL Corrigan [facility]             Sincerely,        Christiana Osuna NP

## 2020-11-06 NOTE — PROGRESS NOTES
[place] [facility]  Chief Complaint   Patient presents with     snf Regulatory     Wellness Visit     [place] Medical Record Number:  [id number]  Place of Service where encounter took place:  [facility] ([facility]) [[id number]]    HPI:    Earl Sanchez  is 89 year old (9/17/1926), who is being seen today for a federally mandated E/M visit.  HPI information obtained from: facility chart records, facility staff and patient report.     Today's concerns are:  Benign hypertension with chronic kidney disease, stage III  Blood pressure lower range of goal. Taking diuretics to manage chronic CHF and Doxazosin Mesylate . Denies CP, SOB or lightheadedness.     Late onset Alzheimer's disease with behavioral disturbance (H)  Adjustment disorder with mixed anxiety and depressed mood  BIMS 11/15  Per nursing facility documentation, resident has been pleasant with staff.   -Currently taking celexa 10 mg/day. Resident tolerated dose reduction.     Hypothyroidism, unspecified type  TSH   Date Value Ref Range Status   11/19/2019 4.87 0.30 - 5.00 uIU/mL Final       ALLERGIES:No known allergies  PAST MEDICAL HISTORY:   has a past medical history of Abdominal aortic aneurysm (H), Acute MI (H) (1981, 2007), Atrial fibrillation (H), BPH (benign prostatic hyperplasia), CAD (coronary artery disease), Chronic kidney disease, Duodenal ulcer with hemorrhage, Gait apraxia, Gastritis, Gastro-oesophageal reflux disease, Heart failure (H), Hyperlipidaemia LDL goal <100, Hypertension goal BP (blood pressure) < 140/90, Osteoarthritis, Renal disease, and Thyroid disease. He also has no past medical history of Malignant hyperthermia or PONV (postoperative nausea and vomiting).  PAST SURGICAL HISTORY:   has a past surgical history that includes hernia repair, inguinal rt/lt; Ankle surgery; Phacoemulsification clear cornea with toric intraocular lens implant (4/10/2014); Phacoemulsification clear cornea with toric  intraocular lens implant (4/22/2014); coronary artery bypass (2007); and Heart Cath Left heart cath (12/10/07).  FAMILY HISTORY: family history includes Hypertension in his maternal grandmother and mother.  SOCIAL HISTORY:  reports that he has never smoked. He has never used smokeless tobacco. He reports current alcohol use. He reports that he does not use drugs.    MEDICATIONS:  Current Outpatient Medications   Medication Sig Dispense Refill     acetaminophen (TYLENOL) 500 MG tablet Take 1,000 mg by mouth every 6 hours as needed for mild pain        allopurinol (ZYLOPRIM) 100 MG tablet Take 100 mg by mouth 2 times daily       aspirin (ASPIRIN LOW DOSE) 81 MG tablet Take 81 mg by mouth daily        citalopram (CELEXA) 10 MG tablet Take 20 mg by mouth daily       donepezil (ARICEPT) 5 MG tablet Take 5 mg by mouth At Bedtime       doxazosin (CARDURA) 4 MG tablet Take 2 tablets (8 mg) by mouth At Bedtime 90 tablet 3     ERGOCALCIFEROL PO Take 50,000 Units by mouth every morning Wednesday        furosemide (LASIX) 10 mg TABS half-tab Take 10 mg by mouth daily       LEVOTHYROXINE SODIUM PO Take 88 mcg by mouth daily       nitroglycerin (NITROSTAT) 0.4 MG SL tablet Place 1 tablet (0.4 mg) under the tongue every 5 minutes as needed for chest pain 25 tablet 0     phenylephrine-shark liver oil-mineral oil-petrolatum (PREPARATION H) 0.25-14-74.9 % rectal ointment Place 1 applicator rectally 4 times daily as needed for hemorrhoids for Hemorrhoids       polyethylene glycol 3350 POWD Take 17 g by mouth daily       pravastatin (PRAVACHOL) 40 MG tablet TAKE 1 TABLET DAILY 90 tablet 1     Sennosides-Docusate Sodium (SENNA-DOCUSATE SODIUM) 8.6-50 MG TABS Take 1 tablet by mouth 2 times daily        spironolactone (ALDACTONE) 25 MG tablet Take 25 mg by mouth daily       triamcinolone (KENALOG) 0.025 % cream Apply topically every 12 hours as needed to rash to the affected area prn, apply tin layer to the rash at the LE.       Warfarin  "Sodium (COUMADIN PO) As directed, per INR       Case Management:  I have reviewed the care plan and MDS and do agree with the plan. Patient's desire to return to the community is not assessible due to cognitive impairment. Information reviewed:  Medications, vital signs, orders, and nursing notes.    ROS:  4 point ROS including Respiratory, CV, GI and , other than that noted in the HPI,  is negative    Vitals:  /64   Pulse 66   Temp 97.9  F (36.6  C)   Resp 16   Ht 1.753 m (5' 9\")   Wt 78.7 kg (173 lb 8 oz)   SpO2 95%   BMI 25.62 kg/m    Body mass index is 25.62 kg/m .  Exam:  GENERAL APPEARANCE:  Alert  ENT:  Mouth and posterior oropharynx normal, moist mucous membranes, [place]  RESP:  respiratory effort and palpation of chest normal, lungs clear to auscultation   CV:  Palpation and auscultation of heart done , regular rate and rhythm, no murmur, rub, or gallop  M/S:   Gait and station normal  Digits and nails normal  SKIN:  Inspection of skin and subcutaneous tissue baseline, Palpation of skin and subcutaneous tissue baseline  NEURO:   Cranial nerves 2-12 are normal tested and grossly at patient's baseline  PSYCH:  insight and judgement impaired, memory impaired     Lab/Diagnostic data:   Labs done in SNF are in [facility]. Please refer to them using [employer]/Care Everywhere. and Recent labs in [facility] reviewed by me today.     ASSESSMENT/PLAN  (I12.9,  N18.30) Benign hypertension with chronic kidney disease, stage III  (primary encounter diagnosis)  Comment: Chronic, lower than desired BPs.   Plan:   -Decrease doxazosin 4 mg/HS  -Keep SBP> 130 mmHg and DBP > 65 mmHg (levels below these increase mortality as shown by standard studies and observations).     (G30.1,  F02.81) Late onset Alzheimer's disease with behavioral disturbance (H)  (F43.23) Adjustment disorder with mixed anxiety and depressed mood  Comment: Chronic, progressive. Has tolerated recent dose adjustments celexa 10/2020.   Plan:   -Continue " current medications. Would recommend discontinuing Aricept as disease progresses. Family has declined in past conversations.   -Expect further decline with progression of disease. Continue LTC assist with meals, medication administration, meals and safety.     (E03.9) Hypothyroidism, unspecified type  Comment:   TSH   Date Value Ref Range Status   11/19/2019 4.87 0.30 - 5.00 uIU/mL Final   Plan: Continue current dose of levothyroxine.  -TSH next lab day.       Orders:  Decrease doxazosin 4 mg/HS  TSH, CBC and BMP     Electronically signed by:  ANGEL Corrigan CNP  [place] [facility]

## 2020-11-16 ENCOUNTER — RECORDS - HEALTHEAST (OUTPATIENT)
Dept: LAB | Facility: CLINIC | Age: 85
End: 2020-11-16

## 2020-11-16 RX ORDER — DONEPEZIL HYDROCHLORIDE 5 MG/1
5 TABLET, FILM COATED ORAL AT BEDTIME
Start: 2020-11-16 | End: 2021-01-01

## 2020-11-17 ENCOUNTER — TELEPHONE (OUTPATIENT)
Dept: GERIATRICS | Facility: CLINIC | Age: 85
End: 2020-11-17

## 2020-11-17 LAB — INR PPP: 1.94 (ref 0.9–1.1)

## 2020-11-18 NOTE — TELEPHONE ENCOUNTER
INR 11/3 1.86, warfarin 1.5mg M/F, 2mg ROW  INR today 1.94  -continue current warfarin dose  -recheck INR on Tues 12/1

## 2020-11-30 ENCOUNTER — RECORDS - HEALTHEAST (OUTPATIENT)
Dept: LAB | Facility: CLINIC | Age: 85
End: 2020-11-30

## 2020-12-01 ENCOUNTER — TELEPHONE (OUTPATIENT)
Dept: GERIATRICS | Facility: CLINIC | Age: 85
End: 2020-12-01

## 2020-12-01 LAB — INR PPP: 2.16 (ref 0.9–1.1)

## 2020-12-02 NOTE — TELEPHONE ENCOUNTER
[place] [facility] TELEPHONE ENCOUNTER    Chief Complaint   Patient presents with     INR RESULTS       Earl aSnchez is a 89 year old  (9/17/1926),Nurse called today to report INR of 2.16 today. Last INR was on 11/3 and he has been getting coumadin 1.5 mg on M/F and 2 mg AOD.     ASSESSMENT/PLAN  therapeutic INR    Continue with current dose of coumadin and recheck INR in 4 weeks.     Electronically signed by:   ANGEL Hoyos CNP

## 2020-12-26 ENCOUNTER — RECORDS - HEALTHEAST (OUTPATIENT)
Dept: LAB | Facility: CLINIC | Age: 85
End: 2020-12-26

## 2020-12-29 ENCOUNTER — TELEPHONE (OUTPATIENT)
Dept: GERIATRICS | Facility: CLINIC | Age: 85
End: 2020-12-29

## 2020-12-29 ENCOUNTER — TRANSFERRED RECORDS (OUTPATIENT)
Dept: HEALTH INFORMATION MANAGEMENT | Facility: CLINIC | Age: 85
End: 2020-12-29

## 2020-12-29 LAB
INR PPP: 2.11 (ref 0.9–1.1)
INR PPP: 2.11 (ref 0.9–1.1)

## 2020-12-30 NOTE — TELEPHONE ENCOUNTER
ON-CALL  GERIATRICS CROSS-COVERAGE NOTE:    Call from RN to report the result of INR.  It is 2.11.  The patient is on warfarin 1.5 mg p.o. every Monday and Friday and 2 mg p.o. all other days.    I asked RN to continue with the same regimen and repeat INR in 2 weeks.    Noble Martinez MD

## 2021-01-01 ENCOUNTER — RECORDS - HEALTHEAST (OUTPATIENT)
Dept: LAB | Facility: CLINIC | Age: 86
End: 2021-01-01

## 2021-01-01 ENCOUNTER — LAB REQUISITION (OUTPATIENT)
Dept: LAB | Facility: CLINIC | Age: 86
End: 2021-01-01
Payer: COMMERCIAL

## 2021-01-01 ENCOUNTER — TELEPHONE (OUTPATIENT)
Dept: GERIATRICS | Facility: CLINIC | Age: 86
End: 2021-01-01

## 2021-01-01 ENCOUNTER — TRANSFERRED RECORDS (OUTPATIENT)
Dept: HEALTH INFORMATION MANAGEMENT | Facility: CLINIC | Age: 86
End: 2021-01-01

## 2021-01-01 ENCOUNTER — CLINICAL UPDATE (OUTPATIENT)
Dept: PHARMACY | Facility: CLINIC | Age: 86
End: 2021-01-01
Payer: COMMERCIAL

## 2021-01-01 ENCOUNTER — NURSING HOME VISIT (OUTPATIENT)
Dept: GERIATRICS | Facility: CLINIC | Age: 86
End: 2021-01-01
Payer: COMMERCIAL

## 2021-01-01 ENCOUNTER — TELEPHONE (OUTPATIENT)
Dept: GERIATRICS | Facility: CLINIC | Age: 86
End: 2021-01-01
Payer: COMMERCIAL

## 2021-01-01 ENCOUNTER — OFFICE VISIT (OUTPATIENT)
Dept: GERIATRICS | Facility: CLINIC | Age: 86
End: 2021-01-01
Payer: COMMERCIAL

## 2021-01-01 VITALS
SYSTOLIC BLOOD PRESSURE: 111 MMHG | WEIGHT: 176 LBS | TEMPERATURE: 96.2 F | DIASTOLIC BLOOD PRESSURE: 70 MMHG | RESPIRATION RATE: 18 BRPM | BODY MASS INDEX: 26.07 KG/M2 | OXYGEN SATURATION: 94 % | HEART RATE: 81 BPM | HEIGHT: 69 IN

## 2021-01-01 VITALS
BODY MASS INDEX: 26.2 KG/M2 | HEIGHT: 69 IN | DIASTOLIC BLOOD PRESSURE: 68 MMHG | HEART RATE: 72 BPM | RESPIRATION RATE: 18 BRPM | WEIGHT: 176.9 LBS | SYSTOLIC BLOOD PRESSURE: 120 MMHG | TEMPERATURE: 97.5 F | OXYGEN SATURATION: 96 %

## 2021-01-01 VITALS
SYSTOLIC BLOOD PRESSURE: 138 MMHG | TEMPERATURE: 97 F | DIASTOLIC BLOOD PRESSURE: 70 MMHG | OXYGEN SATURATION: 100 % | RESPIRATION RATE: 18 BRPM | BODY MASS INDEX: 25.12 KG/M2 | WEIGHT: 169.6 LBS | HEIGHT: 69 IN

## 2021-01-01 VITALS
OXYGEN SATURATION: 96 % | RESPIRATION RATE: 18 BRPM | BODY MASS INDEX: 26.39 KG/M2 | SYSTOLIC BLOOD PRESSURE: 127 MMHG | DIASTOLIC BLOOD PRESSURE: 64 MMHG | HEART RATE: 67 BPM | TEMPERATURE: 97.2 F | HEIGHT: 69 IN | WEIGHT: 178.2 LBS

## 2021-01-01 VITALS
OXYGEN SATURATION: 97 % | DIASTOLIC BLOOD PRESSURE: 66 MMHG | TEMPERATURE: 97.6 F | HEART RATE: 74 BPM | WEIGHT: 173.4 LBS | SYSTOLIC BLOOD PRESSURE: 135 MMHG | HEIGHT: 69 IN | RESPIRATION RATE: 18 BRPM | BODY MASS INDEX: 25.68 KG/M2

## 2021-01-01 VITALS
SYSTOLIC BLOOD PRESSURE: 102 MMHG | HEART RATE: 72 BPM | WEIGHT: 171.4 LBS | OXYGEN SATURATION: 97 % | HEIGHT: 69 IN | BODY MASS INDEX: 25.39 KG/M2 | DIASTOLIC BLOOD PRESSURE: 60 MMHG | TEMPERATURE: 96.8 F | RESPIRATION RATE: 17 BRPM

## 2021-01-01 VITALS
SYSTOLIC BLOOD PRESSURE: 139 MMHG | WEIGHT: 177.6 LBS | OXYGEN SATURATION: 98 % | DIASTOLIC BLOOD PRESSURE: 85 MMHG | TEMPERATURE: 98.2 F | HEART RATE: 67 BPM | RESPIRATION RATE: 16 BRPM | HEIGHT: 69 IN | BODY MASS INDEX: 26.31 KG/M2

## 2021-01-01 VITALS
OXYGEN SATURATION: 95 % | TEMPERATURE: 97.1 F | HEART RATE: 66 BPM | RESPIRATION RATE: 17 BRPM | DIASTOLIC BLOOD PRESSURE: 83 MMHG | SYSTOLIC BLOOD PRESSURE: 128 MMHG

## 2021-01-01 VITALS
TEMPERATURE: 97.6 F | DIASTOLIC BLOOD PRESSURE: 70 MMHG | BODY MASS INDEX: 26.05 KG/M2 | HEART RATE: 69 BPM | WEIGHT: 175.9 LBS | RESPIRATION RATE: 18 BRPM | OXYGEN SATURATION: 95 % | HEIGHT: 69 IN | SYSTOLIC BLOOD PRESSURE: 138 MMHG

## 2021-01-01 VITALS
HEART RATE: 65 BPM | WEIGHT: 136.8 LBS | TEMPERATURE: 97 F | HEIGHT: 69 IN | SYSTOLIC BLOOD PRESSURE: 126 MMHG | OXYGEN SATURATION: 97 % | DIASTOLIC BLOOD PRESSURE: 64 MMHG | RESPIRATION RATE: 17 BRPM | BODY MASS INDEX: 20.26 KG/M2

## 2021-01-01 DIAGNOSIS — E03.4 HYPOTHYROIDISM DUE TO ACQUIRED ATROPHY OF THYROID: ICD-10-CM

## 2021-01-01 DIAGNOSIS — I12.9 BENIGN HYPERTENSION WITH CHRONIC KIDNEY DISEASE, STAGE III (H): ICD-10-CM

## 2021-01-01 DIAGNOSIS — E03.9 HYPOTHYROIDISM, UNSPECIFIED TYPE: ICD-10-CM

## 2021-01-01 DIAGNOSIS — F02.818 LATE ONSET ALZHEIMER'S DISEASE WITH BEHAVIORAL DISTURBANCE (H): ICD-10-CM

## 2021-01-01 DIAGNOSIS — M1A.9XX0 CHRONIC GOUT WITHOUT TOPHUS, UNSPECIFIED CAUSE, UNSPECIFIED SITE: ICD-10-CM

## 2021-01-01 DIAGNOSIS — Z79.01 LONG TERM CURRENT USE OF ANTICOAGULANT THERAPY: ICD-10-CM

## 2021-01-01 DIAGNOSIS — I50.9 CONGESTIVE HEART FAILURE, UNSPECIFIED HF CHRONICITY, UNSPECIFIED HEART FAILURE TYPE (H): ICD-10-CM

## 2021-01-01 DIAGNOSIS — G30.1 LATE ONSET ALZHEIMER'S DISEASE WITH BEHAVIORAL DISTURBANCE (H): ICD-10-CM

## 2021-01-01 DIAGNOSIS — E03.9 HYPOTHYROIDISM, UNSPECIFIED: ICD-10-CM

## 2021-01-01 DIAGNOSIS — N18.30 BENIGN HYPERTENSION WITH CHRONIC KIDNEY DISEASE, STAGE III (H): ICD-10-CM

## 2021-01-01 DIAGNOSIS — R30.0 DYSURIA: Primary | ICD-10-CM

## 2021-01-01 DIAGNOSIS — I25.10 CORONARY ARTERY DISEASE INVOLVING NATIVE CORONARY ARTERY OF NATIVE HEART WITHOUT ANGINA PECTORIS: ICD-10-CM

## 2021-01-01 DIAGNOSIS — N40.0 BENIGN NON-NODULAR PROSTATIC HYPERPLASIA WITHOUT LOWER URINARY TRACT SYMPTOMS: ICD-10-CM

## 2021-01-01 DIAGNOSIS — H11.32 SUBCONJUNCTIVAL HEMORRHAGE OF LEFT EYE: ICD-10-CM

## 2021-01-01 DIAGNOSIS — E86.0 DEHYDRATION: ICD-10-CM

## 2021-01-01 DIAGNOSIS — F02.818 LATE ONSET ALZHEIMER'S DISEASE WITH BEHAVIORAL DISTURBANCE (H): Primary | ICD-10-CM

## 2021-01-01 DIAGNOSIS — I48.20 CHRONIC ATRIAL FIBRILLATION (H): Primary | ICD-10-CM

## 2021-01-01 DIAGNOSIS — I50.22 CHRONIC SYSTOLIC CONGESTIVE HEART FAILURE (H): ICD-10-CM

## 2021-01-01 DIAGNOSIS — R52 PAIN: ICD-10-CM

## 2021-01-01 DIAGNOSIS — I25.10 CORONARY ARTERY DISEASE INVOLVING NATIVE CORONARY ARTERY OF NATIVE HEART WITHOUT ANGINA PECTORIS: Primary | ICD-10-CM

## 2021-01-01 DIAGNOSIS — I48.91 ATRIAL FIBRILLATION, UNSPECIFIED TYPE (H): ICD-10-CM

## 2021-01-01 DIAGNOSIS — E03.9 HYPOTHYROIDISM, UNSPECIFIED TYPE: Primary | ICD-10-CM

## 2021-01-01 DIAGNOSIS — M79.604 BILATERAL LEG PAIN: Primary | ICD-10-CM

## 2021-01-01 DIAGNOSIS — E87.1 HYPONATREMIA: Primary | ICD-10-CM

## 2021-01-01 DIAGNOSIS — M19.90 OSTEOARTHRITIS: Primary | ICD-10-CM

## 2021-01-01 DIAGNOSIS — N18.30 BENIGN HYPERTENSION WITH CHRONIC KIDNEY DISEASE, STAGE III (H): Primary | ICD-10-CM

## 2021-01-01 DIAGNOSIS — G30.1 LATE ONSET ALZHEIMER'S DISEASE WITH BEHAVIORAL DISTURBANCE (H): Primary | ICD-10-CM

## 2021-01-01 DIAGNOSIS — I48.20 CHRONIC ATRIAL FIBRILLATION (H): ICD-10-CM

## 2021-01-01 DIAGNOSIS — M79.605 BILATERAL LEG PAIN: Primary | ICD-10-CM

## 2021-01-01 DIAGNOSIS — I12.9 BENIGN HYPERTENSION WITH CHRONIC KIDNEY DISEASE, STAGE III (H): Primary | ICD-10-CM

## 2021-01-01 DIAGNOSIS — E63.9 NUTRITIONAL DEFICIENCY: ICD-10-CM

## 2021-01-01 DIAGNOSIS — F43.23 ADJUSTMENT DISORDER WITH MIXED ANXIETY AND DEPRESSED MOOD: ICD-10-CM

## 2021-01-01 DIAGNOSIS — Z87.440 PERSONAL HISTORY OF URINARY TRACT INFECTION: ICD-10-CM

## 2021-01-01 DIAGNOSIS — R30.0 DYSURIA: ICD-10-CM

## 2021-01-01 DIAGNOSIS — E87.5 HYPERKALEMIA: ICD-10-CM

## 2021-01-01 DIAGNOSIS — R31.9 HEMATURIA, UNSPECIFIED: ICD-10-CM

## 2021-01-01 DIAGNOSIS — F32.0 CURRENT MILD EPISODE OF MAJOR DEPRESSIVE DISORDER WITHOUT PRIOR EPISODE (H): ICD-10-CM

## 2021-01-01 DIAGNOSIS — R63.4 ABNORMAL WEIGHT LOSS: ICD-10-CM

## 2021-01-01 DIAGNOSIS — N40.0 BENIGN PROSTATIC HYPERPLASIA, UNSPECIFIED WHETHER LOWER URINARY TRACT SYMPTOMS PRESENT: ICD-10-CM

## 2021-01-01 DIAGNOSIS — E55.9 VITAMIN D DEFICIENCY: ICD-10-CM

## 2021-01-01 DIAGNOSIS — F03.91 DEMENTIA WITH BEHAVIORAL DISTURBANCE, UNSPECIFIED DEMENTIA TYPE: ICD-10-CM

## 2021-01-01 LAB
ALBUMIN UR-MCNC: 100 MG/DL
ALBUMIN UR-MCNC: 300 MG/DL
AMORPH CRY #/AREA URNS HPF: ABNORMAL /HPF
ANION GAP SERPL CALCULATED.3IONS-SCNC: 10 MMOL/L (ref 5–18)
ANION GAP SERPL CALCULATED.3IONS-SCNC: 6 MMOL/L (ref 5–18)
ANION GAP SERPL CALCULATED.3IONS-SCNC: 6 MMOL/L (ref 5–18)
ANION GAP SERPL CALCULATED.3IONS-SCNC: 7 MMOL/L (ref 5–18)
ANION GAP SERPL CALCULATED.3IONS-SCNC: 8 MMOL/L (ref 5–18)
ANION GAP SERPL CALCULATED.3IONS-SCNC: 8 MMOL/L (ref 5–18)
APPEARANCE UR: ABNORMAL
APPEARANCE UR: ABNORMAL
BACTERIA #/AREA URNS HPF: ABNORMAL /HPF
BACTERIA #/AREA URNS HPF: ABNORMAL /HPF
BACTERIA UR CULT: NORMAL
BASOPHILS # BLD AUTO: 0.1 THOU/UL (ref 0–0.2)
BASOPHILS # BLD AUTO: 0.1 THOU/UL (ref 0–0.2)
BASOPHILS NFR BLD AUTO: 1 % (ref 0–2)
BASOPHILS NFR BLD AUTO: 1 % (ref 0–2)
BILIRUB UR QL STRIP: NEGATIVE
BILIRUB UR QL STRIP: NEGATIVE
BUN SERPL-MCNC: 26 MG/DL (ref 8–28)
BUN SERPL-MCNC: 26 MG/DL (ref 8–28)
BUN SERPL-MCNC: 27 MG/DL (ref 8–28)
BUN SERPL-MCNC: 28 MG/DL (ref 8–28)
BUN SERPL-MCNC: 28 MG/DL (ref 8–28)
BUN SERPL-MCNC: 30 MG/DL (ref 8–28)
CALCIUM SERPL-MCNC: 8.9 MG/DL (ref 8.5–10.5)
CALCIUM SERPL-MCNC: 9.1 MG/DL (ref 8.5–10.5)
CALCIUM SERPL-MCNC: 9.1 MG/DL (ref 8.5–10.5)
CALCIUM SERPL-MCNC: 9.9 MG/DL (ref 8.5–10.5)
CHLORIDE BLD-SCNC: 104 MMOL/L (ref 98–107)
CHLORIDE BLD-SCNC: 107 MMOL/L (ref 98–107)
CHLORIDE SERPLBLD-SCNC: 104 MMOL/L (ref 98–107)
CHLORIDE SERPLBLD-SCNC: 104 MMOL/L (ref 98–107)
CO2 SERPL-SCNC: 22 MMOL/L (ref 22–31)
CO2 SERPL-SCNC: 25 MMOL/L (ref 22–31)
COLOR UR AUTO: ABNORMAL
COLOR UR AUTO: YELLOW
CREAT SERPL-MCNC: 1.48 MG/DL (ref 0.7–1.3)
CREAT SERPL-MCNC: 1.5 MG/DL (ref 0.7–1.3)
CREAT SERPL-MCNC: 1.52 MG/DL (ref 0.7–1.3)
CREAT SERPL-MCNC: 1.52 MG/DL (ref 0.7–1.3)
CREAT SERPL-MCNC: 1.6 MG/DL (ref 0.7–1.3)
CREAT SERPL-MCNC: 1.6 MG/DL (ref 0.7–1.3)
DIFFERENTIAL: ABNORMAL
EOSINOPHIL # BLD AUTO: 0.2 THOU/UL (ref 0–0.4)
EOSINOPHIL # BLD AUTO: 0.3 THOU/UL (ref 0–0.4)
EOSINOPHIL NFR BLD AUTO: 2 % (ref 0–6)
EOSINOPHIL NFR BLD AUTO: 4 % (ref 0–6)
ERYTHROCYTE [DISTWIDTH] IN BLOOD BY AUTOMATED COUNT: 14.6 % (ref 11–14.5)
GFR SERPL CREATININE-BSD FRML MDRD: 40 ML/MIN/1.73M2
GFR SERPL CREATININE-BSD FRML MDRD: 40 ML/MIN/1.73M2
GFR SERPL CREATININE-BSD FRML MDRD: 43 ML/MIN/1.73M2
GFR SERPL CREATININE-BSD FRML MDRD: 43 ML/MIN/1.73M2
GFR SERPL CREATININE-BSD FRML MDRD: 44 ML/MIN/1.73M2
GFR SERPL CREATININE-BSD FRML MDRD: 44 ML/MIN/1.73M2
GLUCOSE BLD-MCNC: 125 MG/DL (ref 70–125)
GLUCOSE BLD-MCNC: 66 MG/DL (ref 70–125)
GLUCOSE BLD-MCNC: 72 MG/DL (ref 70–125)
GLUCOSE BLD-MCNC: 85 MG/DL (ref 70–125)
GLUCOSE SERPL-MCNC: 66 MG/DL (ref 70–125)
GLUCOSE SERPL-MCNC: 85 MG/DL (ref 70–125)
GLUCOSE UR STRIP-MCNC: NEGATIVE MG/DL
GLUCOSE UR STRIP-MCNC: NEGATIVE MG/DL
HCT VFR BLD AUTO: 35.6 % (ref 40–54)
HCT VFR BLD AUTO: 35.6 % (ref 40–54)
HCT VFR BLD AUTO: 38.1 % (ref 40–54)
HEMOGLOBIN: 11.1 G/DL (ref 14–18)
HGB BLD-MCNC: 11.1 G/DL (ref 14–18)
HGB BLD-MCNC: 12 G/DL (ref 14–18)
HGB UR QL STRIP: ABNORMAL
HGB UR QL STRIP: ABNORMAL
IMM GRANULOCYTES # BLD: 0 THOU/UL
IMM GRANULOCYTES # BLD: 0.1 THOU/UL
IMM GRANULOCYTES NFR BLD: 1 %
IMM GRANULOCYTES NFR BLD: 1 %
INR PPP: 1.78 (ref 0.9–1.1)
INR PPP: 1.78 (ref 0.9–1.1)
INR PPP: 2.27 (ref 0.9–1.1)
INR PPP: 2.27 (ref 0.9–1.1)
INR PPP: 2.28 (ref 0.9–1.1)
INR PPP: 2.28 (ref 0.9–1.1)
INR PPP: 2.37 (ref 0.9–1.1)
INR PPP: 2.37 (ref 0.9–1.1)
KETONES UR STRIP-MCNC: NEGATIVE MG/DL
KETONES UR STRIP-MCNC: NEGATIVE MG/DL
LEUKOCYTE ESTERASE UR QL STRIP: ABNORMAL
LEUKOCYTE ESTERASE UR QL STRIP: ABNORMAL
LYMPHOCYTES # BLD AUTO: 1.4 THOU/UL (ref 0.8–4.4)
LYMPHOCYTES # BLD AUTO: 1.8 THOU/UL (ref 0.8–4.4)
LYMPHOCYTES NFR BLD AUTO: 18 % (ref 20–40)
LYMPHOCYTES NFR BLD AUTO: 26 % (ref 20–40)
MCH RBC QN AUTO: 32 PG (ref 27–34)
MCHC RBC AUTO-ENTMCNC: 31.2 G/DL (ref 32–36)
MCHC RBC AUTO-ENTMCNC: 31.2 G/DL (ref 32–36)
MCHC RBC AUTO-ENTMCNC: 31.5 G/DL (ref 32–36)
MCV RBC AUTO: 102 FL (ref 80–100)
MCV RBC AUTO: 103 FL (ref 80–100)
MCV RBC AUTO: 103 FL (ref 80–100)
MONOCYTES # BLD AUTO: 0.7 THOU/UL (ref 0–0.9)
MONOCYTES # BLD AUTO: 0.7 THOU/UL (ref 0–0.9)
MONOCYTES NFR BLD AUTO: 11 % (ref 2–10)
MONOCYTES NFR BLD AUTO: 9 % (ref 2–10)
MUCOUS THREADS #/AREA URNS LPF: PRESENT /LPF
NEUTROPHILS # BLD AUTO: 4 THOU/UL (ref 2–7.7)
NEUTROPHILS # BLD AUTO: 5.3 THOU/UL (ref 2–7.7)
NEUTROPHILS NFR BLD AUTO: 58 % (ref 50–70)
NEUTROPHILS NFR BLD AUTO: 69 % (ref 50–70)
NITRATE UR QL: NEGATIVE
NITRATE UR QL: NEGATIVE
PH UR STRIP: 8 [PH] (ref 5–7)
PH UR STRIP: 8.5 [PH] (ref 5–7)
PLATELET # BLD AUTO: 210 THOU/UL (ref 140–440)
PLATELET # BLD AUTO: 210 THOU/UL (ref 140–440)
PLATELET # BLD AUTO: 217 THOU/UL (ref 140–440)
PMV BLD AUTO: 10.2 FL (ref 8.5–12.5)
PMV BLD AUTO: 10.2 FL (ref 8.5–12.5)
POTASSIUM BLD-SCNC: 4.6 MMOL/L (ref 3.5–5)
POTASSIUM BLD-SCNC: 4.7 MMOL/L (ref 3.5–5)
POTASSIUM BLD-SCNC: 4.7 MMOL/L (ref 3.5–5)
POTASSIUM BLD-SCNC: 5.1 MMOL/L (ref 3.5–5)
POTASSIUM SERPL-SCNC: 4.7 MMOL/L (ref 3.5–5)
POTASSIUM SERPL-SCNC: 5.1 MMOL/L (ref 3.5–5)
RBC # BLD AUTO: 3.47 MILL/UL (ref 4.4–6.2)
RBC # BLD AUTO: 3.47 MILL/UL (ref 4.4–6.2)
RBC # BLD AUTO: 3.75 MILL/UL (ref 4.4–6.2)
RBC URINE: >182 /HPF
RBC URINE: >182 /HPF
SODIUM SERPL-SCNC: 135 MMOL/L (ref 136–145)
SODIUM SERPL-SCNC: 135 MMOL/L (ref 136–145)
SODIUM SERPL-SCNC: 136 MMOL/L (ref 136–145)
SODIUM SERPL-SCNC: 137 MMOL/L (ref 136–145)
SODIUM SERPL-SCNC: 137 MMOL/L (ref 136–145)
SODIUM SERPL-SCNC: 139 MMOL/L (ref 136–145)
SP GR UR STRIP: 1.01 (ref 1–1.03)
SP GR UR STRIP: 1.01 (ref 1–1.03)
SQUAMOUS EPITHELIAL: <1 /HPF
TSH SERPL DL<=0.005 MIU/L-ACNC: 0.94 UIU/ML (ref 0.3–5)
TSH SERPL DL<=0.005 MIU/L-ACNC: 3 UIU/ML (ref 0.3–5)
TSH SERPL DL<=0.005 MIU/L-ACNC: 4.54 UIU/ML (ref 0.3–5)
TSH SERPL DL<=0.005 MIU/L-ACNC: 5.33 UIU/ML (ref 0.3–5)
TSH SERPL DL<=0.005 MIU/L-ACNC: 5.99 UIU/ML (ref 0.3–5)
TSH SERPL-ACNC: 4.54 UIU/ML (ref 0.3–5)
TSH SERPL-ACNC: 5.33 UIU/ML (ref 0.3–5)
UROBILINOGEN UR STRIP-MCNC: 2 MG/DL
UROBILINOGEN UR STRIP-MCNC: <2 MG/DL
WBC # BLD AUTO: 6.9 THOU/UL (ref 4–11)
WBC URINE: 30 /HPF
WBC URINE: 79 /HPF
WBC: 6.9 THOU/UL (ref 4–11)
WBC: 7.7 THOU/UL (ref 4–11)

## 2021-01-01 PROCEDURE — 81001 URINALYSIS AUTO W/SCOPE: CPT | Mod: ORL | Performed by: NURSE PRACTITIONER

## 2021-01-01 PROCEDURE — 99310 SBSQ NF CARE HIGH MDM 45: CPT | Performed by: NURSE PRACTITIONER

## 2021-01-01 PROCEDURE — 99309 SBSQ NF CARE MODERATE MDM 30: CPT | Performed by: INTERNAL MEDICINE

## 2021-01-01 PROCEDURE — 87086 URINE CULTURE/COLONY COUNT: CPT | Mod: ORL | Performed by: NURSE PRACTITIONER

## 2021-01-01 PROCEDURE — 36415 COLL VENOUS BLD VENIPUNCTURE: CPT | Mod: ORL | Performed by: NURSE PRACTITIONER

## 2021-01-01 PROCEDURE — P9604 ONE-WAY ALLOW PRORATED TRIP: HCPCS | Mod: ORL | Performed by: NURSE PRACTITIONER

## 2021-01-01 PROCEDURE — 99318 PR ANNUAL NURSING FAC ASSESSMNT, STABLE: CPT | Performed by: NURSE PRACTITIONER

## 2021-01-01 PROCEDURE — 99309 SBSQ NF CARE MODERATE MDM 30: CPT | Performed by: NURSE PRACTITIONER

## 2021-01-01 PROCEDURE — 84443 ASSAY THYROID STIM HORMONE: CPT | Mod: ORL | Performed by: NURSE PRACTITIONER

## 2021-01-01 PROCEDURE — 99207 PR NO CHARGE LOS: CPT | Performed by: PHARMACIST

## 2021-01-01 PROCEDURE — 99308 SBSQ NF CARE LOW MDM 20: CPT | Performed by: NURSE PRACTITIONER

## 2021-01-01 RX ORDER — ALLOPURINOL 100 MG/1
100 TABLET ORAL DAILY
Start: 2021-01-01 | End: 2022-01-01

## 2021-01-01 RX ORDER — LEVOTHYROXINE SODIUM 112 UG/1
112 TABLET ORAL DAILY
COMMUNITY
Start: 2021-01-01 | End: 2022-01-01

## 2021-01-01 RX ORDER — CEPHALEXIN 500 MG/1
500 CAPSULE ORAL 2 TIMES DAILY
Refills: 0 | COMMUNITY
Start: 2021-01-01 | End: 2021-01-01

## 2021-01-01 RX ORDER — ACETAMINOPHEN 500 MG
1000 TABLET ORAL EVERY 6 HOURS PRN
Start: 2021-01-01 | End: 2021-01-01 | Stop reason: DRUGHIGH

## 2021-01-01 RX ORDER — SENNOSIDES 8.6 MG
650 CAPSULE ORAL
Start: 2021-01-01 | End: 2021-01-01 | Stop reason: DRUGHIGH

## 2021-01-01 RX ORDER — TRAMADOL HYDROCHLORIDE 50 MG/1
TABLET ORAL
COMMUNITY
Start: 2021-01-01 | End: 2021-01-01

## 2021-01-01 RX ORDER — DOXAZOSIN 4 MG/1
4 TABLET ORAL AT BEDTIME
Qty: 90 TABLET | Refills: 3
Start: 2021-01-01 | End: 2022-01-01

## 2021-01-01 RX ORDER — ACETAMINOPHEN 325 MG/1
650 TABLET ORAL 2 TIMES DAILY
COMMUNITY
End: 2022-01-01 | Stop reason: ALTCHOICE

## 2021-01-01 RX ORDER — TRAMADOL HYDROCHLORIDE 50 MG/1
TABLET ORAL
Qty: 45 TABLET | Refills: 3 | Status: SHIPPED | OUTPATIENT
Start: 2021-01-01 | End: 2022-01-01 | Stop reason: ALTCHOICE

## 2021-01-01 RX ORDER — DONEPEZIL HYDROCHLORIDE 5 MG/1
5 TABLET, FILM COATED ORAL AT BEDTIME
Start: 2021-01-01 | End: 2021-01-01 | Stop reason: SINTOL

## 2021-01-01 RX ORDER — CIPROFLOXACIN 500 MG/1
500 TABLET, FILM COATED ORAL DAILY
COMMUNITY
Start: 2021-01-01 | End: 2022-01-01

## 2021-01-01 ASSESSMENT — MIFFLIN-ST. JEOR
SCORE: 1432.79
SCORE: 1416.92
SCORE: 1428.26
SCORE: 1435.97
SCORE: 1394.68
SCORE: 1250.9
SCORE: 1438.69
SCORE: 1402.85
SCORE: 1428.71

## 2021-01-11 NOTE — LETTER
"    1/11/2021        RE: Earl Sanchez  [facility]  [address]  [facility] [address]        Earl Sanchez is a 89 year old male seen January 11, 2021 at [facility] where he has resided for 3 years (admit 2/2018) seen to follow up dementia with behavioral symptoms.      Pt is seen in his room up to  after finishing his lunch.   He is characteristically dismissive, states he feels okay, \"I just want to go to bed.\"   Says no to pain, dyspnea or other symptoms   No new concerns reported by [facility] nursing staff.     He also has had unresponsive episodes, left facial droop, dysarthria and bilateral arm weakness.   His family was present during one of these and 911 was called. However, when EMTs arrived patient refused transport to ED.   Symptoms resolved on their own.  Pt also had an ED visit in March 2019 for an episode of unresponsiveness that persisted in the ED.   He had head CT, then awoke and responded to questions.   Workup unremarkable and he returned to [facility].     Patient had a hospitalization in January 2018 for encephalopathy with gait disturbance.  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  He was in [facility] for a month, improved, but needed more support than AL, so transitioned to LTC for permanent placement.  Continues to need assist for transfers bed to  and all cares.      Patient has had atrial fib and been anticoagulated with warfarin for many years.   Also treated for BPH, CAD and hypothyroidism, which have been stable.       Past Medical History:   Diagnosis Date     Abdominal aortic aneurysm (H)     per 2/12/15 abdominal US, mild aneurysm measuring [id number]     Acute MI 1981, 2007     Atrial fibrillation (H)      BPH (benign prostatic hyperplasia)      CAD (coronary artery disease)     CABG 2007, f/b cardio, Dr. 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    , lived at the [place] [facility] with services until 1/2018.      Has 3 children.     Review Of Systems  Limited, but negative other than above.         SLUMS 13/30   CPT 4.4   BIMS 11/15   Wt Readings from Last 5 Encounters:   01/11/21 80.8 kg (178 lb 3.2 oz)   11/05/20 78.7 kg (173 lb 8 oz)   09/10/20 78.2 kg (172 lb 8 oz)   07/30/20 78.5 kg (173 lb 1.6 oz)   05/21/20 78.6 kg (173 lb 3.2 oz)      EXAM: NAD  /64   Pulse 67   Temp 97.2  F (36.2  C)   Resp 18   Ht 1.753 m (5' 9\")   Wt 80.8 kg (178 lb 3.2 oz)   SpO2 96%   BMI 26.32 kg/m     Neck supple without adenopathy  Lungs decreased BS, crackles LLL that clear with cough.   Heart irreg irreg s1s2, 1/6 HSM  Abd soft, NT, no distention, +BS  Ext without edema.   Neuro: no tremor or stiffness, +STML   Psych: affect flat, disengaged    LAB 8/13/2020:        Sodium  136 - 145 mmol/L  135Low       Potassium  3.5 - 5.0 mmol/L  5.1High       Chloride  98 - 107 mmol/L  102     CO2  22 - 31 mmol/L  25     Anion Gap, Calculation  5 - 18 mmol/L  8     Glucose  70 - 125 mg/dL  80     Calcium  8.5 - 10.5 mg/dL  9.5     BUN  8 - 28 mg/dL  31High       Creatinine  0.70 - 1.30 mg/dL  1.71High       GFR MDRD Af Amer  >60 mL/min/1.73m2  46Low       GFR MDRD Non Af Amer  >60 mL/min/1.73m2  38Low        Vitamin D, Total (25-Hydroxy)  30.0 - 80.0 ng/mL  68.3      WBC  4.0 - 11.0 thou/uL  7.0     RBC  4.40 - 6.20 mill/uL  3.92Low       Hemoglobin  14.0 - 18.0 g/dL  12.3Low       Hematocrit  40.0 - 54.0 %  40.3     MCV  80 - 100 fL  103High       MCH  27.0 - 34.0 pg  31.4     MCHC  32.0 - 36.0 g/dL  30.5Low       RDW "  11.0 - 14.5 %  14.7High       Platelets  140 - 440 thou/uL  223          IMP/PLAN:  (R41.89) Unresponsive episode    Comment: recurrent, unclear etiology  Plan: Pt has declined ED transfer for this.   Continue to monitor in NH.      If any further syncope would look to discontinuation of donepezil as a likely culprit.       (I12.9,  N18.3) Benign hypertension with chronic kidney disease, stage III (H)    Comment:  GFR 38  BP Readings from Last 3 Encounters:   01/11/21 127/64   11/05/20 118/64   09/10/20 124/76    Plan: remains on doxazosin for bp control and BPH, and spironolactone 25 mg/day.  With higher K+, decrease spironolactone to 12.5 mg/day and follow BMP.     With lower GFR, decrease allopurinol to 100 mg/day     (G30.1,  F02.81) Late onset Alzheimer's disease with behavioral disturbance (H)  Comment: gradual decline in cognition, low functional status.  Less behavioral symptoms now.    Plan: LTC support for meds, meals, activity.    Not likely that donepezil is adding any benefit at this time, and may be causing side effects, e.g., syncope       (F43.23) Adjustment disorder with mixed anxiety and depressed mood  Comment: has worsened with cognitive decline     Plan: continue citalopram 10 mg/day      (I48.2) Chronic atrial fibrillation (H)  Comment: not requiring rate slowing meds   Pulse Readings from Last 4 Encounters:   01/11/21 67   11/05/20 66   09/10/20 72   07/30/20 65      Plan: warfarin per INR, goal 2-2.5 given fall risk.    (I25.10) Coronary artery disease involving native coronary artery of native heart without angina pectoris  Comment: h/o CABG 2007  Plan: daily ASA, statin for secondary prevention.   NTG prn    (N40.0) Benign prostatic hyperplasia, unspecified whether lower urinary tract symptoms present  Comment: longstanding  Plan: continue doxazosin to help avoid urinary retention    (E03.9) Hypothyroidism, unspecified type  Comment:  8/2020:  TSH  0.30 - 5.00 uIU/mL  3.32      Plan:  levothyroxine 88 mcg/day, yearly TSH       (E55.9) Vitamin D deficiency  Comment: replaced as above  Plan: change vit D to 2000 units/day       Patty Gamble MD           Sincerely,        Patty Gamble MD

## 2021-01-16 PROBLEM — G30.1 LATE ONSET ALZHEIMER'S DISEASE WITH BEHAVIORAL DISTURBANCE (H): Status: ACTIVE | Noted: 2021-01-01

## 2021-01-16 PROBLEM — F02.818 LATE ONSET ALZHEIMER'S DISEASE WITH BEHAVIORAL DISTURBANCE (H): Status: ACTIVE | Noted: 2021-01-01

## 2021-01-16 NOTE — PROGRESS NOTES
"Earl Sanchez is a 89 year old male seen January 11, 2021 at [facility] where he has resided for 3 years (admit 2/2018) seen to follow up dementia with behavioral symptoms.      Pt is seen in his room up to  after finishing his lunch.   He is characteristically dismissive, states he feels okay, \"I just want to go to bed.\"   Says no to pain, dyspnea or other symptoms   No new concerns reported by [facility] nursing staff.     He also has had unresponsive episodes, left facial droop, dysarthria and bilateral arm weakness.   His family was present during one of these and 911 was called. However, when EMTs arrived patient refused transport to ED.   Symptoms resolved on their own.  Pt also had an ED visit in March 2019 for an episode of unresponsiveness that persisted in the ED.   He had head CT, then awoke and responded to questions.   Workup unremarkable and he returned to [facility].     Patient had a hospitalization in January 2018 for encephalopathy with gait disturbance.  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  He was in [facility] for a month, improved, but needed more support than AL, so transitioned to LT for permanent placement.  Continues to need assist for transfers bed to  and all cares.      Patient has had atrial fib and been anticoagulated with warfarin for many years.   Also treated for BPH, CAD and hypothyroidism, which have been stable.       Past Medical History:   Diagnosis Date     Abdominal aortic aneurysm (H)     per 2/12/15 abdominal US, mild aneurysm measuring [id number]     Acute MI 1981, 2007     Atrial fibrillation (H)      BPH (benign prostatic hyperplasia)      CAD (coronary artery disease)     CABG 2007, f/b cardio, Dr. 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    , lived at the [place] [facility] with services until 1/2018.      Has 3 children.     Review Of Systems  Limited, but negative other than above.         SLUMS 13/30   CPT 4.4   BIMS 11/15   Wt Readings from Last 5 Encounters:   01/11/21 80.8 kg (178 lb 3.2 oz)   11/05/20 78.7 kg (173 lb 8 oz)   09/10/20 78.2 kg (172 lb 8 oz)   07/30/20 78.5 kg (173 lb 1.6 oz)   05/21/20 78.6 kg (173 lb 3.2 oz)      EXAM: NAD  /64   Pulse 67   Temp 97.2  F (36.2  C)   Resp 18   Ht 1.753 m (5' 9\")   Wt 80.8 kg (178 lb 3.2 oz)   SpO2 96%   BMI 26.32 kg/m     Neck supple without adenopathy  Lungs decreased BS, crackles LLL that clear with cough.   Heart irreg irreg s1s2, 1/6 HSM  Abd soft, NT, no distention, +BS  Ext without edema.   Neuro: no tremor or stiffness, +STML   Psych: affect flat, disengaged    LAB 8/13/2020:        Sodium  136 - 145 mmol/L  135Low       Potassium  3.5 - 5.0 mmol/L  5.1High       Chloride  98 - 107 mmol/L  102     CO2  22 - 31 mmol/L  25     Anion Gap, Calculation  5 - 18 mmol/L  8     Glucose  70 - 125 mg/dL  80     Calcium  8.5 - 10.5 mg/dL  9.5     BUN  8 - 28 mg/dL  31High       Creatinine  0.70 - 1.30 mg/dL  1.71High       GFR MDRD Af Amer  >60 mL/min/1.73m2  46Low       GFR MDRD Non Af Amer  >60 mL/min/1.73m2  38Low        Vitamin D, Total (25-Hydroxy)  30.0 - 80.0 ng/mL  68.3      WBC  4.0 - 11.0 thou/uL  7.0     RBC  4.40 - 6.20 mill/uL  3.92Low       Hemoglobin  14.0 - 18.0 g/dL  12.3Low       Hematocrit  40.0 - 54.0 %  40.3     MCV  80 - 100 fL  103High       MCH  27.0 - 34.0 pg  31.4     MCHC  32.0 - 36.0 g/dL  30.5Low       RDW  11.0 - 14.5 %  14.7High       Platelets  140 - 440 thou/uL  223          IMP/PLAN:  (R41.89) Unresponsive episode    Comment: " recurrent, unclear etiology  Plan: Pt has declined ED transfer for this.   Continue to monitor in NH.      If any further syncope would look to discontinuation of donepezil as a likely culprit.       (I12.9,  N18.3) Benign hypertension with chronic kidney disease, stage III (H)    Comment:  GFR 38  BP Readings from Last 3 Encounters:   01/11/21 127/64   11/05/20 118/64   09/10/20 124/76    Plan: remains on doxazosin for bp control and BPH, and spironolactone 25 mg/day.  With higher K+, decrease spironolactone to 12.5 mg/day and follow BMP.     With lower GFR, decrease allopurinol to 100 mg/day     (G30.1,  F02.81) Late onset Alzheimer's disease with behavioral disturbance (H)  Comment: gradual decline in cognition, low functional status.  Less behavioral symptoms now.    Plan: LTC support for meds, meals, activity.    Not likely that donepezil is adding any benefit at this time, and may be causing side effects, e.g., syncope       (F43.23) Adjustment disorder with mixed anxiety and depressed mood  Comment: has worsened with cognitive decline     Plan: continue citalopram 10 mg/day      (I48.2) Chronic atrial fibrillation (H)  Comment: not requiring rate slowing meds   Pulse Readings from Last 4 Encounters:   01/11/21 67   11/05/20 66   09/10/20 72   07/30/20 65      Plan: warfarin per INR, goal 2-2.5 given fall risk.    (I25.10) Coronary artery disease involving native coronary artery of native heart without angina pectoris  Comment: h/o CABG 2007  Plan: daily ASA, statin for secondary prevention.   NTG prn    (N40.0) Benign prostatic hyperplasia, unspecified whether lower urinary tract symptoms present  Comment: longstanding  Plan: continue doxazosin to help avoid urinary retention    (E03.9) Hypothyroidism, unspecified type  Comment:  8/2020:  TSH  0.30 - 5.00 uIU/mL  3.32      Plan: levothyroxine 88 mcg/day, yearly TSH       (E55.9) Vitamin D deficiency  Comment: replaced as above  Plan: change vit D to 2000  units/day       Patty Gamble MD

## 2021-02-09 NOTE — LETTER
2/9/2021        RE: Earl Sanchez  [facility]  [address]  [facility] [address]        [place] [facility]  [place] Medical Record Number:  [id number]  Place of Service where encounter took place:  [facility] ([facility]) [[id number]]  Chief Complaint   Patient presents with     Nursing Home Acute       HPI:    Earl Sanchez  is a 89 year old (9/17/1926), who is being seen today for an episodic care visit.  HPI information obtained from: facility chart records, facility staff and patient report. Today's concern is:    ICD-10-CM    1. Chronic atrial fibrillation (H)  I48.20    2. Long term current use of anticoagulant therapy  Z79.01    3. Hypothyroidism, unspecified type  E03.9    Resident alert and oriented with intermittent confusion. Family recently had care conference and has asked to make transition from warfarin to Eliquis.   -Denies CP or palpation   TSH   Date Value Ref Range Status   11/19/2019 4.87 0.30 - 5.00 uIU/mL Final       Past Medical and Surgical History reviewed in Epic today.    MEDICATIONS:    Current Outpatient Medications   Medication Sig Dispense Refill     acetaminophen (TYLENOL) 500 MG tablet Take 1,000 mg by mouth every 6 hours as needed for mild pain        allopurinol (ZYLOPRIM) 100 MG tablet Take 100 mg by mouth 2 times daily       aspirin (ASPIRIN LOW DOSE) 81 MG tablet Take 81 mg by mouth daily        citalopram (CELEXA) 10 MG tablet Take 10 mg by mouth daily       donepezil (ARICEPT) 5 MG tablet Take 1 tablet (5 mg) by mouth At Bedtime       doxazosin (CARDURA) 4 MG tablet Take 2 tablets (8 mg) by mouth At Bedtime 90 tablet 3     ERGOCALCIFEROL PO Take 50,000 Units by mouth every morning Wednesday        furosemide (LASIX) 10 mg TABS half-tab Take 10 mg by mouth daily       LEVOTHYROXINE SODIUM PO Take 88 mcg by mouth daily       nitroglycerin (NITROSTAT) 0.4 MG SL tablet Place 1 tablet (0.4 mg) under the tongue  "every 5 minutes as needed for chest pain 25 tablet 0     phenylephrine-shark liver oil-mineral oil-petrolatum (PREPARATION H) 0.25-14-74.9 % rectal ointment Place 1 applicator rectally 4 times daily as needed for hemorrhoids for Hemorrhoids       polyethylene glycol 3350 POWD Take 17 g by mouth daily       pravastatin (PRAVACHOL) 40 MG tablet TAKE 1 TABLET DAILY 90 tablet 1     Sennosides-Docusate Sodium (SENNA-DOCUSATE SODIUM) 8.6-50 MG TABS Take 1 tablet by mouth 2 times daily        spironolactone (ALDACTONE) 25 MG tablet Take 25 mg by mouth daily       triamcinolone (KENALOG) 0.025 % cream Apply topically every 12 hours as needed to rash to the affected area prn, apply tin layer to the rash at the LE.       Warfarin Sodium (COUMADIN PO) As directed, per INR       REVIEW OF SYSTEMS:  Unobtainable secondary to cognitive impairment.     Objective:  /70   Pulse 69   Temp 97.6  F (36.4  C)   Resp 18   Ht 1.753 m (5' 9\")   Wt 79.8 kg (175 lb 14.4 oz)   SpO2 95%   BMI 25.98 kg/m    Exam:  GENERAL APPEARANCE:  Alert, in no distress  ENT:  Mouth and posterior oropharynx normal, moist mucous membranes, [place]  RESP:  respiratory effort and palpation of chest normal, lungs clear to auscultation , no respiratory distress  CV:  Palpation and auscultation of heart done , regular rate and rhythm, no murmur, rub, or gallop  M/S:   Gait and station normal  Digits and nails normal  SKIN:  Inspection of skin and subcutaneous tissue baseline, Palpation of skin and subcutaneous tissue baseline  NEURO:   Cranial nerves 2-12 are normal tested and grossly at patient's baseline  PSYCH:  insight and judgement impaired, memory impaired , affect and mood normal    Labs:   Labs done in SNF are in [facility]. Please refer to them using "[employer]/Care Everywhere. and Recent labs in EPIC reviewed by me today.     ASSESSMENT/PLAN:  (I48.20) Chronic atrial fibrillation (H)  (primary encounter diagnosis)  (Z79.01) Long term current use of " anticoagulant therapy  Comment: Chronic, managed on anticoagulation   Plan:   -discontinue warfarin   -Eliquis 2.5 mg/BID    (E03.9) Hypothyroidism, unspecified type  Comment:   TSH   Date Value Ref Range Status   11/19/2019 4.87 0.30 - 5.00 uIU/mL Final   Plan:   Continue levothyroxine at current dose and redraw TSH yearly and PRN.         Orders written by provider at facility  -Discontinue warfarin   -apixaban 2.5 mg/BID      Electronically signed by:  ANGEL Corrigan [facility]           Sincerely,        Christiana Osuna, NP

## 2021-02-09 NOTE — PROGRESS NOTES
[place] [facility]  [place] Medical Record Number:  [id number]  Place of Service where encounter took place:  [facility] ([facility]) [[id number]]  Chief Complaint   Patient presents with     Nursing Home Acute       HPI:    Earl Sanchez  is a 89 year old (9/17/1926), who is being seen today for an episodic care visit.  HPI information obtained from: facility chart records, facility staff and patient report. Today's concern is:    ICD-10-CM    1. Chronic atrial fibrillation (H)  I48.20    2. Long term current use of anticoagulant therapy  Z79.01    3. Hypothyroidism, unspecified type  E03.9    Resident alert and oriented with intermittent confusion. Family recently had care conference and has asked to make transition from warfarin to Eliquis.   -Denies CP or palpation   TSH   Date Value Ref Range Status   11/19/2019 4.87 0.30 - 5.00 uIU/mL Final       Past Medical and Surgical History reviewed in Epic today.    MEDICATIONS:    Current Outpatient Medications   Medication Sig Dispense Refill     acetaminophen (TYLENOL) 500 MG tablet Take 1,000 mg by mouth every 6 hours as needed for mild pain        allopurinol (ZYLOPRIM) 100 MG tablet Take 100 mg by mouth 2 times daily       aspirin (ASPIRIN LOW DOSE) 81 MG tablet Take 81 mg by mouth daily        citalopram (CELEXA) 10 MG tablet Take 10 mg by mouth daily       donepezil (ARICEPT) 5 MG tablet Take 1 tablet (5 mg) by mouth At Bedtime       doxazosin (CARDURA) 4 MG tablet Take 2 tablets (8 mg) by mouth At Bedtime 90 tablet 3     ERGOCALCIFEROL PO Take 50,000 Units by mouth every morning Wednesday        furosemide (LASIX) 10 mg TABS half-tab Take 10 mg by mouth daily       LEVOTHYROXINE SODIUM PO Take 88 mcg by mouth daily       nitroglycerin (NITROSTAT) 0.4 MG SL tablet Place 1 tablet (0.4 mg) under the tongue every 5 minutes as needed for chest pain 25 tablet 0     phenylephrine-shark liver oil-mineral oil-petrolatum (PREPARATION H)  "0.25-14-74.9 % rectal ointment Place 1 applicator rectally 4 times daily as needed for hemorrhoids for Hemorrhoids       polyethylene glycol 3350 POWD Take 17 g by mouth daily       pravastatin (PRAVACHOL) 40 MG tablet TAKE 1 TABLET DAILY 90 tablet 1     Sennosides-Docusate Sodium (SENNA-DOCUSATE SODIUM) 8.6-50 MG TABS Take 1 tablet by mouth 2 times daily        spironolactone (ALDACTONE) 25 MG tablet Take 25 mg by mouth daily       triamcinolone (KENALOG) 0.025 % cream Apply topically every 12 hours as needed to rash to the affected area prn, apply tin layer to the rash at the LE.       Warfarin Sodium (COUMADIN PO) As directed, per INR       REVIEW OF SYSTEMS:  Unobtainable secondary to cognitive impairment.     Objective:  /70   Pulse 69   Temp 97.6  F (36.4  C)   Resp 18   Ht 1.753 m (5' 9\")   Wt 79.8 kg (175 lb 14.4 oz)   SpO2 95%   BMI 25.98 kg/m    Exam:  GENERAL APPEARANCE:  Alert, in no distress  ENT:  Mouth and posterior oropharynx normal, moist mucous membranes, [place]  RESP:  respiratory effort and palpation of chest normal, lungs clear to auscultation , no respiratory distress  CV:  Palpation and auscultation of heart done , regular rate and rhythm, no murmur, rub, or gallop  M/S:   Gait and station normal  Digits and nails normal  SKIN:  Inspection of skin and subcutaneous tissue baseline, Palpation of skin and subcutaneous tissue baseline  NEURO:   Cranial nerves 2-12 are normal tested and grossly at patient's baseline  PSYCH:  insight and judgement impaired, memory impaired , affect and mood normal    Labs:   Labs done in SNF [facility]. Please refer to them using EPIC/Care Everywhere. and Recent labs in EPIC reviewed by me today.     ASSESSMENT/PLAN:  (I48.20) Chronic atrial fibrillation (H)  (primary encounter diagnosis)  (Z79.01) Long term current use of anticoagulant therapy  Comment: Chronic, managed on anticoagulation   Plan:   -discontinue warfarin   -Eliquis 2.5 " mg/BID    (E03.9) Hypothyroidism, unspecified type  Comment:   TSH   Date Value Ref Range Status   11/19/2019 4.87 0.30 - 5.00 uIU/mL Final   Plan:   Continue levothyroxine at current dose and redraw TSH yearly and PRN.         Orders written by provider at facility  -Discontinue warfarin   -apixaban 2.5 mg/BID      Electronically signed by:  ANGEL Corrigan CNP  [place] [facility]

## 2021-02-10 NOTE — TELEPHONE ENCOUNTER
[place] [facility] TELEPHONE ENCOUNTER    Chief Complaint   Patient presents with     INR RESULTS       Earl Sanchez is a 89 year old  (9/17/1926),Nurse called today to report: INR today of 2.37, previous INR on 2/2 was 1.78.  Has been on 2 mg warfarin daily     ASSESSMENT/PLAN  Therapeutic INR     Continue warfarin 2 mg daily    Recheck INR on 2/23/21  Electronically signed by:   ANGEL Panda CNP

## 2021-02-16 NOTE — PROGRESS NOTES
This patient's medication list and chart were reviewed as part of the service provided by [facility].    Assessment/Recommendations:  1. (Dementia):  May benefit from readdressing with family/patient continued use of Donepezil.  Per chart review, appears dementia has progressed, and therefore is a good time to look at potential d'c and monitor for changes in cognition or function.  Donepezil increases risk of syncopal episodes as well, which patient has a history of, and may contribute to agitation.  2. (HTN, HF):  Appears Doxazosin was to be reduced to 4mg daily, but Epic med list indicates pt is still taking 8mg daily (please verify and reconcile Epic med list; also taking for BPH).  Doxazosin may also contribute to syncopal episodes, orthostasis, falls, and should be used with caution in patients with heart failure due to potential to exacerbate symptoms.  Consider taper and d'c of Doxazosin, and instead begin Tamsulosin for BPH symptoms.  If BPs increase above goal <150/90mmHg, could consider addition of amlodipine at bedtime as alternative to Doxazosin.  Also of note, last K level slightly elevated, and appears spironolactone dose was to be reduced, but I do not see this reflected in Epic med list, so unsure if this change was made.  I also do not see a recent BMP.  May benefit from recheck BMP to determine if Spironolactone dose should be reduced due to K sparing effects w/ follow up K level.  3. (Supplements):  Noted on chart review that vitamin D was to be changed to 2000u daily, however Epic med list indicates pt is taking 50,000u every morning (likely error - please update/reconcile Epic med list).  4. (Afib/CAD):  On both ASA and Warfarin due to h/o Afib, CAD, and noted concern for possible cerebellar stroke in 2018.  Continue to monitor for signs/sx of bleeding/bruising, and Hgb as combo therapy places pt at increased risk of bleed (serotonin specific reuptake inhibitor in  combo with these also increases risk of bleed).     Please ensure Epic med list is reconciled.  Based on chart review, I am also unsure if Allopurinol dose is accurate in Epic med list. (if indeed taking allopurinol bid, could consider taking full dose once daily).    Christen Xie, Pharm.D.,[facility]  Board Certified Geriatric Pharmacist  Medication Therapy Management Pharmacist  [phone]

## 2021-02-26 NOTE — TELEPHONE ENCOUNTER
Resident had brief fainting episode per staff. Just finished having large BM. At baseline now.  VS: 139/85, 96.1, 81, 18, 96%.  Cont. To monitor for changes

## 2021-03-02 NOTE — LETTER
3/2/2021        RE: Earl Sanchez  [facility]  [address]  [facility] [address]        [place] [facility]  Chief Complaint   Patient presents with     intermediate Regulatory     [place] Medical Record Number:  [id number]  Place of Service where encounter took place:  [facility] () [[id number]]    HPI:    Earl Sanhcez  is 89 year old (9/17/1926), who is being seen today for a federally mandated E/M visit.  HPI information obtained from: facility chart records, facility staff, patient report and [facility] chart review. Today's concerns are:    ICD-10-CM    1. Chronic atrial fibrillation (H)  I48.20    2. Long term current use of anticoagulant therapy  Z79.01    3. Hypothyroidism, unspecified type  E03.9    4. 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 Pain  R52 acetaminophen (TYLENOL) 500 MG tablet     -Resident resting in bed, very tired and difficult to waken. Denies pain at this time or SOB. Up with yaz lift to wheelchair. Cognition is progressively getting worse over previous months. Behaviors and resistance to care improved with start of Celexa 10 mg/day. No longer yelling at staff or family.   -Warfarin discontinued and resident was started on eliquis daily. Upon review of facility medical records, resident has been taking 5 mg/bid instead of ordered 2.5 mg/bid. There have been no indications of acute bleeding or concerns. Will clarify order with staff.      TSH   Date Value Ref Range Status   11/19/2019 4.87 0.30 - 5.00 uIU/mL Final       ALLERGIES:No known allergies  PAST MEDICAL HISTORY:   has a past medical history of Abdominal aortic aneurysm (H), Acute MI (H) (1981, 2007), Atrial fibrillation (H), BPH (benign prostatic hyperplasia), CAD (coronary artery disease), Chronic kidney disease, Duodenal ulcer with hemorrhage, Gait apraxia, Gastritis,  Gastro-oesophageal reflux disease, Heart failure (H), Hyperlipidaemia LDL goal <100, Hypertension goal BP (blood pressure) < 140/90, Osteoarthritis, Renal disease, and Thyroid disease. He also has no past medical history of Malignant hyperthermia or PONV (postoperative nausea and vomiting).  PAST SURGICAL HISTORY:   has a past surgical history that includes hernia repair, inguinal rt/lt; Ankle surgery; Phacoemulsification clear cornea with toric intraocular lens implant (4/10/2014); Phacoemulsification clear cornea with toric intraocular lens implant (4/22/2014); coronary artery bypass (2007); and Heart Cath Left heart cath (12/10/07).  FAMILY HISTORY: family history includes Hypertension in his maternal grandmother and mother.  SOCIAL HISTORY:  reports that he has never smoked. He has never used smokeless tobacco. He reports current alcohol use. He reports that he does not use drugs.    MEDICATIONS:  Current Outpatient Medications   Medication Sig Dispense Refill     acetaminophen (TYLENOL) 500 MG tablet Take 1,000 mg by mouth every 6 hours as needed for mild pain        allopurinol (ZYLOPRIM) 100 MG tablet Take 100 mg by mouth 2 times daily       apixaban ANTICOAGULANT (ELIQUIS) 2.5 MG tablet Take 1 tablet (2.5 mg) by mouth 2 times daily 60 tablet      aspirin (ASPIRIN LOW DOSE) 81 MG tablet Take 81 mg by mouth daily        citalopram (CELEXA) 10 MG tablet Take 10 mg by mouth daily       donepezil (ARICEPT) 5 MG tablet Take 1 tablet (5 mg) by mouth At Bedtime       doxazosin (CARDURA) 4 MG tablet Take 2 tablets (8 mg) by mouth At Bedtime 90 tablet 3     ERGOCALCIFEROL PO Take 50,000 Units by mouth every morning Wednesday        furosemide (LASIX) 10 mg TABS half-tab Take 10 mg by mouth daily       LEVOTHYROXINE SODIUM PO Take 88 mcg by mouth daily       nitroglycerin (NITROSTAT) 0.4 MG SL tablet Place 1 tablet (0.4 mg) under the tongue every 5 minutes as needed for chest pain 25 tablet 0     phenylephrine-shark  "liver oil-mineral oil-petrolatum (PREPARATION H) 0.25-14-74.9 % rectal ointment Place 1 applicator rectally 4 times daily as needed for hemorrhoids for Hemorrhoids       polyethylene glycol 3350 POWD Take 17 g by mouth daily       pravastatin (PRAVACHOL) 40 MG tablet TAKE 1 TABLET DAILY 90 tablet 1     Sennosides-Docusate Sodium (SENNA-DOCUSATE SODIUM) 8.6-50 MG TABS Take 1 tablet by mouth 2 times daily        spironolactone (ALDACTONE) 25 MG tablet Take 25 mg by mouth daily       triamcinolone (KENALOG) 0.025 % cream Apply topically every 12 hours as needed to rash to the affected area prn, apply tin layer to the rash at the LE.           Case Management:  I have reviewed the care plan and MDS and do agree with the plan. Patient's desire to return to the community is not assessible due to cognitive impairment. Information reviewed:  Medications, vital signs, orders, and nursing notes.    ROS:  Unobtainable secondary to cognitive impairment.     Vitals:  /85   Pulse 67   Temp 98.2  F (36.8  C)   Resp 16   Ht 1.753 m (5' 9\")   Wt 80.6 kg (177 lb 9.6 oz)   SpO2 98%   BMI 26.23 kg/m    Body mass index is 26.23 kg/m .  Exam:  GENERAL APPEARANCE:  Alert, in no distress  ENT:  Mouth and posterior oropharynx normal, moist mucous membranes, hearing acuity [place]  EYES:  EOM, conjunctivae, lids, pupils and irises normal  NECK:  No adenopathy,masses or thyromegaly  RESP:  respiratory effort and palpation of chest normal, no respiratory distress, Lung sounds clear  CV:  Palpation and auscultation of heart done , rate and rhythm regular, no murmur, no rub or gallop, Edema +2 bilateral LE  ABDOMEN:  normal bowel sounds, soft, nontender, no hepatosplenomegaly or other masses  M/S:   Gait and station up with yaz to WC- baseline, Digits and nails WNL  SKIN:  Inspection/Palpation of skin and subcutaneous tissue WNL2  NEURO: 2-12 in normal limits and at patient's baseline  PSYCH:  insight and judgement, memory alert to " self and significant others , affect and mood normal      Lab/Diagnostic data:   Labs done in SNF are in [facility]. Please refer to them using EPIC/Care Everywhere. and Recent labs in EPIC reviewed by me today.     ASSESSMENT/PLAN    ICD-10-CM    1. Chronic atrial fibrillation (H)  I48.20    2. Long term current use of anticoagulant therapy  Z79.01    3. Hypothyroidism, unspecified type  E03.9    4. 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 Pain  R52 acetaminophen (TYLENOL) 500 MG tablet       Call and clarify order of Eliquis 2.5 mg/BID. Resident's creatinine clearance 49; however is above 80 years of age thus recommendations for lower dose.     Last documented TSH in 2019 and was within normal range. Will continue current dose of levothyroxine at this time but needing TSH next lab day.     See new orders above         Electronically signed by:  ANGEL Corrigan CNP  [facility]           Sincerely,        Christiana Osuna NP

## 2021-03-02 NOTE — PROGRESS NOTES
[place] [facility]  Chief Complaint   Patient presents with     [facility] Regulatory     [place] Medical Record Number:  [id number]  Place of Service where encounter took place:  [facility] () [[id number]]    HPI:    Earl Sanchez  is 89 year old (9/17/1926), who is being seen today for a federally mandated E/M visit.  HPI information obtained from: facility chart records, facility staff, patient report and [place] Epic chart review. Today's concerns are:    ICD-10-CM    1. Chronic atrial fibrillation (H)  I48.20    2. Long term current use of anticoagulant therapy  Z79.01    3. Hypothyroidism, unspecified type  E03.9    4. 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 Pain  R52 acetaminophen (TYLENOL) 500 MG tablet     -Resident resting in bed, very tired and difficult to waken. Denies pain at this time or SOB. Up with yaz lift to wheelchair. Cognition is progressively getting worse over previous months. Behaviors and resistance to care improved with start of Celexa 10 mg/day. No longer yelling at staff or family.   -Warfarin discontinued and resident was started on eliquis daily. Upon review of facility medical records, resident has been taking 5 mg/bid instead of ordered 2.5 mg/bid. There have been no indications of acute bleeding or concerns. Will clarify order with staff.      TSH   Date Value Ref Range Status   11/19/2019 4.87 0.30 - 5.00 uIU/mL Final       ALLERGIES:No known allergies  PAST MEDICAL HISTORY:   has a past medical history of Abdominal aortic aneurysm (H), Acute MI (H) (1981, 2007), Atrial fibrillation (H), BPH (benign prostatic hyperplasia), CAD (coronary artery disease), Chronic kidney disease, Duodenal ulcer with hemorrhage, Gait apraxia, Gastritis, Gastro-oesophageal reflux disease, Heart failure (H), Hyperlipidaemia LDL goal <100, Hypertension goal BP (blood pressure) < 140/90,  Osteoarthritis, Renal disease, and Thyroid disease. He also has no past medical history of Malignant hyperthermia or PONV (postoperative nausea and vomiting).  PAST SURGICAL HISTORY:   has a past surgical history that includes hernia repair, inguinal rt/lt; Ankle surgery; Phacoemulsification clear cornea with toric intraocular lens implant (4/10/2014); Phacoemulsification clear cornea with toric intraocular lens implant (4/22/2014); coronary artery bypass (2007); and Heart Cath Left heart cath (12/10/07).  FAMILY HISTORY: family history includes Hypertension in his maternal grandmother and mother.  SOCIAL HISTORY:  reports that he has never smoked. He has never used smokeless tobacco. He reports current alcohol use. He reports that he does not use drugs.    MEDICATIONS:  Current Outpatient Medications   Medication Sig Dispense Refill     acetaminophen (TYLENOL) 500 MG tablet Take 1,000 mg by mouth every 6 hours as needed for mild pain        allopurinol (ZYLOPRIM) 100 MG tablet Take 100 mg by mouth 2 times daily       apixaban ANTICOAGULANT (ELIQUIS) 2.5 MG tablet Take 1 tablet (2.5 mg) by mouth 2 times daily 60 tablet      aspirin (ASPIRIN LOW DOSE) 81 MG tablet Take 81 mg by mouth daily        citalopram (CELEXA) 10 MG tablet Take 10 mg by mouth daily       donepezil (ARICEPT) 5 MG tablet Take 1 tablet (5 mg) by mouth At Bedtime       doxazosin (CARDURA) 4 MG tablet Take 2 tablets (8 mg) by mouth At Bedtime 90 tablet 3     ERGOCALCIFEROL PO Take 50,000 Units by mouth every morning Wednesday        furosemide (LASIX) 10 mg TABS half-tab Take 10 mg by mouth daily       LEVOTHYROXINE SODIUM PO Take 88 mcg by mouth daily       nitroglycerin (NITROSTAT) 0.4 MG SL tablet Place 1 tablet (0.4 mg) under the tongue every 5 minutes as needed for chest pain 25 tablet 0     phenylephrine-shark liver oil-mineral oil-petrolatum (PREPARATION H) 0.25-14-74.9 % rectal ointment Place 1 applicator rectally 4 times daily as needed for  "hemorrhoids for Hemorrhoids       polyethylene glycol 3350 POWD Take 17 g by mouth daily       pravastatin (PRAVACHOL) 40 MG tablet TAKE 1 TABLET DAILY 90 tablet 1     Sennosides-Docusate Sodium (SENNA-DOCUSATE SODIUM) 8.6-50 MG TABS Take 1 tablet by mouth 2 times daily        spironolactone (ALDACTONE) 25 MG tablet Take 25 mg by mouth daily       triamcinolone (KENALOG) 0.025 % cream Apply topically every 12 hours as needed to rash to the affected area prn, apply tin layer to the rash at the LE.           Case Management:  I have reviewed the care plan and MDS and do agree with the plan. Patient's desire to return to the community is not assessible due to cognitive impairment. Information reviewed:  Medications, vital signs, orders, and nursing notes.    ROS:  Unobtainable secondary to cognitive impairment.     Vitals:  /85   Pulse 67   Temp 98.2  F (36.8  C)   Resp 16   Ht 1.753 m (5' 9\")   Wt 80.6 kg (177 lb 9.6 oz)   SpO2 98%   BMI 26.23 kg/m    Body mass index is 26.23 kg/m .  Exam:  GENERAL APPEARANCE:  Alert, in no distress  ENT:  Mouth and posterior oropharynx normal, moist mucous membranes, hearing acuity [place]  EYES:  EOM, conjunctivae, lids, pupils and irises normal  NECK:  No adenopathy,masses or thyromegaly  RESP:  respiratory effort and palpation of chest normal, no respiratory distress, Lung sounds clear  CV:  Palpation and auscultation of heart done , rate and rhythm regular, no murmur, no rub or gallop, Edema +2 bilateral LE  ABDOMEN:  normal bowel sounds, soft, nontender, no hepatosplenomegaly or other masses  M/S:   Gait and station up with yaz to WC- baseline, Digits and nails WNL  SKIN:  Inspection/Palpation of skin and subcutaneous tissue WNL2  NEURO: 2-12 in normal limits and at patient's baseline  PSYCH:  insight and judgement, memory alert to self and significant others , affect and mood normal      Lab/Diagnostic data:   Labs done in SNF are in [place] EPIC. Please refer to " them using EPIC/Care Everywhere. and Recent labs in EPIC reviewed by me today.     ASSESSMENT/PLAN    ICD-10-CM    1. Chronic atrial fibrillation (H)  I48.20    2. Long term current use of anticoagulant therapy  Z79.01    3. Hypothyroidism, unspecified type  E03.9    4. 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 Pain  R52 acetaminophen (TYLENOL) 500 MG tablet       Call and clarify order of Eliquis 2.5 mg/BID. Resident's creatinine clearance 49; however is above 80 years of age thus recommendations for lower dose.     Last documented TSH in 2019 and was within normal range. Will continue current dose of levothyroxine at this time but needing TSH next lab day.     See new orders above         Electronically signed by:  ANGEL Corrigan CNP  [facility]

## 2021-03-26 NOTE — PROGRESS NOTES
[place] [facility]  [place] Medical Record Number:  [id number]  Place of Service where encounter took place:  [facility] () [[id number]]  Chief Complaint   Patient presents with     Nursing Home Acute       HPI:    Earl Sanchez  is a 89 year old (9/17/1926), who is being seen today for an episodic care visit.  HPI information obtained from: facility chart records, facility staff and patient report. Today's concern is:  Hyponatremia  NA+ 135   -Has history of hyponatremia since 2019    Hyperkalemia  K+ 5.1    Chronic atrial fibrillation (H)  Long term current use of anticoagulant therapy  Recently switched to eliquis. No s/sx of acute bleeding.   -HR 66     Hypothyroidism, unspecified type  TSH 4.54      Past Medical and Surgical History reviewed in Epic today.    MEDICATIONS:    Current Outpatient Medications   Medication Sig Dispense Refill     acetaminophen (TYLENOL) 500 MG tablet Take 2 tablets (1,000 mg) by mouth every 6 hours as needed for mild pain       acetaminophen (TYLENOL) 500 MG tablet Take 1,000 mg by mouth every 6 hours as needed for mild pain        allopurinol (ZYLOPRIM) 100 MG tablet Take 100 mg by mouth 2 times daily       apixaban ANTICOAGULANT (ELIQUIS) 2.5 MG tablet Take 1 tablet (2.5 mg) by mouth 2 times daily 60 tablet      aspirin (ASPIRIN LOW DOSE) 81 MG tablet Take 81 mg by mouth daily        citalopram (CELEXA) 10 MG tablet Take 10 mg by mouth daily       donepezil (ARICEPT) 5 MG tablet Take 1 tablet (5 mg) by mouth At Bedtime       doxazosin (CARDURA) 4 MG tablet Take 2 tablets (8 mg) by mouth At Bedtime 90 tablet 3     ERGOCALCIFEROL PO Take 50,000 Units by mouth every morning Wednesday        furosemide (LASIX) 10 mg TABS half-tab Take 10 mg by mouth daily       LEVOTHYROXINE SODIUM PO Take 88 mcg by mouth daily       nitroglycerin (NITROSTAT) 0.4 MG SL tablet Place 1 tablet (0.4 mg) under the tongue every 5 minutes as needed for chest pain 25 tablet 0  "    phenylephrine-shark liver oil-mineral oil-petrolatum (PREPARATION H) 0.25-14-74.9 % rectal ointment Place 1 applicator rectally 4 times daily as needed for hemorrhoids for Hemorrhoids       polyethylene glycol 3350 POWD Take 17 g by mouth daily       pravastatin (PRAVACHOL) 40 MG tablet TAKE 1 TABLET DAILY 90 tablet 1     Sennosides-Docusate Sodium (SENNA-DOCUSATE SODIUM) 8.6-50 MG TABS Take 1 tablet by mouth 2 times daily        spironolactone (ALDACTONE) 25 MG tablet Take 25 mg by mouth daily       triamcinolone (KENALOG) 0.025 % cream Apply topically every 12 hours as needed to rash to the affected area prn, apply tin layer to the rash at the LE.       REVIEW OF SYSTEMS:  Unobtainable secondary to cognitive impairment.     Objective:  /66   Pulse 74   Temp 97.6  F (36.4  C)   Resp 18   Ht 1.753 m (5' 9\")   Wt 78.7 kg (173 lb 6.4 oz)   SpO2 97%   BMI 25.61 kg/m    Exam:  GENERAL APPEARANCE:  Alert  ENT:  Mouth and posterior oropharynx normal, moist mucous membranes, [place]  RESP:  respiratory effort and palpation of chest normal, lungs clear to auscultation   CV:  Palpation and auscultation of heart done , regular rate and rhythm, no murmur, rub, or gallop  M/S:   Gait and station normal  Digits and nails normal  SKIN:  Inspection of skin and subcutaneous tissue baseline, Palpation of skin and subcutaneous tissue baseline  NEURO:   Cranial nerves 2-12 are normal tested and grossly at patient's baseline  PSYCH:  memory impaired , affect and mood normal    Labs:   L[facility] SNF [facility]. Please refer to them using EPIC/Care Everywhere. and Recent labs in EPIC reviewed by me today.     ASSESSMENT/PLAN:  (E87.1) Hyponatremia  (primary encounter diagnosis)  (E87.5) Hyperkalemia  Comment: Acute/chronic. Likely 2/2 diuretics/SSRI  Plan:   -Decrease spironolactone 12.5 mg qday   -Decrease Lasix 5 mg/day.  -Daily weights x7 days   -Repeat BMP 4/6/2021    (I48.20) Chronic atrial fibrillation " (H)  (Z79.01) Long term current use of anticoagulant therapy  Comment: Chronic, managed on apixaban 2.5 mg/BID  Plan:   -No change, monitor     (E03.9) Hypothyroidism, unspecified type  Comment:   TSH   Date Value Ref Range Status   03/23/2021 4.54 0.30 - 5.00 uIU/mL Final   Plan: Continue levothyroxine 88 mcg/day.       Orders:  -Decrease spironolactone 12.5 mg qday   -Decrease Lasix 5 mg/day.  -Daily weights x7 days   -Repeat BMP 4/6/2021      Electronically signed by:  ANGEL Corrigan CNP  [facility]

## 2021-03-26 NOTE — LETTER
3/26/2021        RE: Earl Sanchez  [facility]  [address]  [facility] [address]        [place] [facility]  [place] Medical Record Number:  [id number]  Place of Service where encounter took place:  [facility] () [[id number]]  Chief Complaint   Patient presents with     Nursing Home Acute       HPI:    Earl Sanchez  is a 89 year old (9/17/1926), who is being seen today for an episodic care visit.  HPI information obtained from: facility chart records, facility staff and patient report. Today's concern is:  Hyponatremia  NA+ 135   -Has history of hyponatremia since 2019    Hyperkalemia  K+ 5.1    Chronic atrial fibrillation (H)  Long term current use of anticoagulant therapy  Recently switched to eliquis. No s/sx of acute bleeding.   -HR 66     Hypothyroidism, unspecified type  TSH 4.54      Past Medical and Surgical History reviewed in Epic today.    MEDICATIONS:    Current Outpatient Medications   Medication Sig Dispense Refill     acetaminophen (TYLENOL) 500 MG tablet Take 2 tablets (1,000 mg) by mouth every 6 hours as needed for mild pain       acetaminophen (TYLENOL) 500 MG tablet Take 1,000 mg by mouth every 6 hours as needed for mild pain        allopurinol (ZYLOPRIM) 100 MG tablet Take 100 mg by mouth 2 times daily       apixaban ANTICOAGULANT (ELIQUIS) 2.5 MG tablet Take 1 tablet (2.5 mg) by mouth 2 times daily 60 tablet      aspirin (ASPIRIN LOW DOSE) 81 MG tablet Take 81 mg by mouth daily        citalopram (CELEXA) 10 MG tablet Take 10 mg by mouth daily       donepezil (ARICEPT) 5 MG tablet Take 1 tablet (5 mg) by mouth At Bedtime       doxazosin (CARDURA) 4 MG tablet Take 2 tablets (8 mg) by mouth At Bedtime 90 tablet 3     ERGOCALCIFEROL PO Take 50,000 Units by mouth every morning Wednesday        furosemide (LASIX) 10 mg TABS half-tab Take 10 mg by mouth daily       LEVOTHYROXINE SODIUM PO Take 88 mcg by mouth daily        "nitroglycerin (NITROSTAT) 0.4 MG SL tablet Place 1 tablet (0.4 mg) under the tongue every 5 minutes as needed for chest pain 25 tablet 0     phenylephrine-shark liver oil-mineral oil-petrolatum (PREPARATION H) 0.25-14-74.9 % rectal ointment Place 1 applicator rectally 4 times daily as needed for hemorrhoids for Hemorrhoids       polyethylene glycol 3350 POWD Take 17 g by mouth daily       pravastatin (PRAVACHOL) 40 MG tablet TAKE 1 TABLET DAILY 90 tablet 1     Sennosides-Docusate Sodium (SENNA-DOCUSATE SODIUM) 8.6-50 MG TABS Take 1 tablet by mouth 2 times daily        spironolactone (ALDACTONE) 25 MG tablet Take 25 mg by mouth daily       triamcinolone (KENALOG) 0.025 % cream Apply topically every 12 hours as needed to rash to the affected area prn, apply tin layer to the rash at the LE.       REVIEW OF SYSTEMS:  Unobtainable secondary to cognitive impairment.     Objective:  /66   Pulse 74   Temp 97.6  F (36.4  C)   Resp 18   Ht 1.753 m (5' 9\")   Wt 78.7 kg (173 lb 6.4 oz)   SpO2 97%   BMI 25.61 kg/m    Exam:  GENERAL APPEARANCE:  Alert  ENT:  Mouth and posterior oropharynx normal, moist mucous membranes, [place]  RESP:  respiratory effort and palpation of chest normal, lungs clear to auscultation   CV:  Palpation and auscultation of heart done , regular rate and rhythm, no murmur, rub, or gallop  M/S:   Gait and station normal  Digits and nails normal  SKIN:  Inspection of skin and subcutaneous tissue baseline, Palpation of skin and subcutaneous tissue baseline  NEURO:   Cranial nerves 2-12 are normal tested and grossly at patient's baseline  PSYCH:  memory impaired , affect and mood normal    Labs:   Labs done in SNF are in [facility]. Please refer to them using [facility]/Care Everywhere. and Recent labs in EPIC reviewed by me today.     ASSESSMENT/PLAN:  (E87.1) Hyponatremia  (primary encounter diagnosis)  (E87.5) Hyperkalemia  Comment: Acute/chronic. Likely 2/2 diuretics/SSRI  Plan:   -Decrease " spironolactone 12.5 mg qday   -Decrease Lasix 5 mg/day.  -Daily weights x7 days   -Repeat BMP 4/6/2021    (I48.20) Chronic atrial fibrillation (H)  (Z79.01) Long term current use of anticoagulant therapy  Comment: Chronic, managed on apixaban 2.5 mg/BID  Plan:   -No change, monitor     (E03.9) Hypothyroidism, unspecified type  Comment:   TSH   Date Value Ref Range Status   03/23/2021 4.54 0.30 - 5.00 uIU/mL Final   Plan: Continue levothyroxine 88 mcg/day.       Orders:  -Decrease spironolactone 12.5 mg qday   -Decrease Lasix 5 mg/day.  -Daily weights x7 days   -Repeat BMP 4/6/2021      Electronically signed by:  ANGEL Corrigan [facility]           Sincerely,        Christiana Osuna, NP

## 2021-04-12 NOTE — TELEPHONE ENCOUNTER
[facility] Nurse Triage Telephone Note    Provider: Christiana Osuna NP  Facility: [facility]  Facility Type:  [facility]    Caller: Kelsey  Call Back Number: [phone]    Allergies:    Allergies   Allergen Reactions     No Known Allergies         Reason for call: Nurse called to report that it appears that patient has a broken blood vessel in his left eye.  No swelling or drainage present.  No bruising noted to the periorbital area.  Patient denies any injury to the left eye.  Patient is on Eliquis 2.5 mg po BID and ASA 81 mg daily.  Nurse doesn't think that it is an eye infection.  Patient denies pain or any visual disturbances.  Patient is doing just fine.  Advised nurse to monitor left eye and if it happens to get worse to update.       Verbal Order/Direction given by Provider: No new orders just monitor.    Provider giving Order:  Flavia Woodall NP    Verbal Order given to: Kelsey Hollingsworth RN

## 2021-04-13 NOTE — PROGRESS NOTES
[facility]  Chief Complaint   Patient presents with     Annual Comprehensive Nursing Home     FVP Care Coordination - Home Visit-Annual Assessment     [facility] Record Number:  [id number]  Place of Service where encounter took place:  [facility] () [[id number]]    HPI:    Earl Sanchez  is a 89 year old  (9/17/1926), who is being seen today for an annual comprehensive visit. HPI information obtained from: facility chart records, facility staff and patient report.      Today's concerns are:  -Resident resting in bed upon visit. Pleasantly confused and inquiring about my comings. Denies pain but complaining of increased fatigue and sleeping.   -Left eye subconjunctival hemorrhage present. No drainage, pain, or change of vision. Denies straining with bowel movements. Blood pressures have been less than goal 150/90.     BP Readings from Last 3 Encounters:   04/08/21 111/70   03/25/21 135/66   02/25/21 139/85     Pulse Readings from Last 4 Encounters:   04/12/21 81   03/25/21 74   03/01/21 67   02/08/21 69     Wt Readings from Last 4 Encounters:   04/08/21 79.8 kg (176 lb)   03/25/21 78.7 kg (173 lb 6.4 oz)   03/01/21 80.6 kg (177 lb 9.6 oz)   02/08/21 79.8 kg (175 lb 14.4 oz)         ALLERGIES: No known allergies  PAST MEDICAL HISTORY:  has a past medical history of Abdominal aortic aneurysm (H), Acute MI (H) (1981, 2007), Atrial fibrillation (H), BPH (benign prostatic hyperplasia), CAD (coronary artery disease), Chronic kidney disease, Duodenal ulcer with hemorrhage, Gait apraxia, Gastritis, Gastro-oesophageal reflux disease, Heart failure (H), Hyperlipidaemia LDL goal <100, Hypertension goal BP (blood pressure) < 140/90, Osteoarthritis, Renal disease, and Thyroid disease. He also has no past medical history of Malignant hyperthermia or PONV (postoperative nausea and vomiting).  PAST SURGICAL HISTORY:  has a past surgical history that includes hernia repair, inguinal  rt/lt; Ankle surgery; Phacoemulsification clear cornea with toric intraocular lens implant (4/10/2014); Phacoemulsification clear cornea with toric intraocular lens implant (4/22/2014); coronary artery bypass (2007); and Heart Cath Left heart cath (12/10/07).  IMMUNIZATIONS:  Immunization History   Administered Date(s) Administered     COVID-19,PF,Moderna 12/30/2020, 01/27/2021     Influenza (High Dose) 3 valent vaccine 10/27/2014, 10/08/2015, 10/06/2016, 10/10/2017, 10/09/2018, 10/21/2019     Influenza (IIV3) PF 12/15/2003     Influenza Quad, Recombinant, p-free (RIV4) 10/14/2020     Influenza Vaccine Im 4yrs+ 4 Valent CCIIV4 10/21/2019     Pneumo Conj 13-V (2010&after) 03/08/2018     Pneumococcal 23 valent 03/19/2019     TDAP Vaccine (Adacel) 08/21/2012     Above immunizations pulled from [facility]. [facility] and facility records also reconciled. Outstanding information sent to  to update [facility] .  Future immunizations are not needed at this point as all recommended immunizations are up to date.     Current Outpatient Medications   Medication Sig Dispense Refill     acetaminophen (TYLENOL) 500 MG tablet Take 2 tablets (1,000 mg) by mouth every 6 hours as needed for mild pain       allopurinol (ZYLOPRIM) 100 MG tablet Take 100 mg by mouth 2 times daily       apixaban ANTICOAGULANT (ELIQUIS) 2.5 MG tablet Take 1 tablet (2.5 mg) by mouth 2 times daily 60 tablet      aspirin (ASPIRIN LOW DOSE) 81 MG tablet Take 81 mg by mouth daily        citalopram (CELEXA) 10 MG tablet Take 10 mg by mouth daily       donepezil (ARICEPT) 5 MG tablet Take 1 tablet (5 mg) by mouth At Bedtime       doxazosin (CARDURA) 4 MG tablet Take 2 tablets (8 mg) by mouth At Bedtime 90 tablet 3     ERGOCALCIFEROL PO Take 50,000 Units by mouth every morning Wednesday        furosemide (LASIX) 10 mg TABS half-tab Take 5 mg by mouth daily       LEVOTHYROXINE SODIUM PO Take 88 mcg by mouth daily       nitroglycerin (NITROSTAT) 0.4  MG SL tablet Place 1 tablet (0.4 mg) under the tongue every 5 minutes as needed for chest pain 25 tablet 0     phenylephrine-shark liver oil-mineral oil-petrolatum (PREPARATION H) 0.25-14-74.9 % rectal ointment Place 1 applicator rectally 4 times daily as needed for hemorrhoids for Hemorrhoids       polyethylene glycol 3350 POWD Take 17 g by mouth daily       pravastatin (PRAVACHOL) 40 MG tablet TAKE 1 TABLET DAILY 90 tablet 1     Sennosides-Docusate Sodium (SENNA-DOCUSATE SODIUM) 8.6-50 MG TABS Take 1 tablet by mouth 2 times daily        spironolactone (ALDACTONE) 25 MG tablet Take 12.5 mg by mouth daily       triamcinolone (KENALOG) 0.025 % cream Apply topically every 12 hours as needed to rash to the affected area prn, apply tin layer to the rash at the LE.           Case Management:  I have reviewed the facility/SNF care plan/MDS, including the falls risk, nutrition and pain screening. I also reviewed the current immunizations, and preventive care. .Future cancer screening is not clinically indicated secondary to age/goals of care Patient's desire to return to the community is present, but is not able due to care needs . Current Level of Care is appropriate.    Advance Directive Discussion:    I reviewed the current advanced directives as reflected in EPIC, the POLST and the facility chart, and verified the congruency of orders . I did not contacted the first party   I did not due to cognitive impairment review the advance directives with the resident.     Team Discussion:  I communicated with the appropriate disciplines involved with the Plan of Care:   Nursing  ,    and Dietitian    Patient's goal is unobtainable secondary to cognitive impairment.  Information reviewed:  Medications, vital signs, orders, and nursing notes.    ROS:  Unobtainable secondary to cognitive impairment.  and 4 point ROS including Respiratory, CV, GI and , other than that noted in the HPI,  is negative    Vitals:  BP  "111/70   Pulse 81   Temp 96.2  F (35.7  C)   Resp 18   Ht 1.753 m (5' 9\")   Wt 79.8 kg (176 lb)   SpO2 94%   BMI 25.99 kg/m   Body mass index is 25.99 kg/m .  Exam:  GENERAL APPEARANCE:  Alert, in no distress  ENT:  Mouth and posterior oropharynx normal, moist mucous membranes, [place]  RESP:  respiratory effort and palpation of chest normal, lungs clear to auscultation   CV:  Palpation and auscultation of heart done , regular rate and rhythm, no murmur, rub, or gallop, no edema  ABDOMEN:  normal bowel sounds, soft, nontender, no hepatosplenomegaly or other masses  M/S:   Gait and station normal  Digits and nails normal  SKIN:  Inspection of skin and subcutaneous tissue baseline, Palpation of skin and subcutaneous tissue baseline  NEURO:   Cranial nerves 2-12 are normal tested and grossly at patient's baseline  PSYCH:  memory impaired , affect and mood normal     Lab/Diagnostic data:   Labs done in SNF are in [facility]. Please refer to them using [employer]/Goodreads Everywhere. and Recent labs in [facility] reviewed by me today.     ASSESSMENT/PLAN  Benign hypertension with chronic kidney disease, stage III  -Blood pressure less than goal range 150/90, asymptomatic. Remains on doxazosin, spironolactone and lasix. Will reduce lasix 5 mg every other day edema managed and slightly elecated creatinine.    -BMP next lab day     Chronic atrial fibrillation (H)  -HR stable off rate controller. Taking eliquis for anticoagulation.     Long term current use of anticoagulant therapy  -Continue eliquis 2.5 mg/BID. Monitor for s/sx of bleeding and update NP as indicated.     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 Mood and behavior have improved. Will continue celexa 10 mg/day without GDR at this time due to stability.     Hypothyroidism, unspecified type  TSH   Date Value Ref Range Status   03/23/2021 4.54 0.30 - 5.00 uIU/mL Final   -Continue levothyroxine 88 mcg/day. TSH yearly and " PRN      Chronic systolic congestive heart failure (H)  -Stable, weight at baseline.   -Reduce lasix 5 mg every other day.   -Call provider if greater than 5 pound weight gain from previous weight. Monitor for SOB, increasing lower extremity edema, wheezing, and change in activity tolerance.     Current mild episode of major depressive disorder without prior episode (H)  -Stable on celexa 10 mg/day     Subconjunctival hemorrhage of left eye   -Acute, no changes in vision or pain noted.   -BP stable and no recent injury or strain. Will monitor and follow up as needed.       Orders:  1.) BMP next lab day   2.) Reduce lasix 5 mg every other day.     Electronically signed by:  ANGEL Corrigan CNP  [facility]

## 2021-05-13 NOTE — LETTER
5/13/2021        RE: Earl Sanchez  [facility]  [address]  [facility] [address]        Earl Sanchez is a 89 year old male seen May 13, 2021 at [facility] where he has resided for 3 years (admit 2/2018) seen to follow up dementia with behavioral symptoms.      Pt is seen in his room up to .   He is quiet and disengaged   Says no to pain, dyspnea or other symptoms   Can be rude and verbally aggressive with nursing staff during cares.       Patient had a hospitalization in January 2018 for encephalopathy with gait disturbance.  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  He was in [facility] for a month, improved, but needed more support than AL, so transitioned to LT for permanent placement.  Continues to need assist for transfers bed to  and all cares.      Patient has had atrial fib and been anticoagulated with warfarin for many years.   Also treated for BPH, CAD and hypothyroidism, which have been stable.     He also has had unresponsive episodes, left facial droop, dysarthria and bilateral arm weakness.   His family was present during one of these and 911 was called. However, when EMTs arrived patient refused transport to ED.   Symptoms resolved on their own.  Pt also had an ED visit in March 2019 for an episode of unresponsiveness that persisted in the ED.   He had head CT, then awoke and responded to questions.   Workup unremarkable and he returned to [facility].      Past Medical History:   Diagnosis Date     Abdominal aortic aneurysm (H)     per 2/12/15 abdominal US, mild aneurysm measuring [id number]     Acute MI 1981, 2007     Atrial fibrillation (H)      BPH (benign prostatic hyperplasia)      CAD (coronary artery disease)     CABG 2007, f/b cardio, Dr. 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    , lived at the [facility] with services until 1/2018.      Has 2 sons and a daughter.        Review Of Systems  Limited, but negative other than above.         SLUMS 13/30   CPT 4.4   BIMS 11/15   Wt Readings from Last 5 Encounters:   05/13/21 80.2 kg (176 lb 14.4 oz)   04/08/21 79.8 kg (176 lb)   03/25/21 78.7 kg (173 lb 6.4 oz)   03/01/21 80.6 kg (177 lb 9.6 oz)   02/08/21 79.8 kg (175 lb 14.4 oz)      EXAM: NAD  /68   Pulse 72   Temp 97.5  F (36.4  C)   Resp 18   Ht 1.753 m (5' 9\")   Wt 80.2 kg (176 lb 14.4 oz)   SpO2 96%   BMI 26.12 kg/m     Neck supple without adenopathy  Lungs decreased BS, crackles LLL that clear with cough.   Heart irreg irreg s1s2, 1/6 HSM  Abd soft, NT, no distention, +BS  Ext without edema.   Neuro: no tremor or stiffness, +STML   Psych: affect flat, disengaged    Last Comprehensive Metabolic Panel:  Sodium   Date Value Ref Range Status   04/29/2021 137 136 - 145 mmol/L Final     Potassium   Date Value Ref Range Status   04/29/2021 4.7 3.5 - 5.0 mmol/L Final     Chloride   Date Value Ref Range Status   04/29/2021 104 98 - 107 mmol/L Final     Carbon Dioxide   Date Value Ref Range Status   04/29/2021 25 22 - 31 mmol/L Final     Anion Gap   Date Value Ref Range Status   04/29/2021 8 5 - 18 mmol/L Final     Glucose   Date Value Ref Range Status   04/29/2021 85 70 - 125 mg/dL Final     Urea Nitrogen   Date Value Ref Range Status   04/29/2021 26 8 - 28 mg/dL Final     Creatinine   Date Value Ref Range Status   04/29/2021 1.52 (H) 0.70 - 1.30 mg/dL Final     GFR Estimate   Date Value Ref Range Status   04/29/2021 43 (L) >60 ml/min/1.73m2 Final     Calcium   Date Value Ref Range Status "   04/29/2021 9.1 8.5 - 10.5 mg/dL Final     WBC 4.0 - 11.0 thou/uL 7.7    RBC 4.40 - 6.20 mill/uL 3.75Low     Hemoglobin 14.0 - 18.0 g/dL 12.0Low     Hematocrit 40.0 - 54.0 % 38.1Low     MCV 80 - 100 fL 102High     MCH 27.0 - 34.0 pg 32.0    MCHC 32.0 - 36.0 g/dL 31.5Low     RDW 11.0 - 14.5 % 14.6High     Platelets 140 - 440 thou/uL 217          IMP/PLAN:  (R41.89) Unresponsive episode    Comment: recurrent, unclear etiology  Plan: Pt has declined ED transfer for this.   Continue to monitor in NH.      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  Less behavioral symptoms now.    Plan: LTC support for meds, meals, activity.      Not likely that donepezil is adding any benefit at this time, and likely causing side effects, e.g., syncope     Will discontinue donepezil today.     (F43.23) Adjustment disorder with mixed anxiety and depressed mood  Comment: has worsened with cognitive decline     Plan: continue citalopram 10 mg/day      (I48.2) Chronic atrial fibrillation (H)  Comment: not requiring rate slowing meds   Pulse Readings from Last 4 Encounters:   05/13/21 72   04/12/21 81   03/25/21 74   03/01/21 67      Plan: apixaban 2.5 mg bid for stroke prophylaxis    (I25.10) Coronary artery disease involving native coronary artery of native heart without angina pectoris  Comment: h/o CABG 2007  Plan: daily ASA, statin for secondary prevention.   NTG prn    (N40.0) Benign prostatic hyperplasia, unspecified whether lower  urinary tract symptoms present  Comment: longstanding  Plan: continue doxazosin to help avoid urinary retention    (E03.9) Hypothyroidism, unspecified type  Comment:    TSH   Date Value Ref Range Status   04/15/2021 5.33 (H) 0.30 - 5.00 uIU/mL Final      Plan: levothyroxine 88 mcg/day, yearly TSH       (E55.9) Vitamin D deficiency  Comment: replaced as above  Plan: change vit D to 2000 units/day       Patty Gamble MD           Sincerely,        Patty Gamble MD

## 2021-05-25 NOTE — PROGRESS NOTES
Earl Sanchez is a 89 year old male seen May 13, 2021 at [facility] where he has resided for 3 years (admit 2/2018) seen to follow up dementia with behavioral symptoms.      Pt is seen in his room up to .   He is quiet and disengaged   Says no to pain, dyspnea or other symptoms   Can be rude and verbally aggressive with nursing staff during cares.       Patient had a hospitalization in January 2018 for encephalopathy with gait disturbance.  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  He was in [facility] for a month, improved, but needed more support than AL, so transitioned to LT for permanent placement.  Continues to need assist for transfers bed to  and all cares.      Patient has had atrial fib and been anticoagulated with warfarin for many years.   Also treated for BPH, CAD and hypothyroidism, which have been stable.     He also has had unresponsive episodes, left facial droop, dysarthria and bilateral arm weakness.   His family was present during one of these and 911 was called. However, when EMTs arrived patient refused transport to ED.   Symptoms resolved on their own.  Pt also had an ED visit in March 2019 for an episode of unresponsiveness that persisted in the ED.   He had head CT, then awoke and responded to questions.   Workup unremarkable and he returned to [facility].      Past Medical History:   Diagnosis Date     Abdominal aortic aneurysm (H)     per 2/12/15 abdominal US, mild aneurysm measuring [id number]     Acute MI 1981, 2007     Atrial fibrillation (H)      BPH (benign prostatic hyperplasia)      CAD (coronary artery disease)     CABG 2007, f/b cardio, Dr. 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      Has 2 sons and a daughter.        Review Of Systems  Limited, but negative other than above.         SLUMS 13/30   CPT 4.4   BIMS 11/15   Wt Readings from Last 5 Encounters:   05/13/21 80.2 kg (176 lb 14.4 oz)   04/08/21 79.8 kg (176 lb)   03/25/21 78.7 kg (173 lb 6.4 oz)   03/01/21 80.6 kg (177 lb 9.6 oz)   02/08/21 79.8 kg (175 lb 14.4 oz)      EXAM: NAD  /68   Pulse 72   Temp 97.5  F (36.4  C)   Resp 18   Ht 1.753 m (5' 9\")   Wt 80.2 kg (176 lb 14.4 oz)   SpO2 96%   BMI 26.12 kg/m     Neck supple without adenopathy  Lungs decreased BS, crackles LLL that clear with cough.   Heart irreg irreg s1s2, 1/6 HSM  Abd soft, NT, no distention, +BS  Ext without edema.   Neuro: no tremor or stiffness, +STML   Psych: affect flat, disengaged    Last Comprehensive Metabolic Panel:  Sodium   Date Value Ref Range Status   04/29/2021 137 136 - 145 mmol/L Final     Potassium   Date Value Ref Range Status   04/29/2021 4.7 3.5 - 5.0 mmol/L Final     Chloride   Date Value Ref Range Status   04/29/2021 104 98 - 107 mmol/L Final     Carbon Dioxide   Date Value Ref Range Status   04/29/2021 25 22 - 31 mmol/L Final     Anion Gap   Date Value Ref Range Status   04/29/2021 8 5 - 18 mmol/L Final     Glucose   Date Value Ref Range Status   04/29/2021 85 70 - 125 mg/dL Final     Urea Nitrogen   Date Value Ref Range Status   04/29/2021 26 8 - 28 mg/dL Final     Creatinine   Date Value Ref Range Status   04/29/2021 1.52 (H) 0.70 - 1.30 mg/dL Final     GFR Estimate   Date Value Ref Range Status   04/29/2021 43 (L) >60 ml/min/1.73m2 Final     Calcium   Date Value Ref Range Status   04/29/2021 9.1 8.5 - 10.5 mg/dL Final     WBC 4.0 - 11.0 thou/uL 7.7    RBC 4.40 - 6.20 mill/uL 3.75Low     Hemoglobin 14.0 - " 18.0 g/dL 12.0Low     Hematocrit 40.0 - 54.0 % 38.1Low     MCV 80 - 100 fL 102High     MCH 27.0 - 34.0 pg 32.0    MCHC 32.0 - 36.0 g/dL 31.5Low     RDW 11.0 - 14.5 % 14.6High     Platelets 140 - 440 thou/uL 217          IMP/PLAN:  (R41.89) Unresponsive episode    Comment: recurrent, unclear etiology  Plan: Pt has declined ED transfer for this.   Continue to monitor in NH.      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  Less behavioral symptoms now.    Plan: LTC support for meds, meals, activity.      Not likely that donepezil is adding any benefit at this time, and likely causing side effects, e.g., syncope     Will discontinue donepezil today.     (F43.23) Adjustment disorder with mixed anxiety and depressed mood  Comment: has worsened with cognitive decline     Plan: continue citalopram 10 mg/day      (I48.2) Chronic atrial fibrillation (H)  Comment: not requiring rate slowing meds   Pulse Readings from Last 4 Encounters:   05/13/21 72   04/12/21 81   03/25/21 74   03/01/21 67      Plan: apixaban 2.5 mg bid for stroke prophylaxis    (I25.10) Coronary artery disease involving native coronary artery of native heart without angina pectoris  Comment: h/o CABG 2007  Plan: daily ASA, statin for secondary prevention.   NTG prn    (N40.0) Benign prostatic hyperplasia, unspecified whether lower urinary tract symptoms present  Comment: longstanding  Plan: continue doxazosin to help avoid urinary retention    (E03.9)  Hypothyroidism, unspecified type  Comment:    TSH   Date Value Ref Range Status   04/15/2021 5.33 (H) 0.30 - 5.00 uIU/mL Final      Plan: levothyroxine 88 mcg/day, yearly TSH       (E55.9) Vitamin D deficiency  Comment: replaced as above  Plan: change vit D to 2000 units/day       Patty Gamble MD

## 2021-07-08 NOTE — PROGRESS NOTES
[place] [facility]  [place] Medical Record Number:  [id number]  Place of Service where encounter took place:  [facility] ([facility]) [[id number]]  Chief Complaint   Patient presents with     half-way Regulatory       HPI:    Earl Sanchez  is 89 year old (9/17/1926), who is being seen today for a federally mandated E/M visit.  HPI information obtained from: facility chart records, facility staff and patient report. Today's concern is:  Resident resting in WC in room. Little information provided given degree of cognitive impairment. Mood stable and no behaviors per nursing manager. Reports no CP, pain or lightheadedness.     Past Medical and Surgical History reviewed in Epic today.    MEDICATIONS:    Current Outpatient Medications   Medication Sig Dispense Refill     acetaminophen (TYLENOL) 500 MG tablet Take 2 tablets (1,000 mg) by mouth every 6 hours as needed for mild pain       allopurinol (ZYLOPRIM) 100 MG tablet Take 1 tablet (100 mg) by mouth daily       aspirin (ASPIRIN LOW DOSE) 81 MG tablet Take 81 mg by mouth daily        cephALEXin (KEFLEX) 500 MG capsule Take 1 capsule (500 mg) by mouth 2 times daily  0     doxazosin (CARDURA) 4 MG tablet Take 1 tablet (4 mg) by mouth At Bedtime 90 tablet 3     ERGOCALCIFEROL PO Take 50,000 Units by mouth every morning Wednesday        furosemide (LASIX) 10 mg TABS half-tab Take 5 mg by mouth every other day       LEVOTHYROXINE SODIUM PO Take 88 mcg by mouth daily       nitroglycerin (NITROSTAT) 0.4 MG SL tablet Place 1 tablet (0.4 mg) under the tongue every 5 minutes as needed for chest pain 25 tablet 0     phenylephrine-shark liver oil-mineral oil-petrolatum (PREPARATION H) 0.25-14-74.9 % rectal ointment Place 1 applicator rectally 4 times daily as needed for hemorrhoids for Hemorrhoids       polyethylene glycol 3350 POWD Take 17 g by mouth daily       pravastatin (PRAVACHOL) 40 MG tablet TAKE 1 TABLET DAILY 90 tablet 1      "Sennosides-Docusate Sodium (SENNA-DOCUSATE SODIUM) 8.6-50 MG TABS Take 1 tablet by mouth 2 times daily        spironolactone (ALDACTONE) 25 MG tablet Take 12.5 mg by mouth daily       triamcinolone (KENALOG) 0.025 % cream Apply topically every 12 hours as needed to rash to the affected area prn, apply tin layer to the rash at the LE.           REVIEW OF SYSTEMS:  Unobtainable secondary to cognitive impairment.     Objective:  /64   Pulse 65   Temp 97  F (36.1  C)   Resp 17   Ht 1.753 m (5' 9\")   Wt 62.1 kg (136 lb 12.8 oz)   SpO2 97%   BMI 20.20 kg/m    Exam:  GENERAL APPEARANCE:  Alert, in no distress  ENT:  Mouth and posterior oropharynx normal, moist mucous membranes, [place]  RESP:  respiratory effort and palpation of chest normal, lungs clear to auscultation   CV:  Palpation and auscultation of heart done , regular rate and rhythm, no murmur, rub, or gallop  M/S:   Gait and station normal  Digits and nails normal  SKIN:  Inspection of skin and subcutaneous tissue baseline, Palpation of skin and subcutaneous tissue baseline  NEURO:   Cranial nerves 2-12 are normal tested and grossly at patient's baseline  PSYCH:  insight and judgement impaired, memory impaired , affect and mood normal    Labs:   Labs done in SNF are in [facility]. Please refer to them using [employer] Everywhere. and Recent labs in EPIC reviewed by me today.     ASSESSMENT/PLAN:  (G30.1,  F02.81) Late onset Alzheimer's disease with behavioral disturbance (H)  (primary encounter diagnosis)  Comment: Chronic, progression noted. Mood and behavior stable.   Plan:   -No change, expect further decline with progression of disease.     (E03.9) Hypothyroidism, unspecified type  Comment: TSH 5.33 on 4/15/2021  Plan:   -Continue levothyroxine 100 mcg/day and recheck TSH next lab day     (F32.0) Current mild episode of major depressive disorder without prior episode (H)  Comment: Mood and behavior stable likely 2/2 progression of dementia. "   Plan:   -discontinue celexa and monitor       Orders:  1. Discontinue Celexa   2. TSH next lab day     Electronically signed by:  ANGEL Corrigan [facility]

## 2021-07-09 NOTE — LETTER
7/9/2021        RE: Earl Sanchez  [facility]  [address]  [facility] [address]        [place] [facility]  [place] Medical Record Number:  [id number]  [facility] [[id number]]  Chief Complaint   Patient presents with     RECHECK       HPI:    Earl Sanchez  is a 89 year old (9/17/1926), who is being seen today for an episodic care visit.  HPI information obtained from: facility chart records, facility staff and patient report. Today's concern is:  Resident resting in WC in room. Little information provided given degree of cognitive impairment. Mood stable and no behaviors per nursing manager. Reports no CP, pain or lightheadedness.     Past Medical and Surgical History reviewed in Epic today.    MEDICATIONS:    Current Outpatient Medications   Medication Sig Dispense Refill     acetaminophen (TYLENOL) 500 MG tablet Take 2 tablets (1,000 mg) by mouth every 6 hours as needed for mild pain       allopurinol (ZYLOPRIM) 100 MG tablet Take 1 tablet (100 mg) by mouth daily       aspirin (ASPIRIN LOW DOSE) 81 MG tablet Take 81 mg by mouth daily        citalopram (CELEXA) 10 MG tablet Take 10 mg by mouth daily       doxazosin (CARDURA) 4 MG tablet Take 1 tablet (4 mg) by mouth At Bedtime 90 tablet 3     ERGOCALCIFEROL PO Take 50,000 Units by mouth every morning Wednesday        furosemide (LASIX) 10 mg TABS half-tab Take 5 mg by mouth every other day       LEVOTHYROXINE SODIUM PO Take 88 mcg by mouth daily       nitroglycerin (NITROSTAT) 0.4 MG SL tablet Place 1 tablet (0.4 mg) under the tongue every 5 minutes as needed for chest pain 25 tablet 0     phenylephrine-shark liver oil-mineral oil-petrolatum (PREPARATION H) 0.25-14-74.9 % rectal ointment Place 1 applicator rectally 4 times daily as needed for hemorrhoids for Hemorrhoids       polyethylene glycol 3350 POWD Take 17 g by mouth daily       pravastatin  "(PRAVACHOL) 40 MG tablet TAKE 1 TABLET DAILY 90 tablet 1     Sennosides-Docusate Sodium (SENNA-DOCUSATE SODIUM) 8.6-50 MG TABS Take 1 tablet by mouth 2 times daily        spironolactone (ALDACTONE) 25 MG tablet Take 12.5 mg by mouth daily       triamcinolone (KENALOG) 0.025 % cream Apply topically every 12 hours as needed to rash to the affected area prn, apply tin layer to the rash at the LE.           REVIEW OF SYSTEMS:  Unobtainable secondary to cognitive impairment.     Objective:  /64   Pulse 65   Temp 97  F (36.1  C)   Resp 17   Ht 1.753 m (5' 9\")   Wt 62.1 kg (136 lb 12.8 oz)   SpO2 97%   BMI 20.20 kg/m    Exam:  GENERAL APPEARANCE:  Alert, in no distress  ENT:  Mouth and posterior oropharynx normal, moist mucous membranes, [place]  RESP:  respiratory effort and palpation of chest normal, lungs clear to auscultation   CV:  Palpation and auscultation of heart done , regular rate and rhythm, no murmur, rub, or gallop  M/S:   Gait and station normal  Digits and nails normal  SKIN:  Inspection of skin and subcutaneous tissue baseline, Palpation of skin and subcutaneous tissue baseline  NEURO:   Cranial nerves 2-12 are normal tested and grossly at patient's baseline  PSYCH:  insight and judgement impaired, memory impaired , affect and mood normal    Labs:   Labs done in SNF are in [facility]. Please refer to them using [employer]/Care Everywhere. and Recent labs in [facility] reviewed by me today.     ASSESSMENT/PLAN:  (G30.1,  F02.81) Late onset Alzheimer's disease with behavioral disturbance (H)  (primary encounter diagnosis)  Comment: Chronic, progression noted. Mood and behavior stable.   Plan:   -No change, expect further decline with progression of disease.     (E03.9) Hypothyroidism, unspecified type  Comment: TSH 5.33 on 4/15/2021  Plan:   -Continue levothyroxine 100 mcg/day and recheck TSH next lab day     (F32.0) Current mild episode of major depressive disorder without prior episode (H)  Comment: Mood " and behavior stable likely 2/2 progression of dementia.   Plan:   -discontinue celexa and monitor       Orders:  1. Discontinue Celexa   2. TSH next lab day     Electronically signed by:  ANGEL Corrigan [facility]           Sincerely,        Christiana Osuna NP

## 2021-07-24 NOTE — TELEPHONE ENCOUNTER
Staff called to report blood clots from his penis.    Is on coumadin as well.    Orders:  1.  UA/UC for bleeding/hematuria  2.  INR tomorrow AM    Electronically signed by Carmel Hernandez RN, CNP

## 2021-07-25 NOTE — TELEPHONE ENCOUNTER
Assessment:  UA appears positive  Still having dysuria, no more blood clots noted but there is a foul odor as well as blood tinged at times  -INR yesterday was 1.5  -No fevers      Plan:  -Keflex 500 mg BID for 3 days  -Update NP tomorrow with UC results      Electronically signed by  Angela Hamm CNP

## 2021-07-27 NOTE — TELEPHONE ENCOUNTER
[facility] Nurse Triage Telephone Note    Provider: ANGEL Xiong CNP  Facility: [facility] Facility Type:  [facility]    Caller: Zaina  Call Back Number: [phone]    Allergies:    Allergies   Allergen Reactions     No Known Allergies         Reason for call: UC finals: Mixture of urogenital organisms. Pt on Keflex 500BID. 7/25-7/28.  AFeb  UA Squam <1, Bacteria Many, Nit -, WBC 30.    Verbal Order/Direction given by Provider: Discontinue Keflex    Provider giving Order:  Patty Gamble MD    Verbal Order given to: Naya Thomas RN

## 2021-09-01 NOTE — PROGRESS NOTES
"[place] [facility]  [place] Medical Record Number:  [id number]  Place of Service where encounter took place: [facility] ([facility]) [[id number]]    HPI:    Earl Sanchez is a 89 year old  (9/17/1926), who is being seen today for an episodic care visit at the above location.   HPI information obtained from: facility chart records, facility staff and patient report. Today's concern is INR/Coumadin management for A. Fib    ROS/Subjective:  Bleeding Signs/Symptoms:  None  Thromboembolic Signs/Symptoms:  None  Medication Changes:  No  Dietary Changes:  No  Activity Changes: No  Bacterial/Viral Infection:  No  Missed Coumadin Doses:  None  On ASA: Yes - 81 mg po q day  Other Concerns:  No    OBJECTIVE:  /68   Pulse 69   Temp 97.8  F (36.6  C)   Resp 16   Ht 1.753 m (5' 9\")   Wt 79.8 kg (176 lb)   SpO2 96%   BMI 25.99 kg/m    Last INR: 1.67 on 1/30/2020  INR Today:  2.07  Current Dose:  Warfarin 3 mg/day   INR Flow sheet at SNF:    ASSESSMENT:    ICD-10-CM    1. Chronic atrial fibrillation I48.20    2. Long term current use of anticoagulant therapy Z79.01        Therapeutic INR for goal of 2-3    PLAN:   Orders written by provider at facility  New Dose: No Change    Next INR: 1 month      Electronically signed by:  ANGEL Corrigan CNP  [facility]     " Patient is scheduled for a/an Colonoscopy (79028) with SuPrep and EGD (43543), under MAC, with Tj SOUSA MD on 10/28/21 at [facility][note 3].      Prep instructions were mailed to patient.    Nurse:   Please send the procedure prep of Cardoso-Prep and the anti-nausea medication to the patient's pharmacy and contact patient for any pre-procedure concerns or questions.     [facility] [facility][id number] [facility], [place] - [address] AT [facility] [address] [Patient Preferred]  [phone]

## 2021-09-17 NOTE — LETTER
"    9/17/2021        RE: Earl Sanchez  [facility]  [address]  [facility] [address]        Earl Sanchez is a 89 year old male seen September 17, 2021 at [facility] where he has resided for 3 and a half years (admit 2/2018) seen to follow up dementia with behavioral symptoms.    Pt is seen on the unit up to , states he is \"pretty good, but tired.\"   Doesn't given much other history.   Nursing staff without any new concerns.   Pt had hematuria in July, treated with abx, but UC negative for infection.    No recurrence reported        Patient had a hospitalization in January 2018 for encephalopathy with gait disturbance.  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  He was in [facility] for a month, improved, but needed more support than AL, so transitioned to LT for permanent placement.  Continues to need assist for transfers bed to  and all cares.      Patient has had atrial fib and been anticoagulated with warfarin for many years.   Also treated for BPH, CAD and hypothyroidism, which have been stable.     He also has had unresponsive episodes, left facial droop, dysarthria and bilateral arm weakness.   His family was present during one of these and 911 was called. However, when EMTs arrived patient refused transport to ED.   Symptoms resolved on their own.  Pt also had an ED visit in March 2019 for an episode of unresponsiveness that persisted in the ED.   He had head CT, then awoke and responded to questions.   Workup unremarkable and he returned to [facility].      Past Medical History:   Diagnosis Date     Abdominal aortic aneurysm (H)     per 2/12/15 abdominal US, mild aneurysm measuring [id number]     Acute MI 1981, 2007     Atrial fibrillation (H)      BPH (benign prostatic hyperplasia)      CAD (coronary artery disease)     CABG 2007, f/b cardio, Dr. 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    , [facility] the [facility] until 1/2018.      Has 2 sons and a daughter.        Review Of Systems  Limited, but negative other than above.         SLUMS 13/30   CPT 4.4   BIMS 11/15 in 2018  Wt Readings from Last 5 Encounters:   09/17/21 77.7 kg (171 lb 6.4 oz)   07/08/21 62.1 kg (136 lb 12.8 oz)   05/13/21 80.2 kg (176 lb 14.4 oz)   04/08/21 79.8 kg (176 lb)   03/25/21 78.7 kg (173 lb 6.4 oz)      EXAM: NAD  /60   Pulse 72   Temp 96.8  F (36  C)   Resp 17   Ht 1.753 m (5' 9\")   Wt 77.7 kg (171 lb 6.4 oz)   SpO2 97%   BMI 25.31 kg/m     Neck supple without adenopathy  Lungs decreased BS, crackles LLL that clear with cough.   Heart irreg irreg s1s2, 1/6 HSM  Abd soft, NT, no distention, +BS  Ext without edema.   Neuro: no tremor or stiffness, +STML   Psych: affect flat, disengaged    Labs reviewed      IMP/PLAN:  (I12.9,  N18.3) Benign hypertension with chronic kidney disease, stage III (H)    Comment:  GFR 43  On lower dose of doxazosin  BP Readings from Last 3 Encounters:   09/17/21 102/60   07/08/21 126/64   05/13/21 120/68    Plan: remains on doxazosin 4 mg/HS for bp control and BPH  Spironolactone to 12.5 mg/day   Follow BMP and bps    (G30.1,  F02.81) Late onset Alzheimer's disease with behavioral disturbance (H)  Comment: gradual decline in cognition, low functional status.  Less behavioral symptoms now.    No changes after discontinuation of donepezil  Plan: LTC support for meds, meals, activity.       (I48.2) Chronic atrial fibrillation (H)  Comment: not requiring rate slowing meds   Pulse Readings " from Last 4 Encounters:   09/17/21 72   07/08/21 65   05/13/21 72   04/12/21 81      Plan: apixaban 2.5 mg bid for stroke prophylaxis    (I25.10) Coronary artery disease involving native coronary artery of native heart without angina pectoris  Comment: h/o CABG 2007  Plan: daily ASA, statin for secondary prevention.   NTG prn  If further hematuria would recommend stopping ASA    (R41.89) Unresponsive episode    Comment: recurrent, unclear etiology  Plan: Pt has declined ED transfer for this.   Continue to monitor in NH.     (N40.0) Benign prostatic hyperplasia, unspecified whether lower urinary tract symptoms present  Comment: longstanding  Plan: continue doxazosin to help avoid urinary retention    (E03.9) Hypothyroidism, unspecified type  Comment:    TSH   Date Value Ref Range Status   04/15/2021 5.33 (H) 0.30 - 5.00 uIU/mL Final   04/15/2021 5.33 (H) 0.30 - 5.00 uIU/mL Final      Plan: levothyroxine 100 mcg/day, yearly TSH       (M1A.9XX0) Chronic gout without tophus, unspecified cause, unspecified site  Comment: no recent flare  Plan: allopurinol 100 mg/day    (E55.9) Vitamin D deficiency  Comment: currently on high dose replacement  Plan: change vit D to 1000 units/day for maintenance     Patty Gamble MD           Sincerely,        Patty Gamble MD

## 2021-09-29 NOTE — PROGRESS NOTES
"Earl Sanchez is a 89 year old male seen September 17, 2021 at [facility] where he has resided for 3 and a half years (admit 2/2018) seen to follow up dementia with behavioral symptoms.    Pt is seen on the unit up to , states he is \"pretty good, but tired.\"   Doesn't given much other history.   Nursing staff without any new concerns.   Pt had hematuria in July, treated with abx, but UC negative for infection.    No recurrence reported        Patient had a hospitalization in January 2018 for encephalopathy with gait disturbance.  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  He was in [facility] for a month, improved, but needed more support than AL, so transitioned to LT for permanent placement.  Continues to need assist for transfers bed to  and all cares.      Patient has had atrial fib and been anticoagulated with warfarin for many years.   Also treated for BPH, CAD and hypothyroidism, which have been stable.     He also has had unresponsive episodes, left facial droop, dysarthria and bilateral arm weakness.   His family was present during one of these and 911 was called. However, when EMTs arrived patient refused transport to ED.   Symptoms resolved on their own.  Pt also had an ED visit in March 2019 for an episode of unresponsiveness that persisted in the ED.   He had head CT, then awoke and responded to questions.   Workup unremarkable and he returned to [facility].      Past Medical History:   Diagnosis Date     Abdominal aortic aneurysm (H)     per 2/12/15 abdominal US, mild aneurysm measuring [id number]     Acute MI 1981, 2007     Atrial fibrillation (H)      BPH (benign prostatic hyperplasia)      CAD (coronary artery disease)     CABG 2007, f/b cardio, Dr. 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      Has 2 sons and a daughter.        Review Of Systems  Limited, but negative other than above.         SLUMS 13/30   CPT 4.4   BIMS 11/15 in 2018  Wt Readings from Last 5 Encounters:   09/17/21 77.7 kg (171 lb 6.4 oz)   07/08/21 62.1 kg (136 lb 12.8 oz)   05/13/21 80.2 kg (176 lb 14.4 oz)   04/08/21 79.8 kg (176 lb)   03/25/21 78.7 kg (173 lb 6.4 oz)      EXAM: NAD  /60   Pulse 72   Temp 96.8  F (36  C)   Resp 17   Ht 1.753 m (5' 9\")   Wt 77.7 kg (171 lb 6.4 oz)   SpO2 97%   BMI 25.31 kg/m     Neck supple without adenopathy  Lungs decreased BS, crackles LLL that clear with cough.   Heart irreg irreg s1s2, 1/6 HSM  Abd soft, NT, no distention, +BS  Ext without edema.   Neuro: no tremor or stiffness, +STML   Psych: affect flat, disengaged    Labs reviewed      IMP/PLAN:  (I12.9,  N18.3) Benign hypertension with chronic kidney disease, stage III (H)    Comment:  GFR 43  On lower dose of doxazosin  BP Readings from Last 3 Encounters:   09/17/21 102/60   07/08/21 126/64   05/13/21 120/68    Plan: remains on doxazosin 4 mg/HS for bp control and BPH  Spironolactone to 12.5 mg/day   Follow BMP and bps    (G30.1,  F02.81) Late onset Alzheimer's disease with behavioral disturbance (H)  Comment: gradual decline in cognition, low functional status.  Less behavioral symptoms now.    No changes after discontinuation of donepezil  Plan: LTC support for meds, meals, activity.       (I48.2) Chronic atrial fibrillation (H)  Comment: not requiring rate slowing meds   Pulse Readings from Last 4 Encounters:   09/17/21 72   07/08/21 65   05/13/21 72   04/12/21 81      Plan: apixaban 2.5 mg bid for stroke " prophylaxis    (I25.10) Coronary artery disease involving native coronary artery of native heart without angina pectoris  Comment: h/o CABG 2007  Plan: daily ASA, statin for secondary prevention.   NTG prn  If further hematuria would recommend stopping ASA    (R41.89) Unresponsive episode    Comment: recurrent, unclear etiology  Plan: Pt has declined ED transfer for this.   Continue to monitor in NH.     (N40.0) Benign prostatic hyperplasia, unspecified whether lower urinary tract symptoms present  Comment: longstanding  Plan: continue doxazosin to help avoid urinary retention    (E03.9) Hypothyroidism, unspecified type  Comment:    TSH   Date Value Ref Range Status   04/15/2021 5.33 (H) 0.30 - 5.00 uIU/mL Final   04/15/2021 5.33 (H) 0.30 - 5.00 uIU/mL Final      Plan: levothyroxine 100 mcg/day, yearly TSH       (M1A.9XX0) Chronic gout without tophus, unspecified cause, unspecified site  Comment: no recent flare  Plan: allopurinol 100 mg/day    (E55.9) Vitamin D deficiency  Comment: currently on high dose replacement  Plan: change vit D to 1000 units/day for maintenance     Patty Gamble MD

## 2021-11-08 NOTE — PROGRESS NOTES
[facility] GERIATRIC SERVICES  Chief Complaint   Patient presents with     assisted Regulatory     [facility] Medical Record Number:  [id number]  Place of Service where encounter took place:  [facility] () [[id number]]    HPI:    Earl Sanchze  is 89 year old (9/17/1926), who is being seen today for a federally mandated E/M visit. Today's concerns are:  Continues to be very confused, alert to self only. Last BIMS 9/15. Complaining of bilateral LE pain, thinks it may be because he is resting in wheelchair and they are dangling. Repositioned and stated some relief. Otherwise doing ok.     BP Readings from Last 3 Encounters:   10/28/21 138/70   09/17/21 102/60   07/08/21 126/64     Pulse Readings from Last 4 Encounters:   09/17/21 72   07/08/21 65   05/13/21 72   04/12/21 81     Wt Readings from Last 4 Encounters:   10/28/21 76.9 kg (169 lb 9.6 oz)   09/17/21 77.7 kg (171 lb 6.4 oz)   07/08/21 62.1 kg (136 lb 12.8 oz)   05/13/21 80.2 kg (176 lb 14.4 oz)         ALLERGIES:No known allergies  PAST MEDICAL HISTORY:   Past Medical History:   Diagnosis Date     Abdominal aortic aneurysm (H)     per 2/12/15 abdominal US, mild aneurysm measuring [id number]     Acute MI (H) 1981, 2007     Atrial fibrillation (H)      BPH (benign prostatic hyperplasia)      CAD (coronary artery disease)     CABG 2007, f/b cardio, Dr. 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     Gastritis     treated with zantac     Gastro-oesophageal reflux disease      Heart failure (H)      Hyperlipidaemia LDL goal <100      Hypertension goal BP (blood pressure) < 140/90      Osteoarthritis     low back, hips, knees     Renal disease     renal insuff     Thyroid disease      PAST SURGICAL HISTORY:   has a past surgical history that includes hernia repair, inguinal rt/lt; Ankle surgery; Phacoemulsification clear cornea with toric intraocular lens implant (4/10/2014);  Phacoemulsification clear cornea with toric intraocular lens implant (4/22/2014); coronary artery bypass (2007); and Heart Cath Left heart cath (12/10/07).  FAMILY HISTORY: family history includes Hypertension in his maternal grandmother and mother.  SOCIAL HISTORY:  reports that he has never smoked. He has never used smokeless tobacco. He reports current alcohol use. He reports that he does not use drugs.    MEDICATIONS:     Review of your medicines          Accurate as of November 9, 2021  8:56 AM. If you have any questions, ask your nurse or doctor.            CONTINUE these medicines which have NOT CHANGED      Dose / Directions   acetaminophen 500 MG tablet  Commonly known as: TYLENOL  Used for: Pain      Dose: 1,000 mg  Take 2 tablets (1,000 mg) by mouth every 6 hours as needed for mild pain  Refills: 0     allopurinol 100 MG tablet  Commonly known as: ZYLOPRIM  Used for: Chronic gout without tophus, unspecified cause, unspecified site      Dose: 100 mg  Take 1 tablet (100 mg) by mouth daily  Refills: 0     Aspirin Low Dose 81 MG tablet  Generic drug: aspirin      Dose: 81 mg  Take 81 mg by mouth daily  Refills: 0     doxazosin 4 MG tablet  Commonly known as: CARDURA  Used for: Benign non-nodular prostatic hyperplasia without lower urinary tract symptoms      Dose: 4 mg  Take 1 tablet (4 mg) by mouth At Bedtime  Quantity: 90 tablet  Refills: 3     ERGOCALCIFEROL PO      Dose: 50,000 Units  Take 50,000 Units by mouth every morning Wednesday  Refills: 0     furosemide 10 mg Tabs half-tab  Commonly known as: LASIX      Dose: 5 mg  Take 5 mg by mouth every other day  Refills: 0     LEVOTHYROXINE SODIUM PO      Dose: 88 mcg  Take 88 mcg by mouth daily  Refills: 0     nitroGLYcerin 0.4 MG sublingual tablet  Commonly known as: NITROSTAT  Used for: CAD (coronary artery disease)      Dose: 0.4 mg  Place 1 tablet (0.4 mg) under the tongue every 5 minutes as needed for chest pain  Quantity: 25 tablet  Refills: 0    "  phenylephrine-mineral oil-petrolatum 0.25-14-74.9 % rectal ointment  Commonly known as: PREPARATION H      Dose: 1 applicator  Place 1 applicator rectally 4 times daily as needed for hemorrhoids for Hemorrhoids  Refills: 0     polyethylene glycol 3350 Powd      Dose: 17 g  Take 17 g by mouth daily  Refills: 0     pravastatin 40 MG tablet  Commonly known as: PRAVACHOL  Used for: Hyperlipidemia with target LDL less than 100      TAKE 1 TABLET DAILY  Quantity: 90 tablet  Refills: 1     SENNA-docusate sodium 8.6-50 MG tablet  Commonly known as: SENNA S      Dose: 1 tablet  Take 1 tablet by mouth 2 times daily  Refills: 0     spironolactone 25 MG tablet  Commonly known as: ALDACTONE      Dose: 12.5 mg  Take 12.5 mg by mouth daily  Refills: 0     triamcinolone 0.025 % cream  Commonly known as: KENALOG      Apply topically every 12 hours as needed to rash to the affected area prn, apply tin layer to the rash at the LE.  Refills: 0           Case Management:  I have reviewed the care plan and MDS and do agree with the plan. Patient's desire to return to the community is present, but is not able due to care needs . Information reviewed:  Medications, vital signs, orders, and nursing notes.    ROS  Unobtainable secondary to cognitive impairment.     Vitals:  /70   Temp 97  F (36.1  C)   Resp 18   Ht 1.753 m (5' 9\")   Wt 76.9 kg (169 lb 9.6 oz)   SpO2 100%   BMI 25.05 kg/m    Body mass index is 25.05 kg/m .  Exam:  GENERAL APPEARANCE:  Alert, in no distress  ENT:  Mouth and posterior oropharynx normal, moist mucous membranes, [place]  RESP:  respiratory effort and palpation of chest normal, lungs clear to auscultation   CV:  Palpation and auscultation of heart done , regular rate and rhythm, no murmur, rub, or gallop  M/S:   Gait and station normal  Digits and nails normal  SKIN:  Inspection of skin and subcutaneous tissue baseline, Palpation of skin and subcutaneous tissue baseline  NEURO:   Cranial nerves 2-12 are " normal tested and grossly at patient's baseline  PSYCH:  memory impaired , affect and mood normal, sad    Lab/Diagnostic data:   Labs done in SNF are in [facility]. Please refer to them using [facility]/Care Everywhere. and Recent labs in [facility] reviewed by me today.     ASSESSMENT/PLAN  (M79.604,  M79.605) Bilateral leg pain  (primary encounter diagnosis)  Comment: New onset of leg pain, likely positioning given improvement with movement. Calf pain- no obvious sign of DVT. Son reports resident told him about last week.   Plan:   -Venous ultrasound bilateral lower extremities to r/o DVT. Is currently anticoagulated.   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 Has had recent 6 lbs weight loss since September. Reports he has no interest in eating. Likely d/t progression of diease.   Plan:   -Monitor   -Boost 8 oz daily.     (E03.9) Hypothyroidism, unspecified type  Comment: Chronic, last TSH 5.33   Plan:   -Continue synthroid 100 mcg/day  -TSH next lab day       Orders:  1. Boost 8 oz daily   2. TSH dx: hypothyroidism   3. Acetaminophen 650 mg QAM  4. Bilateral Venous Ultrasound lower extremities knee down.    Son Pako aware of medication changes and orders   Greater than 50% of total time spent with counseling and coordinating care due to conversation with vasyl Min- 17 minutes in duration. Discussing new weight loss and bilateral lower extremity pain.         Electronically signed by:  ANGEL Corrigan CNP  [facility]

## 2021-11-09 NOTE — LETTER
11/9/2021        RE: Earl Sanchez  [facility]  [address] [facility] [address]         HEALTH GERIATRIC SERVICES  Chief Complaint   Patient presents with     [facility]:  [id number]  Place of Service where encounter took place:  [facility] () [[id number]]    HPI:    Earl Sanchez  is 89 year old (9/17/1926), who is being seen today for a federally mandated E/M visit. Today's concerns are:  Continues to be very confused, alert to self only. Last BIMS 9/15. Complaining of bilateral LE pain, thinks it may be because he is resting in wheelchair and they are dangling. Repositioned and stated some relief. Otherwise doing ok.     BP Readings from Last 3 Encounters:   10/28/21 138/70   09/17/21 102/60   07/08/21 126/64     Pulse Readings from Last 4 Encounters:   09/17/21 72   07/08/21 65   05/13/21 72   04/12/21 81     Wt Readings from Last 4 Encounters:   10/28/21 76.9 kg (169 lb 9.6 oz)   09/17/21 77.7 kg (171 lb 6.4 oz)   07/08/21 62.1 kg (136 lb 12.8 oz)   05/13/21 80.2 kg (176 lb 14.4 oz)         ALLERGIES:No known allergies  PAST MEDICAL HISTORY:   Past Medical History:   Diagnosis Date     Abdominal aortic aneurysm (H)     per 2/12/15 abdominal US, mild aneurysm measuring [id number]     Acute MI (H) 1981, 2007     Atrial fibrillation (H)      BPH (benign prostatic hyperplasia)      CAD (coronary artery disease)     CABG 2007, f/b cardio, Dr. 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     Gastritis     treated with zantac     Gastro-oesophageal reflux disease      Heart failure (H)      Hyperlipidaemia LDL goal <100      Hypertension goal BP (blood pressure) < 140/90      Osteoarthritis     low back, hips, knees     Renal disease     renal insuff     Thyroid disease      PAST SURGICAL HISTORY:   has a past surgical history that includes  hernia repair, inguinal rt/lt; Ankle surgery; Phacoemulsification clear cornea with toric intraocular lens implant (4/10/2014); Phacoemulsification clear cornea with toric intraocular lens implant (4/22/2014); coronary artery bypass (2007); and Heart Cath Left heart cath (12/10/07).  FAMILY HISTORY: family history includes Hypertension in his maternal grandmother and mother.  SOCIAL HISTORY:  reports that he has never smoked. He has never used smokeless tobacco. He reports current alcohol use. He reports that he does not use drugs.    MEDICATIONS:     Review of your medicines          Accurate as of November 9, 2021  8:56 AM. If you have any questions, ask your nurse or doctor.            CONTINUE these medicines which have NOT CHANGED      Dose / Directions   acetaminophen 500 MG tablet  Commonly known as: TYLENOL  Used for: Pain      Dose: 1,000 mg  Take 2 tablets (1,000 mg) by mouth every 6 hours as needed for mild pain  Refills: 0     allopurinol 100 MG tablet  Commonly known as: ZYLOPRIM  Used for: Chronic gout without tophus, unspecified cause, unspecified site      Dose: 100 mg  Take 1 tablet (100 mg) by mouth daily  Refills: 0     Aspirin Low Dose 81 MG tablet  Generic drug: aspirin      Dose: 81 mg  Take 81 mg by mouth daily  Refills: 0     doxazosin 4 MG tablet  Commonly known as: CARDURA  Used for: Benign non-nodular prostatic hyperplasia without lower urinary tract symptoms      Dose: 4 mg  Take 1 tablet (4 mg) by mouth At Bedtime  Quantity: 90 tablet  Refills: 3     ERGOCALCIFEROL PO      Dose: 50,000 Units  Take 50,000 Units by mouth every morning Wednesday  Refills: 0     furosemide 10 mg Tabs half-tab  Commonly known as: LASIX      Dose: 5 mg  Take 5 mg by mouth every other day  Refills: 0     LEVOTHYROXINE SODIUM PO      Dose: 88 mcg  Take 88 mcg by mouth daily  Refills: 0     nitroGLYcerin 0.4 MG sublingual tablet  Commonly known as: NITROSTAT  Used for: CAD (coronary artery disease)      Dose: 0.4  "mg  Place 1 tablet (0.4 mg) under the tongue every 5 minutes as needed for chest pain  Quantity: 25 tablet  Refills: 0     phenylephrine-mineral oil-petrolatum 0.25-14-74.9 % rectal ointment  Commonly known as: PREPARATION H      Dose: 1 applicator  Place 1 applicator rectally 4 times daily as needed for hemorrhoids for Hemorrhoids  Refills: 0     polyethylene glycol 3350 Powd      Dose: 17 g  Take 17 g by mouth daily  Refills: 0     pravastatin 40 MG tablet  Commonly known as: PRAVACHOL  Used for: Hyperlipidemia with target LDL less than 100      TAKE 1 TABLET DAILY  Quantity: 90 tablet  Refills: 1     SENNA-docusate sodium 8.6-50 MG tablet  Commonly known as: SENNA S      Dose: 1 tablet  Take 1 tablet by mouth 2 times daily  Refills: 0     spironolactone 25 MG tablet  Commonly known as: ALDACTONE      Dose: 12.5 mg  Take 12.5 mg by mouth daily  Refills: 0     triamcinolone 0.025 % cream  Commonly known as: KENALOG      Apply topically every 12 hours as needed to rash to the affected area prn, apply tin layer to the rash at the LE.  Refills: 0           Case Management:  I have reviewed the care plan and MDS and do agree with the plan. Patient's desire to return to the community is present, but is not able due to care needs . Information reviewed:  Medications, vital signs, orders, and nursing notes.    ROS  Unobtainable secondary to cognitive impairment.     Vitals:  /70   Temp 97  F (36.1  C)   Resp 18   Ht 1.753 m (5' 9\")   Wt 76.9 kg (169 lb 9.6 oz)   SpO2 100%   BMI 25.05 kg/m    Body mass index is 25.05 kg/m .  Exam:  GENERAL APPEARANCE:  Alert, in no distress  ENT:  Mouth and posterior oropharynx normal, moist mucous membranes, [place]  RESP:  respiratory effort and palpation of chest normal, lungs clear to auscultation   CV:  Palpation and auscultation of heart done , regular rate and rhythm, no murmur, rub, or gallop  M/S:   Gait and station normal  Digits and nails normal  SKIN:  Inspection of " skin and subcutaneous tissue baseline, Palpation of skin and subcutaneous tissue baseline  NEURO:   Cranial nerves 2-12 are normal tested and grossly at patient's baseline  PSYCH:  memory impaired , affect and mood normal, sad    Lab/Diagnostic data:   Labs done in SNF are in [facility]. Please refer to them using [facility]/Care Everywhere. and Recent labs in [facility] reviewed by me today.     ASSESSMENT/PLAN  (M79.604,  M79.605) Bilateral leg pain  (primary encounter diagnosis)  Comment: New onset of leg pain, likely positioning given improvement with movement. Calf pain- no obvious sign of DVT. Son reports resident told him about last week.   Plan:   -Venous ultrasound bilateral lower extremities to r/o DVT. Is currently anticoagulated.   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 Has had recent 6 lbs weight loss since September. Reports he has no interest in eating. Likely d/t progression of diease.   Plan:   -Monitor   -Boost 8 oz daily.     (E03.9) Hypothyroidism, unspecified type  Comment: Chronic, last TSH 5.33   Plan:   -Continue synthroid 100 mcg/day  -TSH next lab day       Orders:  1. Boost 8 oz daily   2. TSH dx: hypothyroidism   3. Acetaminophen 650 mg QAM  4. Bilateral Venous Ultrasound lower extremities knee down.    Jesus Min aware of medication changes and orders   Greater than 50% of total time spent with counseling and coordinating care due to conversation with jesus Min- 17 minutes in duration. Discussing new weight loss and bilateral lower extremity pain.         Electronically signed by:  ANGEL Corrigan CNP  [facility]               Sincerely,        Christiana Osuna NP     [facility]

## 2021-11-13 NOTE — TELEPHONE ENCOUNTER
[facility] Nurse Triage Telephone Note    Provider: ANGEL Xiong CNP  Facility: [facility]            Facility Type:  [facility]    Caller: Victorina  Call Back Number:  [phone]    Allergies:    Allergies   Allergen Reactions     No Known Allergies         Reason for call: TSH 5.99 on 100mcg every day. (Previous TSH 4.54    Verbal Order/Direction given by Provider: Increase to 112mcg every day. Recheck TSH 12/14/21.    Provider giving Order:  ANGEL Xiong CNP    Verbal Order given to: Victorina Thomas RN

## 2021-11-22 NOTE — TELEPHONE ENCOUNTER
Patient almost out of supply, request 2 weeks supply send to local pharmacy for coverage while waiting for mail order pharmacy to deliver.    20 tablets approved as requested    Ellen Negro RN        
Detail Level: Detailed

## 2021-12-09 NOTE — TELEPHONE ENCOUNTER
[facility] Nurse Triage Telephone Note    Provider: ANGEL Xiong CNP  Facility: [facility]              Facility Type:  [facility]    Caller: Gustavo  Call Back Number: [phone]    Allergies:    Allergies   Allergen Reactions     No Known Allergies         Reason for call: Reported urine is dark & foul. AFeb, no pang. No other symptoms. Appetite 50%. Reported pt has H/O UTI's. (Last one 7/21 but showed mixture of organisms)    Verbal Order/Direction given by Provider: Encourage fluids & monitor for any other symptoms.  If other symptoms call back tomorrow    Provider giving Order:  ANGEL Xiong CNP    Verbal Order given to: Gustavo Thomas RN

## 2021-12-10 NOTE — LETTER
12/10/2021        RE: Earl Sanchez  [facility]  [address]  [facility] [address]        [facility]    Chief Complaint   Patient presents with     RECHECK     HPI:  Earl Sanchez is a 89 year old  (9/17/1926), who is being seen today for an episodic care visit at: [facility] () [[id number]]. Today's concern is: Resident with dark, foul smelling urine. Staff have been encouraging fluids without changes in condition. Has a personal history of UTI. NO further concerns and vital signs are within normal range. Sleeping upon arrival, difficult to arouse. Varied HPI d/t degree of cognitive impairment.     BP Readings from Last 3 Encounters:   12/02/21 128/83   10/28/21 138/70   09/17/21 102/60     Pulse Readings from Last 4 Encounters:   12/02/21 66   09/17/21 72   07/08/21 65   05/13/21 72     Wt Readings from Last 4 Encounters:   10/28/21 76.9 kg (169 lb 9.6 oz)   09/17/21 77.7 kg (171 lb 6.4 oz)   07/08/21 62.1 kg (136 lb 12.8 oz)   05/13/21 80.2 kg (176 lb 14.4 oz)         Allergies, and PMH/PSH reviewed in EPIC today.  REVIEW OF SYSTEMS:  Unobtainable secondary to cognitive impairment.     Objective:   /83   Pulse 66   Temp 97.1  F (36.2  C)   Resp 17   SpO2 95%   GENERAL APPEARANCE:  Alert  ENT:  Mouth and posterior oropharynx normal, moist mucous membranes, [place]  RESP:  respiratory effort and palpation of chest normal, lungs clear to auscultation   CV:  Palpation and auscultation of heart done , regular rate and rhythm, no murmur, rub, or gallop  M/S:   Gait and station normal  Digits and nails normal  SKIN:  Inspection of skin and subcutaneous tissue baseline, Palpation of skin and subcutaneous tissue baseline  NEURO:   Cranial nerves 2-12 are normal tested and grossly at patient's baseline  PSYCH:  memory impaired , affect and mood normal    Labs done in SNF are in [facility]. Please refer to them using [facility]/Care  Everywhere. and Recent labs in [facility] reviewed by me today.     Assessment/Plan:    ICD-10-CM    1. Dysuria  R30.0    2. Personal history of urinary tract infection  Z87.440    Acute cloudy/foul urine, has personal history of UTI. Family is aware and concerned.   -UA/UC to r/o acute cystitis       Orders:  UA/UC    Electronically signed by:  ANGEL Corrigan [facility]             Sincerely,        Christiana Osuna, NP

## 2021-12-10 NOTE — PROGRESS NOTES
[facility] GERIATRIC SERVICES    Chief Complaint   Patient presents with     RECHECK     HPI:  Earl Sanchez is a 89 year old  (9/17/1926), who is being seen today for an episodic care visit at: [facility] () [[id number]]. Today's concern is: Resident with dark, foul smelling urine. Staff have been encouraging fluids without changes in condition. Has a personal history of UTI. NO further concerns and vital signs are within normal range. Sleeping upon arrival, difficult to arouse. Varied HPI d/t degree of cognitive impairment.     BP Readings from Last 3 Encounters:   12/02/21 128/83   10/28/21 138/70   09/17/21 102/60     Pulse Readings from Last 4 Encounters:   12/02/21 66   09/17/21 72   07/08/21 65   05/13/21 72     Wt Readings from Last 4 Encounters:   10/28/21 76.9 kg (169 lb 9.6 oz)   09/17/21 77.7 kg (171 lb 6.4 oz)   07/08/21 62.1 kg (136 lb 12.8 oz)   05/13/21 80.2 kg (176 lb 14.4 oz)         Allergies, and PMH/PSH reviewed in [facility] today.  REVIEW OF SYSTEMS:  Unobtainable secondary to cognitive impairment.     Objective:   /83   Pulse 66   Temp 97.1  F (36.2  C)   Resp 17   SpO2 95%   GENERAL APPEARANCE:  Alert  ENT:  Mouth and posterior oropharynx normal, moist mucous membranes, [place]  RESP:  respiratory effort and palpation of chest normal, lungs clear to auscultation   CV:  Palpation and auscultation of heart done , regular rate and rhythm, no murmur, rub, or gallop  M/S:   Gait and station normal  Digits and nails normal  SKIN:  Inspection of skin and subcutaneous tissue baseline, Palpation of skin and subcutaneous tissue baseline  NEURO:   Cranial nerves 2-12 are normal tested and grossly at patient's baseline  PSYCH:  memory impaired , affect and mood normal    Labs done in SNF are in [facility]. Please refer to them using EPIC/Care Everywhere. and Recent labs in [facility] reviewed by me today.     Assessment/Plan:    ICD-10-CM    1. Dysuria  R30.0    2. Personal history of  urinary tract infection  Z87.440    Acute cloudy/foul urine, has personal history of UTI. Family is aware and concerned.   -UA/UC to r/o acute cystitis       Orders:  UA/UC    Electronically signed by:  ANGEL Corrigan [facility]

## 2021-12-29 NOTE — TELEPHONE ENCOUNTER
[facility] Nurse Triage Telephone Note    Provider: ANGEL Xiong CNP  Facility: [facility] Facility Type:  [facility]    Caller: Victorina  Call Back Number: [phone]    Allergies:    Allergies   Allergen Reactions     No Known Allergies         Reason for call: Nurse reporting that patient continues to moan frequently.  Staff and family are presuming that he's moaning due to pain.  Currently on Tylenol 650mg BID.  Patient's family is requesting a stronger pain med.  Of note, patient refused meds today.  Patient also noted to be eating less lately.      Verbal Order/Direction given by Provider: Tramadol 25mg BID and daily PRN.      Provider giving Order:  ANGEL Xiong CNP    Verbal Order given to: Devon Garcia RN

## 2021-12-30 NOTE — TELEPHONE ENCOUNTER
[facility] Nurse Triage Telephone Note    Provider: ANGEL Xoing CNP  Facility: [facility] Facility Type:  [facility]    Caller: Elif   Call Back Number: [phone]    Allergies:    Allergies   Allergen Reactions     No Known Allergies         Reason for call:   Pt noted to have Wt today of 153.7 and last week the pt's wt was 166.1. The pt has been refusing to eat and needing more help lately. VS stable.     Verbal Order/Direction given by Provider: Straight Cath UA/UC, CBC and CMP 12/31    Provider giving Order:  Patty Gamble MD    Verbal Order given to: Elif Castro RN

## 2021-12-30 NOTE — TELEPHONE ENCOUNTER
[facility] nursing staff called about pt who has declined, sleeping more, now not eating very well.  Weight down significantly.   Labs ordered to rule out any reversible cause.     I talked with his daughter Shasta by phone about pt's decline and plan.   If labs do not show anything easily treatable, family would be interested in Hospice referral     Patty Gamble MD

## 2021-12-31 NOTE — TELEPHONE ENCOUNTER
[facility] Triage Nurse Telephone Encounter    Provider: ANGEL Xiong CNP  Facility: [facility] Facility Type:  [facility]    Caller: Victorina  Call Back Number: [phone]    Allergies:    Allergies   Allergen Reactions     No Known Allergies         Reason for call: Nurse is calling to report UA results.  Nurse states that patient is moaning, presumably with voiding.      Verbal Order/Direction given by Provider: Cipro 500mg daily x 7 days.  Call with UC and sensitivities if not sensitive to Cipro.      Provider giving Order:  ANGEL Xiong CNP    Verbal Order given to: Carlos Garcia RN

## 2022-01-01 ENCOUNTER — NURSING HOME VISIT (OUTPATIENT)
Dept: GERIATRICS | Facility: CLINIC | Age: 87
End: 2022-01-01
Payer: COMMERCIAL

## 2022-01-01 ENCOUNTER — TELEPHONE (OUTPATIENT)
Dept: GERIATRICS | Facility: CLINIC | Age: 87
End: 2022-01-01

## 2022-01-01 VITALS
RESPIRATION RATE: 16 BRPM | HEIGHT: 69 IN | BODY MASS INDEX: 22.8 KG/M2 | OXYGEN SATURATION: 93 % | DIASTOLIC BLOOD PRESSURE: 62 MMHG | SYSTOLIC BLOOD PRESSURE: 93 MMHG | TEMPERATURE: 97.3 F | WEIGHT: 153.9 LBS | HEART RATE: 82 BPM

## 2022-01-01 VITALS — OXYGEN SATURATION: 96 % | HEART RATE: 72 BPM | TEMPERATURE: 96.8 F | RESPIRATION RATE: 18 BRPM

## 2022-01-01 DIAGNOSIS — F43.23 ADJUSTMENT DISORDER WITH MIXED ANXIETY AND DEPRESSED MOOD: ICD-10-CM

## 2022-01-01 DIAGNOSIS — F02.818 LATE ONSET ALZHEIMER'S DISEASE WITH BEHAVIORAL DISTURBANCE (H): Primary | ICD-10-CM

## 2022-01-01 DIAGNOSIS — M79.604 BILATERAL LEG PAIN: ICD-10-CM

## 2022-01-01 DIAGNOSIS — M79.605 BILATERAL LEG PAIN: ICD-10-CM

## 2022-01-01 DIAGNOSIS — Z51.5 HOSPICE CARE: ICD-10-CM

## 2022-01-01 DIAGNOSIS — E43 SEVERE PROTEIN-CALORIE MALNUTRITION (H): Primary | ICD-10-CM

## 2022-01-01 DIAGNOSIS — N18.30 BENIGN HYPERTENSION WITH CHRONIC KIDNEY DISEASE, STAGE III (H): ICD-10-CM

## 2022-01-01 DIAGNOSIS — G30.1 LATE ONSET ALZHEIMER'S DISEASE WITH BEHAVIORAL DISTURBANCE (H): Primary | ICD-10-CM

## 2022-01-01 DIAGNOSIS — I12.9 BENIGN HYPERTENSION WITH CHRONIC KIDNEY DISEASE, STAGE III (H): ICD-10-CM

## 2022-01-01 DIAGNOSIS — I48.20 CHRONIC ATRIAL FIBRILLATION (H): ICD-10-CM

## 2022-01-01 DIAGNOSIS — G30.1 LATE ONSET ALZHEIMER'S DISEASE WITH BEHAVIORAL DISTURBANCE (H): ICD-10-CM

## 2022-01-01 DIAGNOSIS — I50.22 CHRONIC SYSTOLIC CONGESTIVE HEART FAILURE (H): ICD-10-CM

## 2022-01-01 DIAGNOSIS — F02.818 LATE ONSET ALZHEIMER'S DISEASE WITH BEHAVIORAL DISTURBANCE (H): ICD-10-CM

## 2022-01-01 LAB
ALBUMIN SERPL-MCNC: 3.4 G/DL (ref 3.5–5)
ALP SERPL-CCNC: 66 U/L (ref 45–120)
ALT SERPL W P-5'-P-CCNC: <9 U/L (ref 0–45)
ANION GAP SERPL CALCULATED.3IONS-SCNC: 11 MMOL/L (ref 5–18)
AST SERPL W P-5'-P-CCNC: 17 U/L (ref 0–40)
BACTERIA UR CULT: ABNORMAL
BILIRUB SERPL-MCNC: 0.6 MG/DL (ref 0–1)
BUN SERPL-MCNC: 83 MG/DL (ref 8–28)
CALCIUM SERPL-MCNC: 10.8 MG/DL (ref 8.5–10.5)
CHLORIDE BLD-SCNC: 104 MMOL/L (ref 98–107)
CO2 SERPL-SCNC: 21 MMOL/L (ref 22–31)
CREAT SERPL-MCNC: 2.73 MG/DL (ref 0.7–1.3)
ERYTHROCYTE [DISTWIDTH] IN BLOOD BY AUTOMATED COUNT: 15.2 % (ref 10–15)
GFR SERPL CREATININE-BSD FRML MDRD: 21 ML/MIN/1.73M2
GLUCOSE BLD-MCNC: 89 MG/DL (ref 70–125)
HCT VFR BLD AUTO: 37.8 % (ref 40–53)
HGB BLD-MCNC: 11.8 G/DL (ref 13.3–17.7)
MCH RBC QN AUTO: 32.1 PG (ref 26.5–33)
MCHC RBC AUTO-ENTMCNC: 31.2 G/DL (ref 31.5–36.5)
MCV RBC AUTO: 103 FL (ref 78–100)
PLATELET # BLD AUTO: 293 10E3/UL (ref 150–450)
POTASSIUM BLD-SCNC: 6 MMOL/L (ref 3.5–5)
PROT SERPL-MCNC: 8 G/DL (ref 6–8)
RBC # BLD AUTO: 3.68 10E6/UL (ref 4.4–5.9)
SODIUM SERPL-SCNC: 136 MMOL/L (ref 136–145)
WBC # BLD AUTO: 7.8 10E3/UL (ref 4–11)

## 2022-01-01 PROCEDURE — P9604 ONE-WAY ALLOW PRORATED TRIP: HCPCS | Mod: ORL | Performed by: NURSE PRACTITIONER

## 2022-01-01 PROCEDURE — 99309 SBSQ NF CARE MODERATE MDM 30: CPT | Performed by: NURSE PRACTITIONER

## 2022-01-01 PROCEDURE — 85027 COMPLETE CBC AUTOMATED: CPT | Mod: ORL | Performed by: NURSE PRACTITIONER

## 2022-01-01 PROCEDURE — 99309 SBSQ NF CARE MODERATE MDM 30: CPT | Performed by: INTERNAL MEDICINE

## 2022-01-01 PROCEDURE — 80053 COMPREHEN METABOLIC PANEL: CPT | Mod: ORL | Performed by: NURSE PRACTITIONER

## 2022-01-01 PROCEDURE — 36415 COLL VENOUS BLD VENIPUNCTURE: CPT | Mod: ORL | Performed by: NURSE PRACTITIONER

## 2022-01-01 ASSESSMENT — MIFFLIN-ST. JEOR: SCORE: 1323.47

## 2022-01-03 NOTE — TELEPHONE ENCOUNTER
[facility] Triage Nurse Telephone Encounter    Provider: ANGEL Xiong CNP  Facility: [facility] Facility Type:  LTC    Caller: Elif   Call Back Number: [phone]    Allergies:    Allergies   Allergen Reactions     No Known Allergies         Reason for call: CBC- stable and comparable.   CMP- shows worsening kidney function with elevated potassium level, pt is also on cipro for UTI and nursing is wondering about an abx timeout. VS stable, today the pt had about 120ccof fluids but still very tired.     Verbal Order/Direction given by Provider: Discontinue asa, allopurinol, cipro, cardura, Vit d, levothyroxine, pravastatin, spironolactone, lasix. Obtain a hospice consult eval and treat.     Provider giving Order:  Patty Gamble MD    Verbal Order given to: Elif Castro RN

## 2022-01-06 NOTE — PROGRESS NOTES
[facility] GERIATRIC SERVICES    Chief Complaint   Patient presents with     RECHECK     HPI:  Earl Sanchez is a 89 year old  (9/17/1926), who is being seen today for an episodic care visit at: No question data found.. Today's concern is:  -Per staff report, resident has been moaning frequently; unable to articulate location/cause 2/2 severe cognitive impairment. Due to admit with [facility] this week or early next. Some grimacing noted during today's exam.   -Non-verbal     Allergies, and PMH/PSH reviewed in EPIC today.  REVIEW OF SYSTEMS:  Unobtainable secondary to cognitive impairment.  and Unobtainable secondary to aphasia.     Objective:   Pulse 72   Temp 96.8  F (36  C)   Resp 18   SpO2 96%   GENERAL APPEARANCE:  Alert  ENT:  Mouth and posterior oropharynx normal, moist mucous membranes, [place]  RESP:  respiratory effort and palpation of chest normal, lungs clear to auscultation   CV:  Palpation and auscultation of heart done , regular rate and rhythm, no murmur, rub, or gallop  M/S:   Gait and station normal  Digits and nails normal  SKIN:  Inspection of skin and subcutaneous tissue baseline, Palpation of skin and subcutaneous tissue baseline  NEURO:   Cranial nerves 2-12 are normal tested and grossly at patient's baseline  PSYCH:  memory impaired , affect and mood normal    Labs done in SNF are in [facility]. Please refer to them using [facility]Care Everywhere. and Recent labs in [facility] reviewed by me today.     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 Non-verbal  Plan:   -Hospice admission tomorrow, continue LTC support with medication administration, meals and safety.     (M79.604,  M79.605) Bilateral leg pain  Comment: Acute   Plan:   -Morphine solution 20mg/ml: give 0.25 ml every 6 hours. Hospice to admit tomorrow at 10 am.     (F43.23) Adjustment disorder  with mixed anxiety and depressed mood  Comment: Stable   Plan: no change         Orders:  See above for new orders     Electronically signed by:   ANGEL Corrigan CNP  [facility]

## 2022-01-06 NOTE — LETTER
1/6/2022        RE: Earl Sanchez  [facility]  [address]  [facility] [facility] [address]        [facility]    Chief Complaint   Patient presents with     RECHECK     HPI:  Earl Sanchez is a 89 year old  (9/17/1926), who is being seen today for an episodic care visit at: No question data found.. Today's concern is:  -Per staff report, resident has been moaning frequently; unable to articulate location/cause 2/2 severe cognitive impairment. Due to admit with [facility] this week or early next. Some grimacing noted during today's exam.   -Non-verbal     Allergies, and PMH/PSH reviewed in EPIC today.  REVIEW OF SYSTEMS:  Unobtainable secondary to cognitive impairment.  and Unobtainable secondary to aphasia.     Objective:   Pulse 72   Temp 96.8  F (36  C)   Resp 18   SpO2 96%   GENERAL APPEARANCE:  Alert  ENT:  Mouth and posterior oropharynx normal, moist mucous membranes, [place]  RESP:  respiratory effort and palpation of chest normal, lungs clear to auscultation   CV:  Palpation and auscultation of heart done , regular rate and rhythm, no murmur, rub, or gallop  M/S:   Gait and station normal  Digits and nails normal  SKIN:  Inspection of skin and subcutaneous tissue baseline, Palpation of skin and subcutaneous tissue baseline  NEURO:   Cranial nerves 2-12 are normal tested and grossly at patient's baseline  PSYCH:  memory impaired , affect and mood normal    Labs done in SNF are in [facility]. Please refer to them using [facility]/Care Everywhere. and Recent labs in [facility] reviewed by me today.     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 Non-verbal  Plan:   -Hospice admission tomorrow, continue LTC support with medication administration, meals and safety.     (M79.604,  M79.605) Bilateral leg pain  Comment: Acute    Plan:   -Morphine solution 20mg/ml: give 0.25 ml every 6 hours. Hospice to admit tomorrow at 10 am.     (F43.23) Adjustment disorder with mixed anxiety and depressed mood  Comment: Stable   Plan: no change         Orders:  See above for new orders     Electronically signed by:   ANGEL Corrigan [facility]             Sincerely,        Christiana Osuna NP

## 2022-01-07 NOTE — LETTER
"    1/7/2022        RE: Earl Sanchez  [facility]  [address]  [facility] [address]        Earl Sanchez is a 89 year old male seen January 7, 2022 at Health system where he has resided for 4 years (admit 2/2018) seen to follow up dementia with recent decline    Pt is seen in his room resting abed, very still.   Does answer a few questions though, states he is \"pretty good\"  No pain, says yes to comfortable? And he doesn't need anything   Then a few minutes after leaving the room pt can be heard making a moaning sound and calling out, which nursing staff reports he does intermittently.   Difficult to know what he means by this    He has stopped eating and refuses medications, no longer getting out of bed.   Labs show significant dehydration with hyperkalemia.   Family is meeting with Hospice this morning                   Patient had a hospitalization in January 2018 for encephalopathy with gait disturbance.  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  He was in [place] [facility] for a month, improved, but needed more support than AL, so transitioned to LT for permanent placement.  Continues to need assist for transfers bed to  and all cares.      Patient has had atrial fib and been anticoagulated with warfarin for many years.   Also treated for BPH, CAD and hypothyroidism, which have been stable.     He also has had unresponsive episodes, left facial droop, dysarthria and bilateral arm weakness.   His family was present during one of these and 911 was called. However, when EMTs arrived patient refused transport to ED.   Symptoms resolved on their own.  Pt also had an ED visit in March 2019 for an episode of unresponsiveness that persisted in the ED.   He had head CT, then awoke and responded to questions.   Workup unremarkable and he returned to [facility].      Past Medical History:   Diagnosis Date     Abdominal aortic " "aneurysm (H)     per 2/12/15 abdominal US, mild aneurysm measuring [id number]     Acute MI 1981, 2007     Atrial fibrillation (H)      BPH (benign prostatic hyperplasia)      CAD (coronary artery disease)     CABG 2007, f/b cardio, Dr. 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    , lived at the [facility] with services until 1/2018.      Has 2 sons and a daughter.        Review Of Systems  Limited, but negative other than above.         SLUMS 13/30   CPT 4.4   BIMS 11/15 in 2018  Wt Readings from Last 5 Encounters:   01/07/22 69.8 kg (153 lb 14.4 oz)   10/28/21 76.9 kg (169 lb 9.6 oz)   09/17/21 77.7 kg (171 lb 6.4 oz)   07/08/21 62.1 kg (136 lb 12.8 oz)   05/13/21 80.2 kg (176 lb 14.4 oz)      EXAM: NAD  BP 93/62   Pulse 82   Temp 97.3  F (36.3  C)   Resp 16   Ht 1.753 m (5' 9\")   Wt 69.8 kg (153 lb 14.4 oz)   SpO2 93%   BMI 22.73 kg/m     Neck supple without adenopathy  Lungs decreased BS diffusely  Heart irreg irreg s1s2, 1/6 HSM  Abd soft, NT, no distention, +BS  Ext without edema.   Neuro: no tremor or stiffness, +STML   Psych: affect okay     Last Comprehensive Metabolic Panel:  Sodium   Date Value Ref Range Status   01/03/2022 136 136 - 145 mmol/L Final     Potassium   Date Value Ref Range Status   01/03/2022 6.0 (H) 3.5 - 5.0 mmol/L Final     Carbon Dioxide (CO2)   Date Value Ref Range Status   01/03/2022 21 (L) 22 - 31 mmol/L Final     Glucose   Date Value Ref Range Status   01/03/2022 89 70 - 125 mg/dL Final     Urea Nitrogen   Date Value Ref Range Status   01/03/2022 83 (H) 8 - 28 mg/dL Final "     Creatinine   Date Value Ref Range Status   01/03/2022 2.73 (H) 0.70 - 1.30 mg/dL Final   04/29/2021 1.52 (H) 0.70 - 1.30 mg/dL Final     GFR Estimate   Date Value Ref Range Status   01/03/2022 21 (L) >60 mL/min/1.73m2 Final     Calcium   Date Value Ref Range Status   01/03/2022 10.8 (H) 8.5 - 10.5 mg/dL Final     Lab Results   Component Value Date    WBC 7.8 01/03/2022      HGB 11.8 01/03/2022       01/03/2022       01/03/2022         IMP/PLAN:  (E43) Severe protein-calorie malnutrition (H)  (primary encounter diagnosis)  (Z51.5) Hospice care  Comment: significant weight loss, dehydration and failure to thrive at end of life     Plan: Signed on to Hospice today.   Comfort focus with prns available for pain or anxiety.         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  Less behavioral symptoms now, at end stages of his disease.    Plan: LTC support for meds, meals, activity.       (I48.2) Chronic atrial fibrillation (H)  Comment: not requiring rate slowing meds   Pulse Readings from Last 4 Encounters:   01/07/22 82   01/05/22 72   12/02/21 66   09/17/21 72      Plan: apixaban d/c'd secondary to goals of care    (I25.10) Coronary artery disease involving native coronary artery of native heart without angina pectoris  Comment: h/o CABG 2007  Plan: comfort focus   All medications d/c'd except those for comfort.        Patty Gamble MD           Sincerely,        Patty Gamble MD

## 2022-01-11 PROBLEM — F32.0 CURRENT MILD EPISODE OF MAJOR DEPRESSIVE DISORDER WITHOUT PRIOR EPISODE (H): Status: ACTIVE | Noted: 2022-01-01

## 2022-01-11 PROBLEM — E46 PROTEIN-CALORIE MALNUTRITION (H): Status: ACTIVE | Noted: 2022-01-01

## 2022-01-11 PROBLEM — I50.22 CHRONIC SYSTOLIC CONGESTIVE HEART FAILURE (H): Status: ACTIVE | Noted: 2022-01-01

## 2022-01-11 PROBLEM — Z51.5 HOSPICE CARE: Status: ACTIVE | Noted: 2022-01-01

## 2022-01-11 NOTE — PROGRESS NOTES
"Earl Sanchez is a 89 year old male seen January 7, 2022 at [facility] where he has resided for 4 years (admit 2/2018) seen to follow up dementia with recent decline    Pt is seen in his room resting abed, very still.   Does answer a few questions though, states he is \"pretty good\"  No pain, says yes to comfortable? And he doesn't need anything   Then a few minutes after leaving the room pt can be heard making a moaning sound and calling out, which nursing staff reports he does intermittently.   Difficult to know what he means by this    He has stopped eating and refuses medications, no longer getting out of bed.   Labs show significant dehydration with hyperkalemia.   Family is meeting with Hospice this morning                   Patient had a hospitalization in January 2018 for encephalopathy with gait disturbance.  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  He was in [place] [facility] for a month, improved, but needed more support than AL, so transitioned to LTC for permanent placement.  Continues to need assist for transfers bed to  and all cares.      Patient has had atrial fib and been anticoagulated with warfarin for many years.   Also treated for BPH, CAD and hypothyroidism, which have been stable.     He also has had unresponsive episodes, left facial droop, dysarthria and bilateral arm weakness.   His family was present during one of these and 911 was called. However, when EMTs arrived patient refused transport to ED.   Symptoms resolved on their own.  Pt also had an ED visit in March 2019 for an episode of unresponsiveness that persisted in the ED.   He had head CT, then awoke and responded to questions.   Workup unremarkable and he returned to [facility].      Past Medical History:   Diagnosis Date     Abdominal aortic aneurysm (H)     per 2/12/15 abdominal US, mild aneurysm measuring [id number]     Acute MI 1981, 2007     Atrial fibrillation (H)      " "BPH (benign prostatic hyperplasia)      CAD (coronary artery disease)     CABG 2007, f/b cardio, Dr. 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    , lived at the [facility] with services until 1/2018.      Has 2 sons and a daughter.        Review Of Systems  Limited, but negative other than above.         SLUMS 13/30   CPT 4.4   BIMS 11/15 in 2018  Wt Readings from Last 5 Encounters:   01/07/22 69.8 kg (153 lb 14.4 oz)   10/28/21 76.9 kg (169 lb 9.6 oz)   09/17/21 77.7 kg (171 lb 6.4 oz)   07/08/21 62.1 kg (136 lb 12.8 oz)   05/13/21 80.2 kg (176 lb 14.4 oz)      EXAM: NAD  BP 93/62   Pulse 82   Temp 97.3  F (36.3  C)   Resp 16   Ht 1.753 m (5' 9\")   Wt 69.8 kg (153 lb 14.4 oz)   SpO2 93%   BMI 22.73 kg/m     Neck supple without adenopathy  Lungs decreased BS diffusely  Heart irreg irreg s1s2, 1/6 HSM  Abd soft, NT, no distention, +BS  Ext without edema.   Neuro: no tremor or stiffness, +STML   Psych: affect okay     Last Comprehensive Metabolic Panel:  Sodium   Date Value Ref Range Status   01/03/2022 136 136 - 145 mmol/L Final     Potassium   Date Value Ref Range Status   01/03/2022 6.0 (H) 3.5 - 5.0 mmol/L Final     Carbon Dioxide (CO2)   Date Value Ref Range Status   01/03/2022 21 (L) 22 - 31 mmol/L Final     Glucose   Date Value Ref Range Status   01/03/2022 89 70 - 125 mg/dL Final     Urea Nitrogen   Date Value Ref Range Status   01/03/2022 83 (H) 8 - 28 mg/dL Final     Creatinine   Date Value Ref Range Status   01/03/2022 2.73 (H) 0.70 - 1.30 mg/dL Final   04/29/2021 1.52 (H) 0.70 - 1.30 mg/dL Final     GFR " Estimate   Date Value Ref Range Status   01/03/2022 21 (L) >60 mL/min/1.73m2 Final     Calcium   Date Value Ref Range Status   01/03/2022 10.8 (H) 8.5 - 10.5 mg/dL Final     Lab Results   Component Value Date    WBC 7.8 01/03/2022      HGB 11.8 01/03/2022       01/03/2022       01/03/2022         IMP/PLAN:  (E43) Severe protein-calorie malnutrition (H)  (primary encounter diagnosis)  (Z51.5) Hospice care  Comment: significant weight loss, dehydration and failure to thrive at end of life     Plan: Signed on to Hospice today.   Comfort focus with prns available for pain or anxiety.         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  Less behavioral symptoms now, at end stages of his disease.    Plan: LTC support for meds, meals, activity.       (I48.2) Chronic atrial fibrillation (H)  Comment: not requiring rate slowing meds   Pulse Readings from Last 4 Encounters:   01/07/22 82   01/05/22 72   12/02/21 66   09/17/21 72      Plan: apixaban d/c'd secondary to goals of care    (I25.10) Coronary artery disease involving native coronary artery of native heart without angina pectoris  Comment: h/o CABG 2007  Plan: comfort focus   All medications d/c'd except those for comfort.        Patty Gamble MD

## 2022-01-20 ENCOUNTER — MEDICAL CORRESPONDENCE (OUTPATIENT)
Dept: HEALTH INFORMATION MANAGEMENT | Facility: CLINIC | Age: 87
End: 2022-01-20
Payer: COMMERCIAL

## 2024-06-04 NOTE — PROGRESS NOTES
[facility]    [facility] Medical Record Number:  [id number]  Place of Service where encounter took place:  [facility] ([facility]) [[id number]]    HPI:    Earl Sanchez is a 89 year old  (9/17/1926), who is being seen today for an episodic care visit at the above location.   HPI information obtained from: facility chart records, facility staff and patient report. Today's concern is INR/Coumadin management for A. Fib    Bleeding Signs/Symptoms:  None  Thromboembolic Signs/Symptoms:  None    Medication Changes:  No  Dietary Changes:  No  Activity Changes: No  Bacterial/Viral Infection:  No    Missed Coumadin Doses:  None    On ASA: Yes - 81 mg po q day    Other Concerns:  No    OBJECTIVE:    INR Today:  2.16  Current Dose:   3 mg by mouth at bedtime every Mon, Wed, Fri, Sat, Sun and 2.5 mg by mouth at bedtime every Tue, Thu    ASSESSMENT:    ICD-10-CM    1. Chronic atrial fibrillation (H) I48.2    2. Encounter for therapeutic drug monitoring Z51.81    3. Long term current use of anticoagulant therapy Z79.01        Therapeutic INR for goal of 2-3    PLAN:    New Dose: No Change      Next INR: 3 weeks        Electronically signed by:  ANGEL Corrigan CNP       4 = No assist / stand by assistance

## 2025-04-16 NOTE — Clinical Note
Buck Villeda and Patty,  Dr Sanchez is coming to you at [facility]. Call me with any questions.  (ps: Christiana-congrats!!) Caio
N/A